# Patient Record
Sex: FEMALE | Race: BLACK OR AFRICAN AMERICAN | NOT HISPANIC OR LATINO | URBAN - METROPOLITAN AREA
[De-identification: names, ages, dates, MRNs, and addresses within clinical notes are randomized per-mention and may not be internally consistent; named-entity substitution may affect disease eponyms.]

---

## 2021-03-22 ENCOUNTER — INPATIENT (INPATIENT)
Facility: HOSPITAL | Age: 26
LOS: 3 days | Discharge: HOME | End: 2021-03-26
Attending: STUDENT IN AN ORGANIZED HEALTH CARE EDUCATION/TRAINING PROGRAM | Admitting: STUDENT IN AN ORGANIZED HEALTH CARE EDUCATION/TRAINING PROGRAM
Payer: SELF-PAY

## 2021-03-22 VITALS
HEART RATE: 130 BPM | OXYGEN SATURATION: 100 % | TEMPERATURE: 98 F | DIASTOLIC BLOOD PRESSURE: 95 MMHG | RESPIRATION RATE: 20 BRPM | SYSTOLIC BLOOD PRESSURE: 136 MMHG

## 2021-03-22 DIAGNOSIS — K85.90 ACUTE PANCREATITIS WITHOUT NECROSIS OR INFECTION, UNSPECIFIED: ICD-10-CM

## 2021-03-22 DIAGNOSIS — Z94.5 SKIN TRANSPLANT STATUS: Chronic | ICD-10-CM

## 2021-03-22 LAB
ALBUMIN SERPL ELPH-MCNC: 4.1 G/DL — SIGNIFICANT CHANGE UP (ref 3.3–5)
ALP SERPL-CCNC: 292 U/L — HIGH (ref 40–120)
ALT FLD-CCNC: 50 U/L — HIGH (ref 4–33)
ANION GAP SERPL CALC-SCNC: 28 MMOL/L — HIGH (ref 7–14)
APAP SERPL-MCNC: <15 UG/ML — SIGNIFICANT CHANGE UP (ref 15–25)
APPEARANCE UR: ABNORMAL
APTT BLD: 33.7 SEC — SIGNIFICANT CHANGE UP (ref 27–36.3)
AST SERPL-CCNC: 209 U/L — HIGH (ref 4–32)
BILIRUB SERPL-MCNC: 1.2 MG/DL — SIGNIFICANT CHANGE UP (ref 0.2–1.2)
BILIRUB UR-MCNC: NEGATIVE — SIGNIFICANT CHANGE UP
BLOOD GAS VENOUS COMPREHENSIVE RESULT: SIGNIFICANT CHANGE UP
BUN SERPL-MCNC: 19 MG/DL — SIGNIFICANT CHANGE UP (ref 7–23)
CALCIUM SERPL-MCNC: 10.4 MG/DL — SIGNIFICANT CHANGE UP (ref 8.4–10.5)
CHLORIDE SERPL-SCNC: 88 MMOL/L — LOW (ref 98–107)
CO2 SERPL-SCNC: 16 MMOL/L — LOW (ref 22–31)
COLOR SPEC: YELLOW — SIGNIFICANT CHANGE UP
CREAT SERPL-MCNC: 1.01 MG/DL — SIGNIFICANT CHANGE UP (ref 0.5–1.3)
DIFF PNL FLD: ABNORMAL
ETHANOL SERPL-MCNC: <10 MG/DL — SIGNIFICANT CHANGE UP
GLUCOSE SERPL-MCNC: 69 MG/DL — LOW (ref 70–99)
GLUCOSE UR QL: NEGATIVE — SIGNIFICANT CHANGE UP
HCG SERPL-ACNC: <5 MIU/ML — SIGNIFICANT CHANGE UP
HCT VFR BLD CALC: 35.1 % — SIGNIFICANT CHANGE UP (ref 34.5–45)
HGB BLD-MCNC: 11.8 G/DL — SIGNIFICANT CHANGE UP (ref 11.5–15.5)
IANC: 10.47 K/UL — HIGH (ref 1.5–8.5)
INR BLD: 1.33 RATIO — HIGH (ref 0.88–1.16)
KETONES UR-MCNC: ABNORMAL
LACTATE BLDV-MCNC: 3.5 MMOL/L — HIGH (ref 0.5–2)
LEUKOCYTE ESTERASE UR-ACNC: NEGATIVE — SIGNIFICANT CHANGE UP
MCHC RBC-ENTMCNC: 27.7 PG — SIGNIFICANT CHANGE UP (ref 27–34)
MCHC RBC-ENTMCNC: 33.6 GM/DL — SIGNIFICANT CHANGE UP (ref 32–36)
MCV RBC AUTO: 82.4 FL — SIGNIFICANT CHANGE UP (ref 80–100)
NITRITE UR-MCNC: NEGATIVE — SIGNIFICANT CHANGE UP
PH UR: 6.5 — SIGNIFICANT CHANGE UP (ref 5–8)
PLATELET # BLD AUTO: 372 K/UL — SIGNIFICANT CHANGE UP (ref 150–400)
POTASSIUM SERPL-MCNC: 3.4 MMOL/L — LOW (ref 3.5–5.3)
POTASSIUM SERPL-SCNC: 3.4 MMOL/L — LOW (ref 3.5–5.3)
PROT SERPL-MCNC: 9.3 G/DL — HIGH (ref 6–8.3)
PROT UR-MCNC: ABNORMAL
PROTHROM AB SERPL-ACNC: 15.1 SEC — HIGH (ref 10.6–13.6)
RBC # BLD: 4.26 M/UL — SIGNIFICANT CHANGE UP (ref 3.8–5.2)
RBC # FLD: 23.3 % — HIGH (ref 10.3–14.5)
SALICYLATES SERPL-MCNC: <5 MG/DL — LOW (ref 15–30)
SARS-COV-2 RNA SPEC QL NAA+PROBE: SIGNIFICANT CHANGE UP
SODIUM SERPL-SCNC: 132 MMOL/L — LOW (ref 135–145)
SP GR SPEC: 1.03 — HIGH (ref 1.01–1.02)
TOXICOLOGY SCREEN, DRUGS OF ABUSE, SERUM RESULT: SIGNIFICANT CHANGE UP
UROBILINOGEN FLD QL: ABNORMAL
WBC # BLD: 12.54 K/UL — HIGH (ref 3.8–10.5)
WBC # FLD AUTO: 12.54 K/UL — HIGH (ref 3.8–10.5)

## 2021-03-22 PROCEDURE — 76705 ECHO EXAM OF ABDOMEN: CPT | Mod: 26

## 2021-03-22 PROCEDURE — 99285 EMERGENCY DEPT VISIT HI MDM: CPT

## 2021-03-22 PROCEDURE — 71046 X-RAY EXAM CHEST 2 VIEWS: CPT | Mod: 26

## 2021-03-22 PROCEDURE — 99223 1ST HOSP IP/OBS HIGH 75: CPT | Mod: GC

## 2021-03-22 PROCEDURE — 74177 CT ABD & PELVIS W/CONTRAST: CPT | Mod: 26

## 2021-03-22 RX ORDER — FAMOTIDINE 10 MG/ML
20 INJECTION INTRAVENOUS ONCE
Refills: 0 | Status: COMPLETED | OUTPATIENT
Start: 2021-03-22 | End: 2021-03-22

## 2021-03-22 RX ORDER — MORPHINE SULFATE 50 MG/1
4 CAPSULE, EXTENDED RELEASE ORAL ONCE
Refills: 0 | Status: DISCONTINUED | OUTPATIENT
Start: 2021-03-22 | End: 2021-03-22

## 2021-03-22 RX ORDER — SODIUM CHLORIDE 9 MG/ML
1000 INJECTION INTRAMUSCULAR; INTRAVENOUS; SUBCUTANEOUS ONCE
Refills: 0 | Status: COMPLETED | OUTPATIENT
Start: 2021-03-22 | End: 2021-03-22

## 2021-03-22 RX ORDER — ACETAMINOPHEN 500 MG
975 TABLET ORAL ONCE
Refills: 0 | Status: COMPLETED | OUTPATIENT
Start: 2021-03-22 | End: 2021-03-22

## 2021-03-22 RX ORDER — HYDROMORPHONE HYDROCHLORIDE 2 MG/ML
0.5 INJECTION INTRAMUSCULAR; INTRAVENOUS; SUBCUTANEOUS ONCE
Refills: 0 | Status: DISCONTINUED | OUTPATIENT
Start: 2021-03-22 | End: 2021-03-22

## 2021-03-22 RX ORDER — ONDANSETRON 8 MG/1
4 TABLET, FILM COATED ORAL ONCE
Refills: 0 | Status: COMPLETED | OUTPATIENT
Start: 2021-03-22 | End: 2021-03-22

## 2021-03-22 RX ORDER — DEXTROSE MONOHYDRATE, SODIUM CHLORIDE, AND POTASSIUM CHLORIDE 50; .745; 4.5 G/1000ML; G/1000ML; G/1000ML
1000 INJECTION, SOLUTION INTRAVENOUS
Refills: 0 | Status: DISCONTINUED | OUTPATIENT
Start: 2021-03-22 | End: 2021-03-23

## 2021-03-22 RX ADMIN — SODIUM CHLORIDE 1000 MILLILITER(S): 9 INJECTION INTRAMUSCULAR; INTRAVENOUS; SUBCUTANEOUS at 14:28

## 2021-03-22 RX ADMIN — ONDANSETRON 4 MILLIGRAM(S): 8 TABLET, FILM COATED ORAL at 14:04

## 2021-03-22 RX ADMIN — MORPHINE SULFATE 4 MILLIGRAM(S): 50 CAPSULE, EXTENDED RELEASE ORAL at 20:22

## 2021-03-22 RX ADMIN — MORPHINE SULFATE 4 MILLIGRAM(S): 50 CAPSULE, EXTENDED RELEASE ORAL at 14:04

## 2021-03-22 RX ADMIN — HYDROMORPHONE HYDROCHLORIDE 0.5 MILLIGRAM(S): 2 INJECTION INTRAMUSCULAR; INTRAVENOUS; SUBCUTANEOUS at 22:31

## 2021-03-22 RX ADMIN — Medication 975 MILLIGRAM(S): at 13:56

## 2021-03-22 RX ADMIN — MORPHINE SULFATE 4 MILLIGRAM(S): 50 CAPSULE, EXTENDED RELEASE ORAL at 14:28

## 2021-03-22 RX ADMIN — DEXTROSE MONOHYDRATE, SODIUM CHLORIDE, AND POTASSIUM CHLORIDE 150 MILLILITER(S): 50; .745; 4.5 INJECTION, SOLUTION INTRAVENOUS at 22:44

## 2021-03-22 RX ADMIN — SODIUM CHLORIDE 1000 MILLILITER(S): 9 INJECTION INTRAMUSCULAR; INTRAVENOUS; SUBCUTANEOUS at 13:55

## 2021-03-22 RX ADMIN — MORPHINE SULFATE 4 MILLIGRAM(S): 50 CAPSULE, EXTENDED RELEASE ORAL at 17:08

## 2021-03-22 RX ADMIN — Medication 975 MILLIGRAM(S): at 14:28

## 2021-03-22 RX ADMIN — Medication 30 MILLILITER(S): at 13:56

## 2021-03-22 RX ADMIN — SODIUM CHLORIDE 1000 MILLILITER(S): 9 INJECTION INTRAMUSCULAR; INTRAVENOUS; SUBCUTANEOUS at 19:01

## 2021-03-22 RX ADMIN — FAMOTIDINE 20 MILLIGRAM(S): 10 INJECTION INTRAVENOUS at 13:56

## 2021-03-22 NOTE — H&P ADULT - PROBLEM SELECTOR PLAN 2
N/V due to pancreatitis  - Prolonged QTc to 557.  Daily EKG   - Ativan 0.25mg IVP q6h PRN for n/v  - Start on CLD given patient amenable and slowly advance as tolerated  - Famotidine 20mg IV q6h PRN for reflux N/V due to pancreatitis  - Prolonged QTc to 557.  Daily EKG. Check Magnesium level.   - Ativan 0.25mg IVP q6h PRN for n/v  - Start on CLD given patient amenable and slowly advance as tolerated  - Famotidine 20mg IV q6h PRN for reflux  - Hypokalemia likely secondary to emesis, likely causing biphasic T waves (down then up, inconsistent with ischemic biphasic T waves).  Replete with K 20mEq with IVF and PO 40mEq. Monitor BMP. N/V due to pancreatitis  - Prolonged QTc to 557.  Daily EKG. Check Magnesium level.   - Ativan 0.25mg IVP q6h PRN for n/v  - Start on CLD given patient amenable and slowly advance as tolerated  - Hypokalemia likely secondary to emesis, likely causing biphasic T waves (down then up, inconsistent with ischemic biphasic T waves).  Replete with K 20mEq with IVF and PO 40mEq. Monitor BMP. N/V due to pancreatitis  - Prolonged QTc to 595 ms. Daily EKG. Check Magnesium level.   - Ativan 0.25mg IVP q6h PRN for N/V  - Start on clear liquid diet given patient amenable and slowly advance as tolerated  - Hypokalemia likely secondary to emesis, likely causing biphasic T waves (down then up, inconsistent with ischemic biphasic T waves).  Replete with K 20mEq with IVF and PO 40mEq. Monitor BMP.

## 2021-03-22 NOTE — ED ADULT NURSE NOTE - NSIMPLEMENTINTERV_GEN_ALL_ED
Implemented All Universal Safety Interventions:  [place] to call system. Call bell, personal items and telephone within reach. Instruct patient to call for assistance. Room bathroom lighting operational. Non-slip footwear when patient is off stretcher. Physically safe environment: no spills, clutter or unnecessary equipment. Stretcher in lowest position, wheels locked, appropriate side rails in place.

## 2021-03-22 NOTE — ED PROVIDER NOTE - OBJECTIVE STATEMENT
26 yo female with unremarkable pmhx presenting with 2 weeks of abdominal pain, vomiting, and decreased PO. Per patient, she has had intermittent now persistent episodes of generalized mid abdominal pain, worse with laying down, with associated several vomiting episodes, blood tinged over the past 4 days. Came to ED for further evaluation. Has IUD for 6 years, LMP 1 mo ago.    Denies surgical hx, denies CP, SOB, LOC, dysuria, hematuria, vaginal bleeding.

## 2021-03-22 NOTE — H&P ADULT - ASSESSMENT
25F w/ no significant PMH presents with worsening epigastric and lower abdominal pain, n/v x 3 weeks, found to have elevated lipase and CT findings c/w acute pancreatitis.

## 2021-03-22 NOTE — ED PROVIDER NOTE - CLINICAL SUMMARY MEDICAL DECISION MAKING FREE TEXT BOX
24 yo female with unremarkable pmhx presenting with 2 weeks of abdominal pain, vomiting, and decreased PO. suggestive of gastritis vs intrabdominal pathology, will evaluate with labs, ct imaging, cxr, urine studies, will give ivf, pain control, zofran, and will reassess

## 2021-03-22 NOTE — ED ADULT NURSE REASSESSMENT NOTE - NS ED NURSE REASSESS COMMENT FT1
MD made aware of pt.'s BP. Pt. NSR on cardiac monitor. Respirations appear unlabored. MD at bedside for evaluation. Awaiting further orders. Will continue ot monitor.

## 2021-03-22 NOTE — ED ADULT NURSE NOTE - OBJECTIVE STATEMENT
pt coming from home c/o abd pain x 1 week. pain was initially in rlq and is now all over. c/o multiple episodes of n/v.  Patient brought to [facility]. Patient evaluated by MD. IV lock placed to left antecubital 20 gauge and labs drawn and sent. Pt waiting for results, fruther orders and disposition.   ALKA Bowers

## 2021-03-22 NOTE — H&P ADULT - PROBLEM SELECTOR PLAN 6
Severe hepatosteatosis possibly secondary to PACHECO vs. heavy alcohol use. Lipid profile with elevated TG to 209.  - Check viral hepatitis (B, C) panel to r/o etiologies Mild descending colonic wall thickening, likely inflammation secondary due to adjacent pancreatitis.  - Treat pancreatitis as above

## 2021-03-22 NOTE — ED PROVIDER NOTE - SHIFT CHANGE DETAILS
HARJINDER:  Patient signed out to Dr. Abraham pending ct abdomen, ruq sono.  HD stable at time of signout.

## 2021-03-22 NOTE — ED ADULT TRIAGE NOTE - CHIEF COMPLAINT QUOTE
pt coming from home c/o abd pain x 1 week. pain was initially in rlq and is now all over. c/o multiple episodes of n/v.

## 2021-03-22 NOTE — H&P ADULT - PROBLEM SELECTOR PLAN 7
DVT ppx: SC Lovenox 40mg daily  Diet: CLD, advance as tolerated Likely due to pain, possibly with undiagnosed underlying essential hypertension.  - BP down to 150s/110s s/p Dilaudid 0.5mg IV  - Treat pain as above  - Monitor BP. Would not be inclined to start antihypertensives inpatient. F/u outpatient with PCP.

## 2021-03-22 NOTE — H&P ADULT - NSHPSOCIALHISTORY_GEN_ALL_CORE
Lives with father at home.  Works as  at [employer].    Tobacco: 1 cigarette/day. Stopped 1 year ago for SOB.  ETOH: rare use. Half a cup 2 months ago.  Recreational drugs: no hx of marijuana, cocaine, heroin use Lives with father at home.  Works as  at [employer].    Tobacco: Former smoker, 1 cigarette/day. Stopped 1 year ago for SOB.  ETOH: Rare use. Half a cup 2 months ago.  Recreational drugs: No hx of marijuana, cocaine, or heroin use

## 2021-03-22 NOTE — H&P ADULT - PROBLEM SELECTOR PLAN 3
HAGMA likely d/t lactic acidosis with respiratory compensation. Likely secondary to pancreatitis and hypovolemia.  - IVF as above  - Trend lactate with VBG

## 2021-03-22 NOTE — ED PROVIDER NOTE - ATTENDING CONTRIBUTION TO CARE
Jesse I have personally performed a face to face bedside history and physical examination of this patient. I have discussed the history, examination, review of systems, assessment and plan of management with the resident. I have reviewed the electronic medical record and amended it to reflect my history, review of systems, physical exam, assessment and plan.    Brief HPI:  26 yo female with unremarkable pmhx presenting with 3 weeks of abdominal pain, vomiting, and decreased PO.  Denies fever, chills, diarrhea, bloody or dark stool.  Abdominal pain is diffuse, non-radiating, sharp.  Had 2-3 episodes of blood tinged emesis today.     Vitals:   Reviewed    Exam:    GEN:  Non-toxic appearing, non-distressed, speaking full sentences, non-diaphoretic, AAOx3  HEENT:  NCAT, neck supple, EOMI, PERRLA, sclera anicteric, no conjunctival pallor or injection, no stridor, normal voice, no tonsillar exudate, uvula midline, tympanic membranes and external auditory canals normal appearing bilaterally   CV:  regular rhythm and rate, s1/s2 audible, no murmurs, rubs or gallops, peripheral pulses 2+ and symmetric  PULM:  non-labored respirations, lungs clear to auscultation bilaterally, no wheezes, crackles or rales  ABD:  non distended, ttp in all quadrants, no rebound, no guarding, negative Burkett's sign, bowel sounds normal, no cvat  MSK:  no gross deformity, non-tender extremities and joints, range of motion grossly normal appearing, no extremity edema, extremities warm and well perfused   NEURO:  AAOx3, CN II-XII intact, motor 5/5 in upper and lower extremities bilaterally, sensation grossly intact in extremities and trunk, finger to nose testing wnl, no nystagmus, negative Romberg, no pronator drift, no gait deficit  SKIN:  warm, dry, no rash or vesicles     A/P:  26 yo female with unremarkable pmhx presenting with 3 weeks of abdominal pain, vomiting, and decreased PO.  Reports 2-3 episodes of blood tinged emesis.  VSS.  No active hematemesis in dept.  Abdomen diffusely tender but non-peritoneal.  Suspect lisa wilson tear given extensive vomiting.  Possible inflammatory or infectious etiology of abdominal pain such as pancreatitis, acute elinor, or appendicitis.  Will send labs, ua, ct, supportive care.  Dispo pending.

## 2021-03-22 NOTE — H&P ADULT - NSHPREVIEWOFSYSTEMS_GEN_ALL_CORE
General: + subjective fever, diaphoresis, generalized weakness. No weight loss  HEENT: No headaches, acute visual changes. + sore throat  CVS: No palpitations  Resp: + mild cough. No dyspnea, wheezing  GI: + abdominal pain, nausea, vomiting. No constipation, diarrhea, hematochezia, melena  : No dysuria, hematuria, or discharge  MSK: No joint pain or swelling  Ext: No edema, no claudication  Skin: No rashes or itching  Heme: No easy bruising or petechiae  Neuro: No confusion, focal weakness  Endocrine: + polyuria, polydipsia Constitutional: +Generalized weakness, fevers, and diaphoresis. No weight loss.  Eyes: No visual changes, double vision, or eye pain  Ears, Nose, Mouth, Throat: +Sore throat. No runny nose, sinus pain, ear pain, tinnitus, dysphagia, or odynophagia.  Cardiovascular: No chest pain, palpitations, or LE edema  Respiratory: +Mild cough. No wheezing, hemoptysis, or SOB.  Gastrointestinal: +Abdominal pain, nausea, and vomiting. No diarrhea, constipation, hematemesis, melena, or BRBPR.  Genitourinary: +Frequent urination. +Polyuria. No dysuria or hematuria.  Musculoskeletal: No joint pain, joint swelling, or decreased ROM  Skin: No pruritus or rashes  Neurologic: No seizures, headache, paresthesias, numbness, or limb weakness  Psychiatric: No depression, anxiety, or agitation  Endocrine: +Polydipsia. No heat/cold intolerance, mood swings, or sweats.  Hematologic/lymphatic: No purpura, petechia, or prolonged or excessive bleeding after dental extraction / injury  Allergic/Immunologic: No anaphylaxis or allergic response to materials, foods, animals    Positives and pertinent negatives noted and all other systems negative.

## 2021-03-22 NOTE — H&P ADULT - PROBLEM SELECTOR PLAN 4
Mild descending colonic wall thickening, likely inflammation secondary due to adjacent pancreatitis.  - Treat pancreatitis as above Reported dark brown emesis in last 3 weeks and 1 episode of bright red blood-tinged emesis yesterday, may be secondary to upper GI bleed.  Possibly secondary to gastropathy vs. esophagitis.  - H/H stable, vitals stable  - GI consult  - Monitor H/H, maintain active T&S Reported dark brown emesis in last 3 weeks and 1 episode of bright red blood-tinged emesis yesterday, may be secondary to upper GI bleed.  Possibly secondary to gastropathy vs. esophagitis.  - H/H stable, vitals stable  - Monitor H/H, maintain active T&S  - GI consult  - Start pantoprazole 40 IV BID Reported dark brown emesis in last 3 weeks and 1 episode of bright red blood-tinged emesis yesterday, may be secondary to upper GI bleed.  Possibly secondary to gastropathy vs. esophagitis vs. Lili Dsouza tear.  - H/H stable, vitals stable  - Monitor H/H, maintain active T&S  - GI consult emailed  - Start pantoprazole 40 IV BID

## 2021-03-22 NOTE — H&P ADULT - PROBLEM SELECTOR PLAN 5
Likely due to pain, possibly with undiagnosed underlying essential hypertension.  - BP down to 150s/110s s/p Dilaudid 0.5mg IV  - Treat pain as above  - Monitor BP. Would not be inclined to start antihypertensives inpatient. F/u outpatient with PCP. Severe hepatosteatosis possibly secondary to PACHECO vs. heavy alcohol use. Lipid profile with elevated TG to 209.  - Check viral hepatitis (B, C) panel to r/o etiologies

## 2021-03-22 NOTE — H&P ADULT - NSHPPHYSICALEXAM_GEN_ALL_CORE
Vital Signs Last 24 Hrs  T(C): 36.8 (22 Mar 2021 22:16), Max: 36.8 (22 Mar 2021 22:16)  T(F): 98.2 (22 Mar 2021 22:16), Max: 98.2 (22 Mar 2021 22:16)  HR: 92 (22 Mar 2021 22:16) (92 - 130)  BP: 151/111 (22 Mar 2021 22:16) (136/95 - 172/121)  BP(mean): --  RR: 18 (22 Mar 2021 22:16) (18 - 20)  SpO2: 100% (22 Mar 2021 22:16) (100% - 100%)    Physical Exam:  Gen: Alert, NAD, drowsy  HEENT: NCAT, EOMI, clear conjunctiva, no scleral icterus, no erythema or exudates in the oropharynx  Neck: Supple, no LAD  CV: Tachycardic, Regular rhythm, S1S2, no m/r/g  Resp: CTAB, normal respiratory effort  Abd: Soft, mildly distended, TTP in epigastric and diffuse lower abdomen  Ext: no edema, no clubbing or cyanosis  Neuro: AOx3, CN2-12 grossly intact, TURNER  Skin: warm, perfused Vital Signs Last 24 Hrs  T(C): 36.8 (22 Mar 2021 22:16), Max: 36.8 (22 Mar 2021 22:16)  T(F): 98.2 (22 Mar 2021 22:16), Max: 98.2 (22 Mar 2021 22:16)  HR: 92 (22 Mar 2021 22:16) (92 - 130)  BP: 151/111 (22 Mar 2021 22:16) (136/95 - 172/121)  BP(mean): --  RR: 18 (22 Mar 2021 22:16) (18 - 20)  SpO2: 100% (22 Mar 2021 22:16) (100% - 100%)    PHYSICAL EXAM:  General: Drowsy, in no acute distress.  Head: Normocephalic, atraumatic.    Eyes: PERRL.  EOMI.  No scleral icterus.  No conjunctival pallor.  Mouth: Moist MM.  No oropharyngeal exudates.    Neck: Supple.  Full range of motion.  No JVD.  No LAD.  No thyromegaly.  Trachea midline.    Heart: Tachycardic.  Normal S1 and S2.  No murmurs, rubs, or gallops.  No LE edema b/l.   Lungs: Nonlabored breathing.  Good inspiratory effort.  CTAB.  No wheezes, crackles, or rhonchi.    Abdomen: BS+, soft, mildly distended, TTP in epigastric and diffuse lower abdomen with no rebound or guarding. No hepatomegaly.   Skin: Warm and dry.  No rashes.  Extremities: No cyanosis.  2+ peripheral pulses b/l.  Musculoskeletal: No joint deformities.  No spinal or paraspinal tenderness.  Neuro: A&Ox3.  CN II-XII intact.  5/5 motor strength in UE and LE b/l.  Tactile sensation intact in UE and LE b/l.  No focal deficits.

## 2021-03-22 NOTE — H&P ADULT - PROBLEM SELECTOR PLAN 1
Meets criteria for acute pancreatitis: worsening epigastric pain with n/v x 3 weeks, elevated lipase of 388, and peripancreatic fat stranding on CT. No gallstone or gallbladder abnormalities on RUQ u/s. TG elevated to 209 but not >1000. Not on any medications that can induce pancreatitis. No hypercalcemia. Possibly secondary alcohol use (although patient denies hx) vs. biliary obstruction vs. infections (HIV, Mumps, coxsackievirus, hepatitis B, CMV, VZV, HSV, Mycoplasma, Legionella, Leptospira, Salmonella, Toxoplasma, Cryptosporidium, Ascaris) vs. idiopathic  - Drain's criteria likely 0 points on assumption that LDH is <350. Severe pancreatitis unlikely. Will check LDH with AM labs.  Recheck Dinesh's criteria at 48 hour mt into admission.  - Treat supportively with IVF. S/p 2L NS. Now on maintenance NS @150cc/hr with K repletion.  - Pain control with IV dilaudid 0.5mg IV q6h PRN for severe pain, Tylenol 650 PO q8h for mild-moderate pain  - Start on CLD given patient amenable and slowly advance as tolerated  - Check HIV, Hepatitis B  - GI consult Meets criteria for acute pancreatitis: worsening epigastric pain with n/v x 3 weeks, elevated lipase of 388, and peripancreatic fat stranding on CT. No gallstone or gallbladder abnormalities on RUQ u/s. TG elevated to 209 but not >1000. Not on any medications that can induce pancreatitis. No hypercalcemia. Possibly secondary alcohol use (although patient denies hx) vs. biliary obstruction vs. infections (HIV, hepatitis B, CMV, HSV, Legionella, Leptospira, Salmonella, Toxoplasma) vs. idiopathic  - Dinesh's criteria likely 0 points on assumption that LDH is <350. Severe pancreatitis unlikely. Will check LDH with AM labs.  Recheck [place]'s criteria at 48 hour mt into admission.  - Treat supportively with IVF. S/p 2L NS. Now on maintenance NS @150cc/hr with K repletion.  - Pain control with IV dilaudid 0.5mg IV q6h PRN for severe pain, Tylenol 650 PO q8h for mild-moderate pain  - Start on CLD given patient amenable and slowly advance as tolerated  - Check HIV, Hepatitis B  - GI consult Meets criteria for acute pancreatitis: worsening epigastric pain with n/v x 3 weeks, elevated lipase of 388, and peripancreatic fat stranding on CT. No gallstone or gallbladder abnormalities on RUQ u/s. TG elevated to 209 but not >1000. Not on any medications that can induce pancreatitis. No hypercalcemia. Biliary duct caliber normal. Possibly secondary alcohol use (although patient denies hx) vs. infections (HIV, hepatitis B, CMV, HSV, Legionella, Leptospira, Salmonella, Toxoplasma) vs. idiopathic  - Dinesh's criteria likely 0 points on assumption that LDH is <350. Severe pancreatitis unlikely. Will check LDH with AM labs.  Recheck [place]'s criteria at 48 hour mt into admission.  - Treat supportively with IVF. S/p 2L NS. Now on maintenance NS @150cc/hr with K repletion.  - Pain control with IV dilaudid 0.5mg IV q6h PRN for severe pain, Tylenol 650 PO q8h for mild-moderate pain  - Start on CLD given patient amenable and slowly advance as tolerated  - Check HIV, Hepatitis B  - GI consult Meets criteria for acute pancreatitis: worsening epigastric pain with n/v x 3 weeks, elevated lipase of 388, and peripancreatic fat stranding on CT. No gallstone or gallbladder abnormalities on RUQ u/s. TG elevated to 209 but not >1000. Not on any medications that can induce pancreatitis. No hypercalcemia. Biliary duct caliber normal. Possibly secondary hypertriglyceridemia (moderate) vs. alcohol use (although patient denies hx, AST:ALT ratio > 2:1) vs. infections (HIV, hepatitis B, CMV, HSV, Legionella, Leptospira, Salmonella, Toxoplasma) vs. idiopathic  - [place]'s criteria likely 0 points on assumption that LDH is <350. Severe pancreatitis unlikely. Will check LDH with AM labs.  Recheck Dinesh's criteria at 48 hour mt into admission.  - Treat supportively with IVF. S/p 2L NS. Now on maintenance NS @150cc/hr with K repletion.  - Pain control with IV dilaudid 0.5mg IV q6h PRN for severe pain, Tylenol 650 PO q8h for mild-moderate pain  - Start on CLD given patient amenable and slowly advance as tolerated  - Check HIV, Hepatitis B  - GI consult Meets criteria for acute pancreatitis: worsening epigastric pain with n/v x 3 weeks, elevated lipase of 388, and peripancreatic fat stranding on CT. No gallstone or gallbladder abnormalities on RUQ u/s. TG elevated to 209 but not >1000. Not on any medications that can induce pancreatitis. No hypercalcemia. Biliary duct caliber normal. Possibly secondary hypertriglyceridemia (moderate) vs. alcohol use (although patient denies hx, AST:ALT ratio > 2:1) vs. infections (HIV, hepatitis B, CMV, HSV, Legionella, Leptospira, Salmonella, Toxoplasma) vs. idiopathic  - [place]'s criteria likely 0 points on assumption that LDH is <350. Severe pancreatitis unlikely. Will check LDH with AM labs.  Recheck Dinesh's criteria at 48 hour mt into admission.  - Treat supportively with IVF. S/p 2L NS. Start maintenance LR @150cc/hr with K repletion.  - Pain control with Oxycodone PO 5mg q4h PRN for severe pain, Tylenol 650 PO q6h for mild-moderate pain  - Start on CLD given patient amenable and slowly advance as tolerated  - Check HIV, Hepatitis B  - GI consult Meets criteria for acute pancreatitis: worsening epigastric pain with N/V x 3 weeks, elevated lipase of 388, and peripancreatic fat stranding on CT. No gallstone or gallbladder abnormalities on RUQ U/S. TG elevated to 209 but not >1000. Not on any medications that can induce pancreatitis. No hypercalcemia. Biliary duct caliber normal. Possibly secondary to hypertriglyceridemia (moderate) vs. alcohol use (although patient denies hx, AST:ALT ratio > 2:1) vs. infections (HIV, hepatitis B, CMV, HSV, Legionella, Leptospira, Salmonella, Toxoplasma) vs. idiopathic.  - [place]'s criteria likely 0 points on assumption that LDH is <350. Severe pancreatitis unlikely. Will check LDH with AM labs.  Recheck Dinesh's criteria at 48 hour mt into admission.  - Treat supportively with IVF. S/p 2L NS. Start maintenance LR @150cc/hr with K repletion.  - Pain control with Oxycodone PO 5mg q4h PRN for severe pain, Tylenol 650 PO q6h for mild-moderate pain  - Start on CLD given patient amenable and slowly advance as tolerated  - Check HIV, Hepatitis B  - GI consult

## 2021-03-22 NOTE — H&P ADULT - NSHPLABSRESULTS_GEN_ALL_CORE
Vital Signs Last 24 Hrs  T(C): 36.8 (22 Mar 2021 22:16), Max: 36.8 (22 Mar 2021 22:16)  T(F): 98.2 (22 Mar 2021 22:16), Max: 98.2 (22 Mar 2021 22:16)  HR: 92 (22 Mar 2021 22:16) (92 - 130)  BP: 151/111 (22 Mar 2021 22:16) (136/95 - 172/121)  BP(mean): --  RR: 18 (22 Mar 2021 22:16) (18 - 20)  SpO2: 100% (22 Mar 2021 22:16) (100% - 100%)    LABS: Personally reviewed labs, imaging, and ECG               11.8   12.54 )-----------( 372      ( 22 Mar 2021 13:33 )             35.1       -    132<L>  |  88<L>  |  19  ----------------------------<  69<L>  3.4<L>   |  16<L>  |  1.01    Ca    10.4      22 Mar 2021 13:33    TPro  9.3<H>  /  Alb  4.1  /  TBili  1.2  /  DBili  x   /  AST  209<H>  /  ALT  50<H>  /  AlkPhos  292<H>         LIVER FUNCTIONS - ( 22 Mar 2021 13:33 )  Alb: 4.1 g/dL / Pro: 9.3 g/dL / ALK PHOS: 292 U/L / ALT: 50 U/L / AST: 209 U/L / GGT: x             Urinalysis Basic - ( 22 Mar 2021 16:24 )    Color: Yellow / Appearance: Slightly Turbid / S.031 / pH: x  Gluc: x / Ketone: Large  / Bili: Negative / Urobili: 3 mg/dL   Blood: x / Protein: 300 mg/dL / Nitrite: Negative   Leuk Esterase: Negative / RBC: 5 /HPF / WBC 4 /HPF   Sq Epi: x / Non Sq Epi: 10 /HPF / Bacteria: Few      PT/INR - ( 22 Mar 2021 17:32 )   PT: 15.1 sec;   INR: 1.33 ratio    PTT - ( 22 Mar 2021 17:32 )  PTT:33.7 sec    CAPILLARY BLOOD GLUCOSE  POCT Blood Glucose.: 72 mg/dL (22 Mar 2021 20:36)    Salicylate Level, Serum (21 @ 17:32)   Salicylate Level, Serum: <5.0  Alcohol, Blood (21 @ 17:32)   Alcohol, Blood: <10: <10 mg/dL = Negative mg/dL  Acetaminophen Level, Serum (21 @ 17:32)   Acetaminophen Level, Serum: <15: Acetaminophen values are related to time of ingestion.       RADIOLOGY & ADDITIONAL TESTS:    CT Abdomen and Pelvis w/ IV Cont (21 @ 17:37)  FINDINGS:  LOWER CHEST: Within normal limits.  LIVER: Severe steatosis. Hepatomegaly.  BILE DUCTS: Normal caliber.  GALLBLADDER: Within normal limits.  SPLEEN: Within normal limits.  PANCREAS: Peripancreatic fat stranding predominantly involving the head and body. Normal pancreatic enhancement. The pancreatic duct is normal in diameter. No peripancreatic fluid collection.  ADRENALS: Within normal limits.  KIDNEYS/URETERS: Within normal limits.  BLADDER: Within normal limits.  REPRODUCTIVE ORGANS: Intrauterine device in place.  BOWEL: Mild descending colonic wall thickening, likely inflammation secondary due to adjacent pancreatitis. No bowel obstruction. Appendix is normal.  PERITONEUM: No ascites.  VESSELS: Within normal limits.  RETROPERITONEUM/LYMPH NODES: No lymphadenopathy.  ABDOMINAL WALL: Within normal limits.  BONES: Within normal limits.  IMPRESSION:  Acute pancreatitis. No peripancreatic fluid collection. Normal parenchymal enhancement.  Hepatomegaly and severe hepatic steatosis      US Abdomen Upper Quadrant Right (21 @ 16:48)  IMPRESSION:  Hepatomegaly. Increased echogenicity of the hepatic parenchyma suggests hepatic parenchymal fatty infiltration. No gallstones, gallbladder wall thickening or pericholecystic fluid.    Xray Chest 2 Views PA/Lat (21 @ 14:53)  IMPRESSION: Normal chest Vital Signs Last 24 Hrs  T(C): 36.8 (22 Mar 2021 22:16), Max: 36.8 (22 Mar 2021 22:16)  T(F): 98.2 (22 Mar 2021 22:16), Max: 98.2 (22 Mar 2021 22:16)  HR: 92 (22 Mar 2021 22:16) (92 - 130)  BP: 151/111 (22 Mar 2021 22:16) (136/95 - 172/121)  BP(mean): --  RR: 18 (22 Mar 2021 22:16) (18 - 20)  SpO2: 100% (22 Mar 2021 22:16) (100% - 100%)    LABS: Personally reviewed labs, imaging, and ECG               11.8   12.54 )-----------( 372      ( 22 Mar 2021 13:33 )             35.1       -    132<L>  |  88<L>  |  19  ----------------------------<  69<L>  3.4<L>   |  16<L>  |  1.01    Ca    10.4      22 Mar 2021 13:33    TPro  9.3<H>  /  Alb  4.1  /  TBili  1.2  /  DBili  x   /  AST  209<H>  /  ALT  50<H>  /  AlkPhos  292<H>         LIVER FUNCTIONS - ( 22 Mar 2021 13:33 )  Alb: 4.1 g/dL / Pro: 9.3 g/dL / ALK PHOS: 292 U/L / ALT: 50 U/L / AST: 209 U/L / GGT: x             Urinalysis Basic - ( 22 Mar 2021 16:24 )    Color: Yellow / Appearance: Slightly Turbid / S.031 / pH: x  Gluc: x / Ketone: Large  / Bili: Negative / Urobili: 3 mg/dL   Blood: x / Protein: 300 mg/dL / Nitrite: Negative   Leuk Esterase: Negative / RBC: 5 /HPF / WBC 4 /HPF   Sq Epi: x / Non Sq Epi: 10 /HPF / Bacteria: Few      PT/INR - ( 22 Mar 2021 17:32 )   PT: 15.1 sec;   INR: 1.33 ratio    PTT - ( 22 Mar 2021 17:32 )  PTT:33.7 sec    CAPILLARY BLOOD GLUCOSE  POCT Blood Glucose.: 72 mg/dL (22 Mar 2021 20:36)    Salicylate Level, Serum (21 @ 17:32)   Salicylate Level, Serum: <5.0  Alcohol, Blood (21 @ 17:32)   Alcohol, Blood: <10: <10 mg/dL = Negative mg/dL  Acetaminophen Level, Serum (21 @ 17:32)   Acetaminophen Level, Serum: <15: Acetaminophen values are related to time of ingestion.       EKG:  Sinus tachycardia  biphasic T wave (down then up) in V1-V6  QTc 557      RADIOLOGY & ADDITIONAL TESTS:    CT Abdomen and Pelvis w/ IV Cont (21 @ 17:37)  FINDINGS:  LOWER CHEST: Within normal limits.  LIVER: Severe steatosis. Hepatomegaly.  BILE DUCTS: Normal caliber.  GALLBLADDER: Within normal limits.  SPLEEN: Within normal limits.  PANCREAS: Peripancreatic fat stranding predominantly involving the head and body. Normal pancreatic enhancement. The pancreatic duct is normal in diameter. No peripancreatic fluid collection.  ADRENALS: Within normal limits.  KIDNEYS/URETERS: Within normal limits.  BLADDER: Within normal limits.  REPRODUCTIVE ORGANS: Intrauterine device in place.  BOWEL: Mild descending colonic wall thickening, likely inflammation secondary due to adjacent pancreatitis. No bowel obstruction. Appendix is normal.  PERITONEUM: No ascites.  VESSELS: Within normal limits.  RETROPERITONEUM/LYMPH NODES: No lymphadenopathy.  ABDOMINAL WALL: Within normal limits.  BONES: Within normal limits.  IMPRESSION:  Acute pancreatitis. No peripancreatic fluid collection. Normal parenchymal enhancement.  Hepatomegaly and severe hepatic steatosis      US Abdomen Upper Quadrant Right (21 @ 16:48)  IMPRESSION:  Hepatomegaly. Increased echogenicity of the hepatic parenchyma suggests hepatic parenchymal fatty infiltration. No gallstones, gallbladder wall thickening or pericholecystic fluid.    Xray Chest 2 Views PA/Lat (21 @ 14:53)  IMPRESSION: Normal chest LABS: Personally reviewed labs, imaging, and ECG               11.8   12.54 )-----------( 372      ( 22 Mar 2021 13:33 )             35.1           132<L>  |  88<L>  |  19  ----------------------------<  69<L>  3.4<L>   |  16<L>  |  1.01    Ca    10.4      22 Mar 2021 13:33    TPro  9.3<H>  /  Alb  4.1  /  TBili  1.2  /  DBili  x   /  AST  209<H>  /  ALT  50<H>  /  AlkPhos  292<H>         LIVER FUNCTIONS - ( 22 Mar 2021 13:33 )  Alb: 4.1 g/dL / Pro: 9.3 g/dL / ALK PHOS: 292 U/L / ALT: 50 U/L / AST: 209 U/L / GGT: x             Urinalysis Basic - ( 22 Mar 2021 16:24 )    Color: Yellow / Appearance: Slightly Turbid / S.031 / pH: x  Gluc: x / Ketone: Large  / Bili: Negative / Urobili: 3 mg/dL   Blood: x / Protein: 300 mg/dL / Nitrite: Negative   Leuk Esterase: Negative / RBC: 5 /HPF / WBC 4 /HPF   Sq Epi: x / Non Sq Epi: 10 /HPF / Bacteria: Few      PT/INR - ( 22 Mar 2021 17:32 )   PT: 15.1 sec;   INR: 1.33 ratio    PTT - ( 22 Mar 2021 17:32 )  PTT:33.7 sec    CAPILLARY BLOOD GLUCOSE  POCT Blood Glucose.: 72 mg/dL (22 Mar 2021 20:36)    Salicylate Level, Serum (21 @ 17:32)   Salicylate Level, Serum: <5.0  Alcohol, Blood (21 @ 17:32)   Alcohol, Blood: <10: <10 mg/dL = Negative mg/dL  Acetaminophen Level, Serum (21 @ 17:32)   Acetaminophen Level, Serum: <15: Acetaminophen values are related to time of ingestion.       EKG:  Sinus tachycardia  biphasic T wave (down then up) in V1-V6  QTc 557      RADIOLOGY & ADDITIONAL TESTS:    CT Abdomen and Pelvis w/ IV Cont (21 @ 17:37)  FINDINGS:  LOWER CHEST: Within normal limits.  LIVER: Severe steatosis. Hepatomegaly.  BILE DUCTS: Normal caliber.  GALLBLADDER: Within normal limits.  SPLEEN: Within normal limits.  PANCREAS: Peripancreatic fat stranding predominantly involving the head and body. Normal pancreatic enhancement. The pancreatic duct is normal in diameter. No peripancreatic fluid collection.  ADRENALS: Within normal limits.  KIDNEYS/URETERS: Within normal limits.  BLADDER: Within normal limits.  REPRODUCTIVE ORGANS: Intrauterine device in place.  BOWEL: Mild descending colonic wall thickening, likely inflammation secondary due to adjacent pancreatitis. No bowel obstruction. Appendix is normal.  PERITONEUM: No ascites.  VESSELS: Within normal limits.  RETROPERITONEUM/LYMPH NODES: No lymphadenopathy.  ABDOMINAL WALL: Within normal limits.  BONES: Within normal limits.  IMPRESSION:  Acute pancreatitis. No peripancreatic fluid collection. Normal parenchymal enhancement.  Hepatomegaly and severe hepatic steatosis      US Abdomen Upper Quadrant Right (21 @ 16:48)  IMPRESSION:  Hepatomegaly. Increased echogenicity of the hepatic parenchyma suggests hepatic parenchymal fatty infiltration. No gallstones, gallbladder wall thickening or pericholecystic fluid.    Xray Chest 2 Views PA/Lat (21 @ 14:53)  IMPRESSION: Normal chest LABS: Personally reviewed labs, imaging, and ECG               11.8   12.54 )-----------( 372      ( 22 Mar 2021 13:33 )             35.1           132<L>  |  88<L>  |  19  ----------------------------<  69<L>  3.4<L>   |  16<L>  |  1.01    Ca    10.4      22 Mar 2021 13:33    TPro  9.3<H>  /  Alb  4.1  /  TBili  1.2  /  DBili  x   /  AST  209<H>  /  ALT  50<H>  /  AlkPhos  292<H>       LIVER FUNCTIONS - ( 22 Mar 2021 13:33 )  Alb: 4.1 g/dL / Pro: 9.3 g/dL / ALK PHOS: 292 U/L / ALT: 50 U/L / AST: 209 U/L / GGT: x           Urinalysis Basic - ( 22 Mar 2021 16:24 )    Color: Yellow / Appearance: Slightly Turbid / S.031 / pH: x  Gluc: x / Ketone: Large  / Bili: Negative / Urobili: 3 mg/dL   Blood: x / Protein: 300 mg/dL / Nitrite: Negative   Leuk Esterase: Negative / RBC: 5 /HPF / WBC 4 /HPF   Sq Epi: x / Non Sq Epi: 10 /HPF / Bacteria: Few    PT/INR - ( 22 Mar 2021 17:32 )   PT: 15.1 sec;   INR: 1.33 ratio    PTT - ( 22 Mar 2021 17:32 )  PTT:33.7 sec    CAPILLARY BLOOD GLUCOSE  POCT Blood Glucose.: 72 mg/dL (22 Mar 2021 20:36)    Salicylate Level, Serum (21 @ 17:32)   Salicylate Level, Serum: <5.0  Alcohol, Blood (21 @ 17:32)   Alcohol, Blood: <10: <10 mg/dL = Negative mg/dL  Acetaminophen Level, Serum (21 @ 17:32)   Acetaminophen Level, Serum: <15: Acetaminophen values are related to time of ingestion.       EKG PERSONALLY REVIEWED:  Sinus tachycardia at 127 bpm  Biphasic T wave (down then up) in V1-V6  QTc 595 ms    IMAGING PERSONALLY REVIEWED.  CT Abdomen and Pelvis w/ IV Cont (21 @ 17:37)  FINDINGS:  LOWER CHEST: Within normal limits.  LIVER: Severe steatosis. Hepatomegaly.  BILE DUCTS: Normal caliber.  GALLBLADDER: Within normal limits.  SPLEEN: Within normal limits.  PANCREAS: Peripancreatic fat stranding predominantly involving the head and body. Normal pancreatic enhancement. The pancreatic duct is normal in diameter. No peripancreatic fluid collection.  ADRENALS: Within normal limits.  KIDNEYS/URETERS: Within normal limits.  BLADDER: Within normal limits.  REPRODUCTIVE ORGANS: Intrauterine device in place.  BOWEL: Mild descending colonic wall thickening, likely inflammation secondary due to adjacent pancreatitis. No bowel obstruction. Appendix is normal.  PERITONEUM: No ascites.  VESSELS: Within normal limits.  RETROPERITONEUM/LYMPH NODES: No lymphadenopathy.  ABDOMINAL WALL: Within normal limits.  BONES: Within normal limits.  IMPRESSION:  Acute pancreatitis. No peripancreatic fluid collection. Normal parenchymal enhancement.  Hepatomegaly and severe hepatic steatosis      US Abdomen Upper Quadrant Right (21 @ 16:48)  IMPRESSION:  Hepatomegaly. Increased echogenicity of the hepatic parenchyma suggests hepatic parenchymal fatty infiltration. No gallstones, gallbladder wall thickening or pericholecystic fluid.    Xray Chest 2 Views PA/Lat (21 @ 14:53)  IMPRESSION: Normal chest

## 2021-03-22 NOTE — H&P ADULT - HISTORY OF PRESENT ILLNESS
25F w/ no PMH who presents with 3 weeks of abdominal pain and nausea/vomiting.  Patient reports pain is in epigastric region to lower substernal chest region and in the lower abdominal region as well.  Pain is burning/sharp and worsened by eating liquids and solids.  Pain is not exacerbated by lying down or bending over.  Patient has been taking ibuprofen at home with only mild relief.  Pain was intermittent and has progressed to persistent.  Reports persistent nausea and emesis with food intake.  Emesis has been brown (chocolate colored) prior to yesterday.  Yesterday, she reports one of her emesis was blood-tinged and green.  Currently she reports mild nausea and abdominal pain is 8/10.  Denies previous hx of these symptoms.  Denies known history of cholesterol issues, gallbladder/gallstone issues, alcohol or opioid use.  Admits to subjective fever at home with diaphoresis, generalized weakness, excessive thirst, frequent urination.  C/o drowsiness post morphine.  Denies weight change, headaches, vision changes, SOB, leg pain, rash, blood in urine. Denies family hx of GI/pancreatic/biliary disease.  Has copper IUD, menstruates, and started her menses today.    In ED: Received 2L NS, tylenol 975mg PO, Maalox 30mL, Famotidine 20mg IVP, Morphine 4mg IVP x2, and Zofran 4mg IVP.  Vitals: afebrile, HR 130s -> 100-110s, BP 130s/90s -> 170s/110-120s, RR 20, SpO2 100% on RA.

## 2021-03-22 NOTE — H&P ADULT - PROBLEM SELECTOR PLAN 9
Transitions of Care Status:  1.  Name of PCP: Dr. Andrei Serrato ([phone])  2.  PCP Contacted on Admission: [ ] Y    [x ] N    3.  PCP contacted at Discharge: [ ] Y    [ ] N    [ ] N/A  4.  Post-Discharge Appointment Date and Location:  5.  Summary of Handoff given to PCP: DVT ppx: hold for possible GI bleed  Diet: CLD, advance as tolerated

## 2021-03-22 NOTE — ED PROVIDER NOTE - NS ED ATTENDING STATEMENT MOD
I have personally seen and examined this patient.  I have fully participated in the care of this patient. I have reviewed all pertinent clinical information, including history, physical exam, plan and the Resident’s note and agree except as noted. Attending with

## 2021-03-22 NOTE — H&P ADULT - PROBLEM SELECTOR PLAN 8
Transitions of Care Status:  1.  Name of PCP: Dr. Andrei Serrato ([phone])  2.  PCP Contacted on Admission: [ ] Y    [x ] N    3.  PCP contacted at Discharge: [ ] Y    [ ] N    [ ] N/A  4.  Post-Discharge Appointment Date and Location:  5.  Summary of Handoff given to PCP: DVT ppx: SC Lovenox 40mg daily  Diet: CLD, advance as tolerated DVT ppx: hold for possible GI bleed  Diet: CLD, advance as tolerated QTc on  ms.   - Serial EKGs to monitor QTc  - Avoid QT prolonging agents  - Monitor electrolytes and replete PRN

## 2021-03-22 NOTE — H&P ADULT - PROBLEM SELECTOR PLAN 10
Transitions of Care Status:  1.  Name of PCP: Dr. Andrei Serrato ([phone])  2.  PCP Contacted on Admission: [ ] Y    [x ] N    3.  PCP contacted at Discharge: [ ] Y    [ ] N    [ ] N/A  4.  Post-Discharge Appointment Date and Location:  5.  Summary of Handoff given to PCP:

## 2021-03-23 DIAGNOSIS — R11.2 NAUSEA WITH VOMITING, UNSPECIFIED: ICD-10-CM

## 2021-03-23 DIAGNOSIS — K52.9 NONINFECTIVE GASTROENTERITIS AND COLITIS, UNSPECIFIED: ICD-10-CM

## 2021-03-23 DIAGNOSIS — R94.31 ABNORMAL ELECTROCARDIOGRAM [ECG] [EKG]: ICD-10-CM

## 2021-03-23 DIAGNOSIS — K92.2 GASTROINTESTINAL HEMORRHAGE, UNSPECIFIED: ICD-10-CM

## 2021-03-23 DIAGNOSIS — K85.90 ACUTE PANCREATITIS WITHOUT NECROSIS OR INFECTION, UNSPECIFIED: ICD-10-CM

## 2021-03-23 DIAGNOSIS — I10 ESSENTIAL (PRIMARY) HYPERTENSION: ICD-10-CM

## 2021-03-23 DIAGNOSIS — E87.2 ACIDOSIS: ICD-10-CM

## 2021-03-23 DIAGNOSIS — K76.0 FATTY (CHANGE OF) LIVER, NOT ELSEWHERE CLASSIFIED: ICD-10-CM

## 2021-03-23 DIAGNOSIS — Z02.9 ENCOUNTER FOR ADMINISTRATIVE EXAMINATIONS, UNSPECIFIED: ICD-10-CM

## 2021-03-23 DIAGNOSIS — Z29.9 ENCOUNTER FOR PROPHYLACTIC MEASURES, UNSPECIFIED: ICD-10-CM

## 2021-03-23 LAB
ALBUMIN SERPL ELPH-MCNC: 2.9 G/DL — LOW (ref 3.3–5)
ALBUMIN SERPL ELPH-MCNC: 3.3 G/DL — SIGNIFICANT CHANGE UP (ref 3.3–5)
ALP SERPL-CCNC: 184 U/L — HIGH (ref 40–120)
ALP SERPL-CCNC: 197 U/L — HIGH (ref 40–120)
ALT FLD-CCNC: 38 U/L — HIGH (ref 4–33)
ALT FLD-CCNC: 43 U/L — HIGH (ref 4–33)
ANION GAP SERPL CALC-SCNC: 16 MMOL/L — HIGH (ref 7–14)
ANION GAP SERPL CALC-SCNC: 18 MMOL/L — HIGH (ref 7–14)
AST SERPL-CCNC: 127 U/L — HIGH (ref 4–32)
AST SERPL-CCNC: 131 U/L — HIGH (ref 4–32)
BASE EXCESS BLDV CALC-SCNC: -6.5 MMOL/L — LOW (ref -3–2)
BILIRUB SERPL-MCNC: 1.1 MG/DL — SIGNIFICANT CHANGE UP (ref 0.2–1.2)
BILIRUB SERPL-MCNC: 1.3 MG/DL — HIGH (ref 0.2–1.2)
BUN SERPL-MCNC: 12 MG/DL — SIGNIFICANT CHANGE UP (ref 7–23)
BUN SERPL-MCNC: 14 MG/DL — SIGNIFICANT CHANGE UP (ref 7–23)
CALCIUM SERPL-MCNC: 9 MG/DL — SIGNIFICANT CHANGE UP (ref 8.4–10.5)
CALCIUM SERPL-MCNC: 9.2 MG/DL — SIGNIFICANT CHANGE UP (ref 8.4–10.5)
CHLORIDE SERPL-SCNC: 100 MMOL/L — SIGNIFICANT CHANGE UP (ref 98–107)
CHLORIDE SERPL-SCNC: 99 MMOL/L — SIGNIFICANT CHANGE UP (ref 98–107)
CO2 SERPL-SCNC: 16 MMOL/L — LOW (ref 22–31)
CO2 SERPL-SCNC: 16 MMOL/L — LOW (ref 22–31)
COVID-19 SPIKE DOMAIN AB INTERP: POSITIVE
COVID-19 SPIKE DOMAIN ANTIBODY RESULT: >250 U/ML — HIGH
CREAT SERPL-MCNC: 0.64 MG/DL — SIGNIFICANT CHANGE UP (ref 0.5–1.3)
CREAT SERPL-MCNC: 0.66 MG/DL — SIGNIFICANT CHANGE UP (ref 0.5–1.3)
GLUCOSE SERPL-MCNC: 108 MG/DL — HIGH (ref 70–99)
GLUCOSE SERPL-MCNC: 80 MG/DL — SIGNIFICANT CHANGE UP (ref 70–99)
HAV IGM SER-ACNC: SIGNIFICANT CHANGE UP
HBV CORE IGM SER-ACNC: SIGNIFICANT CHANGE UP
HBV SURFACE AG SER-ACNC: SIGNIFICANT CHANGE UP
HCO3 BLDV-SCNC: 19 MMOL/L — LOW (ref 20–27)
HCT VFR BLD CALC: 29.4 % — LOW (ref 34.5–45)
HCV AB S/CO SERPL IA: 0.11 S/CO — SIGNIFICANT CHANGE UP (ref 0–0.99)
HCV AB SERPL-IMP: SIGNIFICANT CHANGE UP
HGB BLD-MCNC: 9.7 G/DL — LOW (ref 11.5–15.5)
HIV 1+2 AB+HIV1 P24 AG SERPL QL IA: SIGNIFICANT CHANGE UP
LACTATE BLDV-MCNC: 3.4 MMOL/L — HIGH (ref 0.5–2)
LACTATE SERPL-SCNC: 4 MMOL/L — CRITICAL HIGH (ref 0.5–2)
LDH SERPL L TO P-CCNC: 411 U/L — HIGH (ref 135–225)
MAGNESIUM SERPL-MCNC: 1.5 MG/DL — LOW (ref 1.6–2.6)
MAGNESIUM SERPL-MCNC: 1.8 MG/DL — SIGNIFICANT CHANGE UP (ref 1.6–2.6)
MCHC RBC-ENTMCNC: 27.7 PG — SIGNIFICANT CHANGE UP (ref 27–34)
MCHC RBC-ENTMCNC: 33 GM/DL — SIGNIFICANT CHANGE UP (ref 32–36)
MCV RBC AUTO: 84 FL — SIGNIFICANT CHANGE UP (ref 80–100)
NRBC # BLD: 0 /100 WBCS — SIGNIFICANT CHANGE UP
NRBC # FLD: 0.02 K/UL — HIGH
PCO2 BLDV: 30 MMHG — LOW (ref 41–51)
PH BLDV: 7.39 — SIGNIFICANT CHANGE UP (ref 7.32–7.43)
PHOSPHATE SERPL-MCNC: 1.3 MG/DL — LOW (ref 2.5–4.5)
PHOSPHATE SERPL-MCNC: <0.5 MG/DL — CRITICAL LOW (ref 2.5–4.5)
PLATELET # BLD AUTO: 261 K/UL — SIGNIFICANT CHANGE UP (ref 150–400)
PO2 BLDV: 85 MMHG — HIGH (ref 35–40)
POTASSIUM SERPL-MCNC: 3.5 MMOL/L — SIGNIFICANT CHANGE UP (ref 3.5–5.3)
POTASSIUM SERPL-MCNC: 3.8 MMOL/L — SIGNIFICANT CHANGE UP (ref 3.5–5.3)
POTASSIUM SERPL-SCNC: 3.5 MMOL/L — SIGNIFICANT CHANGE UP (ref 3.5–5.3)
POTASSIUM SERPL-SCNC: 3.8 MMOL/L — SIGNIFICANT CHANGE UP (ref 3.5–5.3)
PROT SERPL-MCNC: 7.3 G/DL — SIGNIFICANT CHANGE UP (ref 6–8.3)
PROT SERPL-MCNC: 7.3 G/DL — SIGNIFICANT CHANGE UP (ref 6–8.3)
RBC # BLD: 3.5 M/UL — LOW (ref 3.8–5.2)
RBC # FLD: 23.6 % — HIGH (ref 10.3–14.5)
SAO2 % BLDV: 96.5 % — HIGH (ref 60–85)
SARS-COV-2 IGG+IGM SERPL QL IA: >250 U/ML — HIGH
SARS-COV-2 IGG+IGM SERPL QL IA: POSITIVE
SODIUM SERPL-SCNC: 132 MMOL/L — LOW (ref 135–145)
SODIUM SERPL-SCNC: 133 MMOL/L — LOW (ref 135–145)
WBC # BLD: 7.46 K/UL — SIGNIFICANT CHANGE UP (ref 3.8–10.5)
WBC # FLD AUTO: 7.46 K/UL — SIGNIFICANT CHANGE UP (ref 3.8–10.5)

## 2021-03-23 PROCEDURE — 99233 SBSQ HOSP IP/OBS HIGH 50: CPT | Mod: GC

## 2021-03-23 PROCEDURE — 99222 1ST HOSP IP/OBS MODERATE 55: CPT | Mod: GC

## 2021-03-23 PROCEDURE — 93010 ELECTROCARDIOGRAM REPORT: CPT

## 2021-03-23 RX ORDER — POTASSIUM PHOSPHATE, MONOBASIC POTASSIUM PHOSPHATE, DIBASIC 236; 224 MG/ML; MG/ML
30 INJECTION, SOLUTION INTRAVENOUS ONCE
Refills: 0 | Status: COMPLETED | OUTPATIENT
Start: 2021-03-23 | End: 2021-03-23

## 2021-03-23 RX ORDER — POTASSIUM CHLORIDE 20 MEQ
40 PACKET (EA) ORAL ONCE
Refills: 0 | Status: COMPLETED | OUTPATIENT
Start: 2021-03-23 | End: 2021-03-23

## 2021-03-23 RX ORDER — SODIUM CHLORIDE 9 MG/ML
1000 INJECTION, SOLUTION INTRAVENOUS
Refills: 0 | Status: DISCONTINUED | OUTPATIENT
Start: 2021-03-23 | End: 2021-03-25

## 2021-03-23 RX ORDER — FAMOTIDINE 10 MG/ML
20 INJECTION INTRAVENOUS EVERY 12 HOURS
Refills: 0 | Status: DISCONTINUED | OUTPATIENT
Start: 2021-03-23 | End: 2021-03-23

## 2021-03-23 RX ORDER — OXYCODONE HYDROCHLORIDE 5 MG/1
5 TABLET ORAL EVERY 4 HOURS
Refills: 0 | Status: DISCONTINUED | OUTPATIENT
Start: 2021-03-23 | End: 2021-03-23

## 2021-03-23 RX ORDER — MAGNESIUM SULFATE 500 MG/ML
2 VIAL (ML) INJECTION ONCE
Refills: 0 | Status: COMPLETED | OUTPATIENT
Start: 2021-03-23 | End: 2021-03-23

## 2021-03-23 RX ORDER — OXYCODONE HYDROCHLORIDE 5 MG/1
10 TABLET ORAL EVERY 4 HOURS
Refills: 0 | Status: DISCONTINUED | OUTPATIENT
Start: 2021-03-23 | End: 2021-03-25

## 2021-03-23 RX ORDER — INFLUENZA VIRUS VACCINE 15; 15; 15; 15 UG/.5ML; UG/.5ML; UG/.5ML; UG/.5ML
0.5 SUSPENSION INTRAMUSCULAR ONCE
Refills: 0 | Status: DISCONTINUED | OUTPATIENT
Start: 2021-03-23 | End: 2021-03-26

## 2021-03-23 RX ORDER — PANTOPRAZOLE SODIUM 20 MG/1
40 TABLET, DELAYED RELEASE ORAL EVERY 12 HOURS
Refills: 0 | Status: DISCONTINUED | OUTPATIENT
Start: 2021-03-23 | End: 2021-03-26

## 2021-03-23 RX ORDER — ACETAMINOPHEN 500 MG
650 TABLET ORAL EVERY 6 HOURS
Refills: 0 | Status: DISCONTINUED | OUTPATIENT
Start: 2021-03-23 | End: 2021-03-26

## 2021-03-23 RX ADMIN — OXYCODONE HYDROCHLORIDE 5 MILLIGRAM(S): 5 TABLET ORAL at 10:07

## 2021-03-23 RX ADMIN — Medication 50 GRAM(S): at 19:04

## 2021-03-23 RX ADMIN — POTASSIUM PHOSPHATE, MONOBASIC POTASSIUM PHOSPHATE, DIBASIC 83.33 MILLIMOLE(S): 236; 224 INJECTION, SOLUTION INTRAVENOUS at 19:13

## 2021-03-23 RX ADMIN — Medication 85 MILLIMOLE(S): at 12:03

## 2021-03-23 RX ADMIN — FAMOTIDINE 20 MILLIGRAM(S): 10 INJECTION INTRAVENOUS at 01:46

## 2021-03-23 RX ADMIN — OXYCODONE HYDROCHLORIDE 5 MILLIGRAM(S): 5 TABLET ORAL at 03:14

## 2021-03-23 RX ADMIN — PANTOPRAZOLE SODIUM 40 MILLIGRAM(S): 20 TABLET, DELAYED RELEASE ORAL at 07:29

## 2021-03-23 RX ADMIN — Medication 30 MILLILITER(S): at 13:52

## 2021-03-23 RX ADMIN — OXYCODONE HYDROCHLORIDE 10 MILLIGRAM(S): 5 TABLET ORAL at 14:53

## 2021-03-23 RX ADMIN — OXYCODONE HYDROCHLORIDE 10 MILLIGRAM(S): 5 TABLET ORAL at 15:53

## 2021-03-23 RX ADMIN — SODIUM CHLORIDE 150 MILLILITER(S): 9 INJECTION, SOLUTION INTRAVENOUS at 03:15

## 2021-03-23 RX ADMIN — PANTOPRAZOLE SODIUM 40 MILLIGRAM(S): 20 TABLET, DELAYED RELEASE ORAL at 17:52

## 2021-03-23 RX ADMIN — SODIUM CHLORIDE 150 MILLILITER(S): 9 INJECTION, SOLUTION INTRAVENOUS at 12:03

## 2021-03-23 RX ADMIN — Medication 40 MILLIEQUIVALENT(S): at 01:46

## 2021-03-23 RX ADMIN — Medication 30 MILLILITER(S): at 23:14

## 2021-03-23 RX ADMIN — OXYCODONE HYDROCHLORIDE 5 MILLIGRAM(S): 5 TABLET ORAL at 03:44

## 2021-03-23 RX ADMIN — OXYCODONE HYDROCHLORIDE 5 MILLIGRAM(S): 5 TABLET ORAL at 09:08

## 2021-03-23 NOTE — CONSULT NOTE ADULT - SUBJECTIVE AND OBJECTIVE BOX
Chief Complaint:  Patient is a 25y old  Female who presents with a chief complaint of Abd pain, N/V (23 Mar 2021 07:20)      HPI: 25F w/ no PMH who presents with 3 weeks of abdominal pain and nausea/vomiting.  Patient reports pain is in epigastric region to lower substernal chest region and in the lower abdominal region as well.  Pain is burning/sharp and worsened by eating liquids and solids.  Pain is not exacerbated by lying down or bending over.  Patient has been taking ibuprofen at home with only mild relief.  +Nausea and vomiting with eating. Yesterday, she reports one episode of vomiting was blood-tinged and green. Currently she reports mild nausea and abdominal pain is 8/10.  Denies previous hx of these symptoms.  Denies known history of cholesterol issues, gallbladder/gallstone issues, alcohol or opioid use.            Allergies:  No Known Allergies      Home Medications:    Hospital Medications:  acetaminophen   Tablet .. 650 milliGRAM(s) Oral every 6 hours PRN  influenza   Vaccine 0.5 milliLiter(s) IntraMuscular once  lactated ringers 1000 milliLiter(s) IV Continuous <Continuous>  LORazepam   Injectable 0.25 milliGRAM(s) IV Push every 6 hours PRN  oxyCODONE    IR 5 milliGRAM(s) Oral every 4 hours PRN  pantoprazole  Injectable 40 milliGRAM(s) IV Push every 12 hours      PMHX/PSHX:  No pertinent past medical history    Status post skin graft        Family history:  FH: type 2 diabetes        Social History:     ROS:     General:  No weight loss, fevers, chills, night sweats, fatigue  Eyes:  No vision changes, no yellowing of eyes   ENT:  No throat pain, runny nose  CV:  No chest pain, palpitations  Resp:  No SOB, cough, wheezing  GI:  See HPI  :  No burning with urination, no hematuria   Muscle:  No muscle pain, weakness  Neuro:  No numbness/tingling, memory problems  Psych:  No fatigue, insomnia, mood problems  Heme:  No easy bruisability  Skin:  No rash, itching       PHYSICAL EXAM:     GENERAL:  Appears stated age, well-groomed, well-nourished, no distress  HEENT:  NC/AT,  conjunctivae clear and pink,  no JVD  CHEST:  Full & symmetric excursion, no increased effort, breath sounds clear  HEART:  Regular rhythm, S1, S2, no murmur/rub/S3/S4, no abdominal bruit, no edema  ABDOMEN:  Soft, non-tender, non-distended, normoactive bowel sounds,  no masses ,  EXTREMITIES:  no cyanosis,clubbing or edema  SKIN:  No rash/erythema/ecchymoses/petechiae/wounds/abscess/warm/dry  NEURO:  Alert, oriented    Vital Signs:  Vital Signs Last 24 Hrs  T(C): 37 (23 Mar 2021 09:07), Max: 37 (23 Mar 2021 09:07)  T(F): 98.6 (23 Mar 2021 09:07), Max: 98.6 (23 Mar 2021 09:07)  HR: 101 (23 Mar 2021 09:07) (92 - 130)  BP: 138/98 (23 Mar 2021 09:07) (136/95 - 172/121)  BP(mean): --  RR: 18 (23 Mar 2021 09:07) (18 - 20)  SpO2: 100% (23 Mar 2021 09:07) (100% - 100%)  Daily Height in cm: 157.48 (23 Mar 2021 01:08)    Daily     LABS:                        11.8   12.54 )-----------( 372      ( 22 Mar 2021 13:33 )             35.1     03-22    132<L>  |  88<L>  |  19  ----------------------------<  69<L>  3.4<L>   |  16<L>  |  1.01    Ca    10.4      22 Mar 2021 13:33    TPro  9.3<H>  /  Alb  4.1  /  TBili  1.2  /  DBili  x   /  AST  209<H>  /  ALT  50<H>  /  AlkPhos  292<H>  03-    LIVER FUNCTIONS - ( 22 Mar 2021 13:33 )  Alb: 4.1 g/dL / Pro: 9.3 g/dL / ALK PHOS: 292 U/L / ALT: 50 U/L / AST: 209 U/L / GGT: x           PT/INR - ( 22 Mar 2021 17:32 )   PT: 15.1 sec;   INR: 1.33 ratio         PTT - ( 22 Mar 2021 17:32 )  PTT:33.7 sec  Urinalysis Basic - ( 22 Mar 2021 16:24 )    Color: Yellow / Appearance: Slightly Turbid / S.031 / pH: x  Gluc: x / Ketone: Large  / Bili: Negative / Urobili: 3 mg/dL   Blood: x / Protein: 300 mg/dL / Nitrite: Negative   Leuk Esterase: Negative / RBC: 5 /HPF / WBC 4 /HPF   Sq Epi: x / Non Sq Epi: 10 /HPF / Bacteria: Few      Amylase Serum--      Lipase [id number]       Ammonia--      Imaging:  < from: CT Abdomen and Pelvis w/ IV Cont (21 @ 17:37) >  FINDINGS:  LOWER CHEST: Within normal limits.    LIVER: Severe steatosis. Hepatomegaly.  BILE DUCTS: Normal caliber.  GALLBLADDER: Within normal limits.  SPLEEN: Within normal limits.  PANCREAS: Peripancreatic fat stranding predominantly involving the head and body. Normal pancreatic enhancement. The pancreatic duct is normal in diameter. No peripancreatic fluid collection.  ADRENALS: Within normal limits.  KIDNEYS/URETERS: Within normal limits.    BLADDER: Within normal limits.  REPRODUCTIVE ORGANS: Intrauterine device in place.    BOWEL: Mild descending colonic wall thickening, likely inflammation secondary due to adjacent pancreatitis. No bowel obstruction. Appendix is normal.  PERITONEUM: No ascites.  VESSELS: Within normal limits.  RETROPERITONEUM/LYMPH NODES: No lymphadenopathy.  ABDOMINAL WALL: Within normal limits.  BONES: Within normal limits.    IMPRESSION:    Acute pancreatitis. No peripancreatic fluid collection. Normal parenchymal enhancement.    Hepatomegaly and severe hepatic steatosis    < end of copied text >             Chief Complaint:  Patient is a 25y old  Female who presents with a chief complaint of Abd pain, N/V (23 Mar 2021 07:20)      HPI: 25F w/ no PMH who presents with 2-3 weeks of abdominal pain and nausea/vomiting.  Patient reports started with vomiting and lower chest pain that then moved to her pelvis and epigastric area.  Pain is burning/sharp and worsened by eating liquids and solids.  Pain is not exacerbated by lying down or bending over.  Patient has been taking ibuprofen at home with only mild relief. Pain is much worse after vomiting. Yesterday, she reports one episode of vomiting was blood-tinged and green. She states yesterday she ate a piece of bread that went down fine without vomiting hwoever today tried apple juice that she then vomited.   Denies previous hx of these symptoms.  Denies known history of cholesterol issues, gallbladder/gallstone issues, alcohol or opioid use.            Allergies:  No Known Allergies      Home Medications:    Hospital Medications:  acetaminophen   Tablet .. 650 milliGRAM(s) Oral every 6 hours PRN  influenza   Vaccine 0.5 milliLiter(s) IntraMuscular once  lactated ringers 1000 milliLiter(s) IV Continuous <Continuous>  LORazepam   Injectable 0.25 milliGRAM(s) IV Push every 6 hours PRN  oxyCODONE    IR 5 milliGRAM(s) Oral every 4 hours PRN  pantoprazole  Injectable 40 milliGRAM(s) IV Push every 12 hours      PMHX/PSHX:  No pertinent past medical history    Status post skin graft        Family history:  FH: type 2 diabetes        Social History:     ROS:     General:  No weight loss, fevers, chills, night sweats, fatigue  Eyes:  No vision changes, no yellowing of eyes   ENT:  No throat pain, runny nose  CV:  No chest pain, palpitations  Resp:  No SOB, cough, wheezing  GI:  See HPI  :  No burning with urination, no hematuria   Muscle:  No muscle pain, weakness  Neuro:  No numbness/tingling, memory problems  Psych:  No fatigue, insomnia, mood problems  Heme:  No easy bruisability  Skin:  No rash, itching       PHYSICAL EXAM:     GENERAL:  Appears stated age, well-groomed, well-nourished, no distress  HEENT:  NC/AT,  conjunctivae clear and pink,  no JVD  CHEST:  Full & symmetric excursion, no increased effort, breath sounds clear  HEART:  Regular rhythm, S1, S2, no murmur/rub/S3/S4, no abdominal bruit, no edema  ABDOMEN:  Soft, non-tender, non-distended, normoactive bowel sounds,  no masses ,  EXTREMITIES:  no cyanosis,clubbing or edema  SKIN:  No rash/erythema/ecchymoses/petechiae/wounds/abscess/warm/dry  NEURO:  Alert, oriented    Vital Signs:  Vital Signs Last 24 Hrs  T(C): 37 (23 Mar 2021 09:07), Max: 37 (23 Mar 2021 09:07)  T(F): 98.6 (23 Mar 2021 09:07), Max: 98.6 (23 Mar 2021 09:07)  HR: 101 (23 Mar 2021 09:07) (92 - 130)  BP: 138/98 (23 Mar 2021 09:07) (136/95 - 172/121)  BP(mean): --  RR: 18 (23 Mar 2021 09:07) (18 - 20)  SpO2: 100% (23 Mar 2021 09:07) (100% - 100%)  Daily Height in cm: 157.48 (23 Mar 2021 01:08)    Daily     LABS:                        11.8   12.54 )-----------( 372      ( 22 Mar 2021 13:33 )             35.1     03-22    132<L>  |  88<L>  |  19  ----------------------------<  69<L>  3.4<L>   |  16<L>  |  1.01    Ca    10.4      22 Mar 2021 13:33    TPro  9.3<H>  /  Alb  4.1  /  TBili  1.2  /  DBili  x   /  AST  209<H>  /  ALT  50<H>  /  AlkPhos  292<H>      LIVER FUNCTIONS - ( 22 Mar 2021 13:33 )  Alb: 4.1 g/dL / Pro: 9.3 g/dL / ALK PHOS: 292 U/L / ALT: 50 U/L / AST: 209 U/L / GGT: x           PT/INR - ( 22 Mar 2021 17:32 )   PT: 15.1 sec;   INR: 1.33 ratio         PTT - ( 22 Mar 2021 17:32 )  PTT:33.7 sec  Urinalysis Basic - ( 22 Mar 2021 16:24 )    Color: Yellow / Appearance: Slightly Turbid / S.031 / pH: x  Gluc: x / Ketone: Large  / Bili: Negative / Urobili: 3 mg/dL   Blood: x / Protein: 300 mg/dL / Nitrite: Negative   Leuk Esterase: Negative / RBC: 5 /HPF / WBC 4 /HPF   Sq Epi: x / Non Sq Epi: 10 /HPF / Bacteria: Few      Amylase Serum--      Lipase [id number]       Ammonia--      Imaging:  < from: CT Abdomen and Pelvis w/ IV Cont (21 @ 17:37) >  FINDINGS:  LOWER CHEST: Within normal limits.    LIVER: Severe steatosis. Hepatomegaly.  BILE DUCTS: Normal caliber.  GALLBLADDER: Within normal limits.  SPLEEN: Within normal limits.  PANCREAS: Peripancreatic fat stranding predominantly involving the head and body. Normal pancreatic enhancement. The pancreatic duct is normal in diameter. No peripancreatic fluid collection.  ADRENALS: Within normal limits.  KIDNEYS/URETERS: Within normal limits.    BLADDER: Within normal limits.  REPRODUCTIVE ORGANS: Intrauterine device in place.    BOWEL: Mild descending colonic wall thickening, likely inflammation secondary due to adjacent pancreatitis. No bowel obstruction. Appendix is normal.  PERITONEUM: No ascites.  VESSELS: Within normal limits.  RETROPERITONEUM/LYMPH NODES: No lymphadenopathy.  ABDOMINAL WALL: Within normal limits.  BONES: Within normal limits.    IMPRESSION:    Acute pancreatitis. No peripancreatic fluid collection. Normal parenchymal enhancement.    Hepatomegaly and severe hepatic steatosis    < end of copied text >             Chief Complaint:  Patient is a 25y old  Female who presents with a chief complaint of Abd pain, N/V (23 Mar 2021 07:20)      HPI: 25F w/ no PMH who presents with 2-3 weeks of abdominal pain and nausea/vomiting.  Patient reports started with vomiting and lower chest pain that then moved to her pelvis and epigastric area.  Pain is burning/sharp and worsened by eating liquids and solids.  Pain is not exacerbated by lying down or bending over.  Patient has been taking ibuprofen at home with only mild relief. Pain is much worse after vomiting. Yesterday, she reports one episode of vomiting was blood-tinged and green. She states yesterday she ate a piece of bread that went down fine without vomiting hwoever today tried apple juice that she then vomited.   Denies previous hx of these symptoms.  Denies known history of cholesterol issues, gallbladder/gallstone issues, alcohol or illicit drug use.           Allergies:  No Known Allergies      Home Medications:    Hospital Medications:  acetaminophen   Tablet .. 650 milliGRAM(s) Oral every 6 hours PRN  influenza   Vaccine 0.5 milliLiter(s) IntraMuscular once  lactated ringers 1000 milliLiter(s) IV Continuous <Continuous>  LORazepam   Injectable 0.25 milliGRAM(s) IV Push every 6 hours PRN  oxyCODONE    IR 5 milliGRAM(s) Oral every 4 hours PRN  pantoprazole  Injectable 40 milliGRAM(s) IV Push every 12 hours      PMHX/PSHX:  No pertinent past medical history    Status post skin graft        Family history:  FH: type 2 diabetes, no FH of liver disease, pancreatic disease or malignancy        Social History:     ROS:     General:  No weight loss, fevers, chills, night sweats, fatigue  Eyes:  No vision changes, no yellowing of eyes   ENT:  No throat pain, runny nose  CV:  No chest pain, palpitations  Resp:  No SOB, cough, wheezing  GI:  See HPI  :  No burning with urination, no hematuria   Muscle:  No muscle pain, weakness  Neuro:  No numbness/tingling, memory problems  Psych:  No fatigue, insomnia, mood problems  Heme:  No easy bruisability  Skin:  No rash, itching       PHYSICAL EXAM:     GENERAL:  no distress  HEENT:  NC/AT,  conjunctivae clear and pink,  no JVD  CHEST:  Full & symmetric excursion, no increased effort, breath sounds clear  HEART:  Regular rhythm, S1, S2, no murmur/rub/S3/S4, no abdominal bruit, no edema  ABDOMEN:  Soft, non-tender, non-distended, normoactive bowel sounds,  no masses ,  EXTREMITIES:  no cyanosis,clubbing or edema  SKIN:  No rash/erythema/ecchymoses/petechiae/wounds/abscess/warm/dry  NEURO:  Alert, oriented    Vital Signs:  Vital Signs Last 24 Hrs  T(C): 37 (23 Mar 2021 09:07), Max: 37 (23 Mar 2021 09:07)  T(F): 98.6 (23 Mar 2021 09:07), Max: 98.6 (23 Mar 2021 09:07)  HR: 101 (23 Mar 2021 09:07) (92 - 130)  BP: 138/98 (23 Mar 2021 09:07) (136/95 - 172/121)  BP(mean): --  RR: 18 (23 Mar 2021 09:07) (18 - 20)  SpO2: 100% (23 Mar 2021 09:07) (100% - 100%)  Daily Height in cm: 157.48 (23 Mar 2021 01:08)    Daily     LABS:                        11.8   12.54 )-----------( 372      ( 22 Mar 2021 13:33 )             35.1     03-22    132<L>  |  88<L>  |  19  ----------------------------<  69<L>  3.4<L>   |  16<L>  |  1.01    Ca    10.4      22 Mar 2021 13:33    TPro  9.3<H>  /  Alb  4.1  /  TBili  1.2  /  DBili  x   /  AST  209<H>  /  ALT  50<H>  /  AlkPhos  292<H>      LIVER FUNCTIONS - ( 22 Mar 2021 13:33 )  Alb: 4.1 g/dL / Pro: 9.3 g/dL / ALK PHOS: 292 U/L / ALT: 50 U/L / AST: 209 U/L / GGT: x           PT/INR - ( 22 Mar 2021 17:32 )   PT: 15.1 sec;   INR: 1.33 ratio         PTT - ( 22 Mar 2021 17:32 )  PTT:33.7 sec  Urinalysis Basic - ( 22 Mar 2021 16:24 )    Color: Yellow / Appearance: Slightly Turbid / S.031 / pH: x  Gluc: x / Ketone: Large  / Bili: Negative / Urobili: 3 mg/dL   Blood: x / Protein: 300 mg/dL / Nitrite: Negative   Leuk Esterase: Negative / RBC: 5 /HPF / WBC 4 /HPF   Sq Epi: x / Non Sq Epi: 10 /HPF / Bacteria: Few      Amylase Serum--      Lipase [id number]       Ammonia--      Imaging:  < from: CT Abdomen and Pelvis w/ IV Cont (21 @ 17:37) >  FINDINGS:  LOWER CHEST: Within normal limits.    LIVER: Severe steatosis. Hepatomegaly.  BILE DUCTS: Normal caliber.  GALLBLADDER: Within normal limits.  SPLEEN: Within normal limits.  PANCREAS: Peripancreatic fat stranding predominantly involving the head and body. Normal pancreatic enhancement. The pancreatic duct is normal in diameter. No peripancreatic fluid collection.  ADRENALS: Within normal limits.  KIDNEYS/URETERS: Within normal limits.    BLADDER: Within normal limits.  REPRODUCTIVE ORGANS: Intrauterine device in place.    BOWEL: Mild descending colonic wall thickening, likely inflammation secondary due to adjacent pancreatitis. No bowel obstruction. Appendix is normal.  PERITONEUM: No ascites.  VESSELS: Within normal limits.  RETROPERITONEUM/LYMPH NODES: No lymphadenopathy.  ABDOMINAL WALL: Within normal limits.  BONES: Within normal limits.    IMPRESSION:    Acute pancreatitis. No peripancreatic fluid collection. Normal parenchymal enhancement.    Hepatomegaly and severe hepatic steatosis    < end of copied text >

## 2021-03-23 NOTE — PROGRESS NOTE ADULT - PROBLEM SELECTOR PLAN 8
DVT ppx: hold for possible GI bleed  Diet: CLD, advance as tolerated DVT ppx: lovenox 40 qd if evidence of bleeding can hold.   Diet: CLD, advance as tolerated  Electrolytes: repleted this am. Will monitor for possible re-feeding syndrome. etiology of poor electrolytes most likely for poor nutritional state. Nutritional consult f/u. Patient on OTC supplement Apetamin will look into it further to see if can cause metabolic disturbances including hepatic injury.

## 2021-03-23 NOTE — PROVIDER CONTACT NOTE (CRITICAL VALUE NOTIFICATION) - ACTION/TREATMENT ORDERED:
MD made aware and will put in sodium phosphate iv
MD made aware, monitor lactate level, will put in for magnesium and sodium phosphate

## 2021-03-23 NOTE — CONSULT NOTE ADULT - ATTENDING COMMENTS
History/PE/recommendations as above-admitted after several week illness characterized as nausea, vomiting and emesis with scant blood. Experiencing upper abdominal pain as well as sensation of bloating and distention. Currently comfortable/NAD/nontoxic-appearing. Abdomen-protuberant but otherwise soft and minimally tender throughout. CT noted for severe hepatic steatosis. Gallbladder and bile duct normal. Evidence of pancreatitis noted. Etiology for her acute pancreatitis unclear-no evident detection of gallstones, alcohol history or other evident etiology. As noted, assessment for autoimmune etiology advised. In view of elevated liver enzymes although possibly related to hepatic steatosis may warrant MRI/MRCP. Continue IV hydration, liquids p.o. for now. Monitor serial liver enzymes. Submit hepatitis viral serology (HAV/HBV/HCV), BRIAN, smooth muscle antibody as well as IgG4 level. Pantoprazole 40 mg q.d. for likely associated GERD symptoms.

## 2021-03-23 NOTE — PROGRESS NOTE ADULT - PROBLEM SELECTOR PLAN 4
Reported dark brown emesis in last 3 weeks and 1 episode of bright red blood-tinged emesis yesterday, may be secondary to upper GI bleed.  Possibly secondary to gastropathy vs. esophagitis.  - H/H stable, vitals stable  - Monitor H/H, maintain active T&S  - GI consult  - Start pantoprazole 40 IV BID Reported dark brown emesis in last 3 weeks and 1 episode of bright red blood-tinged emesis yesterday, may be secondary to upper GI bleed.  Possibly secondary to gastropathy vs. esophagitis.  - H/H stable, vitals stable  - Monitor H/H, maintain active T&S  - GI consult with no role for EGD and will continue to monitoring.   - Start pantoprazole 40 IV BID

## 2021-03-23 NOTE — CONSULT NOTE ADULT - ASSESSMENT
25F w/ no PMH who presents with 2-3 weeks of abdominal pain and nausea/vomiting. 25F w/ no PMH who presents with 2-3 weeks of abdominal pain and nausea/vomiting with concern for pancreatitis.       Impression:  #Pancreatitis - peripancreatic stranding on imaging - no stones or biliary dilatation however ddx includes passed stone given elevated of LFTs. Calcium normal, TG mildly elevated. Also noted is protein gap on CMP - other ddx includes autoimmune disease or IgG4-related disease.   #Blood-tinged vomit - likely 2/2 esophagitis from repeated vomiting episodes vs MW tear. Other ddx includes PUD vs gastritis vs angioectasia vs Dieulafoy vs malignancy.      Recommendation:  - patient had multiple episodes of vomiting today on clears - would keep NPO for now as she is not tolerating  - send IgG4 level  - f/u HIV and hepatitis panel  - given acute pancreatitis episode with continued vomiting - no indication for EGD at this time  - IVF and pain control  - rest of care per primary team      Kacy Iverson PGY-4  Gastroenterology Fellow  Pager #[phone] (DEMIAN) or [phone] (NS)  Available on [employer] Teams.  Please contact on-call GI fellow via answering service ([phone]) after 5pm and before 8am, and on weekends.

## 2021-03-23 NOTE — PROGRESS NOTE ADULT - PROBLEM SELECTOR PLAN 3
HAGMA likely d/t lactic acidosis with respiratory compensation. Likely secondary to pancreatitis and hypovolemia.  - IVF as above  - Trend lactate with VBG HAGMA likely d/t lactic acidosis with respiratory compensation. Likely secondary to pancreatitis and hypovolemia.  - IVF as above  - Trend lactate with VBG daily.

## 2021-03-23 NOTE — PROGRESS NOTE ADULT - PROBLEM SELECTOR PLAN 1
Meets criteria for acute pancreatitis: worsening epigastric pain with n/v x 3 weeks, elevated lipase of 388, and peripancreatic fat stranding on CT. No gallstone or gallbladder abnormalities on RUQ u/s. TG elevated to 209 but not >1000. Not on any medications that can induce pancreatitis. No hypercalcemia. Biliary duct caliber normal. Possibly secondary hypertriglyceridemia (moderate) vs. alcohol use (although patient denies hx, AST:ALT ratio > 2:1) vs. infections (HIV, hepatitis B, CMV, HSV, Legionella, Leptospira, Salmonella, Toxoplasma) vs. idiopathic  - [place]'s criteria likely 0 points on assumption that LDH is <350. Severe pancreatitis unlikely. Will check LDH with AM labs.  Recheck Dinesh's criteria at 48 hour mt into admission.  - Treat supportively with IVF. S/p 2L NS. Start maintenance LR @150cc/hr with K repletion.  - Pain control with Oxycodone PO 5mg q4h PRN for severe pain, Tylenol 650 PO q6h for mild-moderate pain  - Start on CLD given patient amenable and slowly advance as tolerated  - Check HIV, Hepatitis B  - GI consult Meets criteria for acute pancreatitis: worsening epigastric pain with n/v x 3 weeks, elevated lipase of 388, and peripancreatic fat stranding on CT. No gallstone or gallbladder abnormalities on RUQ u/s. TG elevated to 209 but not >1000. Not on any medications that can induce pancreatitis. No hypercalcemia. Biliary duct caliber normal. Possibly secondary hypertriglyceridemia (moderate) vs. alcohol use (although patient denies hx, AST:ALT ratio > 2:1) vs. infections (HIV, hepatitis B, CMV, HSV, Legionella, Leptospira, Salmonella, Toxoplasma) vs. idiopathic  - Treat supportively with IVF. S/p 2L NS. Start maintenance LR @150cc/hr with K repletion.  - Pain control with Oxycodone PO 5mg q4h PRN for severe pain, Tylenol 650 PO q6h for mild-moderate pain  - Start on CLD given patient amenable and slowly advance as tolerated  - Check HIV, Hepatitis B  - GI consult  - Checking IgG4 with IgG subsets.  - Maalox for symptomatic control.

## 2021-03-23 NOTE — PROGRESS NOTE ADULT - SUBJECTIVE AND OBJECTIVE BOX
Jarrod Bowers M.D.  Internal Medicine PGY-1  [phone] / [id number]     Patient is a 25y old  Female who presents with a chief complaint of Abd pain, N/V (22 Mar 2021 22:27)      SUBJECTIVE / OVERNIGHT EVENTS:          MEDICATIONS  (STANDING):  influenza   Vaccine 0.5 milliLiter(s) IntraMuscular once  lactated ringers 1000 milliLiter(s) (150 mL/Hr) IV Continuous <Continuous>  pantoprazole  Injectable 40 milliGRAM(s) IV Push every 12 hours    MEDICATIONS  (PRN):  acetaminophen   Tablet .. 650 milliGRAM(s) Oral every 6 hours PRN Temp greater or equal to 38C (100.4F), Mild Pain (1 - 3), Moderate Pain (4 - 6)  LORazepam   Injectable 0.25 milliGRAM(s) IV Push every 6 hours PRN Nausea and/or Vomiting  oxyCODONE    IR 5 milliGRAM(s) Oral every 4 hours PRN Severe Pain (7 - 10)      Vital Signs Last 24 Hrs  T(C): 36.6 (23 Mar 2021 01:08), Max: 36.8 (22 Mar 2021 22:16)  T(F): 97.8 (23 Mar 2021 01:08), Max: 98.2 (22 Mar 2021 22:16)  HR: 102 (23 Mar 2021 01:08) (92 - 130)  BP: 146/107 (23 Mar 2021 01:08) (136/95 - 172/121)  BP(mean): --  RR: 20 (23 Mar 2021 01:08) (18 - 20)  SpO2: 100% (23 Mar 2021 01:08) (100% - 100%)      PHYSICAL EXAM  GENERAL: NAD, well-developed  HEAD:  Atraumatic, Normocephalic  EYES: EOMI, PERRLA, conjunctiva and sclera clear  NECK: Supple, No JVD  CHEST/LUNG: Clear to auscultation bilaterally; No wheeze  HEART: Regular rate and rhythm; No murmurs, rubs, or gallops  ABDOMEN: Soft, Nontender, Nondistended; Bowel sounds present  EXTREMITIES:  2+ Peripheral Pulses, No clubbing, cyanosis, or edema  PSYCH: AAOx3  SKIN: No rashes or lesions    CAPILLARY BLOOD GLUCOSE      POCT Blood Glucose.: 72 mg/dL (22 Mar 2021 20:36)    I&O's Summary      LABS:                        11.8   12.54 )-----------( 372      ( 22 Mar 2021 13:33 )             35.1         132<L>  |  88<L>  |  19  ----------------------------<  69<L>  3.4<L>   |  16<L>  |  1.01    Ca    10.4      22 Mar 2021 13:33    TPro  9.3<H>  /  Alb  4.1  /  TBili  1.2  /  DBili  x   /  AST  209<H>  /  ALT  50<H>  /  AlkPhos  292<H>      PT/INR - ( 22 Mar 2021 17:32 )   PT: 15.1 sec;   INR: 1.33 ratio         PTT - ( 22 Mar 2021 17:32 )  PTT:33.7 sec      Urinalysis Basic - ( 22 Mar 2021 16:24 )    Color: Yellow / Appearance: Slightly Turbid / S.031 / pH: x  Gluc: x / Ketone: Large  / Bili: Negative / Urobili: 3 mg/dL   Blood: x / Protein: 300 mg/dL / Nitrite: Negative   Leuk Esterase: Negative / RBC: 5 /HPF / WBC 4 /HPF   Sq Epi: x / Non Sq Epi: 10 /HPF / Bacteria: Few        RADIOLOGY & ADDITIONAL TESTS:     MICROBIOLOGY:    ANTIMICROBIALS:    CONSULTS: Jarrod Bowers M.D.  Internal Medicine PGY-1  102- [phone] / [id number]     Patient is a 25y old  Female who presents with a chief complaint of Abd pain, N/V (22 Mar 2021 22:27)      SUBJECTIVE / OVERNIGHT EVENTS: still with pain 8/10          MEDICATIONS  (STANDING):  influenza   Vaccine 0.5 milliLiter(s) IntraMuscular once  lactated ringers 1000 milliLiter(s) (150 mL/Hr) IV Continuous <Continuous>  pantoprazole  Injectable 40 milliGRAM(s) IV Push every 12 hours    MEDICATIONS  (PRN):  acetaminophen   Tablet .. 650 milliGRAM(s) Oral every 6 hours PRN Temp greater or equal to 38C (100.4F), Mild Pain (1 - 3), Moderate Pain (4 - 6)  LORazepam   Injectable 0.25 milliGRAM(s) IV Push every 6 hours PRN Nausea and/or Vomiting  oxyCODONE    IR 5 milliGRAM(s) Oral every 4 hours PRN Severe Pain (7 - 10)      Vital Signs Last 24 Hrs  T(C): 36.6 (23 Mar 2021 01:08), Max: 36.8 (22 Mar 2021 22:16)  T(F): 97.8 (23 Mar 2021 01:08), Max: 98.2 (22 Mar 2021 22:16)  HR: 102 (23 Mar 2021 01:08) (92 - 130)  BP: 146/107 (23 Mar 2021 01:08) (136/95 - 172/121)  BP(mean): --  RR: 20 (23 Mar 2021 01:08) (18 - 20)  SpO2: 100% (23 Mar 2021 01:08) (100% - 100%)      PHYSICAL EXAM  GENERAL: NAD, well-developed  HEAD:  Atraumatic, Normocephalic  EYES: EOMI, PERRLA, conjunctiva and sclera clear  NECK: Supple, No JVD  CHEST/LUNG: Clear to auscultation bilaterally; No wheeze  HEART: Regular rate and rhythm; No murmurs, rubs, or gallops  ABDOMEN: Soft, Nontender, Nondistended; Bowel sounds present  EXTREMITIES:  2+ Peripheral Pulses, No clubbing, cyanosis, or edema  PSYCH: AAOx3  SKIN: No rashes or lesions    CAPILLARY BLOOD GLUCOSE      POCT Blood Glucose.: 72 mg/dL (22 Mar 2021 20:36)    I&O's Summary      LABS:                        11.8   12.54 )-----------( 372      ( 22 Mar 2021 13:33 )             35.1         132<L>  |  88<L>  |  19  ----------------------------<  69<L>  3.4<L>   |  16<L>  |  1.01    Ca    10.4      22 Mar 2021 13:33    TPro  9.3<H>  /  Alb  4.1  /  TBili  1.2  /  DBili  x   /  AST  209<H>  /  ALT  50<H>  /  AlkPhos  292<H>      PT/INR - ( 22 Mar 2021 17:32 )   PT: 15.1 sec;   INR: 1.33 ratio         PTT - ( 22 Mar 2021 17:32 )  PTT:33.7 sec      Urinalysis Basic - ( 22 Mar 2021 16:24 )    Color: Yellow / Appearance: Slightly Turbid / S.031 / pH: x  Gluc: x / Ketone: Large  / Bili: Negative / Urobili: 3 mg/dL   Blood: x / Protein: 300 mg/dL / Nitrite: Negative   Leuk Esterase: Negative / RBC: 5 /HPF / WBC 4 /HPF   Sq Epi: x / Non Sq Epi: 10 /HPF / Bacteria: Few        RADIOLOGY & ADDITIONAL TESTS:     MICROBIOLOGY:    ANTIMICROBIALS:    CONSULTS: Jarrod Bowers M.D.  Internal Medicine PGY-1  [phone] / [id number]     Patient is a 25y old  Female who presents with a chief complaint of Abd pain, N/V (22 Mar 2021 22:27)      SUBJECTIVE / OVERNIGHT EVENTS: Patient seen and examined at the bedside this am. Per nursing patient admitted overnight for acute pancreatitis. Patient with minimal alcohol use hx, no gallbladder hx, denies autoimmune hx, any OTC or prescription medication use, hx of hypertriglyceridemia, or previous instrumentation (ERCP)/trauma to the area. The patient also had evidence of prolonged QT on EKG but denies any SCD hx in family endorse younger brother who is 18 has passed out a few times while playing sports but denies any further cardiac workup. Patient currently endorsing epigastric and lower abdominal pain and hiccups with PO intake, but has denied further episodes of hematemsis.           MEDICATIONS  (STANDING):  influenza   Vaccine 0.5 milliLiter(s) IntraMuscular once  lactated ringers 1000 milliLiter(s) (150 mL/Hr) IV Continuous <Continuous>  pantoprazole  Injectable 40 milliGRAM(s) IV Push every 12 hours    MEDICATIONS  (PRN):  acetaminophen   Tablet .. 650 milliGRAM(s) Oral every 6 hours PRN Temp greater or equal to 38C (100.4F), Mild Pain (1 - 3), Moderate Pain (4 - 6)  LORazepam   Injectable 0.25 milliGRAM(s) IV Push every 6 hours PRN Nausea and/or Vomiting  oxyCODONE    IR 5 milliGRAM(s) Oral every 4 hours PRN Severe Pain (7 - 10)      Vital Signs Last 24 Hrs  T(C): 36.6 (23 Mar 2021 01:08), Max: 36.8 (22 Mar 2021 22:16)  T(F): 97.8 (23 Mar 2021 01:08), Max: 98.2 (22 Mar 2021 22:16)  HR: 102 (23 Mar 2021 01:08) (92 - 130)  BP: 146/107 (23 Mar 2021 01:08) (136/95 - 172/121)  BP(mean): --  RR: 20 (23 Mar 2021 01:08) (18 - 20)  SpO2: 100% (23 Mar 2021 01:08) (100% - 100%)    General: in no acute distress.   Head: Normocephalic, atraumatic.    Eyes: PERRL.  EOMI.  No scleral icterus.  No conjunctival pallor.  Mouth: Moist MM.  No oropharyngeal exudates.    Neck: Supple.  Full range of motion.  No JVD.  No LAD.  No thyromegaly.  Trachea midline.    Heart: Tachycardic.  Normal S1 and S2.  No murmurs, rubs, or gallops.  No LE edema b/l.   Lungs: Nonlabored breathing.  Good inspiratory effort.  CTAB.  No wheezes, crackles, or rhonchi.  +3L NC.   Abdomen: BS+, soft, mildly distended, TTP in epigastric and diffuse lower abdomen with no rebound or guarding. No hepatomegaly. +Primafit.   Skin: Warm and dry.  No rashes.  Extremities: + RLE ecchymosis.   Musculoskeletal: No joint deformities.  No spinal or paraspinal tenderness.  Neuro: A&Ox3.  CN II-XII intact.      CAPILLARY BLOOD GLUCOSE      POCT Blood Glucose.: 72 mg/dL (22 Mar 2021 20:36)    I&O's Summary      LABS:                        11.8   12.54 )-----------( 372      ( 22 Mar 2021 13:33 )             35.1     03-    132<L>  |  88<L>  |  19  ----------------------------<  69<L>  3.4<L>   |  16<L>  |  1.01    Ca    10.4      22 Mar 2021 13:33    TPro  9.3<H>  /  Alb  4.1  /  TBili  1.2  /  DBili  x   /  AST  209<H>  /  ALT  50<H>  /  AlkPhos  292<H>  03-22    PT/INR - ( 22 Mar 2021 17:32 )   PT: 15.1 sec;   INR: 1.33 ratio         PTT - ( 22 Mar 2021 17:32 )  PTT:33.7 sec      Urinalysis Basic - ( 22 Mar 2021 16:24 )    Color: Yellow / Appearance: Slightly Turbid / S.031 / pH: x  Gluc: x / Ketone: Large  / Bili: Negative / Urobili: 3 mg/dL   Blood: x / Protein: 300 mg/dL / Nitrite: Negative   Leuk Esterase: Negative / RBC: 5 /HPF / WBC 4 /HPF   Sq Epi: x / Non Sq Epi: 10 /HPF / Bacteria: Few        RADIOLOGY & ADDITIONAL TESTS:     MICROBIOLOGY:    ANTIMICROBIALS:    CONSULTS:

## 2021-03-23 NOTE — PROVIDER CONTACT NOTE (CRITICAL VALUE NOTIFICATION) - ASSESSMENT
VSS, afebrile. pt is tachy at times. pt denies chest pain and sob.
VSS, afebrile, pt denies sob and chest pain. ambulates with standby assist

## 2021-03-23 NOTE — PROGRESS NOTE ADULT - PROBLEM SELECTOR PLAN 6
Mild descending colonic wall thickening, likely inflammation secondary due to adjacent pancreatitis.  - Treat pancreatitis as above

## 2021-03-23 NOTE — PROGRESS NOTE ADULT - PROBLEM SELECTOR PLAN 7
Likely due to pain, possibly with undiagnosed underlying essential hypertension.  - BP down to 150s/110s s/p Dilaudid 0.5mg IV  - Treat pain as above  - Monitor BP. Would not be inclined to start antihypertensives inpatient. F/u outpatient with PCP. Likely due to pain, possibly with undiagnosed underlying essential hypertension.  - BP down to 150s/110s s/p Dilaudid 0.5mg IV  - Treat pain as above  - Monitor BP. Would not be inclined to start antihypertensives inpatient. F/u outpatient with PCP.  -patient within timeframe of withdrawal however CIWA <5 on exam this am and patient not endorsing significant EtOH use hx.

## 2021-03-23 NOTE — PROGRESS NOTE ADULT - PROBLEM SELECTOR PLAN 2
N/V due to pancreatitis  - Prolonged QTc to 557.  Daily EKG. Check Magnesium level.   - Ativan 0.25mg IVP q6h PRN for n/v  - Start on CLD given patient amenable and slowly advance as tolerated  - Hypokalemia likely secondary to emesis, likely causing biphasic T waves (down then up, inconsistent with ischemic biphasic T waves).  Replete with K 20mEq with IVF and PO 40mEq. Monitor BMP. N/V due to pancreatitis  - Prolonged QTc to 557. Phosphorus repleted will recheck labs. Other electrolytes normal. EKG rechecked with normal QTc.  - Ativan 0.25mg IVP q6h PRN for n/v  - Start on CLD given patient amenable and slowly advance as tolerated

## 2021-03-23 NOTE — PROGRESS NOTE ADULT - PROBLEM SELECTOR PLAN 5
Severe hepatosteatosis possibly secondary to PACHECO vs. heavy alcohol use. Lipid profile with elevated TG to 209.  - Check viral hepatitis (B, C) panel to r/o etiologies

## 2021-03-24 LAB
ALBUMIN SERPL ELPH-MCNC: 2.8 G/DL — LOW (ref 3.3–5)
ALP SERPL-CCNC: 160 U/L — HIGH (ref 40–120)
ALT FLD-CCNC: 38 U/L — HIGH (ref 4–33)
ANION GAP SERPL CALC-SCNC: 12 MMOL/L — SIGNIFICANT CHANGE UP (ref 7–14)
AST SERPL-CCNC: 87 U/L — HIGH (ref 4–32)
BILIRUB SERPL-MCNC: 1.2 MG/DL — SIGNIFICANT CHANGE UP (ref 0.2–1.2)
BUN SERPL-MCNC: 7 MG/DL — SIGNIFICANT CHANGE UP (ref 7–23)
CALCIUM SERPL-MCNC: 8.1 MG/DL — LOW (ref 8.4–10.5)
CHLORIDE SERPL-SCNC: 99 MMOL/L — SIGNIFICANT CHANGE UP (ref 98–107)
CO2 SERPL-SCNC: 21 MMOL/L — LOW (ref 22–31)
CREAT SERPL-MCNC: 0.53 MG/DL — SIGNIFICANT CHANGE UP (ref 0.5–1.3)
GLUCOSE SERPL-MCNC: 81 MG/DL — SIGNIFICANT CHANGE UP (ref 70–99)
HCT VFR BLD CALC: 26.8 % — LOW (ref 34.5–45)
HGB BLD-MCNC: 9 G/DL — LOW (ref 11.5–15.5)
LACTATE SERPL-SCNC: 2.1 MMOL/L — HIGH (ref 0.5–2)
MAGNESIUM SERPL-MCNC: 1.1 MG/DL — LOW (ref 1.6–2.6)
MCHC RBC-ENTMCNC: 27.5 PG — SIGNIFICANT CHANGE UP (ref 27–34)
MCHC RBC-ENTMCNC: 33.6 GM/DL — SIGNIFICANT CHANGE UP (ref 32–36)
MCV RBC AUTO: 82 FL — SIGNIFICANT CHANGE UP (ref 80–100)
NRBC # BLD: 0 /100 WBCS — SIGNIFICANT CHANGE UP
NRBC # FLD: 0 K/UL — SIGNIFICANT CHANGE UP
PHOSPHATE SERPL-MCNC: 1.6 MG/DL — LOW (ref 2.5–4.5)
PLATELET # BLD AUTO: 209 K/UL — SIGNIFICANT CHANGE UP (ref 150–400)
POTASSIUM SERPL-MCNC: 3.3 MMOL/L — LOW (ref 3.5–5.3)
POTASSIUM SERPL-SCNC: 3.3 MMOL/L — LOW (ref 3.5–5.3)
PROT SERPL-MCNC: 6.3 G/DL — SIGNIFICANT CHANGE UP (ref 6–8.3)
RBC # BLD: 3.27 M/UL — LOW (ref 3.8–5.2)
RBC # FLD: 22.5 % — HIGH (ref 10.3–14.5)
SODIUM SERPL-SCNC: 132 MMOL/L — LOW (ref 135–145)
WBC # BLD: 6.84 K/UL — SIGNIFICANT CHANGE UP (ref 3.8–10.5)
WBC # FLD AUTO: 6.84 K/UL — SIGNIFICANT CHANGE UP (ref 3.8–10.5)

## 2021-03-24 PROCEDURE — 99232 SBSQ HOSP IP/OBS MODERATE 35: CPT | Mod: GC

## 2021-03-24 PROCEDURE — 99232 SBSQ HOSP IP/OBS MODERATE 35: CPT

## 2021-03-24 PROCEDURE — 93010 ELECTROCARDIOGRAM REPORT: CPT

## 2021-03-24 RX ORDER — THIAMINE MONONITRATE (VIT B1) 100 MG
500 TABLET ORAL THREE TIMES A DAY
Refills: 0 | Status: DISCONTINUED | OUTPATIENT
Start: 2021-03-24 | End: 2021-03-26

## 2021-03-24 RX ORDER — FOLIC ACID 0.8 MG
1 TABLET ORAL DAILY
Refills: 0 | Status: DISCONTINUED | OUTPATIENT
Start: 2021-03-24 | End: 2021-03-26

## 2021-03-24 RX ORDER — THIAMINE MONONITRATE (VIT B1) 100 MG
500 TABLET ORAL DAILY
Refills: 0 | Status: DISCONTINUED | OUTPATIENT
Start: 2021-03-24 | End: 2021-03-24

## 2021-03-24 RX ORDER — ENOXAPARIN SODIUM 100 MG/ML
40 INJECTION SUBCUTANEOUS DAILY
Refills: 0 | Status: DISCONTINUED | OUTPATIENT
Start: 2021-03-24 | End: 2021-03-25

## 2021-03-24 RX ORDER — POTASSIUM PHOSPHATE, MONOBASIC POTASSIUM PHOSPHATE, DIBASIC 236; 224 MG/ML; MG/ML
30 INJECTION, SOLUTION INTRAVENOUS ONCE
Refills: 0 | Status: COMPLETED | OUTPATIENT
Start: 2021-03-24 | End: 2021-03-24

## 2021-03-24 RX ORDER — MAGNESIUM SULFATE 500 MG/ML
2 VIAL (ML) INJECTION ONCE
Refills: 0 | Status: COMPLETED | OUTPATIENT
Start: 2021-03-24 | End: 2021-03-24

## 2021-03-24 RX ORDER — FAMOTIDINE 10 MG/ML
20 INJECTION INTRAVENOUS ONCE
Refills: 0 | Status: COMPLETED | OUTPATIENT
Start: 2021-03-24 | End: 2021-03-24

## 2021-03-24 RX ADMIN — Medication 105 MILLIGRAM(S): at 22:00

## 2021-03-24 RX ADMIN — SODIUM CHLORIDE 150 MILLILITER(S): 9 INJECTION, SOLUTION INTRAVENOUS at 02:07

## 2021-03-24 RX ADMIN — Medication 1 MILLIGRAM(S): at 18:02

## 2021-03-24 RX ADMIN — OXYCODONE HYDROCHLORIDE 10 MILLIGRAM(S): 5 TABLET ORAL at 05:40

## 2021-03-24 RX ADMIN — POTASSIUM PHOSPHATE, MONOBASIC POTASSIUM PHOSPHATE, DIBASIC 83.33 MILLIMOLE(S): 236; 224 INJECTION, SOLUTION INTRAVENOUS at 11:54

## 2021-03-24 RX ADMIN — PANTOPRAZOLE SODIUM 40 MILLIGRAM(S): 20 TABLET, DELAYED RELEASE ORAL at 18:03

## 2021-03-24 RX ADMIN — ENOXAPARIN SODIUM 40 MILLIGRAM(S): 100 INJECTION SUBCUTANEOUS at 21:59

## 2021-03-24 RX ADMIN — OXYCODONE HYDROCHLORIDE 10 MILLIGRAM(S): 5 TABLET ORAL at 11:32

## 2021-03-24 RX ADMIN — PANTOPRAZOLE SODIUM 40 MILLIGRAM(S): 20 TABLET, DELAYED RELEASE ORAL at 05:10

## 2021-03-24 RX ADMIN — SODIUM CHLORIDE 150 MILLILITER(S): 9 INJECTION, SOLUTION INTRAVENOUS at 10:32

## 2021-03-24 RX ADMIN — OXYCODONE HYDROCHLORIDE 10 MILLIGRAM(S): 5 TABLET ORAL at 10:32

## 2021-03-24 RX ADMIN — Medication 1 TABLET(S): at 18:02

## 2021-03-24 RX ADMIN — Medication 0.25 MILLIGRAM(S): at 04:32

## 2021-03-24 RX ADMIN — Medication 50 GRAM(S): at 10:32

## 2021-03-24 RX ADMIN — OXYCODONE HYDROCHLORIDE 10 MILLIGRAM(S): 5 TABLET ORAL at 05:10

## 2021-03-24 RX ADMIN — FAMOTIDINE 20 MILLIGRAM(S): 10 INJECTION INTRAVENOUS at 02:16

## 2021-03-24 NOTE — PROGRESS NOTE ADULT - PROBLEM SELECTOR PLAN 2
N/V due to pancreatitis  - Prolonged QTc to 557. Phosphorus repleted will recheck labs. Other electrolytes normal. EKG rechecked with normal QTc.  - Ativan 0.25mg IVP q6h PRN for n/v  - Start on CLD given patient amenable and slowly advance as tolerated N/V due to pancreatitis  - Ativan 0.25mg IVP q6h PRN for n/v given prolonged QTc. Will transition to zofran given normal QTc on 3/23.   - Start on CLD given patient amenable and slowly advance as tolerated

## 2021-03-24 NOTE — PROGRESS NOTE ADULT - PROBLEM SELECTOR PLAN 5
Severe hepatosteatosis possibly secondary to PACHECO vs. heavy alcohol use. Lipid profile with elevated TG to 209.  - Check viral hepatitis (B, C) panel to r/o etiologies Severe hepatosteatosis possibly secondary to PACHECO vs. heavy alcohol use. Lipid profile with elevated TG to 209.  - Hepatitis negative.   - Check autoimmune markers.

## 2021-03-24 NOTE — PROGRESS NOTE ADULT - ASSESSMENT
25F w/ no significant PMH presents with worsening epigastric and lower abdominal pain, n/v x 3 weeks, found to have elevated lipase and CT findings c/w acute pancreatitis.  25F w/ no significant PMH presents with worsening epigastric and lower abdominal pain, n/v x 3 weeks, found to have elevated lipase and CT findings c/w acute pancreatitis of unknown etiology who is now presenting with worsening transaminitis and severe hepatic steatosis on imaging.

## 2021-03-24 NOTE — PROGRESS NOTE ADULT - ASSESSMENT
25F w/ no PMH who presents with 2-3 weeks of abdominal pain and nausea/vomiting with concern for pancreatitis.       Impression:  #Pancreatitis - peripancreatic stranding on imaging - no stones or biliary dilatation however ddx includes passed stone given elevated of LFTs. Calcium normal, TG mildly elevated. Also noted is protein gap on CMP - other ddx includes autoimmune disease or IgG4-related disease. HIV and hep panel negative.  #Hepatomegaly with steatosis - ddx is wide and includes NAFLD vs alcoholic steatosis vs drug-induced (denies any home meds) vs pregnancy-related (neg preg test)  #Blood-tinged vomit - likely 2/2 esophagitis from repeated vomiting episodes vs MW tear. Other ddx includes PUD vs gastritis vs angioectasia vs Dieulafoy vs malignancy.      Recommendation:  - advance to clears as tolerated  - f/u IgG4 level  - f/u HIV and hepatitis panel  - given acute pancreatitis episode with continued vomiting - no indication for EGD at this time  - IVF and pain control  - rest of care per primary team      Kacy Iverson PGY-4  Gastroenterology Fellow  Pager #[phone] (DEMIAN) or [phone] (NS)  Available on [employer] Teams.  Please contact on-call GI fellow via answering service ([phone]) after 5pm and before 8am, and on weekends.  25F w/ no PMH who presents with 2-3 weeks of abdominal pain and nausea/vomiting with concern for pancreatitis.       Impression:  #Pancreatitis - peripancreatic stranding on imaging - no stones or biliary dilatation however ddx includes passed stone given elevated of LFTs. Calcium normal, TG mildly elevated. Also noted is protein gap on CMP - other ddx includes autoimmune disease or IgG4-related disease. HIV and hep panel negative.  #Hepatomegaly with steatosis - ddx is wide and includes NAFLD vs alcoholic steatosis vs drug-induced (denies any home meds) vs hepatitis C genotype 3 (neg ab) vs pregnancy-related (neg preg test) vs Jeff's disease vs starvation   #Blood-tinged vomit - likely 2/2 esophagitis from repeated vomiting episodes vs MW tear. Other ddx includes PUD vs gastritis vs angioectasia vs Dieulafoy vs malignancy.      Recommendation:  - advance to clears as tolerated  - f/u IgG4 level  - f/u HIV and hepatitis panel  - consider other autoimmune work up given unclear etiology of pancreatitis and severe fatty liver: BRIAN, AMA, ASMA and cerruloplasmin  - given acute pancreatitis episode with continued vomiting - no indication for EGD at this time  - IVF and pain control  - rest of care per primary team      Kacy Iverson PGY-4  Gastroenterology Fellow  Pager #[phone] (DEMIAN) or [phone] (NS)  Available on [employer] Teams.  Please contact on-call GI fellow via answering service ([phone]) after 5pm and before 8am, and on weekends.

## 2021-03-24 NOTE — PROGRESS NOTE ADULT - PROBLEM SELECTOR PLAN 4
Reported dark brown emesis in last 3 weeks and 1 episode of bright red blood-tinged emesis yesterday, may be secondary to upper GI bleed.  Possibly secondary to gastropathy vs. esophagitis.  - H/H stable, vitals stable  - Monitor H/H, maintain active T&S  - GI consult with no role for EGD and will continue to monitoring.   - Start pantoprazole 40 IV BID Reported dark brown emesis in last 3 weeks and 1 episode of bright red blood-tinged emesis yesterday, may be secondary to upper GI bleed.  Possibly secondary to gastropathy vs. esophagitis.  - H/H stable, vitals stable  - Monitor H/H, maintain active T&S  - GI consult with no role for EGD and will continue to monitoring.   - C/w pantoprazole BID.

## 2021-03-24 NOTE — PROGRESS NOTE ADULT - SUBJECTIVE AND OBJECTIVE BOX
Jarrod Bowers M.D.  Internal Medicine PGY-1  [id number] / [id number]     Patient is a 25y old  Female who presents with a chief complaint of Abd pain, N/V (23 Mar 2021 09:51)      SUBJECTIVE / OVERNIGHT EVENTS:          MEDICATIONS  (STANDING):  influenza   Vaccine 0.5 milliLiter(s) IntraMuscular once  lactated ringers 1000 milliLiter(s) (150 mL/Hr) IV Continuous <Continuous>  pantoprazole  Injectable 40 milliGRAM(s) IV Push every 12 hours    MEDICATIONS  (PRN):  acetaminophen   Tablet .. 650 milliGRAM(s) Oral every 6 hours PRN Temp greater or equal to 38C (100.4F), Mild Pain (1 - 3), Moderate Pain (4 - 6)  aluminum hydroxide/magnesium hydroxide/simethicone Suspension 30 milliLiter(s) Oral every 6 hours PRN Dyspepsia  LORazepam   Injectable 0.25 milliGRAM(s) IV Push every 6 hours PRN Nausea and/or Vomiting  oxyCODONE    IR 10 milliGRAM(s) Oral every 4 hours PRN Moderate Pain (4 - 6)      Vital Signs Last 24 Hrs  T(C): 37.2 (24 Mar 2021 05:08), Max: 37.2 (24 Mar 2021 05:08)  T(F): 99 (24 Mar 2021 05:08), Max: 99 (24 Mar 2021 05:08)  HR: 107 (24 Mar 2021 05:08) (101 - 107)  BP: 140/103 (24 Mar 2021 05:08) (138/98 - 152/99)  BP(mean): --  RR: 16 (24 Mar 2021 05:08) (16 - 18)  SpO2: 100% (24 Mar 2021 05:08) (97% - 100%)      PHYSICAL EXAM  GENERAL: NAD, well-developed  HEAD:  Atraumatic, Normocephalic  EYES: EOMI, PERRLA, conjunctiva and sclera clear  NECK: Supple, No JVD  CHEST/LUNG: Clear to auscultation bilaterally; No wheeze  HEART: Regular rate and rhythm; No murmurs, rubs, or gallops  ABDOMEN: Soft, Nontender, Nondistended; Bowel sounds present  EXTREMITIES:  2+ Peripheral Pulses, No clubbing, cyanosis, or edema  PSYCH: AAOx3  SKIN: No rashes or lesions    CAPILLARY BLOOD GLUCOSE      POCT Blood Glucose.: 110 mg/dL (23 Mar 2021 12:30)  POCT Blood Glucose.: 81 mg/dL (23 Mar 2021 08:50)    I&O's Summary    23 Mar 2021 07:01  -  24 Mar 2021 07:00  --------------------------------------------------------  IN: 750 mL / OUT: 0 mL / NET: 750 mL        LABS:                        9.7    7.46  )-----------( 261      ( 23 Mar 2021 10:39 )             29.4         133<L>  |  99  |  12  ----------------------------<  108<H>  3.5   |  16<L>  |  0.64    Ca    9.0      23 Mar 2021 17:25  Phos  1.3       Mg     1.5         TPro  7.3  /  Alb  3.3  /  TBili  1.3<H>  /  DBili  x   /  AST  131<H>  /  ALT  43<H>  /  AlkPhos  197<H>      PT/INR - ( 22 Mar 2021 17:32 )   PT: 15.1 sec;   INR: 1.33 ratio         PTT - ( 22 Mar 2021 17:32 )  PTT:33.7 sec      Urinalysis Basic - ( 22 Mar 2021 16:24 )    Color: Yellow / Appearance: Slightly Turbid / S.031 / pH: x  Gluc: x / Ketone: Large  / Bili: Negative / Urobili: 3 mg/dL   Blood: x / Protein: 300 mg/dL / Nitrite: Negative   Leuk Esterase: Negative / RBC: 5 /HPF / WBC 4 /HPF   Sq Epi: x / Non Sq Epi: 10 /HPF / Bacteria: Few        RADIOLOGY & ADDITIONAL TESTS:     MICROBIOLOGY:    ANTIMICROBIALS:    CONSULTS: Jarrod Bowers M.D.  Internal Medicine PGY-1  [id number] / [id number]     Patient is a 25y old  Female who presents with a chief complaint of Abd pain, N/V (23 Mar 2021 09:51)      SUBJECTIVE / OVERNIGHT EVENTS:    Patient seen and examined at the bedside this am. Per nursing no acute events overnight. Patient still endorsing epigastric pain with sips of juice and endorsed one bout of pseudo-emesis last night but stated that drinking sips of water helps calm her stomach down and help her. She states that her pain control has been adequate since going up on the oxycodone 10 IR and does not endorse any night sweats, tremors, hallucinations, hx of previous alcohol withdrawal type symptoms.       MEDICATIONS  (STANDING):  influenza   Vaccine 0.5 milliLiter(s) IntraMuscular once  lactated ringers 1000 milliLiter(s) (150 mL/Hr) IV Continuous <Continuous>  pantoprazole  Injectable 40 milliGRAM(s) IV Push every 12 hours    MEDICATIONS  (PRN):  acetaminophen   Tablet .. 650 milliGRAM(s) Oral every 6 hours PRN Temp greater or equal to 38C (100.4F), Mild Pain (1 - 3), Moderate Pain (4 - 6)  aluminum hydroxide/magnesium hydroxide/simethicone Suspension 30 milliLiter(s) Oral every 6 hours PRN Dyspepsia  LORazepam   Injectable 0.25 milliGRAM(s) IV Push every 6 hours PRN Nausea and/or Vomiting  oxyCODONE    IR 10 milliGRAM(s) Oral every 4 hours PRN Moderate Pain (4 - 6)      Vital Signs Last 24 Hrs  T(C): 37.2 (24 Mar 2021 05:08), Max: 37.2 (24 Mar 2021 05:08)  T(F): 99 (24 Mar 2021 05:08), Max: 99 (24 Mar 2021 05:08)  HR: 107 (24 Mar 2021 05:08) (101 - 107)  BP: 140/103 (24 Mar 2021 05:08) (138/98 - 152/99)  BP(mean): --  RR: 16 (24 Mar 2021 05:08) (16 - 18)  SpO2: 100% (24 Mar 2021 05:08) (97% - 100%)      PHYSICAL EXAM  GENERAL: NAD, well-developed  HEAD:  Atraumatic, Normocephalic  EYES: EOMI, PERRLA, conjunctiva and sclera clear  NECK: Supple, No JVD  CHEST/LUNG: Clear to auscultation bilaterally; No wheeze  HEART: Regular rate and rhythm; No murmurs, rubs, or gallops  ABDOMEN: Soft, Nontender, Nondistended; Bowel sounds present  EXTREMITIES:  2+ Peripheral Pulses, No clubbing, cyanosis, or edema  PSYCH: AAOx3  SKIN: No rashes or lesions    CAPILLARY BLOOD GLUCOSE      POCT Blood Glucose.: 110 mg/dL (23 Mar 2021 12:30)  POCT Blood Glucose.: 81 mg/dL (23 Mar 2021 08:50)    I&O's Summary    23 Mar 2021 07:01  -  24 Mar 2021 07:00  --------------------------------------------------------  IN: 750 mL / OUT: 0 mL / NET: 750 mL        LABS:                        9.7    7.46  )-----------( 261      ( 23 Mar 2021 10:39 )             29.4     03-23    133<L>  |  99  |  12  ----------------------------<  108<H>  3.5   |  16<L>  |  0.64    Ca    9.0      23 Mar 2021 17:25  Phos  1.3     03-  Mg     1.5     -    TPro  7.3  /  Alb  3.3  /  TBili  1.3<H>  /  DBili  x   /  AST  131<H>  /  ALT  43<H>  /  AlkPhos  197<H>  03-23    PT/INR - ( 22 Mar 2021 17:32 )   PT: 15.1 sec;   INR: 1.33 ratio         PTT - ( 22 Mar 2021 17:32 )  PTT:33.7 sec      Urinalysis Basic - ( 22 Mar 2021 16:24 )    Color: Yellow / Appearance: Slightly Turbid / S.031 / pH: x  Gluc: x / Ketone: Large  / Bili: Negative / Urobili: 3 mg/dL   Blood: x / Protein: 300 mg/dL / Nitrite: Negative   Leuk Esterase: Negative / RBC: 5 /HPF / WBC 4 /HPF   Sq Epi: x / Non Sq Epi: 10 /HPF / Bacteria: Few        RADIOLOGY & ADDITIONAL TESTS:     MICROBIOLOGY:    ANTIMICROBIALS:    CONSULTS: Jarrod Bowers M.D.  Internal Medicine PGY-1  201- [id number] / [id number]     Patient is a 25y old  Female who presents with a chief complaint of Abd pain, N/V (23 Mar 2021 09:51)      SUBJECTIVE / OVERNIGHT EVENTS:    Patient seen and examined at the bedside this am. Per nursing no acute events overnight. Patient still endorsing epigastric pain with sips of juice and endorsed one bout of pseudo-emesis last night but stated that drinking sips of water helps calm her stomach down and help her. She states that her pain control has been adequate since going up on the oxycodone 10 IR and does not endorse any night sweats, tremors, hallucinations, hx of previous alcohol withdrawal type symptoms.       MEDICATIONS  (STANDING):  influenza   Vaccine 0.5 milliLiter(s) IntraMuscular once  lactated ringers 1000 milliLiter(s) (150 mL/Hr) IV Continuous <Continuous>  pantoprazole  Injectable 40 milliGRAM(s) IV Push every 12 hours    MEDICATIONS  (PRN):  acetaminophen   Tablet .. 650 milliGRAM(s) Oral every 6 hours PRN Temp greater or equal to 38C (100.4F), Mild Pain (1 - 3), Moderate Pain (4 - 6)  aluminum hydroxide/magnesium hydroxide/simethicone Suspension 30 milliLiter(s) Oral every 6 hours PRN Dyspepsia  LORazepam   Injectable 0.25 milliGRAM(s) IV Push every 6 hours PRN Nausea and/or Vomiting  oxyCODONE    IR 10 milliGRAM(s) Oral every 4 hours PRN Moderate Pain (4 - 6)      Vital Signs Last 24 Hrs  T(C): 37.2 (24 Mar 2021 05:08), Max: 37.2 (24 Mar 2021 05:08)  T(F): 99 (24 Mar 2021 05:08), Max: 99 (24 Mar 2021 05:08)  HR: 107 (24 Mar 2021 05:08) (101 - 107)  BP: 140/103 (24 Mar 2021 05:08) (138/98 - 152/99)  BP(mean): --  RR: 16 (24 Mar 2021 05:08) (16 - 18)  SpO2: 100% (24 Mar 2021 05:08) (97% - 100%)      General: in no acute distress.   Head: Normocephalic, atraumatic. No tongue fasiculations. Good dental hygiene.     Eyes: PERRL.  EOMI.  No scleral icterus.  No conjunctival pallor.  Mouth: Moist MM.  No oropharyngeal exudates.    Heart: Tachycardic.  Normal S1 and S2.  No murmurs, rubs, or gallops.  No LE edema b/l.   Lungs: Nonlabored breathing.  Good inspiratory effort.  CTAB.  No wheezes, crackles, or rhonchi.  Abdomen: BS+, soft, mildly distended, TTP in epigastric and diffuse lower abdomen with no rebound or guarding. No hepatomegaly.   Skin: Warm and dry.  No rashes.  Musculoskeletal: No joint deformities.  No spinal or paraspinal tenderness.  Neuro: A&Ox3.  CN II-XII intact.    Extremities: No tremors.   Psych: AxOx3 not responding to external stimuli denies hallucinations, si, hi.     CAPILLARY BLOOD GLUCOSE      POCT Blood Glucose.: 110 mg/dL (23 Mar 2021 12:30)  POCT Blood Glucose.: 81 mg/dL (23 Mar 2021 08:50)    I&O's Summary    23 Mar 2021 07:01  -  24 Mar 2021 07:00  --------------------------------------------------------  IN: 750 mL / OUT: 0 mL / NET: 750 mL        LABS:                        9.7    7.46  )-----------( 261      ( 23 Mar 2021 10:39 )             29.4         133<L>  |  99  |  12  ----------------------------<  108<H>  3.5   |  16<L>  |  0.64    Ca    9.0      23 Mar 2021 17:25  Phos  1.3       Mg     1.5         TPro  7.3  /  Alb  3.3  /  TBili  1.3<H>  /  DBili  x   /  AST  131<H>  /  ALT  43<H>  /  AlkPhos  197<H>      PT/INR - ( 22 Mar 2021 17:32 )   PT: 15.1 sec;   INR: 1.33 ratio         PTT - ( 22 Mar 2021 17:32 )  PTT:33.7 sec      Urinalysis Basic - ( 22 Mar 2021 16:24 )    Color: Yellow / Appearance: Slightly Turbid / S.031 / pH: x  Gluc: x / Ketone: Large  / Bili: Negative / Urobili: 3 mg/dL   Blood: x / Protein: 300 mg/dL / Nitrite: Negative   Leuk Esterase: Negative / RBC: 5 /HPF / WBC 4 /HPF   Sq Epi: x / Non Sq Epi: 10 /HPF / Bacteria: Few        RADIOLOGY & ADDITIONAL TESTS:     MICROBIOLOGY:    ANTIMICROBIALS:    CONSULTS:

## 2021-03-24 NOTE — PROGRESS NOTE ADULT - SUBJECTIVE AND OBJECTIVE BOX
Chief Complaint:  Patient is a 25y old  Female who presents with a chief complaint of Abd pain, N/V (24 Mar 2021 07:14)      Interval Events: no vomiting since trying apple juice yesterday  - improved chest and abdominal pain      Hospital Medications:  acetaminophen   Tablet .. 650 milliGRAM(s) Oral every 6 hours PRN  aluminum hydroxide/magnesium hydroxide/simethicone Suspension 30 milliLiter(s) Oral every 6 hours PRN  influenza   Vaccine 0.5 milliLiter(s) IntraMuscular once  lactated ringers 1000 milliLiter(s) IV Continuous <Continuous>  LORazepam   Injectable 0.25 milliGRAM(s) IV Push every 6 hours PRN  magnesium sulfate  IVPB 2 Gram(s) IV Intermittent once  oxyCODONE    IR 10 milliGRAM(s) Oral every 4 hours PRN  pantoprazole  Injectable 40 milliGRAM(s) IV Push every 12 hours  potassium phosphate IVPB 30 milliMole(s) IV Intermittent once      PMHX/PSHX:  No pertinent past medical history    Status post skin graft            ROS:     General:  No weight loss, fevers, chills, night sweats, fatigue   Eyes:  No vision changes  ENT:  No sore throat, pain, runny nose  CV:  No chest pain, palpitations, dizziness   Resp:  No SOB, cough, wheezing  GI:  See HPI  :  No burning with urination, hematuria  Muscle:  No pain, weakness  Neuro:  No weakness/tingling, memory problems  Psych:  No fatigue, insomnia, mood problems, depression  Heme:  No easy bruisability  Skin:  No rash, edema      PHYSICAL EXAM:     GENERAL:  Well developed, no distress  HEENT:  NC/AT,  conjunctivae clear, sclera anicteric  CHEST:  Full & symmetric excursion, no increased effort w/ respirations  HEART:  Regular rhythm & rate  ABDOMEN:  Soft, non-tender, distended  EXTREMITIES:  no LE  edema  SKIN:  No rash/erythema/ecchymoses/petechiae/wounds/jaundice  NEURO:  Alert, oriented    Vital Signs:  Vital Signs Last 24 Hrs  T(C): 37.2 (24 Mar 2021 05:08), Max: 37.2 (24 Mar 2021 05:08)  T(F): 99 (24 Mar 2021 05:08), Max: 99 (24 Mar 2021 05:08)  HR: 107 (24 Mar 2021 05:08) (106 - 107)  BP: 140/103 (24 Mar 2021 05:08) (140/88 - 152/99)  BP(mean): --  RR: 16 (24 Mar 2021 05:08) (16 - 18)  SpO2: 100% (24 Mar 2021 05:08) (97% - 100%)  Daily     Daily Weight in k (24 Mar 2021 02:32)    LABS:                        9.0    6.84  )-----------( 209      ( 24 Mar 2021 08:52 )             26.8     03-24    132<L>  |  99  |  7   ----------------------------<  81  3.3<L>   |  21<L>  |  0.53    Ca    8.1<L>      24 Mar 2021 08:52  Phos  1.6     03-24  Mg     1.1     03-24    TPro  6.3  /  Alb  2.8<L>  /  TBili  1.2  /  DBili  x   /  AST  87<H>  /  ALT  38<H>  /  AlkPhos  160<H>  03-24    LIVER FUNCTIONS - ( 24 Mar 2021 08:52 )  Alb: 2.8 g/dL / Pro: 6.3 g/dL / ALK PHOS: 160 U/L / ALT: 38 U/L / AST: 87 U/L / GGT: x           PT/INR - ( 22 Mar 2021 17:32 )   PT: 15.1 sec;   INR: 1.33 ratio         PTT - ( 22 Mar 2021 17:32 )  PTT:33.7 sec  Urinalysis Basic - ( 22 Mar 2021 16:24 )    Color: Yellow / Appearance: Slightly Turbid / S.031 / pH: x  Gluc: x / Ketone: Large  / Bili: Negative / Urobili: 3 mg/dL   Blood: x / Protein: 300 mg/dL / Nitrite: Negative   Leuk Esterase: Negative / RBC: 5 /HPF / WBC 4 /HPF   Sq Epi: x / Non Sq Epi: 10 /HPF / Bacteria: Few          Imaging:

## 2021-03-24 NOTE — PROGRESS NOTE ADULT - PROBLEM SELECTOR PLAN 1
Meets criteria for acute pancreatitis: worsening epigastric pain with n/v x 3 weeks, elevated lipase of 388, and peripancreatic fat stranding on CT. No gallstone or gallbladder abnormalities on RUQ u/s. TG elevated to 209 but not >1000. Not on any medications that can induce pancreatitis. No hypercalcemia. Biliary duct caliber normal. Possibly secondary hypertriglyceridemia (moderate) vs. alcohol use (although patient denies hx, AST:ALT ratio > 2:1) vs. infections (HIV, hepatitis B, CMV, HSV, Legionella, Leptospira, Salmonella, Toxoplasma) vs. idiopathic  - Treat supportively with IVF. S/p 2L NS. Start maintenance LR @150cc/hr with K repletion.  - Pain control with Oxycodone PO 5mg q4h PRN for severe pain, Tylenol 650 PO q6h for mild-moderate pain  - Start on CLD given patient amenable and slowly advance as tolerated  - Check HIV, Hepatitis B  - GI consult  - Checking IgG4 with IgG subsets.  - Maalox for symptomatic control. Meets criteria for acute pancreatitis: worsening epigastric pain with n/v x 3 weeks, elevated lipase of 388, and peripancreatic fat stranding on CT. No gallstone or gallbladder abnormalities on RUQ u/s. TG elevated to 209 but not >1000. Not on any medications that can induce pancreatitis. No hypercalcemia. Biliary duct caliber normal. Possibly secondary hypertriglyceridemia (moderate) vs. alcohol use (although patient denies hx, AST:ALT ratio > 2:1) vs. infections (HIV, hepatitis B, CMV, HSV, Legionella, Leptospira, Salmonella, Toxoplasma) vs. idiopathic  - Treat supportively with IVF. S/p 2L NS. Start maintenance LR @150cc/hr with K repletion.  - Pain control with Oxycodone PO 10mg q4h PRN for severe pain, Tylenol 650 PO q6h for mild-moderate pain  - Start on CLD given patient amenable and slowly advance as tolerated  - HIV and hep panel negative.   - GI consult  - Checking IgG4 with IgG subsets.  - f/u autoimmune labs AMA, BRIAN, ASMA, ceruloplasmin for possible autoimmune component of severe pancreatic dysfunction w/ hepatic steatosis.   - Maalox for symptomatic control.

## 2021-03-24 NOTE — PROGRESS NOTE ADULT - PROBLEM SELECTOR PLAN 3
HAGMA likely d/t lactic acidosis with respiratory compensation. Likely secondary to pancreatitis and hypovolemia.  - IVF as above  - Trend lactate with VBG daily. HAGMA likely d/t lactic acidosis with respiratory compensation. Likely secondary to pancreatitis and hypovolemia.  - IVF as above  - Lactate downtrending this am. Will trend until resolution.   - Trend lactate with VBG daily.

## 2021-03-24 NOTE — PROGRESS NOTE ADULT - PROBLEM SELECTOR PLAN 7
Likely due to pain, possibly with undiagnosed underlying essential hypertension.  - BP down to 150s/110s s/p Dilaudid 0.5mg IV  - Treat pain as above  - Monitor BP. Would not be inclined to start antihypertensives inpatient. F/u outpatient with PCP. Likely due to pain, possibly with undiagnosed underlying essential hypertension.  - BP down to 150s/110s s/p Dilaudid 0.5mg IV  - Treat pain as above.   - Monitor BP. Would not be inclined to start antihypertensives inpatient. F/u outpatient with PCP.

## 2021-03-25 LAB
24R-OH-CALCIDIOL SERPL-MCNC: <5 NG/ML — LOW (ref 30–80)
ALBUMIN SERPL ELPH-MCNC: 2.8 G/DL — LOW (ref 3.3–5)
ALP SERPL-CCNC: 189 U/L — HIGH (ref 40–120)
ALT FLD-CCNC: 39 U/L — HIGH (ref 4–33)
ANION GAP SERPL CALC-SCNC: 10 MMOL/L — SIGNIFICANT CHANGE UP (ref 7–14)
APPEARANCE UR: CLEAR — SIGNIFICANT CHANGE UP
APTT BLD: 34.1 SEC — SIGNIFICANT CHANGE UP (ref 27–36.3)
AST SERPL-CCNC: 134 U/L — HIGH (ref 4–32)
BILIRUB SERPL-MCNC: 1.3 MG/DL — HIGH (ref 0.2–1.2)
BILIRUB UR-MCNC: NEGATIVE — SIGNIFICANT CHANGE UP
BUN SERPL-MCNC: 6 MG/DL — LOW (ref 7–23)
CALCIUM SERPL-MCNC: 8.7 MG/DL — SIGNIFICANT CHANGE UP (ref 8.4–10.5)
CERULOPLASMIN SERPL-MCNC: 28 MG/DL — SIGNIFICANT CHANGE UP (ref 16–45)
CHLORIDE SERPL-SCNC: 100 MMOL/L — SIGNIFICANT CHANGE UP (ref 98–107)
CO2 SERPL-SCNC: 26 MMOL/L — SIGNIFICANT CHANGE UP (ref 22–31)
COLOR SPEC: SIGNIFICANT CHANGE UP
CREAT SERPL-MCNC: 0.49 MG/DL — LOW (ref 0.5–1.3)
DIFF PNL FLD: ABNORMAL
GLUCOSE SERPL-MCNC: 73 MG/DL — SIGNIFICANT CHANGE UP (ref 70–99)
GLUCOSE UR QL: NEGATIVE — SIGNIFICANT CHANGE UP
HCT VFR BLD CALC: 26.4 % — LOW (ref 34.5–45)
HGB BLD-MCNC: 8.8 G/DL — LOW (ref 11.5–15.5)
INR BLD: 1.29 RATIO — HIGH (ref 0.88–1.16)
KETONES UR-MCNC: ABNORMAL
LACTATE SERPL-SCNC: 1 MMOL/L — SIGNIFICANT CHANGE UP (ref 0.5–2)
LEUKOCYTE ESTERASE UR-ACNC: NEGATIVE — SIGNIFICANT CHANGE UP
MAGNESIUM SERPL-MCNC: 1.5 MG/DL — LOW (ref 1.6–2.6)
MCHC RBC-ENTMCNC: 27.2 PG — SIGNIFICANT CHANGE UP (ref 27–34)
MCHC RBC-ENTMCNC: 33.3 GM/DL — SIGNIFICANT CHANGE UP (ref 32–36)
MCV RBC AUTO: 81.7 FL — SIGNIFICANT CHANGE UP (ref 80–100)
NITRITE UR-MCNC: NEGATIVE — SIGNIFICANT CHANGE UP
NRBC # BLD: 0 /100 WBCS — SIGNIFICANT CHANGE UP
NRBC # FLD: 0 K/UL — SIGNIFICANT CHANGE UP
PH UR: 8 — SIGNIFICANT CHANGE UP (ref 5–8)
PHOSPHATE SERPL-MCNC: 1.2 MG/DL — LOW (ref 2.5–4.5)
PLATELET # BLD AUTO: 263 K/UL — SIGNIFICANT CHANGE UP (ref 150–400)
POTASSIUM SERPL-MCNC: 3.8 MMOL/L — SIGNIFICANT CHANGE UP (ref 3.5–5.3)
POTASSIUM SERPL-SCNC: 3.8 MMOL/L — SIGNIFICANT CHANGE UP (ref 3.5–5.3)
PROT SERPL-MCNC: 6.6 G/DL — SIGNIFICANT CHANGE UP (ref 6–8.3)
PROT UR-MCNC: NEGATIVE — SIGNIFICANT CHANGE UP
PROTHROM AB SERPL-ACNC: 14.7 SEC — HIGH (ref 10.6–13.6)
RBC # BLD: 3.23 M/UL — LOW (ref 3.8–5.2)
RBC # FLD: 22.4 % — HIGH (ref 10.3–14.5)
SODIUM SERPL-SCNC: 136 MMOL/L — SIGNIFICANT CHANGE UP (ref 135–145)
SP GR SPEC: 1.01 — LOW (ref 1.01–1.02)
UROBILINOGEN FLD QL: SIGNIFICANT CHANGE UP
WBC # BLD: 7 K/UL — SIGNIFICANT CHANGE UP (ref 3.8–10.5)
WBC # FLD AUTO: 7 K/UL — SIGNIFICANT CHANGE UP (ref 3.8–10.5)

## 2021-03-25 PROCEDURE — 99233 SBSQ HOSP IP/OBS HIGH 50: CPT | Mod: GC

## 2021-03-25 PROCEDURE — 99232 SBSQ HOSP IP/OBS MODERATE 35: CPT | Mod: GC

## 2021-03-25 PROCEDURE — 93306 TTE W/DOPPLER COMPLETE: CPT | Mod: 26

## 2021-03-25 RX ORDER — AMLODIPINE BESYLATE 2.5 MG/1
5 TABLET ORAL DAILY
Refills: 0 | Status: DISCONTINUED | OUTPATIENT
Start: 2021-03-25 | End: 2021-03-26

## 2021-03-25 RX ORDER — POTASSIUM PHOSPHATE, MONOBASIC POTASSIUM PHOSPHATE, DIBASIC 236; 224 MG/ML; MG/ML
30 INJECTION, SOLUTION INTRAVENOUS ONCE
Refills: 0 | Status: COMPLETED | OUTPATIENT
Start: 2021-03-25 | End: 2021-03-25

## 2021-03-25 RX ORDER — OXYCODONE HYDROCHLORIDE 5 MG/1
5 TABLET ORAL EVERY 8 HOURS
Refills: 0 | Status: DISCONTINUED | OUTPATIENT
Start: 2021-03-25 | End: 2021-03-26

## 2021-03-25 RX ORDER — MAGNESIUM SULFATE 500 MG/ML
2 VIAL (ML) INJECTION DAILY
Refills: 0 | Status: DISCONTINUED | OUTPATIENT
Start: 2021-03-25 | End: 2021-03-26

## 2021-03-25 RX ADMIN — Medication 105 MILLIGRAM(S): at 14:53

## 2021-03-25 RX ADMIN — Medication 50 GRAM(S): at 13:50

## 2021-03-25 RX ADMIN — POTASSIUM PHOSPHATE, MONOBASIC POTASSIUM PHOSPHATE, DIBASIC 83.33 MILLIMOLE(S): 236; 224 INJECTION, SOLUTION INTRAVENOUS at 11:27

## 2021-03-25 RX ADMIN — SODIUM CHLORIDE 150 MILLILITER(S): 9 INJECTION, SOLUTION INTRAVENOUS at 05:02

## 2021-03-25 RX ADMIN — Medication 105 MILLIGRAM(S): at 05:03

## 2021-03-25 RX ADMIN — Medication 650 MILLIGRAM(S): at 21:41

## 2021-03-25 RX ADMIN — AMLODIPINE BESYLATE 5 MILLIGRAM(S): 2.5 TABLET ORAL at 11:27

## 2021-03-25 RX ADMIN — PANTOPRAZOLE SODIUM 40 MILLIGRAM(S): 20 TABLET, DELAYED RELEASE ORAL at 05:04

## 2021-03-25 RX ADMIN — SODIUM CHLORIDE 150 MILLILITER(S): 9 INJECTION, SOLUTION INTRAVENOUS at 11:27

## 2021-03-25 RX ADMIN — Medication 105 MILLIGRAM(S): at 21:41

## 2021-03-25 RX ADMIN — Medication 1 MILLIGRAM(S): at 11:27

## 2021-03-25 RX ADMIN — Medication 650 MILLIGRAM(S): at 22:11

## 2021-03-25 RX ADMIN — ENOXAPARIN SODIUM 40 MILLIGRAM(S): 100 INJECTION SUBCUTANEOUS at 11:28

## 2021-03-25 RX ADMIN — Medication 1 TABLET(S): at 11:27

## 2021-03-25 NOTE — PROGRESS NOTE ADULT - ASSESSMENT
25F w/ no PMH who presents with 2-3 weeks of abdominal pain and nausea/vomiting with concern for pancreatitis.       Impression:  #Pancreatitis - peripancreatic stranding on imaging - no stones or biliary dilatation however ddx includes passed stone given elevated of LFTs. Calcium normal, TG mildly elevated. Also noted is protein gap on CMP - other ddx includes autoimmune disease or IgG4-related disease. HIV and hep panel negative.  #Hepatomegaly with steatosis - ddx is wide and includes NAFLD vs alcoholic steatosis vs drug-induced (denies any home meds) vs hepatitis C genotype 3 (neg ab) vs pregnancy-related (neg preg test) vs Jeff's disease vs starvation   #Blood-tinged vomit - likely 2/2 esophagitis from repeated vomiting episodes vs MW tear. Other ddx includes PUD vs gastritis vs angioectasia vs Dieulafoy vs malignancy.      Recommendation:  - if no more vomiting or abd pain with eating would trial soft diet to avoid odynophagia that is likely from esophagitis from prolonged continued vomiting  - f/u IgG4 level and autoimmune work up  - c/w PPI  - rest of care per primary team      Kacy Iverson PGY-4  Gastroenterology Fellow  Pager #[phone] (DEMIAN) or [phone] (NS)  Available on [employer] Teams.  Please contact on-call GI fellow via answering service ([phone]) after 5pm and before 8am, and on weekends. 25F w/ no PMH who presents with 2-3 weeks of abdominal pain and nausea/vomiting with concern for pancreatitis.       Impression:  #Pancreatitis - peripancreatic stranding on imaging - no stones or biliary dilatation however ddx includes passed stone given elevated of LFTs. Calcium normal, TG mildly elevated. Also noted is protein gap on CMP - other ddx includes autoimmune disease or IgG4-related disease. HIV and hep panel negative. patient now reports alcohol use as possible etiology.  #Hepatomegaly with steatosis - ddx is wide and includes NAFLD vs alcoholic steatosis vs drug-induced (denies any home meds) vs hepatitis C genotype 3 (neg ab) vs pregnancy-related (neg preg test) vs Jeff's disease vs starvation   #Blood-tinged vomit - likely 2/2 esophagitis from repeated vomiting episodes vs MW tear. Other ddx includes PUD vs gastritis vs angioectasia vs Dieulafoy vs malignancy.      Recommendation:  - if no more vomiting or abd pain with eating would trial soft diet to avoid odynophagia that is likely from esophagitis from prolonged continued vomiting  - f/u IgG4 level and autoimmune work up  - trend LFTs  - c/w PPI  - rest of care per primary team      Kacy Iverson PGY-4  Gastroenterology Fellow  Pager #[phone] (DEMIAN) or [phone] (NS)  Available on [employer] Teams.  Please contact on-call GI fellow via answering service ([phone]) after 5pm and before 8am, and on weekends.

## 2021-03-25 NOTE — PROGRESS NOTE ADULT - PROBLEM SELECTOR PLAN 5
Severe hepatosteatosis possibly secondary to PACHECO vs. heavy alcohol use. Lipid profile with elevated TG to 209.  - Hepatitis negative.   - Check autoimmune markers. Reported dark brown emesis in last 3 weeks and 1 episode of bright red blood-tinged emesis yesterday, may be secondary to upper GI bleed.  Possibly secondary to gastropathy vs. esophagitis.  - H/H stable, vitals stable  - Monitor H/H, maintain active T&S  - GI consult with no role for EGD and will continue to monitoring.   - C/w pantoprazole BID.

## 2021-03-25 NOTE — PROGRESS NOTE ADULT - SUBJECTIVE AND OBJECTIVE BOX
Jarrod Bowers M.D.  Internal Medicine PGY-1  [phone] [phone] / [id number]     Patient is a 25y old  Female who presents with a chief complaint of Abd pain, N/V (24 Mar 2021 09:51)      SUBJECTIVE / OVERNIGHT EVENTS:          MEDICATIONS  (STANDING):  enoxaparin Injectable 40 milliGRAM(s) SubCutaneous daily  folic acid 1 milliGRAM(s) Oral daily  influenza   Vaccine 0.5 milliLiter(s) IntraMuscular once  lactated ringers 1000 milliLiter(s) (150 mL/Hr) IV Continuous <Continuous>  multivitamin 1 Tablet(s) Oral daily  pantoprazole  Injectable 40 milliGRAM(s) IV Push every 12 hours  thiamine IVPB 500 milliGRAM(s) IV Intermittent three times a day    MEDICATIONS  (PRN):  acetaminophen   Tablet .. 650 milliGRAM(s) Oral every 6 hours PRN Temp greater or equal to 38C (100.4F), Mild Pain (1 - 3), Moderate Pain (4 - 6)  aluminum hydroxide/magnesium hydroxide/simethicone Suspension 30 milliLiter(s) Oral every 6 hours PRN Dyspepsia  LORazepam   Injectable 0.25 milliGRAM(s) IV Push every 6 hours PRN Nausea and/or Vomiting  oxyCODONE    IR 10 milliGRAM(s) Oral every 4 hours PRN Moderate Pain (4 - 6)      Vital Signs Last 24 Hrs  T(C): 37 (25 Mar 2021 05:00), Max: 37 (25 Mar 2021 05:00)  T(F): 98.6 (25 Mar 2021 05:00), Max: 98.6 (25 Mar 2021 05:00)  HR: 98 (25 Mar 2021 05:00) (98 - 106)  BP: 139/100 (25 Mar 2021 05:00) (137/104 - 139/100)  BP(mean): --  RR: 17 (25 Mar 2021 05:00) (17 - 18)  SpO2: 100% (25 Mar 2021 05:00) (96% - 100%)      PHYSICAL EXAM  GENERAL: NAD, well-developed  HEAD:  Atraumatic, Normocephalic  EYES: EOMI, PERRLA, conjunctiva and sclera clear  NECK: Supple, No JVD  CHEST/LUNG: Clear to auscultation bilaterally; No wheeze  HEART: Regular rate and rhythm; No murmurs, rubs, or gallops  ABDOMEN: Soft, Nontender, Nondistended; Bowel sounds present  EXTREMITIES:  2+ Peripheral Pulses, No clubbing, cyanosis, or edema  PSYCH: AAOx3  SKIN: No rashes or lesions    CAPILLARY BLOOD GLUCOSE        I&O's Summary    24 Mar 2021 07:01  -  25 Mar 2021 07:00  --------------------------------------------------------  IN: 4170 mL / OUT: 0 mL / NET: 4170 mL        LABS:                        9.0    6.84  )-----------( 209      ( 24 Mar 2021 08:52 )             26.8     03-24    132<L>  |  99  |  7   ----------------------------<  81  3.3<L>   |  21<L>  |  0.53    Ca    8.1<L>      24 Mar 2021 08:52  Phos  1.6     03-24  Mg     1.1     03-24    TPro  6.3  /  Alb  2.8<L>  /  TBili  1.2  /  DBili  x   /  AST  87<H>  /  ALT  38<H>  /  AlkPhos  160<H>  03-24              RADIOLOGY & ADDITIONAL TESTS:     MICROBIOLOGY:    ANTIMICROBIALS:    CONSULTS: Jarrod Bowers M.D.  Internal Medicine PGY-1  [phone] [id number] / [id number]     Patient is a 25y old  Female who presents with a chief complaint of Abd pain, N/V (24 Mar 2021 09:51)      SUBJECTIVE / OVERNIGHT EVENTS:  still with abdominal pain          MEDICATIONS  (STANDING):  enoxaparin Injectable 40 milliGRAM(s) SubCutaneous daily  folic acid 1 milliGRAM(s) Oral daily  influenza   Vaccine 0.5 milliLiter(s) IntraMuscular once  lactated ringers 1000 milliLiter(s) (150 mL/Hr) IV Continuous <Continuous>  multivitamin 1 Tablet(s) Oral daily  pantoprazole  Injectable 40 milliGRAM(s) IV Push every 12 hours  thiamine IVPB 500 milliGRAM(s) IV Intermittent three times a day    MEDICATIONS  (PRN):  acetaminophen   Tablet .. 650 milliGRAM(s) Oral every 6 hours PRN Temp greater or equal to 38C (100.4F), Mild Pain (1 - 3), Moderate Pain (4 - 6)  aluminum hydroxide/magnesium hydroxide/simethicone Suspension 30 milliLiter(s) Oral every 6 hours PRN Dyspepsia  LORazepam   Injectable 0.25 milliGRAM(s) IV Push every 6 hours PRN Nausea and/or Vomiting  oxyCODONE    IR 10 milliGRAM(s) Oral every 4 hours PRN Moderate Pain (4 - 6)      Vital Signs Last 24 Hrs  T(C): 37 (25 Mar 2021 05:00), Max: 37 (25 Mar 2021 05:00)  T(F): 98.6 (25 Mar 2021 05:00), Max: 98.6 (25 Mar 2021 05:00)  HR: 98 (25 Mar 2021 05:00) (98 - 106)  BP: 139/100 (25 Mar 2021 05:00) (137/104 - 139/100)  BP(mean): --  RR: 17 (25 Mar 2021 05:00) (17 - 18)  SpO2: 100% (25 Mar 2021 05:00) (96% - 100%)      PHYSICAL EXAM  GENERAL: NAD, well-developed  HEAD:  Atraumatic, Normocephalic  EYES: EOMI, PERRLA, conjunctiva and sclera clear  NECK: Supple, No JVD  CHEST/LUNG: Clear to auscultation bilaterally; No wheeze  HEART: Regular rate and rhythm; No murmurs, rubs, or gallops  ABDOMEN: Soft, Nontender, Nondistended; Bowel sounds present  EXTREMITIES:  2+ Peripheral Pulses, No clubbing, cyanosis, or edema  PSYCH: AAOx3  SKIN: No rashes or lesions    CAPILLARY BLOOD GLUCOSE        I&O's Summary    24 Mar 2021 07:01  -  25 Mar 2021 07:00  --------------------------------------------------------  IN: 4170 mL / OUT: 0 mL / NET: 4170 mL        LABS:                          8.8    7.00  )-----------( 263      ( 25 Mar 2021 08:28 )             26.4                           9.0    6.84  )-----------( 209      ( 24 Mar 2021 08:52 )             26.8     03-24    132<L>  |  99  |  7   ----------------------------<  81  3.3<L>   |  21<L>  |  0.53    Ca    8.1<L>      24 Mar 2021 08:52  Phos  1.6     03-24  Mg     1.1     03-24    TPro  6.3  /  Alb  2.8<L>  /  TBili  1.2  /  DBili  x   /  AST  87<H>  /  ALT  38<H>  /  AlkPhos  160<H>  03-24              RADIOLOGY & ADDITIONAL TESTS:     MICROBIOLOGY:    ANTIMICROBIALS:    CONSULTS: Jarrod Bowers M.D.  Internal Medicine PGY-1  [phone] / [id number]     Patient is a 25y old  Female who presents with a chief complaint of Abd pain, N/V (24 Mar 2021 09:51)      SUBJECTIVE / OVERNIGHT EVENTS:  Patient seen and examined at the bedside this am. Endorsing abdominal pain and vomiting last night. Patient counseled on apetamin and its adverse effects. The patient endorses that she will d/c the supplement at home. She also does not endorse any previous knowledge of knowing if she had hypertension from previous doctors visits.            MEDICATIONS  (STANDING):  enoxaparin Injectable 40 milliGRAM(s) SubCutaneous daily  folic acid 1 milliGRAM(s) Oral daily  influenza   Vaccine 0.5 milliLiter(s) IntraMuscular once  lactated ringers 1000 milliLiter(s) (150 mL/Hr) IV Continuous <Continuous>  multivitamin 1 Tablet(s) Oral daily  pantoprazole  Injectable 40 milliGRAM(s) IV Push every 12 hours  thiamine IVPB 500 milliGRAM(s) IV Intermittent three times a day    MEDICATIONS  (PRN):  acetaminophen   Tablet .. 650 milliGRAM(s) Oral every 6 hours PRN Temp greater or equal to 38C (100.4F), Mild Pain (1 - 3), Moderate Pain (4 - 6)  aluminum hydroxide/magnesium hydroxide/simethicone Suspension 30 milliLiter(s) Oral every 6 hours PRN Dyspepsia  LORazepam   Injectable 0.25 milliGRAM(s) IV Push every 6 hours PRN Nausea and/or Vomiting  oxyCODONE    IR 10 milliGRAM(s) Oral every 4 hours PRN Moderate Pain (4 - 6)      Vital Signs Last 24 Hrs  T(C): 37 (25 Mar 2021 05:00), Max: 37 (25 Mar 2021 05:00)  T(F): 98.6 (25 Mar 2021 05:00), Max: 98.6 (25 Mar 2021 05:00)  HR: 98 (25 Mar 2021 05:00) (98 - 106)  BP: 139/100 (25 Mar 2021 05:00) (137/104 - 139/100)  BP(mean): --  RR: 17 (25 Mar 2021 05:00) (17 - 18)  SpO2: 100% (25 Mar 2021 05:00) (96% - 100%)      PHYSICAL EXAM  GENERAL: NAD, well-developed  HEAD:  Atraumatic, Normocephalic  EYES: EOMI, PERRLA, conjunctiva and sclera clear  NECK: Supple, No JVD  CHEST/LUNG: Clear to auscultation bilaterally; No wheeze  HEART: Regular rate and rhythm; No murmurs, rubs, or gallops  ABDOMEN: Soft, Nontender, Nondistended; Bowel sounds present  EXTREMITIES:  2+ Peripheral Pulses, No clubbing, cyanosis, or edema  PSYCH: AAOx3  SKIN: No rashes or lesions    CAPILLARY BLOOD GLUCOSE        I&O's Summary    24 Mar 2021 07:01  -  25 Mar 2021 07:00  --------------------------------------------------------  IN: 4170 mL / OUT: 0 mL / NET: 4170 mL        LABS:                          8.8    7.00  )-----------( 263      ( 25 Mar 2021 08:28 )             26.4                           9.0    6.84  )-----------( 209      ( 24 Mar 2021 08:52 )             26.8     03-24    132<L>  |  99  |  7   ----------------------------<  81  3.3<L>   |  21<L>  |  0.53    Ca    8.1<L>      24 Mar 2021 08:52  Phos  1.6     03-24  Mg     1.1     03-24    TPro  6.3  /  Alb  2.8<L>  /  TBili  1.2  /  DBili  x   /  AST  87<H>  /  ALT  38<H>  /  AlkPhos  160<H>  03-24              RADIOLOGY & ADDITIONAL TESTS:     MICROBIOLOGY:    ANTIMICROBIALS:    CONSULTS:

## 2021-03-25 NOTE — PROVIDER CONTACT NOTE (OTHER) - RECOMMENDATIONS
EKG, MD evaluation
Md made aware and ok for pt to eat yogurt
For provider to speak with patient and reiterate need for test.

## 2021-03-25 NOTE — PROGRESS NOTE ADULT - PROBLEM SELECTOR PLAN 7
Likely due to pain, possibly with undiagnosed underlying essential hypertension.  - BP down to 150s/110s s/p Dilaudid 0.5mg IV  - Treat pain as above.   - Monitor BP. Would not be inclined to start antihypertensives inpatient. F/u outpatient with PCP. Mild descending colonic wall thickening, likely inflammation secondary due to adjacent pancreatitis.  - Treat pancreatitis as above

## 2021-03-25 NOTE — PROVIDER CONTACT NOTE (OTHER) - REASON
verbal consent for pt to eat yogurt
Patient refusing echocardiogram
Pt c/o mid sternal chest pain 7/10 sharp

## 2021-03-25 NOTE — PROVIDER CONTACT NOTE (OTHER) - SITUATION
Pt c/o mid sternal chest pain 7/10 sharp
Patient is refusing echocardiogram when transporter was on unit to bring her. Patient educated on reason for test.
Pt has an order for clear liquid diet, pt requested yogurt. ok per md for pt to have yogurt

## 2021-03-25 NOTE — PROGRESS NOTE ADULT - PROBLEM SELECTOR PLAN 6
Mild descending colonic wall thickening, likely inflammation secondary due to adjacent pancreatitis.  - Treat pancreatitis as above Severe hepatosteatosis possibly secondary to PACHECO vs. heavy alcohol use vs OTC apetamin use which is known to cause hepatosteatosis. Lipid profile with elevated TG to 209.  - Hepatitis negative.   - Check autoimmune markers.

## 2021-03-25 NOTE — PROGRESS NOTE ADULT - PROBLEM SELECTOR PROBLEM 2
Nausea & vomiting Acute pancreatitis, unspecified complication status, unspecified pancreatitis type

## 2021-03-25 NOTE — PROGRESS NOTE ADULT - ASSESSMENT
25F w/ no significant PMH presents with worsening epigastric and lower abdominal pain, n/v x 3 weeks, found to have elevated lipase and CT findings c/w acute pancreatitis of unknown etiology who is now presenting with worsening transaminitis and severe hepatic steatosis on imaging.  25F w/ no significant PMH presents with worsening epigastric and lower abdominal pain, n/v x 3 weeks, found to have elevated lipase and CT findings c/w acute pancreatitis of unknown etiology who is now presenting with worsening transaminitis and severe hepatic steatosis on imaging. Worsening trasaminitis and hepatic steatosis most likely i/s/o possible apetamin (OTC supplement) use however other etiologies being worked us as well. Her course has been complicated by hypertension (both systolic and diastolic hypertension).

## 2021-03-25 NOTE — PROGRESS NOTE ADULT - PROBLEM SELECTOR PLAN 4
Reported dark brown emesis in last 3 weeks and 1 episode of bright red blood-tinged emesis yesterday, may be secondary to upper GI bleed.  Possibly secondary to gastropathy vs. esophagitis.  - H/H stable, vitals stable  - Monitor H/H, maintain active T&S  - GI consult with no role for EGD and will continue to monitoring.   - C/w pantoprazole BID. HAGMA likely d/t lactic acidosis with respiratory compensation. Likely secondary to pancreatitis and hypovolemia.  -s/p IVF  -Lactate normal   - Lactate downtrending this am. Will trend until resolution.   - Trend lactate with VBG daily.

## 2021-03-25 NOTE — DIETITIAN INITIAL EVALUATION ADULT. - OTHER INFO
26 y/o female admitted with dx of pancreatitis. Visited with pt to obtain nutrition hx. Pt said that she began having abdominal pain approx 3 weeks ago with frequent vomiting episodes occurring shortly after. The pain and vomiting was directly related to food and liquids she drank and not specific to one particular food item or beverage. She currently has pain when she swallows; GI believes it to be related to prolonged vomiting pt has experienced. She had tolerated CLD; diet now has advanced. Pt is trying to consume small amounts of food but is still "afraid" that her abdominal pain/vomiting will reoccur. Suggested to consume PO as tolerated. Discussed oral supplementation with Ensure Clear 3x daily (540 mariama and 24 gm protein) to optimize her nutrition. Pt is amenable. She denies food allergies, diarrhea/constipation, or issues with chewing/swallowing with last BM 3/24. She said that her wt has been stable PTA with  lbs. Food preferences deferred until she is feeling better. HTN noted possibly due to prolonged vomiting; TG noted to be elevated. Discussed heart healthy diet principles and reinforced low sodium restriction as well as limiting high sodium choices when eating out. Pt receptive towards education. RDN services to remain available as needed.

## 2021-03-25 NOTE — PROGRESS NOTE ADULT - PROBLEM SELECTOR PROBLEM 1
Acute pancreatitis, unspecified complication status, unspecified pancreatitis type Hypertension, unspecified type

## 2021-03-25 NOTE — PROGRESS NOTE ADULT - SUBJECTIVE AND OBJECTIVE BOX
Chief Complaint:  Patient is a 25y old  Female who presents with a chief complaint of Abd pain, N/V (25 Mar 2021 07:16)      Interval Events: reports ate crackers and drank water without vomiting  - continues to have odynophagia      Hospital Medications:  acetaminophen   Tablet .. 650 milliGRAM(s) Oral every 6 hours PRN  aluminum hydroxide/magnesium hydroxide/simethicone Suspension 30 milliLiter(s) Oral every 6 hours PRN  enoxaparin Injectable 40 milliGRAM(s) SubCutaneous daily  folic acid 1 milliGRAM(s) Oral daily  influenza   Vaccine 0.5 milliLiter(s) IntraMuscular once  lactated ringers 1000 milliLiter(s) IV Continuous <Continuous>  magnesium sulfate  IVPB 2 Gram(s) IV Intermittent daily  multivitamin 1 Tablet(s) Oral daily  pantoprazole  Injectable 40 milliGRAM(s) IV Push every 12 hours  potassium phosphate IVPB 30 milliMole(s) IV Intermittent once  thiamine IVPB 500 milliGRAM(s) IV Intermittent three times a day      PMHX/PSHX:  No pertinent past medical history    Status post skin graft            ROS:     General:  No weight loss, fevers, chills, night sweats, fatigue   Eyes:  No vision changes  ENT:  No sore throat, pain, runny nose  CV:  No chest pain, palpitations, dizziness   Resp:  No SOB, cough, wheezing  GI:  See HPI  :  No burning with urination, hematuria  Muscle:  No pain, weakness  Neuro:  No weakness/tingling, memory problems  Psych:  No fatigue, insomnia, mood problems, depression  Heme:  No easy bruisability  Skin:  No rash, edema      PHYSICAL EXAM:     GENERAL:  Well developed, no distress  HEENT:  NC/AT,  conjunctivae clear, sclera anicteric  CHEST:  Full & symmetric excursion, no increased effort w/ respirations  HEART:  Regular rhythm & rate  ABDOMEN:  Soft, non-tender, non-distended  EXTREMITIES:  no LE  edema  SKIN:  No rash/erythema/ecchymoses/petechiae/wounds/jaundice  NEURO:  Alert, oriented    Vital Signs:  Vital Signs Last 24 Hrs  T(C): 37 (25 Mar 2021 05:00), Max: 37 (25 Mar 2021 05:00)  T(F): 98.6 (25 Mar 2021 05:00), Max: 98.6 (25 Mar 2021 05:00)  HR: 98 (25 Mar 2021 05:00) (98 - 106)  BP: 139/100 (25 Mar 2021 05:00) (137/104 - 139/100)  BP(mean): --  RR: 17 (25 Mar 2021 05:00) (17 - 18)  SpO2: 100% (25 Mar 2021 05:00) (96% - 100%)  Daily     Daily     LABS:                        8.8    7.00  )-----------( 263      ( 25 Mar 2021 08:28 )             26.4     03-25    136  |  100  |  6<L>  ----------------------------<  73  3.8   |  26  |  0.49<L>    Ca    8.7      25 Mar 2021 08:28  Phos  1.2     03-25  Mg     1.5     03-25    TPro  6.6  /  Alb  2.8<L>  /  TBili  1.3<H>  /  DBili  x   /  AST  134<H>  /  ALT  39<H>  /  AlkPhos  189<H>  03-25    LIVER FUNCTIONS - ( 25 Mar 2021 08:28 )  Alb: 2.8 g/dL / Pro: 6.6 g/dL / ALK PHOS: 189 U/L / ALT: 39 U/L / AST: 134 U/L / GGT: x           PT/INR - ( 25 Mar 2021 08:28 )   PT: 14.7 sec;   INR: 1.29 ratio         PTT - ( 25 Mar 2021 08:28 )  PTT:34.1 sec        Imaging:             Chief Complaint:  Patient is a 25y old  Female who presents with a chief complaint of Abd pain, N/V (25 Mar 2021 07:16)      Interval Events: reports ate crackers and drank water without vomiting  - continues to have odynophagia  - after discussion with primary team, patient does have daily alcohol use and takes a supplement with hepatotoxicity      Hospital Medications:  acetaminophen   Tablet .. 650 milliGRAM(s) Oral every 6 hours PRN  aluminum hydroxide/magnesium hydroxide/simethicone Suspension 30 milliLiter(s) Oral every 6 hours PRN  enoxaparin Injectable 40 milliGRAM(s) SubCutaneous daily  folic acid 1 milliGRAM(s) Oral daily  influenza   Vaccine 0.5 milliLiter(s) IntraMuscular once  lactated ringers 1000 milliLiter(s) IV Continuous <Continuous>  magnesium sulfate  IVPB 2 Gram(s) IV Intermittent daily  multivitamin 1 Tablet(s) Oral daily  pantoprazole  Injectable 40 milliGRAM(s) IV Push every 12 hours  potassium phosphate IVPB 30 milliMole(s) IV Intermittent once  thiamine IVPB 500 milliGRAM(s) IV Intermittent three times a day      PMHX/PSHX:  No pertinent past medical history    Status post skin graft            ROS:     General:  No weight loss, fevers, chills, night sweats, fatigue   Eyes:  No vision changes  ENT:  No sore throat, pain, runny nose  CV:  No chest pain, palpitations, dizziness   Resp:  No SOB, cough, wheezing  GI:  See HPI  :  No burning with urination, hematuria  Muscle:  No pain, weakness  Neuro:  No weakness/tingling, memory problems  Psych:  No fatigue, insomnia, mood problems, depression  Heme:  No easy bruisability  Skin:  No rash, edema      PHYSICAL EXAM:     GENERAL:  Well developed, no distress  HEENT:  NC/AT,  conjunctivae clear, sclera anicteric  CHEST:  Full & symmetric excursion, no increased effort w/ respirations  HEART:  Regular rhythm & rate  ABDOMEN:  Soft, non-tender, non-distended  EXTREMITIES:  no LE  edema  SKIN:  No rash/erythema/ecchymoses/petechiae/wounds/jaundice  NEURO:  Alert, oriented    Vital Signs:  Vital Signs Last 24 Hrs  T(C): 37 (25 Mar 2021 05:00), Max: 37 (25 Mar 2021 05:00)  T(F): 98.6 (25 Mar 2021 05:00), Max: 98.6 (25 Mar 2021 05:00)  HR: 98 (25 Mar 2021 05:00) (98 - 106)  BP: 139/100 (25 Mar 2021 05:00) (137/104 - 139/100)  BP(mean): --  RR: 17 (25 Mar 2021 05:00) (17 - 18)  SpO2: 100% (25 Mar 2021 05:00) (96% - 100%)  Daily     Daily     LABS:                        8.8    7.00  )-----------( 263      ( 25 Mar 2021 08:28 )             26.4     03-25    136  |  100  |  6<L>  ----------------------------<  73  3.8   |  26  |  0.49<L>    Ca    8.7      25 Mar 2021 08:28  Phos  1.2     03-25  Mg     1.5     03-25    TPro  6.6  /  Alb  2.8<L>  /  TBili  1.3<H>  /  DBili  x   /  AST  134<H>  /  ALT  39<H>  /  AlkPhos  189<H>  03-25    LIVER FUNCTIONS - ( 25 Mar 2021 08:28 )  Alb: 2.8 g/dL / Pro: 6.6 g/dL / ALK PHOS: 189 U/L / ALT: 39 U/L / AST: 134 U/L / GGT: x           PT/INR - ( 25 Mar 2021 08:28 )   PT: 14.7 sec;   INR: 1.29 ratio         PTT - ( 25 Mar 2021 08:28 )  PTT:34.1 sec        Imaging:

## 2021-03-25 NOTE — PROGRESS NOTE ADULT - PROBLEM SELECTOR PLAN 2
N/V due to pancreatitis  - Ativan 0.25mg IVP q6h PRN for n/v given prolonged QTc. Will transition to zofran given normal QTc on 3/23.   - Start on CLD given patient amenable and slowly advance as tolerated Meets criteria for acute pancreatitis: worsening epigastric pain with n/v x 3 weeks, elevated lipase of 388, and peripancreatic fat stranding on CT. No gallstone or gallbladder abnormalities on RUQ u/s. TG elevated to 209 but not >1000. Not on any medications that can induce pancreatitis. No hypercalcemia. Biliary duct caliber normal. Possibly secondary hypertriglyceridemia (moderate) vs. alcohol use (although patient denies hx, AST:ALT ratio > 2:1) vs. infections (HIV, hepatitis B, CMV, HSV, Legionella, Leptospira, Salmonella, Toxoplasma) vs. idiopathic  - Treat supportively with IVF. S/p 2L NS. Start maintenance LR @150cc/hr with K repletion (3/23-).  - Pain control with Oxycodone PO 5mg q4h PRN for severe pain, Tylenol 650 PO q6h for mild-moderate pain  - Diet advanced to solids  - HIV and hep panel negative.   - GI consult  - Checking IgG4 with IgG subsets.  - f/u autoimmune labs AMA, BRIAN, ASMA, ceruloplasmin for possible autoimmune component of severe pancreatic dysfunction w/ hepatic steatosis.   - Maalox for symptomatic control.

## 2021-03-25 NOTE — DIETITIAN INITIAL EVALUATION ADULT. - ADD RECOMMEND
1). Monitor weights, labs, BM's, skin integrity, p.o. intake and tolerance. 2). Follow pt as per protocol.

## 2021-03-25 NOTE — PROVIDER CONTACT NOTE (OTHER) - ACTION/TREATMENT ORDERED:
Provider spoke to patient and patient is now agreeable to echocardiogram.
MD said she will order famotadine and see if that works. She said the pt had the same complaint in ED and EKG was already done
Md made aware and ok for pt to eat yogurt

## 2021-03-25 NOTE — PROGRESS NOTE ADULT - PROBLEM SELECTOR PLAN 1
Meets criteria for acute pancreatitis: worsening epigastric pain with n/v x 3 weeks, elevated lipase of 388, and peripancreatic fat stranding on CT. No gallstone or gallbladder abnormalities on RUQ u/s. TG elevated to 209 but not >1000. Not on any medications that can induce pancreatitis. No hypercalcemia. Biliary duct caliber normal. Possibly secondary hypertriglyceridemia (moderate) vs. alcohol use (although patient denies hx, AST:ALT ratio > 2:1) vs. infections (HIV, hepatitis B, CMV, HSV, Legionella, Leptospira, Salmonella, Toxoplasma) vs. idiopathic  - Treat supportively with IVF. S/p 2L NS. Start maintenance LR @150cc/hr with K repletion.  - Pain control with Oxycodone PO 10mg q4h PRN for severe pain, Tylenol 650 PO q6h for mild-moderate pain  - Start on CLD given patient amenable and slowly advance as tolerated  - HIV and hep panel negative.   - GI consult  - Checking IgG4 with IgG subsets.  - f/u autoimmune labs AMA, BRIAN, ASMA, ceruloplasmin for possible autoimmune component of severe pancreatic dysfunction w/ hepatic steatosis.   - Maalox for symptomatic control. Initially on presentation appeared to be due to pain. However patient has persistently been hypertensive to 140s/100s with significant diastolic hypertension. NO previous recollection of hypertension during otpt visits to doctors. Pain much improved from pancreatitis as well.   -F/u echo, renal dopplers, and secondary HTN w/u as outpatient  -Start amlodipine 5mg qd, will need to educate patient on medication compliance and need for good outpatient follow up.

## 2021-03-25 NOTE — DIETITIAN INITIAL EVALUATION ADULT. - PERTINENT MEDS FT
MEDICATIONS  (STANDING):  amLODIPine   Tablet 5 milliGRAM(s) Oral daily  enoxaparin Injectable 40 milliGRAM(s) SubCutaneous daily  folic acid 1 milliGRAM(s) Oral daily  influenza   Vaccine 0.5 milliLiter(s) IntraMuscular once  lactated ringers 1000 milliLiter(s) (150 mL/Hr) IV Continuous <Continuous>  magnesium sulfate  IVPB 2 Gram(s) IV Intermittent daily  multivitamin 1 Tablet(s) Oral daily  pantoprazole  Injectable 40 milliGRAM(s) IV Push every 12 hours  thiamine IVPB 500 milliGRAM(s) IV Intermittent three times a day

## 2021-03-25 NOTE — PROVIDER CONTACT NOTE (OTHER) - BACKGROUND
Pt admitted with pancreaitis.
Pt was admitted for acute pancreatitis
Patient admitted for acute pancreatitis, tachycardia.

## 2021-03-25 NOTE — PROGRESS NOTE ADULT - PROBLEM SELECTOR PLAN 3
HAGMA likely d/t lactic acidosis with respiratory compensation. Likely secondary to pancreatitis and hypovolemia.  - IVF as above  - Lactate downtrending this am. Will trend until resolution.   - Trend lactate with VBG daily. N/V due to pancreatitis  - Will transition to zofran given normal QTc on 3/23.   - advancing diet as tolerated.

## 2021-03-26 VITALS
RESPIRATION RATE: 18 BRPM | SYSTOLIC BLOOD PRESSURE: 123 MMHG | HEART RATE: 86 BPM | TEMPERATURE: 98 F | DIASTOLIC BLOOD PRESSURE: 93 MMHG | OXYGEN SATURATION: 100 %

## 2021-03-26 LAB
ALBUMIN SERPL ELPH-MCNC: 2.8 G/DL — LOW (ref 3.3–5)
ALP SERPL-CCNC: 200 U/L — HIGH (ref 40–120)
ALT FLD-CCNC: 54 U/L — HIGH (ref 4–33)
ANA TITR SER: NEGATIVE — SIGNIFICANT CHANGE UP
ANION GAP SERPL CALC-SCNC: 11 MMOL/L — SIGNIFICANT CHANGE UP (ref 7–14)
AST SERPL-CCNC: 181 U/L — HIGH (ref 4–32)
BASOPHILS # BLD AUTO: 0.02 K/UL — SIGNIFICANT CHANGE UP (ref 0–0.2)
BASOPHILS NFR BLD AUTO: 0.3 % — SIGNIFICANT CHANGE UP (ref 0–2)
BILIRUB SERPL-MCNC: 0.9 MG/DL — SIGNIFICANT CHANGE UP (ref 0.2–1.2)
BUN SERPL-MCNC: 5 MG/DL — LOW (ref 7–23)
CALCIUM SERPL-MCNC: 8.6 MG/DL — SIGNIFICANT CHANGE UP (ref 8.4–10.5)
CHLORIDE SERPL-SCNC: 99 MMOL/L — SIGNIFICANT CHANGE UP (ref 98–107)
CO2 SERPL-SCNC: 25 MMOL/L — SIGNIFICANT CHANGE UP (ref 22–31)
CREAT SERPL-MCNC: 0.46 MG/DL — LOW (ref 0.5–1.3)
EOSINOPHIL # BLD AUTO: 0.07 K/UL — SIGNIFICANT CHANGE UP (ref 0–0.5)
EOSINOPHIL NFR BLD AUTO: 1.2 % — SIGNIFICANT CHANGE UP (ref 0–6)
GLUCOSE SERPL-MCNC: 70 MG/DL — SIGNIFICANT CHANGE UP (ref 70–99)
HCT VFR BLD CALC: 26.2 % — LOW (ref 34.5–45)
HGB BLD-MCNC: 8.6 G/DL — LOW (ref 11.5–15.5)
IANC: 3.15 K/UL — SIGNIFICANT CHANGE UP (ref 1.5–8.5)
IGG SERPL-MCNC: 1367 MG/DL — SIGNIFICANT CHANGE UP (ref 586–1602)
IGG1 SER-MCNC: 869 MG/DL — HIGH (ref 248–810)
IGG2 SER-MCNC: 348 MG/DL — SIGNIFICANT CHANGE UP (ref 130–555)
IGG3 SER-MCNC: >228 MG/DL — HIGH (ref 15–102)
IGG4 SER-MCNC: 56 MG/DL — SIGNIFICANT CHANGE UP (ref 2–96)
IMM GRANULOCYTES NFR BLD AUTO: 2.4 % — HIGH (ref 0–1.5)
LYMPHOCYTES # BLD AUTO: 1.72 K/UL — SIGNIFICANT CHANGE UP (ref 1–3.3)
LYMPHOCYTES # BLD AUTO: 30 % — SIGNIFICANT CHANGE UP (ref 13–44)
MAGNESIUM SERPL-MCNC: 1.2 MG/DL — LOW (ref 1.6–2.6)
MCHC RBC-ENTMCNC: 27 PG — SIGNIFICANT CHANGE UP (ref 27–34)
MCHC RBC-ENTMCNC: 32.8 GM/DL — SIGNIFICANT CHANGE UP (ref 32–36)
MCV RBC AUTO: 82.1 FL — SIGNIFICANT CHANGE UP (ref 80–100)
MONOCYTES # BLD AUTO: 0.63 K/UL — SIGNIFICANT CHANGE UP (ref 0–0.9)
MONOCYTES NFR BLD AUTO: 11 % — SIGNIFICANT CHANGE UP (ref 2–14)
NEUTROPHILS # BLD AUTO: 3.15 K/UL — SIGNIFICANT CHANGE UP (ref 1.8–7.4)
NEUTROPHILS NFR BLD AUTO: 55.1 % — SIGNIFICANT CHANGE UP (ref 43–77)
NRBC # BLD: 0 /100 WBCS — SIGNIFICANT CHANGE UP
NRBC # FLD: 0 K/UL — SIGNIFICANT CHANGE UP
PHOSPHATE SERPL-MCNC: 2.3 MG/DL — LOW (ref 2.5–4.5)
PLATELET # BLD AUTO: 251 K/UL — SIGNIFICANT CHANGE UP (ref 150–400)
POTASSIUM SERPL-MCNC: 3.5 MMOL/L — SIGNIFICANT CHANGE UP (ref 3.5–5.3)
POTASSIUM SERPL-SCNC: 3.5 MMOL/L — SIGNIFICANT CHANGE UP (ref 3.5–5.3)
PROT SERPL-MCNC: 6.5 G/DL — SIGNIFICANT CHANGE UP (ref 6–8.3)
RBC # BLD: 3.19 M/UL — LOW (ref 3.8–5.2)
RBC # FLD: 22.5 % — HIGH (ref 10.3–14.5)
SODIUM SERPL-SCNC: 135 MMOL/L — SIGNIFICANT CHANGE UP (ref 135–145)
TSH SERPL-MCNC: 9.23 UIU/ML — HIGH (ref 0.27–4.2)
WBC # BLD: 5.73 K/UL — SIGNIFICANT CHANGE UP (ref 3.8–10.5)
WBC # FLD AUTO: 5.73 K/UL — SIGNIFICANT CHANGE UP (ref 3.8–10.5)

## 2021-03-26 PROCEDURE — 99239 HOSP IP/OBS DSCHRG MGMT >30: CPT | Mod: GC

## 2021-03-26 PROCEDURE — 99232 SBSQ HOSP IP/OBS MODERATE 35: CPT | Mod: GC

## 2021-03-26 RX ORDER — POTASSIUM PHOSPHATE, MONOBASIC POTASSIUM PHOSPHATE, DIBASIC 236; 224 MG/ML; MG/ML
30 INJECTION, SOLUTION INTRAVENOUS ONCE
Refills: 0 | Status: COMPLETED | OUTPATIENT
Start: 2021-03-26 | End: 2021-03-26

## 2021-03-26 RX ORDER — ERGOCALCIFEROL 1.25 MG/1
50000 CAPSULE ORAL ONCE
Refills: 0 | Status: COMPLETED | OUTPATIENT
Start: 2021-03-26 | End: 2021-03-26

## 2021-03-26 RX ORDER — CHOLECALCIFEROL (VITAMIN D3) 125 MCG
1 CAPSULE ORAL
Qty: 7 | Refills: 0
Start: 2021-03-26 | End: 2021-04-01

## 2021-03-26 RX ORDER — CHOLECALCIFEROL (VITAMIN D3) 125 MCG
1 CAPSULE ORAL
Qty: 1 | Refills: 0
Start: 2021-03-26 | End: 2021-04-01

## 2021-03-26 RX ORDER — MAGNESIUM SULFATE 500 MG/ML
2 VIAL (ML) INJECTION ONCE
Refills: 0 | Status: COMPLETED | OUTPATIENT
Start: 2021-03-26 | End: 2021-03-26

## 2021-03-26 RX ORDER — FOLIC ACID 0.8 MG
1 TABLET ORAL
Qty: 30 | Refills: 0
Start: 2021-03-26 | End: 2021-04-24

## 2021-03-26 RX ORDER — THIAMINE MONONITRATE (VIT B1) 100 MG
1 TABLET ORAL
Qty: 30 | Refills: 0
Start: 2021-03-26 | End: 2021-04-24

## 2021-03-26 RX ORDER — PANTOPRAZOLE SODIUM 20 MG/1
1 TABLET, DELAYED RELEASE ORAL
Qty: 30 | Refills: 0
Start: 2021-03-26 | End: 2021-04-24

## 2021-03-26 RX ADMIN — ERGOCALCIFEROL 50000 UNIT(S): 1.25 CAPSULE ORAL at 10:23

## 2021-03-26 RX ADMIN — Medication 105 MILLIGRAM(S): at 15:41

## 2021-03-26 RX ADMIN — Medication 1 MILLIGRAM(S): at 11:55

## 2021-03-26 RX ADMIN — Medication 1 TABLET(S): at 11:55

## 2021-03-26 RX ADMIN — PANTOPRAZOLE SODIUM 40 MILLIGRAM(S): 20 TABLET, DELAYED RELEASE ORAL at 06:50

## 2021-03-26 RX ADMIN — AMLODIPINE BESYLATE 5 MILLIGRAM(S): 2.5 TABLET ORAL at 06:50

## 2021-03-26 RX ADMIN — Medication 50 GRAM(S): at 10:24

## 2021-03-26 RX ADMIN — POTASSIUM PHOSPHATE, MONOBASIC POTASSIUM PHOSPHATE, DIBASIC 83.33 MILLIMOLE(S): 236; 224 INJECTION, SOLUTION INTRAVENOUS at 11:55

## 2021-03-26 RX ADMIN — Medication 105 MILLIGRAM(S): at 06:50

## 2021-03-26 NOTE — PROGRESS NOTE ADULT - PROBLEM SELECTOR PLAN 8
DVT ppx: hold for possible GI bleed  Diet: CLD, advance as tolerated DVT ppx: hold for possible GI bleed  Diet: CLD, advance as tolerated    Hypothyroidism: Patient most likely has component of subclincal hypothyroidism vs. ESS. Will ask patient to follow up with PCP. Will set patient up with [facility] follow up number.     Hypovitamin D: Will send patient out on vitamin D supplementation 50,000 IU for 8 weeks total.

## 2021-03-26 NOTE — DISCHARGE NOTE PROVIDER - NSFOLLOWUPCLINICS_GEN_ALL_ED_FT
[facility]  Internal Medicine  [address] [place] [address]  Phone: [phone]  Fax: [phone]  Follow Up Time: 2 weeks

## 2021-03-26 NOTE — PROGRESS NOTE ADULT - PROBLEM SELECTOR PLAN 2
Meets criteria for acute pancreatitis: worsening epigastric pain with n/v x 3 weeks, elevated lipase of 388, and peripancreatic fat stranding on CT. No gallstone or gallbladder abnormalities on RUQ u/s. TG elevated to 209 but not >1000. Not on any medications that can induce pancreatitis. No hypercalcemia. Biliary duct caliber normal. Possibly secondary hypertriglyceridemia (moderate) vs. alcohol use (although patient denies hx, AST:ALT ratio > 2:1) vs. infections (HIV, hepatitis B, CMV, HSV, Legionella, Leptospira, Salmonella, Toxoplasma) vs. idiopathic  - Treat supportively with IVF. S/p 2L NS. Start maintenance LR @150cc/hr with K repletion (3/23-).  - Pain control with Oxycodone PO 5mg q4h PRN for severe pain, Tylenol 650 PO q6h for mild-moderate pain  - Diet advanced to solids  - HIV and hep panel negative.   - GI consult  - Checking IgG4 with IgG subsets.  - f/u autoimmune labs AMA, BRIAN, ASMA, ceruloplasmin for possible autoimmune component of severe pancreatic dysfunction w/ hepatic steatosis.   - Maalox for symptomatic control. Meets criteria for acute pancreatitis: worsening epigastric pain with n/v x 3 weeks, elevated lipase of 388, and peripancreatic fat stranding on CT. No gallstone or gallbladder abnormalities on RUQ u/s. TG elevated to 209 but not >1000. Not on any medications that can induce pancreatitis. No hypercalcemia. Biliary duct caliber normal. Possibly secondary hypertriglyceridemia (moderate) vs. alcohol use (although patient denies hx, AST:ALT ratio > 2:1) vs. infections (HIV, hepatitis B, CMV, HSV, Legionella, Leptospira, Salmonella, Toxoplasma) vs. idiopathic  - Treat supportively with IVF. S/p 2L NS. Start maintenance LR @150cc/hr with K repletion (3/23-).  - Pain control with Oxycodone PO 5mg q4h PRN for severe pain, Tylenol 650 PO q6h for mild-moderate pain  - Diet advanced to solids  - HIV and hep panel negative.   - GI consult  - IgG4 wnl.  - f/u autoimmune labs AMA, BRIAN, ASMA, ceruloplasmin for possible autoimmune component of severe pancreatic dysfunction w/ hepatic steatosis.   - Maalox for symptomatic control.

## 2021-03-26 NOTE — PROGRESS NOTE ADULT - PROBLEM SELECTOR PLAN 3
N/V due to pancreatitis  - Will transition to zofran given normal QTc on 3/23.   - advancing diet as tolerated.

## 2021-03-26 NOTE — DISCHARGE NOTE PROVIDER - NSDCMRMEDTOKEN_GEN_ALL_CORE_FT
ibuprofen:    folic acid 1 mg oral tablet: 1 tab(s) orally once a day  Multiple Vitamins oral tablet: 1 tab(s) orally once a day  Protonix 20 mg oral delayed release tablet: 1 tab(s) orally once a day   thiamine 50 mg oral tablet: 1 tab(s) orally once a day   Vitamin D3 50,000 intl units (1250 mcg) oral capsule: 1 cap(s) orally once a week starting 4/2

## 2021-03-26 NOTE — PROGRESS NOTE ADULT - PROBLEM SELECTOR PLAN 1
Initially on presentation appeared to be due to pain. However patient has persistently been hypertensive to 140s/100s with significant diastolic hypertension. NO previous recollection of hypertension during otpt visits to doctors. Pain much improved from pancreatitis as well.   -F/u echo, renal dopplers, and secondary HTN w/u as outpatient  -Start amlodipine 5mg qd, will need to educate patient on medication compliance and need for good outpatient follow up. Initially on presentation appeared to be due to pain. However patient has persistently been hypertensive to 140s/100s with significant diastolic hypertension. NO previous recollection of hypertension during otpt visits to doctors. Pain much improved from pancreatitis as well.   -Echo wnl, can f/u renal dopplers, and secondary HTN w/u as outpatient  -D/c amlodipine 5mg qd.   -HTN improved today. Most likely i/s/o fluids given during admission.

## 2021-03-26 NOTE — PROGRESS NOTE ADULT - PROBLEM SELECTOR PLAN 4
HAGMA likely d/t lactic acidosis with respiratory compensation. Likely secondary to pancreatitis and hypovolemia.  -s/p IVF  -Lactate normal   - Lactate downtrending this am. Will trend until resolution.   - Trend lactate with VBG daily.

## 2021-03-26 NOTE — DISCHARGE NOTE PROVIDER - NSDCCPCAREPLAN_GEN_ALL_CORE_FT
PRINCIPAL DISCHARGE DIAGNOSIS  Diagnosis: Pancreatitis  Assessment and Plan of Treatment: You were diagnosed with pancreatitis. We treated you with IV fluids. If you start to have worsening abdominal pain, nausea/vomiting, or cannot tolerate food, please return to the hospital.      SECONDARY DISCHARGE DIAGNOSES  Diagnosis: Elevated TSH  Assessment and Plan of Treatment: One of your thyroid tests (thyroid stimulating hormone) was elevated. This test should be repeated in the future. Please follow-up with your primary care physician.    Diagnosis: GERD (gastroesophageal reflux disease)  Assessment and Plan of Treatment: You have acid reflux. We started you on pantoprazole. Continue to take pantoprazole at home.    Diagnosis: Anemia  Assessment and Plan of Treatment: Your hemoglobin level was low. Follow-up with your primary care physician. If you begin to feel short of breath, heart palpitations, or lightheaded, these may be signs that your anemia is worsening and you should come to the hospital.    Diagnosis: Vitamin D deficiency  Assessment and Plan of Treatment: Your vitamin D level was extremely low. Continue to take the prescribed vitamin D supplement. Follow-up with your primary care physician.    Diagnosis: Steatosis of liver  Assessment and Plan of Treatment: Your liver was enlarged with signs of liver injury on imaging and lab work. We believe this is from the aphetine supplement. Do not continue to take this supplement. It is important for you to follow-up with your primary care physician.    Diagnosis: Hypertension, unspecified type  Assessment and Plan of Treatment: Your blood pressure was high during your hospital stay. We did imaging of your heart (echocardiogram) which was normal. We did imaging of your kidneys (renal ultrasound) which was normal. We ordered lab studies to evaluate potential reasons for your high blood pressure. These results have not come back yet. Please follow-up with your primary care physician for continued blood pressure monitoring and your laboratory results.     PRINCIPAL DISCHARGE DIAGNOSIS  Diagnosis: Pancreatitis  Assessment and Plan of Treatment: You were diagnosed with pancreatitis. We treated you with IV fluids and pain medications. If you start to have worsening abdominal pain, nausea/vomiting, or cannot tolerate food, please return to the hospital. Please follow up with a primary care doctor within a week of discharge.      SECONDARY DISCHARGE DIAGNOSES  Diagnosis: Electrolyte abnormality  Assessment and Plan of Treatment: You were found to have persistent electrolyte abnormalities that we think may be due to poor diet. Please follow up with a primary care doctor after discharge.    Diagnosis: Elevated TSH  Assessment and Plan of Treatment: One of your thyroid tests (thyroid stimulating hormone) was elevated. This test should be repeated as you may need to be started on medications for low thyroid function. Please follow-up with your primary care physician within a week for repeat testing.    Diagnosis: Hypertension, unspecified type  Assessment and Plan of Treatment: Your blood pressure was high during your hospital stay. We did imaging of your heart (echocardiogram) which was normal. We did imaging of your kidneys (renal ultrasound) which was normal. We ordered lab studies to evaluate potential reasons for your high blood pressure. These results have not come back yet. Please follow-up with your primary care physician for continued blood pressure monitoring and your laboratory results.    Diagnosis: GERD (gastroesophageal reflux disease)  Assessment and Plan of Treatment: You have acid reflux. We started you on pantoprazole. Continue to take pantoprazole at home.    Diagnosis: Anemia  Assessment and Plan of Treatment: Your hemoglobin level was low. Follow-up with your primary care physician. If you begin to feel short of breath, heart palpitations, or lightheaded, these may be signs that your anemia is worsening and you should come to the hospital.    Diagnosis: Vitamin D deficiency  Assessment and Plan of Treatment: Your vitamin D level was extremely low. Continue to take the prescribed vitamin D supplement once a week. Follow-up with your primary care physician after discharge.    Diagnosis: Steatosis of liver  Assessment and Plan of Treatment: Your liver was enlarged with signs of liver injury on imaging and lab work. We believe this is from the aphetine supplement. Do not continue to take this supplement. It is important for you to follow-up with your primary care physician and gastroenterology after discharge.

## 2021-03-26 NOTE — PROGRESS NOTE ADULT - PROBLEM SELECTOR PLAN 6
Severe hepatosteatosis possibly secondary to PACHECO vs. heavy alcohol use vs OTC apetamin use which is known to cause hepatosteatosis. Lipid profile with elevated TG to 209.  - Hepatitis negative.   - Check autoimmune markers.

## 2021-03-26 NOTE — DISCHARGE NOTE PROVIDER - NSDCFUADDAPPT_GEN_ALL_CORE_FT
Please call [phone] to make appointments to see the primary care doctors and the gastroenterologist.

## 2021-03-26 NOTE — PROGRESS NOTE ADULT - ASSESSMENT
25F w/ no PMH who presents with 2-3 weeks of abdominal pain and nausea/vomiting with concern for pancreatitis.       Impression:  #Pancreatitis - peripancreatic stranding on imaging - no stones or biliary dilatation however ddx includes passed stone given elevated of LFTs. Calcium normal, TG mildly elevated. Also noted is protein gap on CMP - other ddx includes autoimmune disease or IgG4-related disease. HIV and hep panel negative. patient now reports alcohol use as possible etiology.  #Hepatomegaly with steatosis - ddx is wide and includes NAFLD vs alcoholic steatosis vs drug-induced (now reports use of supplement "apetamin" which has been associated with case reports of drug-induced autoimmune hepatitis) vs hepatitis C genotype 3 (neg ab) vs pregnancy-related (neg preg test) vs Jeff's disease vs starvation   #Blood-tinged vomit - likely 2/2 esophagitis from repeated vomiting episodes vs MW tear. Other ddx includes PUD vs gastritis vs angioectasia vs Dieulafoy vs malignancy.      Recommendation:  - diet as tolerated  - f/u autoimmune work up  - trend LFTs  - c/w PPI daily on d/c  - stop home supplement for weight gain  - patient should follow up in clinic after d/c - [address] [place], [place] ([phone])  - rest of care per primary team    GI will sign off, please reconsult as needed.     Kacy Iverson PGY-4  Gastroenterology Fellow  Pager #[phone] (DEMIAN) or [phone] (NS)  Available on [employer] Teams.  Please contact on-call GI fellow via answering service ([phone]) after 5pm and before 8am, and on weekends.

## 2021-03-26 NOTE — PROGRESS NOTE ADULT - SUBJECTIVE AND OBJECTIVE BOX
Jarrod Bowers M.D.  Internal Medicine PGY-1  [phone] [phone] / [id number]       Patient is a 25y old  Female who presents with a chief complaint of Abd pain, N/V (25 Mar 2021 14:50)      SUBJECTIVE / OVERNIGHT EVENTS:          MEDICATIONS  (STANDING):  amLODIPine   Tablet 5 milliGRAM(s) Oral daily  folic acid 1 milliGRAM(s) Oral daily  influenza   Vaccine 0.5 milliLiter(s) IntraMuscular once  magnesium sulfate  IVPB 2 Gram(s) IV Intermittent daily  multivitamin 1 Tablet(s) Oral daily  pantoprazole  Injectable 40 milliGRAM(s) IV Push every 12 hours  thiamine IVPB 500 milliGRAM(s) IV Intermittent three times a day    MEDICATIONS  (PRN):  acetaminophen   Tablet .. 650 milliGRAM(s) Oral every 6 hours PRN Temp greater or equal to 38C (100.4F), Mild Pain (1 - 3), Moderate Pain (4 - 6)  aluminum hydroxide/magnesium hydroxide/simethicone Suspension 30 milliLiter(s) Oral every 6 hours PRN Dyspepsia  oxyCODONE    IR 5 milliGRAM(s) Oral every 8 hours PRN Severe Pain (7 - 10)      Vital Signs Last 24 Hrs  T(C): 36.8 (26 Mar 2021 06:18), Max: 37.1 (25 Mar 2021 21:26)  T(F): 98.3 (26 Mar 2021 06:18), Max: 98.8 (25 Mar 2021 21:26)  HR: 86 (26 Mar 2021 06:18) (86 - 111)  BP: 123/93 (26 Mar 2021 06:18) (123/93 - 132/87)  BP(mean): --  RR: 18 (26 Mar 2021 06:18) (17 - 18)  SpO2: 100% (26 Mar 2021 06:18) (99% - 100%)      PHYSICAL EXAM  GENERAL: NAD, well-developed  HEAD:  Atraumatic, Normocephalic  EYES: EOMI, PERRLA, conjunctiva and sclera clear  NECK: Supple, No JVD  CHEST/LUNG: Clear to auscultation bilaterally; No wheeze  HEART: Regular rate and rhythm; No murmurs, rubs, or gallops  ABDOMEN: Soft, Nontender, Nondistended; Bowel sounds present  EXTREMITIES:  2+ Peripheral Pulses, No clubbing, cyanosis, or edema  PSYCH: AAOx3  SKIN: No rashes or lesions    CAPILLARY BLOOD GLUCOSE        I&O's Summary      LABS:                        8.8    7.00  )-----------( 263      ( 25 Mar 2021 08:28 )             26.4     03-    136  |  100  |  6<L>  ----------------------------<  73  3.8   |  26  |  0.49<L>    Ca    8.7      25 Mar 2021 08:28  Phos  1.2     03-  Mg     1.5         TPro  6.6  /  Alb  2.8<L>  /  TBili  1.3<H>  /  DBili  x   /  AST  134<H>  /  ALT  39<H>  /  AlkPhos  189<H>  03-25    PT/INR - ( 25 Mar 2021 08:28 )   PT: 14.7 sec;   INR: 1.29 ratio         PTT - ( 25 Mar 2021 08:28 )  PTT:34.1 sec      Urinalysis Basic - ( 25 Mar 2021 12:24 )    Color: Light Yellow / Appearance: Clear / S.009 / pH: x  Gluc: x / Ketone: Small  / Bili: Negative / Urobili: <2 mg/dL   Blood: x / Protein: Negative / Nitrite: Negative   Leuk Esterase: Negative / RBC: 12 /HPF / WBC 1 /HPF   Sq Epi: x / Non Sq Epi: 1 /HPF / Bacteria: Negative        RADIOLOGY & ADDITIONAL TESTS:     MICROBIOLOGY:    ANTIMICROBIALS:    CONSULTS: Jarrod Bowers M.D.  Internal Medicine PGY-1  [phone] / [id number]       Patient is a 25y old  Female who presents with a chief complaint of Abd pain, N/V (25 Mar 2021 14:50)      SUBJECTIVE / OVERNIGHT EVENTS:    Patient seen and examined at the bedside this am. Per nursing no acute events overnight. Patient looking forward to being discharged home today. Patient denies any n/v/d, sob, she is able to tolerate her PO intake.       MEDICATIONS  (STANDING):  amLODIPine   Tablet 5 milliGRAM(s) Oral daily  folic acid 1 milliGRAM(s) Oral daily  influenza   Vaccine 0.5 milliLiter(s) IntraMuscular once  magnesium sulfate  IVPB 2 Gram(s) IV Intermittent daily  multivitamin 1 Tablet(s) Oral daily  pantoprazole  Injectable 40 milliGRAM(s) IV Push every 12 hours  thiamine IVPB 500 milliGRAM(s) IV Intermittent three times a day    MEDICATIONS  (PRN):  acetaminophen   Tablet .. 650 milliGRAM(s) Oral every 6 hours PRN Temp greater or equal to 38C (100.4F), Mild Pain (1 - 3), Moderate Pain (4 - 6)  aluminum hydroxide/magnesium hydroxide/simethicone Suspension 30 milliLiter(s) Oral every 6 hours PRN Dyspepsia  oxyCODONE    IR 5 milliGRAM(s) Oral every 8 hours PRN Severe Pain (7 - 10)      Vital Signs Last 24 Hrs  T(C): 36.8 (26 Mar 2021 06:18), Max: 37.1 (25 Mar 2021 21:26)  T(F): 98.3 (26 Mar 2021 06:18), Max: 98.8 (25 Mar 2021 21:26)  HR: 86 (26 Mar 2021 06:18) (86 - 111)  BP: 123/93 (26 Mar 2021 06:18) (123/93 - 132/87)  BP(mean): --  RR: 18 (26 Mar 2021 06:18) (17 - 18)  SpO2: 100% (26 Mar 2021 06:18) (99% - 100%)      PHYSICAL EXAM  GENERAL: NAD, well-developed  HEAD:  Atraumatic, Normocephalic  EYES: EOMI, PERRLA, conjunctiva and sclera clear  NECK: Supple, No JVD  CHEST/LUNG: Clear to auscultation bilaterally; No wheeze  HEART: Regular rate and rhythm; No murmurs, rubs, or gallops  ABDOMEN: Soft, Nontender, Nondistended; Bowel sounds present  EXTREMITIES:  2+ Peripheral Pulses, No clubbing, cyanosis, or edema  PSYCH: AAOx3  SKIN: No rashes or lesions    CAPILLARY BLOOD GLUCOSE        I&O's Summary      LABS:                        8.8    7.00  )-----------( 263      ( 25 Mar 2021 08:28 )             26.4         136  |  100  |  6<L>  ----------------------------<  73  3.8   |  26  |  0.49<L>    Ca    8.7      25 Mar 2021 08:28  Phos  1.2       Mg     1.5         TPro  6.6  /  Alb  2.8<L>  /  TBili  1.3<H>  /  DBili  x   /  AST  134<H>  /  ALT  39<H>  /  AlkPhos  189<H>  25    PT/INR - ( 25 Mar 2021 08:28 )   PT: 14.7 sec;   INR: 1.29 ratio         PTT - ( 25 Mar 2021 08:28 )  PTT:34.1 sec      Urinalysis Basic - ( 25 Mar 2021 12:24 )    Color: Light Yellow / Appearance: Clear / S.009 / pH: x  Gluc: x / Ketone: Small  / Bili: Negative / Urobili: <2 mg/dL   Blood: x / Protein: Negative / Nitrite: Negative   Leuk Esterase: Negative / RBC: 12 /HPF / WBC 1 /HPF   Sq Epi: x / Non Sq Epi: 1 /HPF / Bacteria: Negative        RADIOLOGY & ADDITIONAL TESTS:     MICROBIOLOGY:    ANTIMICROBIALS:    CONSULTS:

## 2021-03-26 NOTE — PROGRESS NOTE ADULT - ATTENDING COMMENTS
Patient seen/examined. Recommendations as noted.  some l experiencing lower abdominal pain today but different from the upper abdominal pain prompting admission at time of diagnosis of pancreatitis. Trial of bland soft diet. Continue pantoprazole in conjunction with antireflux precautions. Monitor serial liver enzymes and await completion of the serologic workup.
Feels somewhat better today. No further vomiting. Tolerating liquids. Recommendations as noted-IV hydration and continue clear liquids as tolerated. Advise IGg4 level and additional autoimmune/metabolic evaluation as noted above. Monitor serial liver enzymes.
The patient now admits to alcohol intake (denied up until now) and also indicates that she has been taking an herbal supplement that is an appetite stimulant in an effort to gain weight. Reports 30 pound weight gain!  Alcohol/herbal supplement possible etiology regarding pancreatitis as well as severe steatosis noted on abdominal imaging. Somewhat improved from admission-can trial bland diet. Monitor serial liver enzymes with completion of the serologic workup as outlined. Patient aware of necessity of alcohol avoidance and cessation of herbal supplements.
25 F with limited PMH, (some ETOH use) a/w abdominal pain, found to have pancreatitis (CT showing pancreatitis, Lipas elevated).  Pain control, IVF, diet as tolerated.  Pt also with c/o pain after eating, ? GERD, on PPI, will add Maalox. check lactate this afternoon    Discussed with HS
25 F with limited PMH, (some ETOH use) a/w abdominal pain, found to have pancreatitis (CT showing pancreatitis, Lipase elevated) and hepatic steatosis.  Pain control, IVF, diet as tolerated.  advanced today.    ? GERD, on PPI/Maalox.  Pt admits to ETOH use, also with regular use of Apetamin, a dietary supplement used to gain weight, known adverse effect of liver toxicity.  Discussed with Dr. Jenkins GI at bedside, advance diet as tolerated.  Noted to have HTN, started norvasc, given young age will check secondary causes of htn.  Can complete further workup as o/p is tolerating po.      Discussed with HS
25 F with limited PMH, (some ETOH use) a/w abdominal pain, found to have pancreatitis (CT showing pancreatitis, Lipase elevated) and hepatic steatosis.  Pain control, IVF, diet as tolerated.  ? GERD, on PPI/Maalox. Lactate improved.  Pt admits to ETOH use, last time in February for several days.  Also with regular use of Apetamin, a dietary supplement used to gain weight.  Pt with multiple electrolyte deficiencies, nutrition to see.    Discussed with HS
25F with no PMH presented with pancreatitis and elevated LFT. noted to have significant hepatosteatosis, potentially due to her OTC supplement toxicity. no signs of autoimmune process at this time. Her pancreatitis is resolving, tolerating full diet. recommended patient to hold further OTC supplements. follow up with PCP and hepatology for further monitoring of her liver function. Hypertensive, in setting aggressive fluid hydration. no h/o of HTN. would hold BP agents, follow up in clinic prior to starting BP agents. stable for d/c home today.     45 minutes spent coordinating discharge

## 2021-03-26 NOTE — PROGRESS NOTE ADULT - PROBLEM SELECTOR PLAN 5
Reported dark brown emesis in last 3 weeks and 1 episode of bright red blood-tinged emesis yesterday, may be secondary to upper GI bleed.  Possibly secondary to gastropathy vs. esophagitis.  - H/H stable, vitals stable  - Monitor H/H, maintain active T&S  - GI consult with no role for EGD and will continue to monitoring.   - C/w pantoprazole BID.

## 2021-03-26 NOTE — DISCHARGE NOTE NURSING/CASE MANAGEMENT/SOCIAL WORK - PATIENT PORTAL LINK FT
You can access the FollowMyHealth Patient Portal offered by [facility] by registering at the following website: [email or web address]. By joining [employer]’s FollowMyHealth portal, you will also be able to view your health information using other applications (apps) compatible with our system.

## 2021-03-26 NOTE — PROGRESS NOTE ADULT - PROBLEM SELECTOR PLAN 9
Transitions of Care Status:  1.  Name of PCP: Dr. Andrei Serrato ([phone])  2.  PCP Contacted on Admission: [ ] Y    [x ] N    3.  PCP contacted at Discharge: [ ] Y    [ ] N    [ ] N/A  4.  Post-Discharge Appointment Date and Location:  5.  Summary of Handoff given to PCP:
Transitions of Care Status:  1.  Name of PCP: Dr. Andrei Serrato ([phone])  2.  PCP Contacted on Admission: [ ] Y    [x ] N    3.  PCP contacted at Discharge: [ ] Y    [ ] N    [ ] N/A  4.  Post-Discharge Appointment Date and Location:  5.  Summary of Handoff given to PCP:
Transitions of Care Status:  1.  Name of PCP: Dr. Andrei Serrato ([phone])  2.  PCP Contacted on Admission: [ ] Y    [x ] N    3.  PCP contacted at Discharge: [ ] Y    [ ] N    [ ] N/A  4.  Post-Discharge Appointment Date and Location:  5.  Summary of Handoff given to PCP:
Transitions of Care Status:  1.  Name of PCP: Dr. Andrei Serrato ([phone])  2.  PCP Contacted on Admission: [ ] Y    [x ] N    3.  PCP contacted at Discharge: [ ] Y    [ ] N    [ ] N/A  4.  Post-Discharge Appointment Date and Location:  5.  Summary of Handoff given to PCP:

## 2021-03-26 NOTE — PROGRESS NOTE ADULT - ASSESSMENT
25F w/ no significant PMH presents with worsening epigastric and lower abdominal pain, n/v x 3 weeks, found to have elevated lipase and CT findings c/w acute pancreatitis of unknown etiology who is now presenting with worsening transaminitis and severe hepatic steatosis on imaging. Worsening trasaminitis and hepatic steatosis most likely i/s/o possible apetamin (OTC supplement) use however other etiologies being worked us as well. Her course has been complicated by hypertension (both systolic and diastolic hypertension).  25F w/ no significant PMH presents with worsening epigastric and lower abdominal pain, n/v x 3 weeks, found to have elevated lipase and CT findings c/w acute pancreatitis of unknown etiology who is now presenting with worsening transaminitis and severe hepatic steatosis on imaging. Worsening trasaminitis and hepatic steatosis most likely i/s/o possible apetamin (OTC supplement) use however other etiologies being worked us as well. Her course has been complicated by hypertension (both systolic and diastolic hypertension) most likely in the setting of increased fluids for pancreatitis now stable for discharge home with follow up with PCP for possible subclinical hypothyroidism and regular care along with stopping supplementation with apetamin.

## 2021-03-26 NOTE — PROGRESS NOTE ADULT - SUBJECTIVE AND OBJECTIVE BOX
Chief Complaint:  Patient is a 25y old  Female who presents with a chief complaint of Abd pain, N/V (26 Mar 2021 07:21)      Interval Events: reports lower abdominal/pelvic pain after eating breakfast  - denies epigastric or chest pain   - reports she is peeing a lot  - no n/v, no BM      Hospital Medications:  acetaminophen   Tablet .. 650 milliGRAM(s) Oral every 6 hours PRN  aluminum hydroxide/magnesium hydroxide/simethicone Suspension 30 milliLiter(s) Oral every 6 hours PRN  amLODIPine   Tablet 5 milliGRAM(s) Oral daily  folic acid 1 milliGRAM(s) Oral daily  influenza   Vaccine 0.5 milliLiter(s) IntraMuscular once  magnesium sulfate  IVPB 2 Gram(s) IV Intermittent daily  multivitamin 1 Tablet(s) Oral daily  oxyCODONE    IR 5 milliGRAM(s) Oral every 8 hours PRN  pantoprazole  Injectable 40 milliGRAM(s) IV Push every 12 hours  potassium phosphate IVPB 30 milliMole(s) IV Intermittent once  thiamine IVPB 500 milliGRAM(s) IV Intermittent three times a day      PMHX/PSHX:  No pertinent past medical history    Status post skin graft            ROS:     General:  No weight loss, fevers, chills, night sweats, fatigue   Eyes:  No vision changes  ENT:  No sore throat, pain, runny nose  CV:  No chest pain, palpitations, dizziness   Resp:  No SOB, cough, wheezing  GI:  See HPI  :  No burning with urination, hematuria  Muscle:  No pain, weakness  Neuro:  No weakness/tingling, memory problems  Psych:  No fatigue, insomnia, mood problems, depression  Heme:  No easy bruisability  Skin:  No rash, edema      PHYSICAL EXAM:     GENERAL:  Well developed, no distress  HEENT:  NC/AT,  conjunctivae clear, sclera anicteric  CHEST:  Full & symmetric excursion, no increased effort w/ respirations  HEART:  Regular rhythm & rate  ABDOMEN:  Soft, tender to palpation in lower abdomen/suprapubic area, distended  EXTREMITIES:  no LE  edema  SKIN:  No rash/erythema/ecchymoses/petechiae/wounds/jaundice  NEURO:  Alert, oriented    Vital Signs:  Vital Signs Last 24 Hrs  T(C): 36.8 (26 Mar 2021 06:18), Max: 37.1 (25 Mar 2021 21:26)  T(F): 98.3 (26 Mar 2021 06:18), Max: 98.8 (25 Mar 2021 21:26)  HR: 86 (26 Mar 2021 06:18) (86 - 111)  BP: 123/93 (26 Mar 2021 06:18) (123/93 - 132/87)  BP(mean): --  RR: 18 (26 Mar 2021 06:18) (17 - 18)  SpO2: 100% (26 Mar 2021 06:18) (99% - 100%)  Daily     Daily     LABS:                        8.6    5.73  )-----------( 251      ( 26 Mar 2021 08:40 )             26.2     03-26    135  |  99  |  5<L>  ----------------------------<  70  3.5   |  25  |  0.46<L>    Ca    8.6      26 Mar 2021 08:40  Phos  2.3     03-  Mg     1.2     -    TPro  6.5  /  Alb  2.8<L>  /  TBili  0.9  /  DBili  x   /  AST  181<H>  /  ALT  54<H>  /  AlkPhos  200<H>  03-26    LIVER FUNCTIONS - ( 26 Mar 2021 08:40 )  Alb: 2.8 g/dL / Pro: 6.5 g/dL / ALK PHOS: 200 U/L / ALT: 54 U/L / AST: 181 U/L / GGT: x           PT/INR - ( 25 Mar 2021 08:28 )   PT: 14.7 sec;   INR: 1.29 ratio         PTT - ( 25 Mar 2021 08:28 )  PTT:34.1 sec  Urinalysis Basic - ( 25 Mar 2021 12:24 )    Color: Light Yellow / Appearance: Clear / S.009 / pH: x  Gluc: x / Ketone: Small  / Bili: Negative / Urobili: <2 mg/dL   Blood: x / Protein: Negative / Nitrite: Negative   Leuk Esterase: Negative / RBC: 12 /HPF / WBC 1 /HPF   Sq Epi: x / Non Sq Epi: 1 /HPF / Bacteria: Negative          Imaging:

## 2021-03-29 LAB
ALDOST SERPL-MCNC: <3 NG/DL — SIGNIFICANT CHANGE UP
MITOCHONDRIA AB SER-ACNC: SIGNIFICANT CHANGE UP
SMOOTH MUSCLE AB SER-ACNC: ABNORMAL

## 2021-04-05 LAB
METANEPHRINE, PL: 57 PG/ML — SIGNIFICANT CHANGE UP (ref 0–88)
NORMETANEPHRINE, PL: 78.8 PG/ML — SIGNIFICANT CHANGE UP (ref 0–107.7)

## 2021-04-20 NOTE — DIETITIAN INITIAL EVALUATION ADULT. - CALCULATED FROM (G/KG)
1.  Start anoro one puff daily  2.  Change your albuterol to only as needed for severe breakthrough shortness of breath  
70.92

## 2022-03-01 NOTE — PROGRESS NOTE ADULT - PROBLEM/PLAN-9
Patient was seen for 30 minute sessin through a synchronous (real-time) audio-video encounter. The patient (or guardian if applicable) is aware that this is a billable service, which includes applicable co-pays. This Virtual Visit was conducted with patient's (and/or legal guardian's) consent. The visit was conducted pursuant to the emergency declaration under the [address] [place] [address] and the [employer] General Act. Patient identification was verified, and a caregiver was present when appropriate. The patient was located in a state where the provider was licensed to provide care. Patient was initially non-cooperative for the first few minutes but quickly warmed up and readily participated. We continued working on /sh/ and /ch/ in initial and final positions of words. He completed the task with 70% avg acc with mod cues needed. When we tried to progress to phrases, his accuracy dropped to 60% with max cues. Homework practice strongly encouraged. Will continue. Federico Romero.  Marcelino Riddle MA/CCC-SLP  [id number]    [facility] [id number]
DISPLAY PLAN FREE TEXT

## 2023-09-25 ENCOUNTER — INPATIENT (INPATIENT)
Facility: HOSPITAL | Age: 28
LOS: 10 days | Discharge: LEFT BEFORE DISCHARGE | End: 2023-10-06
Attending: STUDENT IN AN ORGANIZED HEALTH CARE EDUCATION/TRAINING PROGRAM | Admitting: STUDENT IN AN ORGANIZED HEALTH CARE EDUCATION/TRAINING PROGRAM
Payer: MEDICAID

## 2023-09-25 VITALS
SYSTOLIC BLOOD PRESSURE: 155 MMHG | RESPIRATION RATE: 24 BRPM | HEART RATE: 162 BPM | HEIGHT: 63 IN | OXYGEN SATURATION: 96 % | DIASTOLIC BLOOD PRESSURE: 107 MMHG | WEIGHT: 160.06 LBS

## 2023-09-25 DIAGNOSIS — Z94.5 SKIN TRANSPLANT STATUS: Chronic | ICD-10-CM

## 2023-09-25 LAB
ALBUMIN SERPL ELPH-MCNC: 3.1 G/DL — LOW (ref 3.3–5)
ALBUMIN SERPL ELPH-MCNC: 3.2 G/DL — LOW (ref 3.3–5)
ALBUMIN SERPL ELPH-MCNC: 3.7 G/DL — SIGNIFICANT CHANGE UP (ref 3.3–5)
ALP SERPL-CCNC: 101 U/L — SIGNIFICANT CHANGE UP (ref 40–120)
ALP SERPL-CCNC: 75 U/L — SIGNIFICANT CHANGE UP (ref 40–120)
ALP SERPL-CCNC: 76 U/L — SIGNIFICANT CHANGE UP (ref 40–120)
ALT FLD-CCNC: 21 U/L — SIGNIFICANT CHANGE UP (ref 12–78)
ALT FLD-CCNC: 24 U/L — SIGNIFICANT CHANGE UP (ref 12–78)
ALT FLD-CCNC: 28 U/L — SIGNIFICANT CHANGE UP (ref 12–78)
AMPHET UR-MCNC: NEGATIVE — SIGNIFICANT CHANGE UP
ANION GAP SERPL CALC-SCNC: 10 MMOL/L — SIGNIFICANT CHANGE UP (ref 5–17)
ANION GAP SERPL CALC-SCNC: 12 MMOL/L — SIGNIFICANT CHANGE UP (ref 5–17)
ANION GAP SERPL CALC-SCNC: 27 MMOL/L — HIGH (ref 5–17)
ANION GAP SERPL CALC-SCNC: 9 MMOL/L — SIGNIFICANT CHANGE UP (ref 5–17)
APPEARANCE CSF: CLEAR — SIGNIFICANT CHANGE UP
APPEARANCE CSF: CLEAR — SIGNIFICANT CHANGE UP
APPEARANCE UR: CLEAR — SIGNIFICANT CHANGE UP
APTT BLD: 27 SEC — SIGNIFICANT CHANGE UP (ref 24.5–35.6)
AST SERPL-CCNC: 70 U/L — HIGH (ref 15–37)
AST SERPL-CCNC: 72 U/L — HIGH (ref 15–37)
AST SERPL-CCNC: 75 U/L — HIGH (ref 15–37)
BACTERIA # UR AUTO: ABNORMAL
BARBITURATES UR SCN-MCNC: NEGATIVE — SIGNIFICANT CHANGE UP
BASE EXCESS BLDA CALC-SCNC: -5.3 MMOL/L — LOW (ref -2–3)
BASE EXCESS BLDV CALC-SCNC: -17.3 MMOL/L — LOW (ref -2–3)
BASOPHILS # BLD AUTO: 0.02 K/UL — SIGNIFICANT CHANGE UP (ref 0–0.2)
BASOPHILS # BLD AUTO: 0.07 K/UL — SIGNIFICANT CHANGE UP (ref 0–0.2)
BASOPHILS NFR BLD AUTO: 0.2 % — SIGNIFICANT CHANGE UP (ref 0–2)
BASOPHILS NFR BLD AUTO: 0.7 % — SIGNIFICANT CHANGE UP (ref 0–2)
BENZODIAZ UR-MCNC: POSITIVE — SIGNIFICANT CHANGE UP
BILIRUB SERPL-MCNC: 1.2 MG/DL — SIGNIFICANT CHANGE UP (ref 0.2–1.2)
BILIRUB SERPL-MCNC: 1.3 MG/DL — HIGH (ref 0.2–1.2)
BILIRUB SERPL-MCNC: 1.8 MG/DL — HIGH (ref 0.2–1.2)
BILIRUB UR-MCNC: NEGATIVE — SIGNIFICANT CHANGE UP
BLD GP AB SCN SERPL QL: SIGNIFICANT CHANGE UP
BLOOD GAS COMMENTS ARTERIAL: SIGNIFICANT CHANGE UP
BLOOD GAS COMMENTS, VENOUS: SIGNIFICANT CHANGE UP
BUN SERPL-MCNC: 10 MG/DL — SIGNIFICANT CHANGE UP (ref 7–23)
BUN SERPL-MCNC: 4 MG/DL — LOW (ref 7–23)
BUN SERPL-MCNC: 5 MG/DL — LOW (ref 7–23)
BUN SERPL-MCNC: 6 MG/DL — LOW (ref 7–23)
CALCIUM SERPL-MCNC: 8.2 MG/DL — LOW (ref 8.5–10.1)
CALCIUM SERPL-MCNC: 8.4 MG/DL — LOW (ref 8.5–10.1)
CALCIUM SERPL-MCNC: 8.6 MG/DL — SIGNIFICANT CHANGE UP (ref 8.5–10.1)
CALCIUM SERPL-MCNC: 9.2 MG/DL — SIGNIFICANT CHANGE UP (ref 8.5–10.1)
CHLORIDE BLDV-SCNC: 94 MMOL/L — LOW (ref 98–107)
CHLORIDE SERPL-SCNC: 100 MMOL/L — SIGNIFICANT CHANGE UP (ref 96–108)
CHLORIDE SERPL-SCNC: 100 MMOL/L — SIGNIFICANT CHANGE UP (ref 96–108)
CHLORIDE SERPL-SCNC: 95 MMOL/L — LOW (ref 96–108)
CHLORIDE SERPL-SCNC: 98 MMOL/L — SIGNIFICANT CHANGE UP (ref 96–108)
CO2 BLDA-SCNC: 20 MMOL/L — SIGNIFICANT CHANGE UP (ref 19–24)
CO2 BLDV-SCNC: 14 MMOL/L — LOW (ref 22–26)
CO2 SERPL-SCNC: 13 MMOL/L — LOW (ref 22–31)
CO2 SERPL-SCNC: 24 MMOL/L — SIGNIFICANT CHANGE UP (ref 22–31)
CO2 SERPL-SCNC: 25 MMOL/L — SIGNIFICANT CHANGE UP (ref 22–31)
CO2 SERPL-SCNC: 25 MMOL/L — SIGNIFICANT CHANGE UP (ref 22–31)
COCAINE METAB.OTHER UR-MCNC: NEGATIVE — SIGNIFICANT CHANGE UP
COLOR CSF: ABNORMAL
COLOR CSF: ABNORMAL
COLOR SPEC: YELLOW — SIGNIFICANT CHANGE UP
CREAT SERPL-MCNC: 0.72 MG/DL — SIGNIFICANT CHANGE UP (ref 0.5–1.3)
CREAT SERPL-MCNC: 0.72 MG/DL — SIGNIFICANT CHANGE UP (ref 0.5–1.3)
CREAT SERPL-MCNC: 0.74 MG/DL — SIGNIFICANT CHANGE UP (ref 0.5–1.3)
CREAT SERPL-MCNC: 1.32 MG/DL — HIGH (ref 0.5–1.3)
CSF PCR RESULT: SIGNIFICANT CHANGE UP
DIFF PNL FLD: ABNORMAL
EGFR: 112 ML/MIN/1.73M2 — SIGNIFICANT CHANGE UP
EGFR: 115 ML/MIN/1.73M2 — SIGNIFICANT CHANGE UP
EGFR: 115 ML/MIN/1.73M2 — SIGNIFICANT CHANGE UP
EGFR: 56 ML/MIN/1.73M2 — LOW
EOSINOPHIL # BLD AUTO: 0 K/UL — SIGNIFICANT CHANGE UP (ref 0–0.5)
EOSINOPHIL # BLD AUTO: 0.01 K/UL — SIGNIFICANT CHANGE UP (ref 0–0.5)
EOSINOPHIL NFR BLD AUTO: 0 % — SIGNIFICANT CHANGE UP (ref 0–6)
EOSINOPHIL NFR BLD AUTO: 0.1 % — SIGNIFICANT CHANGE UP (ref 0–6)
EPI CELLS # UR: SIGNIFICANT CHANGE UP
ETHANOL SERPL-MCNC: <10 MG/DL — SIGNIFICANT CHANGE UP (ref 0–10)
GAS PNL BLDA: SIGNIFICANT CHANGE UP
GAS PNL BLDA: SIGNIFICANT CHANGE UP
GAS PNL BLDV: 133 MMOL/L — LOW (ref 136–145)
GAS PNL BLDV: SIGNIFICANT CHANGE UP
GAS PNL BLDV: SIGNIFICANT CHANGE UP
GLUCOSE BLDC GLUCOMTR-MCNC: 85 MG/DL — SIGNIFICANT CHANGE UP (ref 70–99)
GLUCOSE BLDC GLUCOMTR-MCNC: 98 MG/DL — SIGNIFICANT CHANGE UP (ref 70–99)
GLUCOSE BLDV-MCNC: 244 MG/DL — HIGH (ref 65–95)
GLUCOSE CSF-MCNC: 93 MG/DL — HIGH (ref 40–70)
GLUCOSE SERPL-MCNC: 227 MG/DL — HIGH (ref 70–99)
GLUCOSE SERPL-MCNC: 75 MG/DL — SIGNIFICANT CHANGE UP (ref 70–99)
GLUCOSE SERPL-MCNC: 82 MG/DL — SIGNIFICANT CHANGE UP (ref 70–99)
GLUCOSE SERPL-MCNC: 86 MG/DL — SIGNIFICANT CHANGE UP (ref 70–99)
GLUCOSE UR QL: NEGATIVE MG/DL — SIGNIFICANT CHANGE UP
GRAM STN FLD: SIGNIFICANT CHANGE UP
GRAM STN FLD: SIGNIFICANT CHANGE UP
HCO3 BLDA-SCNC: 19 MMOL/L — LOW (ref 21–28)
HCO3 BLDV-SCNC: 13 MMOL/L — LOW (ref 22–28)
HCT VFR BLD CALC: 26.2 % — LOW (ref 34.5–45)
HCT VFR BLD CALC: 26.8 % — LOW (ref 34.5–45)
HCT VFR BLD CALC: 31.8 % — LOW (ref 34.5–45)
HCT VFR BLDA CALC: 37 % — SIGNIFICANT CHANGE UP (ref 37–47)
HGB BLD CALC-MCNC: 12.3 G/DL — SIGNIFICANT CHANGE UP (ref 11.7–16.1)
HGB BLD-MCNC: 8.3 G/DL — LOW (ref 11.5–15.5)
HGB BLD-MCNC: 8.6 G/DL — LOW (ref 11.5–15.5)
HGB BLD-MCNC: 9.5 G/DL — LOW (ref 11.5–15.5)
HIV 1 & 2 AB SERPL IA.RAPID: SIGNIFICANT CHANGE UP
HOROWITZ INDEX BLDA+IHG-RTO: 40 — SIGNIFICANT CHANGE UP
HOROWITZ INDEX BLDV+IHG-RTO: 21 — SIGNIFICANT CHANGE UP
HYPOCHROMIA BLD QL: SLIGHT — SIGNIFICANT CHANGE UP
IMM GRANULOCYTES NFR BLD AUTO: 0.7 % — SIGNIFICANT CHANGE UP (ref 0–0.9)
IMM GRANULOCYTES NFR BLD AUTO: 1.9 % — HIGH (ref 0–0.9)
INR BLD: 0.97 RATIO — SIGNIFICANT CHANGE UP (ref 0.85–1.18)
KETONES UR-MCNC: ABNORMAL
LACTATE BLDV-MCNC: >15 MMOL/L — CRITICAL HIGH (ref 0.56–1.39)
LACTATE SERPL-SCNC: 17.3 MMOL/L — CRITICAL HIGH (ref 0.7–2)
LACTATE SERPL-SCNC: 4.7 MMOL/L — CRITICAL HIGH (ref 0.7–2)
LEUKOCYTE ESTERASE UR-ACNC: NEGATIVE — SIGNIFICANT CHANGE UP
LYMPHOCYTES # BLD AUTO: 1.5 K/UL — SIGNIFICANT CHANGE UP (ref 1–3.3)
LYMPHOCYTES # BLD AUTO: 14.8 % — SIGNIFICANT CHANGE UP (ref 13–44)
LYMPHOCYTES # BLD AUTO: 2.99 K/UL — SIGNIFICANT CHANGE UP (ref 1–3.3)
LYMPHOCYTES # BLD AUTO: 28.1 % — SIGNIFICANT CHANGE UP (ref 13–44)
MACROCYTES BLD QL: SLIGHT — SIGNIFICANT CHANGE UP
MAGNESIUM SERPL-MCNC: 1.3 MG/DL — LOW (ref 1.6–2.6)
MAGNESIUM SERPL-MCNC: 1.9 MG/DL — SIGNIFICANT CHANGE UP (ref 1.6–2.6)
MAGNESIUM SERPL-MCNC: 2.2 MG/DL — SIGNIFICANT CHANGE UP (ref 1.6–2.6)
MANUAL SMEAR VERIFICATION: SIGNIFICANT CHANGE UP
MCHC RBC-ENTMCNC: 24.4 PG — LOW (ref 27–34)
MCHC RBC-ENTMCNC: 24.5 PG — LOW (ref 27–34)
MCHC RBC-ENTMCNC: 24.6 PG — LOW (ref 27–34)
MCHC RBC-ENTMCNC: 29.9 G/DL — LOW (ref 32–36)
MCHC RBC-ENTMCNC: 31.7 G/DL — LOW (ref 32–36)
MCHC RBC-ENTMCNC: 32.1 G/DL — SIGNIFICANT CHANGE UP (ref 32–36)
MCV RBC AUTO: 76.4 FL — LOW (ref 80–100)
MCV RBC AUTO: 77.1 FL — LOW (ref 80–100)
MCV RBC AUTO: 82.4 FL — SIGNIFICANT CHANGE UP (ref 80–100)
METHADONE UR-MCNC: NEGATIVE — SIGNIFICANT CHANGE UP
MONOCYTES # BLD AUTO: 0.52 K/UL — SIGNIFICANT CHANGE UP (ref 0–0.9)
MONOCYTES # BLD AUTO: 0.84 K/UL — SIGNIFICANT CHANGE UP (ref 0–0.9)
MONOCYTES NFR BLD AUTO: 4.9 % — SIGNIFICANT CHANGE UP (ref 2–14)
MONOCYTES NFR BLD AUTO: 8.3 % — SIGNIFICANT CHANGE UP (ref 2–14)
NEUTROPHILS # BLD AUTO: 6.86 K/UL — SIGNIFICANT CHANGE UP (ref 1.8–7.4)
NEUTROPHILS # BLD AUTO: 7.72 K/UL — HIGH (ref 1.8–7.4)
NEUTROPHILS # CSF: SIGNIFICANT CHANGE UP (ref 0–6)
NEUTROPHILS # CSF: SIGNIFICANT CHANGE UP (ref 0–6)
NEUTROPHILS NFR BLD AUTO: 64.3 % — SIGNIFICANT CHANGE UP (ref 43–77)
NEUTROPHILS NFR BLD AUTO: 76 % — SIGNIFICANT CHANGE UP (ref 43–77)
NITRITE UR-MCNC: NEGATIVE — SIGNIFICANT CHANGE UP
NRBC # BLD: 0 /100 WBCS — SIGNIFICANT CHANGE UP (ref 0–0)
NRBC NFR CSF: 0 /UL — SIGNIFICANT CHANGE UP (ref 0–5)
NRBC NFR CSF: 0 /UL — SIGNIFICANT CHANGE UP (ref 0–5)
OPIATES UR-MCNC: NEGATIVE — SIGNIFICANT CHANGE UP
PCO2 BLDA: 33 MMHG — SIGNIFICANT CHANGE UP (ref 32–46)
PCO2 BLDV: 46 MMHG — SIGNIFICANT CHANGE UP (ref 42–55)
PCP SPEC-MCNC: SIGNIFICANT CHANGE UP
PCP UR-MCNC: NEGATIVE — SIGNIFICANT CHANGE UP
PH BLDA: 7.37 — SIGNIFICANT CHANGE UP (ref 7.35–7.45)
PH BLDV: 7.05 — CRITICAL LOW (ref 7.32–7.43)
PH UR: 6.5 — SIGNIFICANT CHANGE UP (ref 5–8)
PHOSPHATE SERPL-MCNC: 1.1 MG/DL — LOW (ref 2.5–4.5)
PHOSPHATE SERPL-MCNC: 1.2 MG/DL — LOW (ref 2.5–4.5)
PHOSPHATE SERPL-MCNC: 1.8 MG/DL — LOW (ref 2.5–4.5)
PLAT MORPH BLD: NORMAL — SIGNIFICANT CHANGE UP
PLATELET # BLD AUTO: 101 K/UL — LOW (ref 150–400)
PLATELET # BLD AUTO: 112 K/UL — LOW (ref 150–400)
PLATELET # BLD AUTO: 162 K/UL — SIGNIFICANT CHANGE UP (ref 150–400)
PO2 BLDA: 84 MMHG — SIGNIFICANT CHANGE UP (ref 83–108)
PO2 BLDV: 87 MMHG — HIGH (ref 25–45)
POTASSIUM BLDV-SCNC: 3.7 MMOL/L — SIGNIFICANT CHANGE UP (ref 3.5–5.1)
POTASSIUM SERPL-MCNC: 2.7 MMOL/L — CRITICAL LOW (ref 3.5–5.3)
POTASSIUM SERPL-MCNC: 2.8 MMOL/L — CRITICAL LOW (ref 3.5–5.3)
POTASSIUM SERPL-MCNC: 3 MMOL/L — LOW (ref 3.5–5.3)
POTASSIUM SERPL-MCNC: 3.5 MMOL/L — SIGNIFICANT CHANGE UP (ref 3.5–5.3)
POTASSIUM SERPL-SCNC: 2.7 MMOL/L — CRITICAL LOW (ref 3.5–5.3)
POTASSIUM SERPL-SCNC: 2.8 MMOL/L — CRITICAL LOW (ref 3.5–5.3)
POTASSIUM SERPL-SCNC: 3 MMOL/L — LOW (ref 3.5–5.3)
POTASSIUM SERPL-SCNC: 3.5 MMOL/L — SIGNIFICANT CHANGE UP (ref 3.5–5.3)
PROT CSF-MCNC: 39 MG/DL — SIGNIFICANT CHANGE UP (ref 15–45)
PROT SERPL-MCNC: 8.3 GM/DL — SIGNIFICANT CHANGE UP (ref 6–8.3)
PROT SERPL-MCNC: 8.5 GM/DL — HIGH (ref 6–8.3)
PROT SERPL-MCNC: 9.7 GM/DL — HIGH (ref 6–8.3)
PROT UR-MCNC: 30 MG/DL
PROTHROM AB SERPL-ACNC: 11.6 SEC — SIGNIFICANT CHANGE UP (ref 9.5–13)
RAPID RVP RESULT: SIGNIFICANT CHANGE UP
RBC # BLD: 3.4 M/UL — LOW (ref 3.8–5.2)
RBC # BLD: 3.51 M/UL — LOW (ref 3.8–5.2)
RBC # BLD: 3.86 M/UL — SIGNIFICANT CHANGE UP (ref 3.8–5.2)
RBC # CSF: 2 /UL — HIGH (ref 0–0)
RBC # CSF: 2 /UL — HIGH (ref 0–0)
RBC # FLD: 22.2 % — HIGH (ref 10.3–14.5)
RBC # FLD: 22.2 % — HIGH (ref 10.3–14.5)
RBC # FLD: 22.4 % — HIGH (ref 10.3–14.5)
RBC BLD AUTO: SIGNIFICANT CHANGE UP
RBC CASTS # UR COMP ASSIST: ABNORMAL /HPF (ref 0–4)
SAO2 % BLDA: 97.2 % — SIGNIFICANT CHANGE UP (ref 94–98)
SAO2 % BLDV: 92.7 % — LOW (ref 94–98)
SARS-COV-2 RNA SPEC QL NAA+PROBE: SIGNIFICANT CHANGE UP
SODIUM SERPL-SCNC: 134 MMOL/L — LOW (ref 135–145)
SODIUM SERPL-SCNC: 134 MMOL/L — LOW (ref 135–145)
SODIUM SERPL-SCNC: 135 MMOL/L — SIGNIFICANT CHANGE UP (ref 135–145)
SODIUM SERPL-SCNC: 135 MMOL/L — SIGNIFICANT CHANGE UP (ref 135–145)
SP GR SPEC: 1.01 — SIGNIFICANT CHANGE UP (ref 1.01–1.02)
SPECIMEN SOURCE: SIGNIFICANT CHANGE UP
SPECIMEN SOURCE: SIGNIFICANT CHANGE UP
THC UR QL: NEGATIVE — SIGNIFICANT CHANGE UP
TROPONIN I, HIGH SENSITIVITY RESULT: 11 NG/L — SIGNIFICANT CHANGE UP
TUBE TYPE: SIGNIFICANT CHANGE UP
TUBE TYPE: SIGNIFICANT CHANGE UP
UROBILINOGEN FLD QL: NEGATIVE MG/DL — SIGNIFICANT CHANGE UP
WBC # BLD: 10.15 K/UL — SIGNIFICANT CHANGE UP (ref 3.8–10.5)
WBC # BLD: 10.65 K/UL — HIGH (ref 3.8–10.5)
WBC # BLD: 11.57 K/UL — HIGH (ref 3.8–10.5)
WBC # FLD AUTO: 10.15 K/UL — SIGNIFICANT CHANGE UP (ref 3.8–10.5)
WBC # FLD AUTO: 10.65 K/UL — HIGH (ref 3.8–10.5)
WBC # FLD AUTO: 11.57 K/UL — HIGH (ref 3.8–10.5)
WBC UR QL: SIGNIFICANT CHANGE UP

## 2023-09-25 PROCEDURE — 99254 IP/OBS CNSLTJ NEW/EST MOD 60: CPT

## 2023-09-25 PROCEDURE — 71045 X-RAY EXAM CHEST 1 VIEW: CPT | Mod: 26

## 2023-09-25 PROCEDURE — 31500 INSERT EMERGENCY AIRWAY: CPT

## 2023-09-25 PROCEDURE — 99291 CRITICAL CARE FIRST HOUR: CPT

## 2023-09-25 PROCEDURE — 71260 CT THORAX DX C+: CPT | Mod: 26,MA

## 2023-09-25 PROCEDURE — 99291 CRITICAL CARE FIRST HOUR: CPT | Mod: 25

## 2023-09-25 PROCEDURE — 70450 CT HEAD/BRAIN W/O DYE: CPT | Mod: 26,MA

## 2023-09-25 PROCEDURE — 93010 ELECTROCARDIOGRAM REPORT: CPT

## 2023-09-25 PROCEDURE — 62270 DX LMBR SPI PNXR: CPT

## 2023-09-25 RX ORDER — SODIUM,POTASSIUM PHOSPHATES 278-250MG
1 POWDER IN PACKET (EA) ORAL ONCE
Refills: 0 | Status: COMPLETED | OUTPATIENT
Start: 2023-09-25 | End: 2023-09-25

## 2023-09-25 RX ORDER — ETOMIDATE 2 MG/ML
40 INJECTION INTRAVENOUS ONCE
Refills: 0 | Status: COMPLETED | OUTPATIENT
Start: 2023-09-25 | End: 2023-09-25

## 2023-09-25 RX ORDER — ENOXAPARIN SODIUM 100 MG/ML
40 INJECTION SUBCUTANEOUS
Refills: 0 | Status: DISCONTINUED | OUTPATIENT
Start: 2023-09-25 | End: 2023-09-27

## 2023-09-25 RX ORDER — ACETAMINOPHEN 500 MG
975 TABLET ORAL ONCE
Refills: 0 | Status: COMPLETED | OUTPATIENT
Start: 2023-09-25 | End: 2023-09-25

## 2023-09-25 RX ORDER — ACYCLOVIR SODIUM 500 MG
600 VIAL (EA) INTRAVENOUS EVERY 8 HOURS
Refills: 0 | Status: DISCONTINUED | OUTPATIENT
Start: 2023-09-25 | End: 2023-09-25

## 2023-09-25 RX ORDER — DEXMEDETOMIDINE HYDROCHLORIDE IN 0.9% SODIUM CHLORIDE 4 UG/ML
0.5 INJECTION INTRAVENOUS
Qty: 200 | Refills: 0 | Status: DISCONTINUED | OUTPATIENT
Start: 2023-09-25 | End: 2023-10-03

## 2023-09-25 RX ORDER — VANCOMYCIN HCL 1 G
1000 VIAL (EA) INTRAVENOUS EVERY 12 HOURS
Refills: 0 | Status: DISCONTINUED | OUTPATIENT
Start: 2023-09-25 | End: 2023-09-30

## 2023-09-25 RX ORDER — MIDAZOLAM HYDROCHLORIDE 1 MG/ML
10 INJECTION, SOLUTION INTRAMUSCULAR; INTRAVENOUS ONCE
Refills: 0 | Status: DISCONTINUED | OUTPATIENT
Start: 2023-09-25 | End: 2023-09-25

## 2023-09-25 RX ORDER — ACETAMINOPHEN 500 MG
1000 TABLET ORAL ONCE
Refills: 0 | Status: COMPLETED | OUTPATIENT
Start: 2023-09-25 | End: 2023-09-27

## 2023-09-25 RX ORDER — VANCOMYCIN HCL 1 G
1500 VIAL (EA) INTRAVENOUS EVERY 12 HOURS
Refills: 0 | Status: DISCONTINUED | OUTPATIENT
Start: 2023-09-25 | End: 2023-09-25

## 2023-09-25 RX ORDER — CEFTRIAXONE 500 MG/1
1000 INJECTION, POWDER, FOR SOLUTION INTRAMUSCULAR; INTRAVENOUS EVERY 24 HOURS
Refills: 0 | Status: DISCONTINUED | OUTPATIENT
Start: 2023-09-26 | End: 2023-09-25

## 2023-09-25 RX ORDER — MIDAZOLAM HYDROCHLORIDE 1 MG/ML
4 INJECTION, SOLUTION INTRAMUSCULAR; INTRAVENOUS
Refills: 0 | Status: DISCONTINUED | OUTPATIENT
Start: 2023-09-25 | End: 2023-09-26

## 2023-09-25 RX ORDER — PHENOBARBITAL 60 MG
260 TABLET ORAL ONCE
Refills: 0 | Status: DISCONTINUED | OUTPATIENT
Start: 2023-09-25 | End: 2023-09-25

## 2023-09-25 RX ORDER — SODIUM CHLORIDE 9 MG/ML
1350 INJECTION, SOLUTION INTRAVENOUS ONCE
Refills: 0 | Status: DISCONTINUED | OUTPATIENT
Start: 2023-09-25 | End: 2023-09-25

## 2023-09-25 RX ORDER — SODIUM CHLORIDE 9 MG/ML
1000 INJECTION, SOLUTION INTRAVENOUS ONCE
Refills: 0 | Status: COMPLETED | OUTPATIENT
Start: 2023-09-25 | End: 2023-09-25

## 2023-09-25 RX ORDER — CHLORHEXIDINE GLUCONATE 213 G/1000ML
15 SOLUTION TOPICAL EVERY 12 HOURS
Refills: 0 | Status: DISCONTINUED | OUTPATIENT
Start: 2023-09-25 | End: 2023-09-26

## 2023-09-25 RX ORDER — CEFTRIAXONE 500 MG/1
1000 INJECTION, POWDER, FOR SOLUTION INTRAMUSCULAR; INTRAVENOUS ONCE
Refills: 0 | Status: COMPLETED | OUTPATIENT
Start: 2023-09-25 | End: 2023-09-25

## 2023-09-25 RX ORDER — ENOXAPARIN SODIUM 100 MG/ML
40 INJECTION SUBCUTANEOUS EVERY 24 HOURS
Refills: 0 | Status: DISCONTINUED | OUTPATIENT
Start: 2023-09-25 | End: 2023-09-25

## 2023-09-25 RX ORDER — CEFTRIAXONE 500 MG/1
INJECTION, POWDER, FOR SOLUTION INTRAMUSCULAR; INTRAVENOUS
Refills: 0 | Status: DISCONTINUED | OUTPATIENT
Start: 2023-09-25 | End: 2023-09-26

## 2023-09-25 RX ORDER — SODIUM CHLORIDE 9 MG/ML
2000 INJECTION, SOLUTION INTRAVENOUS ONCE
Refills: 0 | Status: COMPLETED | OUTPATIENT
Start: 2023-09-25 | End: 2023-09-25

## 2023-09-25 RX ORDER — PIPERACILLIN AND TAZOBACTAM 4; .5 G/20ML; G/20ML
3.38 INJECTION, POWDER, LYOPHILIZED, FOR SOLUTION INTRAVENOUS EVERY 8 HOURS
Refills: 0 | Status: DISCONTINUED | OUTPATIENT
Start: 2023-09-25 | End: 2023-09-25

## 2023-09-25 RX ORDER — ROCURONIUM BROMIDE 10 MG/ML
60 VIAL (ML) INTRAVENOUS ONCE
Refills: 0 | Status: COMPLETED | OUTPATIENT
Start: 2023-09-25 | End: 2023-09-25

## 2023-09-25 RX ORDER — MAGNESIUM SULFATE 500 MG/ML
2 VIAL (ML) INJECTION ONCE
Refills: 0 | Status: COMPLETED | OUTPATIENT
Start: 2023-09-25 | End: 2023-09-25

## 2023-09-25 RX ORDER — POLYETHYLENE GLYCOL 3350 17 G/17G
17 POWDER, FOR SOLUTION ORAL DAILY
Refills: 0 | Status: DISCONTINUED | OUTPATIENT
Start: 2023-09-25 | End: 2023-10-06

## 2023-09-25 RX ORDER — ENOXAPARIN SODIUM 100 MG/ML
40 INJECTION SUBCUTANEOUS EVERY 12 HOURS
Refills: 0 | Status: DISCONTINUED | OUTPATIENT
Start: 2023-09-25 | End: 2023-09-25

## 2023-09-25 RX ORDER — SODIUM,POTASSIUM PHOSPHATES 278-250MG
1 POWDER IN PACKET (EA) ORAL
Refills: 0 | Status: COMPLETED | OUTPATIENT
Start: 2023-09-25 | End: 2023-09-25

## 2023-09-25 RX ORDER — ACYCLOVIR SODIUM 500 MG
700 VIAL (EA) INTRAVENOUS ONCE
Refills: 0 | Status: DISCONTINUED | OUTPATIENT
Start: 2023-09-25 | End: 2023-09-25

## 2023-09-25 RX ORDER — CHLORHEXIDINE GLUCONATE 213 G/1000ML
1 SOLUTION TOPICAL
Refills: 0 | Status: DISCONTINUED | OUTPATIENT
Start: 2023-09-25 | End: 2023-10-04

## 2023-09-25 RX ORDER — MIDAZOLAM HYDROCHLORIDE 1 MG/ML
0.02 INJECTION, SOLUTION INTRAMUSCULAR; INTRAVENOUS
Qty: 100 | Refills: 0 | Status: DISCONTINUED | OUTPATIENT
Start: 2023-09-25 | End: 2023-09-25

## 2023-09-25 RX ORDER — POTASSIUM CHLORIDE 20 MEQ
10 PACKET (EA) ORAL
Refills: 0 | Status: COMPLETED | OUTPATIENT
Start: 2023-09-25 | End: 2023-09-25

## 2023-09-25 RX ORDER — SENNA PLUS 8.6 MG/1
1 TABLET ORAL AT BEDTIME
Refills: 0 | Status: DISCONTINUED | OUTPATIENT
Start: 2023-09-25 | End: 2023-10-06

## 2023-09-25 RX ORDER — POTASSIUM PHOSPHATE, MONOBASIC POTASSIUM PHOSPHATE, DIBASIC 236; 224 MG/ML; MG/ML
15 INJECTION, SOLUTION INTRAVENOUS ONCE
Refills: 0 | Status: COMPLETED | OUTPATIENT
Start: 2023-09-25 | End: 2023-09-25

## 2023-09-25 RX ORDER — CEFTRIAXONE 500 MG/1
2000 INJECTION, POWDER, FOR SOLUTION INTRAMUSCULAR; INTRAVENOUS EVERY 24 HOURS
Refills: 0 | Status: DISCONTINUED | OUTPATIENT
Start: 2023-09-26 | End: 2023-09-26

## 2023-09-25 RX ORDER — POTASSIUM CHLORIDE 20 MEQ
40 PACKET (EA) ORAL
Refills: 0 | Status: COMPLETED | OUTPATIENT
Start: 2023-09-25 | End: 2023-09-25

## 2023-09-25 RX ORDER — AZITHROMYCIN 500 MG/1
500 TABLET, FILM COATED ORAL ONCE
Refills: 0 | Status: COMPLETED | OUTPATIENT
Start: 2023-09-25 | End: 2023-09-25

## 2023-09-25 RX ORDER — THIAMINE MONONITRATE (VIT B1) 100 MG
500 TABLET ORAL ONCE
Refills: 0 | Status: COMPLETED | OUTPATIENT
Start: 2023-09-25 | End: 2023-09-25

## 2023-09-25 RX ORDER — ENOXAPARIN SODIUM 100 MG/ML
40 INJECTION SUBCUTANEOUS
Refills: 0 | Status: DISCONTINUED | OUTPATIENT
Start: 2023-09-25 | End: 2023-09-25

## 2023-09-25 RX ORDER — PROPOFOL 10 MG/ML
15 INJECTION, EMULSION INTRAVENOUS
Qty: 1000 | Refills: 0 | Status: DISCONTINUED | OUTPATIENT
Start: 2023-09-25 | End: 2023-09-26

## 2023-09-25 RX ORDER — LEVETIRACETAM 250 MG/1
1500 TABLET, FILM COATED ORAL EVERY 12 HOURS
Refills: 0 | Status: DISCONTINUED | OUTPATIENT
Start: 2023-09-25 | End: 2023-10-02

## 2023-09-25 RX ORDER — MIDAZOLAM HYDROCHLORIDE 1 MG/ML
2 INJECTION, SOLUTION INTRAMUSCULAR; INTRAVENOUS EVERY 4 HOURS
Refills: 0 | Status: DISCONTINUED | OUTPATIENT
Start: 2023-09-25 | End: 2023-09-25

## 2023-09-25 RX ORDER — POTASSIUM CHLORIDE 20 MEQ
40 PACKET (EA) ORAL
Refills: 0 | Status: DISCONTINUED | OUTPATIENT
Start: 2023-09-25 | End: 2023-09-25

## 2023-09-25 RX ADMIN — LEVETIRACETAM 400 MILLIGRAM(S): 250 TABLET, FILM COATED ORAL at 17:47

## 2023-09-25 RX ADMIN — MIDAZOLAM HYDROCHLORIDE 1.45 MG/KG/HR: 1 INJECTION, SOLUTION INTRAMUSCULAR; INTRAVENOUS at 05:54

## 2023-09-25 RX ADMIN — Medication 975 MILLIGRAM(S): at 06:23

## 2023-09-25 RX ADMIN — PROPOFOL 6.53 MICROGRAM(S)/KG/MIN: 10 INJECTION, EMULSION INTRAVENOUS at 14:10

## 2023-09-25 RX ADMIN — Medication 262 MILLIGRAM(S): at 15:28

## 2023-09-25 RX ADMIN — AZITHROMYCIN 255 MILLIGRAM(S): 500 TABLET, FILM COATED ORAL at 07:04

## 2023-09-25 RX ADMIN — MIDAZOLAM HYDROCHLORIDE 4 MILLIGRAM(S): 1 INJECTION, SOLUTION INTRAMUSCULAR; INTRAVENOUS at 15:32

## 2023-09-25 RX ADMIN — LEVETIRACETAM 400 MILLIGRAM(S): 250 TABLET, FILM COATED ORAL at 05:43

## 2023-09-25 RX ADMIN — Medication 100 MILLIEQUIVALENT(S): at 16:44

## 2023-09-25 RX ADMIN — SODIUM CHLORIDE 2000 MILLILITER(S): 9 INJECTION, SOLUTION INTRAVENOUS at 05:00

## 2023-09-25 RX ADMIN — PROPOFOL 6.53 MICROGRAM(S)/KG/MIN: 10 INJECTION, EMULSION INTRAVENOUS at 05:17

## 2023-09-25 RX ADMIN — Medication 975 MILLIGRAM(S): at 05:53

## 2023-09-25 RX ADMIN — Medication 1 PACKET(S): at 12:49

## 2023-09-25 RX ADMIN — DEXMEDETOMIDINE HYDROCHLORIDE IN 0.9% SODIUM CHLORIDE 7.41 MICROGRAM(S)/KG/HR: 4 INJECTION INTRAVENOUS at 16:19

## 2023-09-25 RX ADMIN — Medication 1 PACKET(S): at 15:28

## 2023-09-25 RX ADMIN — Medication 250 MILLIGRAM(S): at 10:01

## 2023-09-25 RX ADMIN — Medication 25 GRAM(S): at 21:21

## 2023-09-25 RX ADMIN — SENNA PLUS 1 TABLET(S): 8.6 TABLET ORAL at 21:23

## 2023-09-25 RX ADMIN — Medication 100 MILLIEQUIVALENT(S): at 15:31

## 2023-09-25 RX ADMIN — Medication 100 MILLIEQUIVALENT(S): at 17:46

## 2023-09-25 RX ADMIN — Medication 25 GRAM(S): at 10:50

## 2023-09-25 RX ADMIN — POTASSIUM PHOSPHATE, MONOBASIC POTASSIUM PHOSPHATE, DIBASIC 62.5 MILLIMOLE(S): 236; 224 INJECTION, SOLUTION INTRAVENOUS at 21:21

## 2023-09-25 RX ADMIN — ETOMIDATE 40 MILLIGRAM(S): 2 INJECTION INTRAVENOUS at 05:03

## 2023-09-25 RX ADMIN — MIDAZOLAM HYDROCHLORIDE 2 MILLIGRAM(S): 1 INJECTION, SOLUTION INTRAMUSCULAR; INTRAVENOUS at 12:42

## 2023-09-25 RX ADMIN — MIDAZOLAM HYDROCHLORIDE 1.45 MG/KG/HR: 1 INJECTION, SOLUTION INTRAMUSCULAR; INTRAVENOUS at 09:02

## 2023-09-25 RX ADMIN — MIDAZOLAM HYDROCHLORIDE 10 MILLIGRAM(S): 1 INJECTION, SOLUTION INTRAMUSCULAR; INTRAVENOUS at 06:53

## 2023-09-25 RX ADMIN — Medication 105 MILLIGRAM(S): at 06:26

## 2023-09-25 RX ADMIN — CHLORHEXIDINE GLUCONATE 1 APPLICATION(S): 213 SOLUTION TOPICAL at 09:05

## 2023-09-25 RX ADMIN — PROPOFOL 6.53 MICROGRAM(S)/KG/MIN: 10 INJECTION, EMULSION INTRAVENOUS at 09:03

## 2023-09-25 RX ADMIN — CHLORHEXIDINE GLUCONATE 15 MILLILITER(S): 213 SOLUTION TOPICAL at 09:55

## 2023-09-25 RX ADMIN — Medication 260 MILLIGRAM(S): at 05:17

## 2023-09-25 RX ADMIN — Medication 1 PACKET(S): at 21:21

## 2023-09-25 RX ADMIN — ENOXAPARIN SODIUM 40 MILLIGRAM(S): 100 INJECTION SUBCUTANEOUS at 17:48

## 2023-09-25 RX ADMIN — Medication 1 PACKET(S): at 17:47

## 2023-09-25 RX ADMIN — DEXMEDETOMIDINE HYDROCHLORIDE IN 0.9% SODIUM CHLORIDE 7.41 MICROGRAM(S)/KG/HR: 4 INJECTION INTRAVENOUS at 23:18

## 2023-09-25 RX ADMIN — Medication 40 MILLIEQUIVALENT(S): at 22:03

## 2023-09-25 RX ADMIN — CHLORHEXIDINE GLUCONATE 15 MILLILITER(S): 213 SOLUTION TOPICAL at 17:48

## 2023-09-25 RX ADMIN — Medication 250 MILLIGRAM(S): at 21:21

## 2023-09-25 RX ADMIN — Medication 60 MILLIGRAM(S): at 05:04

## 2023-09-25 RX ADMIN — Medication 40 MILLIEQUIVALENT(S): at 10:01

## 2023-09-25 RX ADMIN — POLYETHYLENE GLYCOL 3350 17 GRAM(S): 17 POWDER, FOR SOLUTION ORAL at 16:04

## 2023-09-25 RX ADMIN — Medication 40 MILLIEQUIVALENT(S): at 21:21

## 2023-09-25 RX ADMIN — CEFTRIAXONE 100 MILLIGRAM(S): 500 INJECTION, POWDER, FOR SOLUTION INTRAMUSCULAR; INTRAVENOUS at 12:41

## 2023-09-25 RX ADMIN — SODIUM CHLORIDE 1000 MILLILITER(S): 9 INJECTION, SOLUTION INTRAVENOUS at 19:30

## 2023-09-25 RX ADMIN — CEFTRIAXONE 100 MILLIGRAM(S): 500 INJECTION, POWDER, FOR SOLUTION INTRAMUSCULAR; INTRAVENOUS at 05:46

## 2023-09-25 NOTE — ED ADULT NURSE REASSESSMENT NOTE - NS ED NURSE REASSESS COMMENT FT1
Patient received  on stretcher, intubated with 7.5' tube, 21cm @ R lip. Patient shivering (stops to noxious), sedation increased to goal RASS 0 - -1.  Patient withdrawing all four extremities to noxious, +DISHA. Antibiotics infusing. Awaiting ICU report.

## 2023-09-25 NOTE — ED PROCEDURE NOTE - NS ED PROC PERFORMED BY1 FT
Community Care Team documentation for patient in [facility]  Initial Follow Up       Patient was discharged to [facility], [facility]. Information included in this progress note has been provided to SNF. Hospital Admission and Diagnosis:  [facility] 5/24-5/30/20 Chest Pain     PCP : None    SNF Attending:  Elke Galeas MD    Spoke with SNF team .Reported: Patient left [facility] AMA on 6/3. Community Care Team will follow up weekly with [facility] until discharge. Medications were not reconciled and general patient assessment was not completed during this [facility] outreach.
dr dawn

## 2023-09-25 NOTE — ED ADULT NURSE NOTE - ED STAT RN HANDOFF DETAILS
Handoff report given to ALKA Duncan. RN made aware of pts current condition/test results/reason for admission to ICU. pt is intubated with 7.5 ETT 21 at the oral commissure. pt is on cardiac monitor. pt is vitally stable at this time. Handoff report given to ALKA Duncan. RN made aware of pts current condition/test results/reason for admission to ICU. pt is intubated with 7.5 ETT 21 at the oral commissure. pt is on cardiac monitor. pt is tachycardic to the 120's at this time, pt is otherwise vitally stable. ALKA Duncan at bedside. Handoff report given to ALKA Duncan. RN made aware of pts current condition/test results/reason for admission to ICU. pt is intubated with 7.5 ETT 21 at the oral commissure. pt is on cardiac monitor. pt is tachycardic to the 120's at this time, pt is otherwise vitally stable. ALKA Duncan at bedside. any issues endorsed to oncoming RN for followup.

## 2023-09-25 NOTE — ED ADULT NURSE NOTE - FINAL NURSING ELECTRONIC SIGNATURE
----- Message from Tayler Hernandez sent at 11/15/2019  9:21 AM CST -----  Contact: Wife   Type: RX Refill Request    Who Called:  Wife - Ellie     Have you contacted your pharmacy: no     Refill or New Rx: Refill     RX Name and Strength:nateglinide (STARLIX) 60 MG tablet    90day supply    Preferred Pharmacy with phone number:[facility] - [place], [place] - [address] AT [address] [facility] [phone] (Phone)  [phone] (Fax)        Local or Mail Order: Mail     Ordering Provider: Dr. Alfredo     Would the patient rather a call back or a response via [facility]?  Call     Best Call Back Number:[phone]         25-Sep-2023 07:15

## 2023-09-25 NOTE — CONSULT NOTE ADULT - SUBJECTIVE AND OBJECTIVE BOX
Patient is a 28y old  Female who presents with a chief complaint of Status Epilepticus (25 Sep 2023 09:20)      CC: status epilepticus    HPI:  27 yo f pmhx polysubstance abuse, ETOH abuse, seizure disorder on Keppra presented with seizures. Limited hx available but per ED staff, father endorsed she usually drinks daily but hasn't drank in 2 days.  Patient with 9 back to back seizures with EMS, given Versed 10mg IM and 5mg IV prior to seizures stopping.  In ED patient intubated for airway protection, patient persistent seizures, lactate >17.  Patient also noted to be febrile, LP performed in ED results pending. Patinet sedated on propofol and Versed gtt.  Patient still appeared to be seizing in ED, given additional Versed 10mg IVP. Admit to ICU.  (25 Sep 2023 07:29)     Patient is currently sedated, on Propofol, but was reportedly following commands earlier today.  On LEV 1500mg IV q112h    CSF:  WBC = 0, Prot = 39, Glu = 93    Head CT: reduced volume in frontal region, ventriculomegaly, absent septum pellucidum    PAST MEDICAL & SURGICAL HISTORY:  Leg pain    No significant past surgical history    FAMILY HISTORY:  unremarkable    Social Hx:  Nonsmoker, no drug or alcohol use    MEDICATIONS  (STANDING):  acetaminophen   IVPB .. 1000 milliGRAM(s) IV Intermittent once  acyclovir IVPB 600 milliGRAM(s) IV Intermittent every 8 hours  cefTRIAXone   IVPB      chlorhexidine 0.12% Liquid 15 milliLiter(s) Oral Mucosa every 12 hours  chlorhexidine 2% Cloths 1 Application(s) Topical <User Schedule>  enoxaparin Injectable 40 milliGRAM(s) SubCutaneous <User Schedule>  levETIRAcetam  IVPB 1500 milliGRAM(s) IV Intermittent every 12 hours  polyethylene glycol 3350 17 Gram(s) Oral daily  potassium chloride  10 mEq/100 mL IVPB 10 milliEquivalent(s) IV Intermittent every 1 hour  potassium phosphate / sodium phosphate Powder (PHOS-NaK) 1 Packet(s) Oral once  propofol Infusion 15 MICROgram(s)/kG/Min (6.53 mL/Hr) IV Continuous <Continuous>  senna 1 Tablet(s) Oral at bedtime  vancomycin  IVPB 1000 milliGRAM(s) IV Intermittent every 12 hours       Allergies  No Known Allergies    ROS: Pertinent positives in HPI, all other ROS were reviewed and are negative.      Vital Signs Last 24 Hrs  T(C): 38 (25 Sep 2023 14:00), Max: 38.7 (25 Sep 2023 04:55)  T(F): 100.4 (25 Sep 2023 14:00), Max: 101.7 (25 Sep 2023 04:55)  HR: 107 (25 Sep 2023 14:00) (95 - 164)  BP: 126/94 (25 Sep 2023 14:00) (113/85 - 176/124)  BP(mean): 105 (25 Sep 2023 14:00) (89 - 155)  RR: 18 (25 Sep 2023 14:00) (17 - 38)  SpO2: 100% (25 Sep 2023 14:00) (95% - 100%)    Parameters below as of 25 Sep 2023 08:36  Patient On (Oxygen Delivery Method): ventilator, 18/450/40%/+5      Neurological exam:  Confounded by sedation, no spontaneous movements, no nystagmus  Will re-examine off sedation    Labs:   09-25    135  |  100  |  5<L>  ----------------------------<  82  2.7<LL>   |  25  |  0.72    Ca    8.6      25 Sep 2023 13:55  Phos  1.2     09-25  Mg     2.2     09-25    TPro  8.5<H>  /  Alb  3.1<L>  /  TBili  1.8<H>  /  DBili  x   /  AST  72<H>  /  ALT  24  /  AlkPhos  75  09-25                              8.6    10.15 )-----------( 112      ( 25 Sep 2023 13:55 )             26.8       A: 27 yo woman admitted in status epilepticus, possibly induced by ETOH withdrawal.  Exam limited by sedation, CSF studies normal.    Recommend:  1. Keppra 1500mg IV q12h  2. EEG  3. Wean to extubate  4. Seizure precautions  5. ETOH withdrawal protocol    Bhanu Nation MD  Neurology Attending Physician

## 2023-09-25 NOTE — ED PROVIDER NOTE - PHYSICAL EXAMINATION
gen - unresponsive  cv - very tachycardic  resp - tachypneic   gi - soft, nd  eyes - pinpoint pupils  skin - intact, no rash  hent - tongue lac  neuro - not responsive, has gag reflex  msk - no signs of fracture

## 2023-09-25 NOTE — ED PROVIDER NOTE - CLINICAL SUMMARY MEDICAL DECISION MAKING FREE TEXT BOX
status epilepticus. 9 seizures pre hospital. started seizing again in the ed, biting tongue and bleeding from mouth. intubated for airway protection. given fever and recurrent seizures, will start prop and versed drip. this may be etoh withdrawal, which that will help with. could be cns infection, will need to lp. empiric ceftriaxone.   I read ekg as sinus tach rate 158, no st elevation or depression, qtc 515, narrow qrs, normal axis. status epilepticus. 9 seizures pre hospital. started seizing again in the ed, biting tongue and bleeding from mouth. intubated for airway protection. given fever and recurrent seizures, will start prop and versed drip. this may be etoh withdrawal, which that will help with. could be cns infection, will need to lp. empiric ceftriaxone.   I read ekg as sinus tach rate 158, no st elevation or depression, qtc 515, narrow qrs, normal axis.  case discussed with father moncho jordankulwinder [phone] or

## 2023-09-25 NOTE — CONSULT NOTE ADULT - SUBJECTIVE AND OBJECTIVE BOX
HPI:  29 yo f pmhx polysubstance abuse, ETOH abuse, seizure disorder on Keppra presented with seizures. Limited hx available but per ED staff, father endorsed she usually drinks daily but hasn't drank in 2 days.  Patient with 9 back to back seizures with EMS, given Versed 10mg IM and 5mg IV prior to seizures stopping.  In ED patient intubated for airway protection, patient persistent seizures, lactate >17.  Patient also noted to be febrile, LP performed in ED results pending. Patinet sedated on propofol and Versed gtt.  Patient still appeared to be seizing in ED, given additional Versed 10mg IVP. Admit to ICU.  (25 Sep 2023 07:29)      PAST MEDICAL & SURGICAL HISTORY:  Leg pain      No significant past surgical history          Allergies    No Known Allergies    Intolerances        ANTIMICROBIALS:  cefTRIAXone   IVPB 1000 every 24 hours  vancomycin  IVPB 1000 every 12 hours      OTHER MEDS:  acetaminophen   IVPB .. 1000 milliGRAM(s) IV Intermittent once  chlorhexidine 0.12% Liquid 15 milliLiter(s) Oral Mucosa every 12 hours  chlorhexidine 2% Cloths 1 Application(s) Topical <User Schedule>  levETIRAcetam  IVPB 1500 milliGRAM(s) IV Intermittent every 12 hours  midazolam Infusion 0.02 mG/kG/Hr IV Continuous <Continuous>  propofol Infusion 15 MICROgram(s)/kG/Min IV Continuous <Continuous>      SOCIAL HISTORY:    Marital Status:    Occupation:   Lives with:     Substance Use (street drugs):   Tobacco Usage:    Alcohol Usage: Social EtOH    FAMILY HISTORY:      ROS:  Unobtainable because:   All other systems negative     Constitutional: no fever, no chills, no weight loss, no night sweats  Eye: no eye pain, no redness, no vision changes  ENT:  no sore throat, no rhinorrhea  Cardiovascular:  no chest pain, no palpitation  Respiratory:  no SOB, no cough  GI:  no abd pain, no vomiting, no diarrhea  urinary: no dysuria, no hematuria, no flank pain  : no  discharge or bleeding  musculoskeletal:  no joint pain, no joint swelling  skin:  no rash  neurology:  no headache, no seizure, no change in mental status  psych: no anxiety, no depression     Physical Exam:    General:    NAD, non toxic  Head: atraumatic, normocephalic  Eyes: normal sclera and conjunctiva  ENT:   no oropharyngeal lesions, no LAD, neck supple  Cardio:    regular S1,S2, no murmur  Respiratory:   clear to auscultation b/l, no wheezing  abd:   soft, BS +, not tender, no hepatosplenomegaly  :     no CVAT, no suprapubic tenderness, no johnson  Musculoskeletal : no joint swelling, no edema  Skin:    no rash  vascular:  normal pulses  Neurologic:     no focal deficits  psych: normal affect, no suicidal ideation      Drug Dosing Weight  Height (cm): 160 (25 Sep 2023 04:43)  Weight (kg): 59.3 (25 Sep 2023 08:36)  BMI (kg/m2): 23.2 (25 Sep 2023 08:36)  BSA (m2): 1.61 (25 Sep 2023 08:36)    Vital Signs Last 24 Hrs  T(F): 100.6 (23 @ 09:00), Max: 101.7 (23 @ 04:55)    Vital Signs Last 24 Hrs  HR: 105 (23 @ 09:00) (105 - 164)  BP: 116/88 (23 @ 09:00) (116/88 - 176/124)  RR: 18 (23 @ 09:00)  SpO2: 100% (23 @ 09:00) (95% - 100%)  Wt(kg): --                          9.5    10.65 )-----------( 162      ( 25 Sep 2023 04:55 )             31.8           135  |  95<L>  |  10  ----------------------------<  227<H>  3.5   |  13<L>  |  1.32<H>    Ca    9.2      25 Sep 2023 04:55  Phos  5.9       Mg     1.4         TPro  9.7<H>  /  Alb  3.7  /  TBili  1.2  /  DBili  x   /  AST  75<H>  /  ALT  28  /  AlkPhos  101        Urinalysis Basic - ( 25 Sep 2023 05:25 )    Color: Yellow / Appearance: Clear / S.010 / pH: x  Gluc: x / Ketone: Trace  / Bili: Negative / Urobili: Negative mg/dL   Blood: x / Protein: 30 mg/dL / Nitrite: Negative   Leuk Esterase: Negative / RBC: 6-10 /HPF / WBC 0-2   Sq Epi: x / Non Sq Epi: x / Bacteria: Few        MICROBIOLOGY:  v      Rapid RVP Result: [employer] ( @ 04:55)          RADIOLOGY:       HPI:  History obtaind from the medical chart and ICU team and NP as she is intubated and  sedated.    Patient is a 28 year old female with  pmh of polysubstance abuse, ETOH abuse, seizure disorder on Keppra who  presented with seizures.  as per med records Patient with 9 back to back seizures with EMS, given Versed 10mg IM and 5mg IV prior to seizures stopping.  In ED patient intubated for airway protection, patient persistent seizures, lactate >17.  Patient also noted to be febrile, LP performed in ED results pending. Patient sedated on propofol and Versed gtt.    patient seen In ICU , ICU team at bedside as well.  she is on the vent, does open her eyes .  as per ICU team she did receive a dose of ceftriaxone and LP was sent for csf cell count, culture, Viral  panel .  csf prelim no wbc, 2 rbc and normal protein and glucose level  so far.  + fever      PAST MEDICAL & SURGICAL HISTORY:    seizure disorder     Allergies    No Known drug  Allergies      ANTIMICROBIALS:    cefTRIAXone   IVPB 1000 every 24 hours  vancomycin  IVPB 1000 every 12 hours      OTHER MEDS:  acetaminophen   IVPB .. 1000 milliGRAM(s) IV Intermittent once  chlorhexidine 0.12% Liquid 15 milliLiter(s) Oral Mucosa every 12 hours  chlorhexidine 2% Cloths 1 Application(s) Topical <User Schedule>  levETIRAcetam  IVPB 1500 milliGRAM(s) IV Intermittent every 12 hours  midazolam Infusion 0.02 mG/kG/Hr IV Continuous <Continuous>  propofol Infusion 15 MICROgram(s)/kG/Min IV Continuous <Continuous>      SOCIAL HISTORY:      Substance Use (street drugs):     Alcohol Usage: daily ETOH use as per med records      ROS:  Unobtainable because: on the vent, sedated     Physical Exam:    General:    on the vent  Head: atraumatic, normocephalic  Eyes: normal sclera   ENT:   ET and OGT present  Cardio:    tachycardia S1,S2, no murmur  Respiratory:   on the vent, decreased breath sounds at bases  abd:   soft, BS +, not tender, no distention  Musculoskeletal : no joint swelling, no edema  Skin:    no rash  vascular:  normal pulses  Neurologic:     sedated and does not follow commands      Drug Dosing Weight  Height (cm): 160 (25 Sep 2023 04:43)  Weight (kg): 59.3 (25 Sep 2023 08:36)  BMI (kg/m2): 23.2 (25 Sep 2023 08:36)  BSA (m2): 1.61 (25 Sep 2023 08:36)    Vital Signs Last 24 Hrs  T(F): 100.6 (23 @ 09:00), Max: 101.7 (23 @ 04:55)    Vital Signs Last 24 Hrs  HR: 105 (23 @ 09:00) (105 - 164)  BP: 116/88 (23 @ 09:00) (116/88 - 176/124)  RR: 18 (23 @ 09:00)  SpO2: 100% (23 @ 09:00) (95% - 100%)  Wt(kg): --                          9.5    10.65 )-----------( 162      ( 25 Sep 2023 04:55 )             31.8           135  |  95<L>  |  10  ----------------------------<  227<H>  3.5   |  13<L>  |  1.32<H>    Ca    9.2      25 Sep 2023 04:55  Phos  5.9       Mg     1.4         TPro  9.7<H>  /  Alb  3.7  /  TBili  1.2  /  DBili  x   /  AST  75<H>  /  ALT  28  /  AlkPhos  101        Urinalysis Basic - ( 25 Sep 2023 05:25 )    Color: Yellow / Appearance: Clear / S.010 / pH: x  Gluc: x / Ketone: Trace  / Bili: Negative / Urobili: Negative mg/dL   Blood: x / Protein: 30 mg/dL / Nitrite: Negative   Leuk Esterase: Negative / RBC: 6-10 /HPF / WBC 0-2   Sq Epi: x / Non Sq Epi: x / Bacteria: Few        MICROBIOLOGY:  v      Rapid RVP Result: NotDetec ( @ 04:55)    RADIOLOGY:    < from: Xray Chest 1 View-PORTABLE IMMEDIATE (23 @ 05:55) >    ACC: [id number] EXAM:  XR CHEST PORTABLE IMMED 1V   ORDERED BY: MAL HENRIQUEZ     PROCEDURE DATE:  2023          INTERPRETATION:  AP supine chest on 2023 at 5:19 AM.   Patient has sepsis and seizures.    COMPARISON: None available.    Mildly elevated left hemidiaphragm noted. Heart magnified by technique.    There is a left upper thoracic curve.    Endotracheal tube and nasogastric tube are in place. No visible fracture.    IMPRESSION: Tubes in place.    --- End of Report ---            SERGIO NICOLE MD; Attending Radiologist  This document has been electronically signed. Sep 25 2023  9:13AM    < end of copied text >    < from: CT Chest w/ IV Cont (23 @ 05:42) >  ACC: [id number] EXAM:  CT CHEST IC   ORDERED BY: MAL HENRIQUEZ     PROCEDURE DATE:  2023          INTERPRETATION:  CLINICAL INFORMATION: Aspiration pneumonia    COMPARISON: There are no prior studies available for comparison.    TECHNIQUE: A volumetric CT acquisition of the chest was obtained from the   thoracic inlet to the upper abdomen, after the administration of   intravenous contrast. MIP images were also obtained. 95 cc Omnipaque 350   administered and 5 cc discarded.    FINDINGS:    CHEST:  LINES AND TUBES: Endotracheal tube with its distal tip above the level of   the vandana. Enteric tube with its distal tip in the stomach.  LUNGS AND LARGE AIRWAYS: Patent central airways.  Patchy airspace   consolidations in the right upper lobe and left lower lobe which may be   infectious in etiology. Subsegmental atelectasis at the right lung base.  PLEURA: No pleural effusion.  VESSELS: Within normal limits.  HEART: Heart size is normal. No pericardial effusion.  MEDIASTINUM AND GEORGIA: No lymphadenopathy.  CHEST WALL AND LOWER NECK: Within normal limits.  VISUALIZED UPPER ABDOMEN: Hepatic steatosis.  BONES: Within normal limits.    IMPRESSION: Patchy airspace consolidations in the right upper lobe and   left lower lobe which may be infectious in etiology.    --- End of Report ---      < end of copied text >     HPI:  History obtained from the medical chart and ICU team and NP as she is intubated and  sedated.    Patient is a 28 year old female with  pmh of polysubstance abuse, ETOH abuse, seizure disorder on Keppra who  presented with seizures.  as per med records Patient with 9 back to back seizures with EMS, given Versed 10mg IM and 5mg IV prior to seizures stopping.  In ED patient intubated for airway protection, patient persistent seizures, lactate >17.  Patient also noted to be febrile, LP performed in ED results pending. Patient sedated on propofol and Versed gtt.    patient seen In ICU , ICU team at bedside as well.  she is on the vent, does open her eyes .  as per ICU team she did receive a dose of ceftriaxone and LP was sent for csf cell count, culture, Viral  panel .  csf prelim no wbc, 2 rbc and normal protein and elevated  glucose level  so far.  + fever      PAST MEDICAL & SURGICAL HISTORY:    seizure disorder     Allergies    No Known drug  Allergies      ANTIMICROBIALS:    cefTRIAXone   IVPB 1000 every 24 hours  vancomycin  IVPB 1000 every 12 hours      OTHER MEDS:  acetaminophen   IVPB .. 1000 milliGRAM(s) IV Intermittent once  chlorhexidine 0.12% Liquid 15 milliLiter(s) Oral Mucosa every 12 hours  chlorhexidine 2% Cloths 1 Application(s) Topical <User Schedule>  levETIRAcetam  IVPB 1500 milliGRAM(s) IV Intermittent every 12 hours  midazolam Infusion 0.02 mG/kG/Hr IV Continuous <Continuous>  propofol Infusion 15 MICROgram(s)/kG/Min IV Continuous <Continuous>      SOCIAL HISTORY:    Alcohol Usage: daily ETOH use as per med records      ROS:  Unobtainable because: on the vent, sedated     Physical Exam:    General:    on the vent  Head: atraumatic, normocephalic  Eyes: normal sclera   ENT:   ET and OGT present  Cardio:    tachycardia S1,S2, no murmur  Respiratory:   on the vent, decreased breath sounds at bases  abd:   soft, BS +, not tender, no distention  Musculoskeletal : no joint swelling, no edema  Skin:    no rash  vascular:  normal pulses  Neurologic:     sedated and does not follow commands      Drug Dosing Weight  Height (cm): 160 (25 Sep 2023 04:43)  Weight (kg): 59.3 (25 Sep 2023 08:36)  BMI (kg/m2): 23.2 (25 Sep 2023 08:36)  BSA (m2): 1.61 (25 Sep 2023 08:36)    Vital Signs Last 24 Hrs  T(F): 100.6 (23 @ 09:00), Max: 101.7 (23 @ 04:55)    Vital Signs Last 24 Hrs  HR: 105 (23 @ 09:00) (105 - 164)  BP: 116/88 (23 @ 09:00) (116/88 - 176/124)  RR: 18 (23 @ 09:00)  SpO2: 100% (23 @ 09:00) (95% - 100%)  Wt(kg): --                          9.5    10.65 )-----------( 162      ( 25 Sep 2023 04:55 )             31.8           135  |  95<L>  |  10  ----------------------------<  227<H>  3.5   |  13<L>  |  1.32<H>    Ca    9.2      25 Sep 2023 04:55  Phos  5.9       Mg     1.4         TPro  9.7<H>  /  Alb  3.7  /  TBili  1.2  /  DBili  x   /  AST  75<H>  /  ALT  28  /  AlkPhos  101        Urinalysis Basic - ( 25 Sep 2023 05:25 )    Color: Yellow / Appearance: Clear / S.010 / pH: x  Gluc: x / Ketone: Trace  / Bili: Negative / Urobili: Negative mg/dL   Blood: x / Protein: 30 mg/dL / Nitrite: Negative   Leuk Esterase: Negative / RBC: 6-10 /HPF / WBC 0-2   Sq Epi: x / Non Sq Epi: x / Bacteria: Few        MICROBIOLOGY:  v      Rapid RVP Result: NotDetec ( @ 04:55)    RADIOLOGY:    < from: Xray Chest 1 View-PORTABLE IMMEDIATE (23 @ 05:55) >    ACC: [id number] EXAM:  XR CHEST PORTABLE IMMED 1V   ORDERED BY: MAL HENRIQUEZ     PROCEDURE DATE:  2023          INTERPRETATION:  AP supine chest on 2023 at 5:19 AM.   Patient has sepsis and seizures.    COMPARISON: None available.    Mildly elevated left hemidiaphragm noted. Heart magnified by technique.    There is a left upper thoracic curve.    Endotracheal tube and nasogastric tube are in place. No visible fracture.    IMPRESSION: Tubes in place.    --- End of Report ---            SERGIO NICOLE MD; Attending Radiologist  This document has been electronically signed. Sep 25 2023  9:13AM    < end of copied text >    < from: CT Chest w/ IV Cont (23 @ 05:42) >  ACC: [id number] EXAM:  CT CHEST IC   ORDERED BY: MAL HENRIQUEZ     PROCEDURE DATE:  2023          INTERPRETATION:  CLINICAL INFORMATION: Aspiration pneumonia    COMPARISON: There are no prior studies available for comparison.    TECHNIQUE: A volumetric CT acquisition of the chest was obtained from the   thoracic inlet to the upper abdomen, after the administration of   intravenous contrast. MIP images were also obtained. 95 cc Omnipaque 350   administered and 5 cc discarded.    FINDINGS:    CHEST:  LINES AND TUBES: Endotracheal tube with its distal tip above the level of   the vandana. Enteric tube with its distal tip in the stomach.  LUNGS AND LARGE AIRWAYS: Patent central airways.  Patchy airspace   consolidations in the right upper lobe and left lower lobe which may be   infectious in etiology. Subsegmental atelectasis at the right lung base.  PLEURA: No pleural effusion.  VESSELS: Within normal limits.  HEART: Heart size is normal. No pericardial effusion.  MEDIASTINUM AND GEORGIA: No lymphadenopathy.  CHEST WALL AND LOWER NECK: Within normal limits.  VISUALIZED UPPER ABDOMEN: Hepatic steatosis.  BONES: Within normal limits.    IMPRESSION: Patchy airspace consolidations in the right upper lobe and   left lower lobe which may be infectious in etiology.    --- End of Report ---      < end of copied text >

## 2023-09-25 NOTE — H&P ADULT - HISTORY OF PRESENT ILLNESS
27 yo f pmhx polysubstance abuse, ETOH abuse, seizure disorder on Keppra presented with seizures. Limited hx available but per ED staff, father endorsed she usually drinks daily but hasn't drank in 2 days.  Patient with 9 back to back seizures with EMS, given Versed 10mg IM and 5mg IV prior to seizures stopping.  In ED patient intubated for airway protection, patient persistent seizures, lactate >17.  Patient also noted to be febrile, LP performed in ED results pending. Patinet sedated on propofol and Versed gtt.  Patient still appeared to be seizing in ED, given additional Versed 10mg IVP. Admit to ICU.

## 2023-09-25 NOTE — ED ADULT NURSE NOTE - NS ED PATIENT SAFETY CONCERN
"SUBJECTIVE:  Constantino Tony is a 5 y.o. male here accompanied by mother for Other (Review [place] Forms/Failed Vision and Color Test/ADHD Eval/), Headache, and Dizziness    Failed vision screen at school. He has confused colors at home in the past. Possible color blindness    Concerns about ADHD for about 2 years  Became more apparent when he went to    Disruptive in class and at home  Talks non-stop  Requires a lot of redirection  There is a lot of defiance too     Grades are very good, doing well in school     Has seen SW/counseling at school. They have been considering testing for [email or web address] program.     Has trouble with social relationships at school, boundaries.     Has a history of very mild pulmonic stenosis, cleared from cardiology perspective at last office visit.       Marios allergies, medications, history, and problem list were updated as appropriate.    Review of Systems   A comprehensive review of symptoms was completed and negative except as noted above.    OBJECTIVE:  Vital signs  Vitals:    05/19/22 1506   BP: (!) 121/66   BP Location: Left arm   Pulse: 87   Weight: 26.4 kg (58 lb 3.2 oz)   Height: 4' 1.33" (1.253 m)        Physical Exam  Vitals and nursing note reviewed. Exam conducted with a chaperone present.   Constitutional:       General: He is active. He is not in acute distress.     Appearance: Normal appearance. He is not toxic-appearing.      Comments: Very active, running around room   HENT:      Head: Normocephalic and atraumatic.      Right Ear: Tympanic membrane, ear canal and external ear normal.      Left Ear: Tympanic membrane, ear canal and external ear normal.      Nose: Nose normal. No congestion or rhinorrhea.      Mouth/Throat:      Mouth: Mucous membranes are moist.      Pharynx: Oropharynx is clear. No oropharyngeal exudate or posterior oropharyngeal erythema.   Eyes:      General:         Right eye: No discharge.         Left eye: No discharge.      " [facility] SURGERY SCHEDULING FORM   [address] [place] [address] [facility]   [phone] (scheduling phone) [phone] (scheduling fax)     PATIENT INFORMATION   Patient Name:    Maribel South   :    1943   G Completed by:    Rozina Snowden      Date:    9/1/2017    [facility] will follow its Pre-Admission Assessment and Screening Policy for pre-admission testing.   Physicians that require   additional testing must order this directly and have results faxed to [facility] at Conjunctiva/sclera: Conjunctivae normal.   Cardiovascular:      Rate and Rhythm: Normal rate and regular rhythm.      Heart sounds: Normal heart sounds. No murmur heard.  Pulmonary:      Effort: Pulmonary effort is normal. No respiratory distress or retractions.      Breath sounds: Normal breath sounds. No decreased air movement. No wheezing.   Abdominal:      Palpations: There is no hepatomegaly or splenomegaly.   Musculoskeletal:         General: No swelling.      Cervical back: Normal range of motion and neck supple. No muscular tenderness.   Skin:     General: Skin is warm and dry.      Capillary Refill: Capillary refill takes less than 2 seconds.      Findings: No rash.   Neurological:      General: No focal deficit present.      Mental Status: He is alert and oriented for age.   Psychiatric:         Behavior: Behavior normal.          ASSESSMENT/PLAN:  Constantino was seen today for other, headache and dizziness.    Diagnoses and all orders for this visit:    Attention deficit hyperactivity disorder (ADHD), combined type  -     dextroamphetamine-amphetamine (ADDERALL XR) 5 MG 24 hr capsule; Take 1 capsule (5 mg total) by mouth once daily.    Failed vision screen  -     Visual acuity screening    Color blindness  -     POC COLOR VISION SCREEN         Recheck in 1 month   Discussed side effects of adderall, ensuring adequate meals, sleep, etc  Will see optometry   Passed vision screen but failed color screen today       No results found for this or any previous visit (from the past 24 hour(s)).    Follow Up:  No follow-ups on file.         Unable to assess due to medical condition

## 2023-09-25 NOTE — PATIENT PROFILE ADULT - FALL HARM RISK - RISK INTERVENTIONS

## 2023-09-25 NOTE — H&P ADULT - NSHPPHYSICALEXAM_GEN_ALL_CORE
GENERAL: adult female, lying in bed, nad  HEENT: nc/at, pupils 3-4mm equal round and reactive, no nystagmus or gaze preference appreciated  CV: sinus tach  RESP: course b/l  ABD: soft, nontender, nondistended, +bs  : elizabeth  MSK: appropriate for age  EXT: no edema  SKIN: warm  NEURO: appears to be seizing, limbs rigid, right arm appears decorticate, left arm decerebate, feet rotated medially

## 2023-09-25 NOTE — ED ADULT NURSE NOTE - OBJECTIVE STATEMENT
28y female BIBEMS for seizures. as per EMS, pt had 9 seizures back to back prior to arrival in the ED. pt received 10mg versed IM and then 5mg IV before the seizures stopped. unclear what preceded these events at this time. EMS states pts family was unable to provide collateral. pt unable to maintain airway, pt intubated by Dr. Dubose with a 7.5 ETT, tube is 21 at the oral commissure.   pt placed on cardiac monitor/EKG completed. pt has pmh of poly substance abuse and seizures on keppra 28y female BIBEMS for seizures. as per EMS, pt had 9 seizures back to back prior to arrival in the ED. pt received 10mg versed IM and then 5mg IV before the seizures stopped. unclear what preceded these events at this time. EMS states pts family was unable to provide collateral, no family at bedside at this time. pt unable to maintain airway, pt intubated by Dr. Dubose with a 7.5 ETT, tube is 21 at the oral commissure.   pt placed on cardiac monitor/EKG completed. pt has pmh of poly substance abuse and seizures on keppra 28y female BIBEMS for seizures. pt arrived into the ED unresponsive. as per EMS, pt had 9 seizures back to back prior to arrival in the ED. pt received 10mg versed IM and then 5mg IV before the seizures stopped. unclear what preceded these events at this time. EMS states pts family was unable to provide collateral, no family at bedside at this time. pt unable to maintain airway, pt intubated by Dr. Dubose with a 7.5 ETT, tube is 21 at the oral commissure.   pt placed on cardiac monitor/EKG completed. pt has pmh of poly substance abuse and seizures on keppra

## 2023-09-25 NOTE — ED ADULT TRIAGE NOTE - CHIEF COMPLAINT QUOTE
had a total 9 sz , hx sz on Keppra  got a total 15mg of versed , 10mg IM and 5 mg iv , was found on bed fs 271 pt has HX ETOH pt unknown if she is complains with meds.

## 2023-09-25 NOTE — ED PROCEDURE NOTE - CPROC ED TIME OUT STATEMENT1
“Patient's name, , procedure and correct site were confirmed during the [place] Timeout.”
“Patient's name, , procedure and correct site were confirmed during the [place] Timeout.”
“Patient's name, , procedure and correct site were confirmed during the [place] Timeout.”

## 2023-09-25 NOTE — ED PROVIDER NOTE - OBJECTIVE STATEMENT
28F who has a reported hx of poly sub abuse and seizures on keppra pw seizures. per ems, pt had 9 seizures back to back. given 10mg versed IM and then 5mg IV before stopped seizing. unclear what preceded this event. per ems, family unable to provide more history. 28F who has a reported hx of poly sub abuse including etoh use disorder and seizures on keppra pw seizures. per ems, pt had 9 seizures back to back. given 10mg versed IM and then 5mg IV before stopped seizing. unclear what preceded this event. per ems, family unable to provide more history.  called father who says pt told him that she hasn't drunk alcohol in 2 days. normally drinks daily.

## 2023-09-25 NOTE — ED PROCEDURE NOTE - NS ED PROCEUDURE1 POST INTUBATION REVIEW
Appropriate capnography/Breath sounds bilateral/Positive end tidal Co2 noted/Chest X-Ray No, the patient is not being discharged from [facility]

## 2023-09-25 NOTE — ED ADULT NURSE REASSESSMENT NOTE - NS ED NURSE REASSESS COMMENT FT1
pt transported to CT on remote telemetry monitor at 05:18am, pt returned to bedside at 05:48am and reconnected to main monitor.

## 2023-09-25 NOTE — CONSULT NOTE ADULT - ASSESSMENT
A/P-  28 year old female s/p multiple seixure epsiodes admitted to ICU and intubated for airway protection.  febrile  minimal leukocytosis  LP - not impressive with no wbc , elevtaed glucose and normal protein  awaist gram stain and cx and viral panel  covid-19- negative  ct chets - possible aspiration pneumonia/pneumonitis  fevers could be secondary to seizures themselves vs aspiration pneumonia    plan-  await csf gram stain and cx and viral panel.  since antibiotics were already started for this no objection to continue ceftriaxone and vanco and acyclovir and if csf cx is negative can d/c antibiotics for this adn if CSF HSV pcr is negative can then d/c acyclovir as well.  keep vanco trough <15.  also check blood and urine cx and sputum cx as part of the fever work up along with urine legionella AG.    vent and seizure management as per ICU team.    All labs and imaging and chart notes reviewed.     All above discussed with patient and patient verbalizes full understanding of all above and agrees with above plan of care.    Thank you for this consultation.    Jayesh Delgado MD  Infectious Disease Attending    for any questions please do not hesitate to contact me either via teams or by calling [phone]    Critical care time spent 50 minutes.

## 2023-09-25 NOTE — ED ADULT NURSE NOTE - NSFALLHARMRISKINTERV_ED_ALL_ED

## 2023-09-25 NOTE — ED ADULT NURSE REASSESSMENT NOTE - NS ED NURSE REASSESS COMMENT FT1
Patient report called to RN Beulah, patient to be transferred to ICU bed 8 with 2 RN and RT on vent and CM.

## 2023-09-25 NOTE — H&P ADULT - ASSESSMENT
29 yo f pmhx polysubstance abuse, ETOH abuse, seizure disorder on Keppra admitted with     1. Status Epilepticus   2. Aspiration pna  3. hypoxic resp fx  4. r/o meningitis    NEURO: sedated on propofol, versed gtt. stat EEG ordered.  keppra and phenobarb given in ED. Remains on Keppra  CV: close hd montioring  RESP: hypoxic respiratory failure, ac/vc 4-6 cc/kg tv lung protective strategies, actively titrating fio2/peep for spo2 >92%  RENAL: BREA, avoid nephrotoxic meds, renally dose meds, trend urine output, bun/cr and electrolytes.  replace as needed. johnson in place  GI: NPO, ogt in place  ENDO: poct q6hr  ID: vanco, ceftriaxone and acyclovir for ~meningitis coverage.    HEME: holding vte ppx   DISPO: Full code    Critical Care time: 45 mins assessing presenting problems of acute illness that poses high probability of life threatening deterioration or end organ damage/dysfunction.  Medical decision making including Initiating plan of care, reviewing data, reviewing radiology, discussing with multidisciplinary team, non inclusive of procedures, discussing goals of care with patient/family

## 2023-09-25 NOTE — ED PROVIDER NOTE - CARE PLAN
1 Principal Discharge DX:	Status epilepticus   Principal Discharge DX:	Status epilepticus  Secondary Diagnosis:	CAP (community acquired pneumonia)

## 2023-09-26 LAB
ALBUMIN SERPL ELPH-MCNC: 2.6 G/DL — LOW (ref 3.3–5)
ALP SERPL-CCNC: 64 U/L — SIGNIFICANT CHANGE UP (ref 40–120)
ALT FLD-CCNC: 19 U/L — SIGNIFICANT CHANGE UP (ref 12–78)
ANION GAP SERPL CALC-SCNC: 5 MMOL/L — SIGNIFICANT CHANGE UP (ref 5–17)
ANION GAP SERPL CALC-SCNC: 6 MMOL/L — SIGNIFICANT CHANGE UP (ref 5–17)
ANION GAP SERPL CALC-SCNC: 8 MMOL/L — SIGNIFICANT CHANGE UP (ref 5–17)
AST SERPL-CCNC: 49 U/L — HIGH (ref 15–37)
BASOPHILS # BLD AUTO: 0.04 K/UL — SIGNIFICANT CHANGE UP (ref 0–0.2)
BASOPHILS NFR BLD AUTO: 0.7 % — SIGNIFICANT CHANGE UP (ref 0–2)
BILIRUB SERPL-MCNC: 2.3 MG/DL — HIGH (ref 0.2–1.2)
BUN SERPL-MCNC: 3 MG/DL — LOW (ref 7–23)
BUN SERPL-MCNC: 4 MG/DL — LOW (ref 7–23)
BUN SERPL-MCNC: 4 MG/DL — LOW (ref 7–23)
CALCIUM SERPL-MCNC: 8.4 MG/DL — LOW (ref 8.5–10.1)
CALCIUM SERPL-MCNC: 8.4 MG/DL — LOW (ref 8.5–10.1)
CALCIUM SERPL-MCNC: 8.5 MG/DL — SIGNIFICANT CHANGE UP (ref 8.5–10.1)
CHLORIDE SERPL-SCNC: 105 MMOL/L — SIGNIFICANT CHANGE UP (ref 96–108)
CHLORIDE SERPL-SCNC: 107 MMOL/L — SIGNIFICANT CHANGE UP (ref 96–108)
CHLORIDE SERPL-SCNC: 110 MMOL/L — HIGH (ref 96–108)
CO2 SERPL-SCNC: 21 MMOL/L — LOW (ref 22–31)
CO2 SERPL-SCNC: 24 MMOL/L — SIGNIFICANT CHANGE UP (ref 22–31)
CO2 SERPL-SCNC: 24 MMOL/L — SIGNIFICANT CHANGE UP (ref 22–31)
CREAT SERPL-MCNC: 0.53 MG/DL — SIGNIFICANT CHANGE UP (ref 0.5–1.3)
CREAT SERPL-MCNC: 0.6 MG/DL — SIGNIFICANT CHANGE UP (ref 0.5–1.3)
CREAT SERPL-MCNC: 0.61 MG/DL — SIGNIFICANT CHANGE UP (ref 0.5–1.3)
CULTURE RESULTS: NO GROWTH — SIGNIFICANT CHANGE UP
EGFR: 123 ML/MIN/1.73M2 — SIGNIFICANT CHANGE UP
EGFR: 124 ML/MIN/1.73M2 — SIGNIFICANT CHANGE UP
EGFR: 128 ML/MIN/1.73M2 — SIGNIFICANT CHANGE UP
EOSINOPHIL # BLD AUTO: 0.05 K/UL — SIGNIFICANT CHANGE UP (ref 0–0.5)
EOSINOPHIL NFR BLD AUTO: 0.8 % — SIGNIFICANT CHANGE UP (ref 0–6)
GLUCOSE BLDC GLUCOMTR-MCNC: 121 MG/DL — HIGH (ref 70–99)
GLUCOSE BLDC GLUCOMTR-MCNC: 129 MG/DL — HIGH (ref 70–99)
GLUCOSE BLDC GLUCOMTR-MCNC: 184 MG/DL — HIGH (ref 70–99)
GLUCOSE BLDC GLUCOMTR-MCNC: 52 MG/DL — CRITICAL LOW (ref 70–99)
GLUCOSE BLDC GLUCOMTR-MCNC: 62 MG/DL — LOW (ref 70–99)
GLUCOSE SERPL-MCNC: 111 MG/DL — HIGH (ref 70–99)
GLUCOSE SERPL-MCNC: 139 MG/DL — HIGH (ref 70–99)
GLUCOSE SERPL-MCNC: 77 MG/DL — SIGNIFICANT CHANGE UP (ref 70–99)
HCT VFR BLD CALC: 27.1 % — LOW (ref 34.5–45)
HGB BLD-MCNC: 8.5 G/DL — LOW (ref 11.5–15.5)
IMM GRANULOCYTES NFR BLD AUTO: 0.7 % — SIGNIFICANT CHANGE UP (ref 0–0.9)
LEGIONELLA AG UR QL: NEGATIVE — SIGNIFICANT CHANGE UP
LYMPHOCYTES # BLD AUTO: 1.25 K/UL — SIGNIFICANT CHANGE UP (ref 1–3.3)
LYMPHOCYTES # BLD AUTO: 21.1 % — SIGNIFICANT CHANGE UP (ref 13–44)
MAGNESIUM SERPL-MCNC: 1.9 MG/DL — SIGNIFICANT CHANGE UP (ref 1.6–2.6)
MAGNESIUM SERPL-MCNC: 2 MG/DL — SIGNIFICANT CHANGE UP (ref 1.6–2.6)
MAGNESIUM SERPL-MCNC: 2.1 MG/DL — SIGNIFICANT CHANGE UP (ref 1.6–2.6)
MCHC RBC-ENTMCNC: 24.6 PG — LOW (ref 27–34)
MCHC RBC-ENTMCNC: 31.4 G/DL — LOW (ref 32–36)
MCV RBC AUTO: 78.3 FL — LOW (ref 80–100)
MONOCYTES # BLD AUTO: 0.5 K/UL — SIGNIFICANT CHANGE UP (ref 0–0.9)
MONOCYTES NFR BLD AUTO: 8.4 % — SIGNIFICANT CHANGE UP (ref 2–14)
MRSA PCR RESULT.: SIGNIFICANT CHANGE UP
NEUTROPHILS # BLD AUTO: 4.05 K/UL — SIGNIFICANT CHANGE UP (ref 1.8–7.4)
NEUTROPHILS NFR BLD AUTO: 68.3 % — SIGNIFICANT CHANGE UP (ref 43–77)
NRBC # BLD: 0 /100 WBCS — SIGNIFICANT CHANGE UP (ref 0–0)
PHOSPHATE SERPL-MCNC: 1.4 MG/DL — LOW (ref 2.5–4.5)
PHOSPHATE SERPL-MCNC: 1.6 MG/DL — LOW (ref 2.5–4.5)
PHOSPHATE SERPL-MCNC: 4.7 MG/DL — HIGH (ref 2.5–4.5)
PLATELET # BLD AUTO: 91 K/UL — LOW (ref 150–400)
POTASSIUM SERPL-MCNC: 2.7 MMOL/L — CRITICAL LOW (ref 3.5–5.3)
POTASSIUM SERPL-MCNC: 4 MMOL/L — SIGNIFICANT CHANGE UP (ref 3.5–5.3)
POTASSIUM SERPL-MCNC: 4.3 MMOL/L — SIGNIFICANT CHANGE UP (ref 3.5–5.3)
POTASSIUM SERPL-SCNC: 2.7 MMOL/L — CRITICAL LOW (ref 3.5–5.3)
POTASSIUM SERPL-SCNC: 4 MMOL/L — SIGNIFICANT CHANGE UP (ref 3.5–5.3)
POTASSIUM SERPL-SCNC: 4.3 MMOL/L — SIGNIFICANT CHANGE UP (ref 3.5–5.3)
PROT SERPL-MCNC: 7.4 GM/DL — SIGNIFICANT CHANGE UP (ref 6–8.3)
RBC # BLD: 3.46 M/UL — LOW (ref 3.8–5.2)
RBC # FLD: 22.8 % — HIGH (ref 10.3–14.5)
S AUREUS DNA NOSE QL NAA+PROBE: DETECTED
SODIUM SERPL-SCNC: 136 MMOL/L — SIGNIFICANT CHANGE UP (ref 135–145)
SODIUM SERPL-SCNC: 137 MMOL/L — SIGNIFICANT CHANGE UP (ref 135–145)
SODIUM SERPL-SCNC: 137 MMOL/L — SIGNIFICANT CHANGE UP (ref 135–145)
SPECIMEN SOURCE: SIGNIFICANT CHANGE UP
T4 AB SER-ACNC: 7.9 UG/DL — SIGNIFICANT CHANGE UP (ref 4.6–12)
TSH SERPL-MCNC: 4.14 UIU/ML — HIGH (ref 0.36–3.74)
VANCOMYCIN TROUGH SERPL-MCNC: 10.3 UG/ML — SIGNIFICANT CHANGE UP (ref 10–20)
WBC # BLD: 5.93 K/UL — SIGNIFICANT CHANGE UP (ref 3.8–10.5)
WBC # FLD AUTO: 5.93 K/UL — SIGNIFICANT CHANGE UP (ref 3.8–10.5)

## 2023-09-26 PROCEDURE — 71045 X-RAY EXAM CHEST 1 VIEW: CPT | Mod: 26

## 2023-09-26 PROCEDURE — 99291 CRITICAL CARE FIRST HOUR: CPT

## 2023-09-26 RX ORDER — SODIUM,POTASSIUM PHOSPHATES 278-250MG
1 POWDER IN PACKET (EA) ORAL
Refills: 0 | Status: COMPLETED | OUTPATIENT
Start: 2023-09-26 | End: 2023-09-26

## 2023-09-26 RX ORDER — DEXTROSE MONOHYDRATE, SODIUM CHLORIDE, AND POTASSIUM CHLORIDE 50; .745; 4.5 G/1000ML; G/1000ML; G/1000ML
1000 INJECTION, SOLUTION INTRAVENOUS
Refills: 0 | Status: DISCONTINUED | OUTPATIENT
Start: 2023-09-26 | End: 2023-09-27

## 2023-09-26 RX ORDER — POTASSIUM CHLORIDE 20 MEQ
40 PACKET (EA) ORAL
Refills: 0 | Status: COMPLETED | OUTPATIENT
Start: 2023-09-26 | End: 2023-09-26

## 2023-09-26 RX ORDER — PANTOPRAZOLE SODIUM 20 MG/1
40 TABLET, DELAYED RELEASE ORAL EVERY 12 HOURS
Refills: 0 | Status: DISCONTINUED | OUTPATIENT
Start: 2023-09-26 | End: 2023-10-04

## 2023-09-26 RX ORDER — SODIUM CHLORIDE 9 MG/ML
1000 INJECTION, SOLUTION INTRAVENOUS
Refills: 0 | Status: DISCONTINUED | OUTPATIENT
Start: 2023-09-26 | End: 2023-09-26

## 2023-09-26 RX ORDER — THIAMINE MONONITRATE (VIT B1) 100 MG
100 TABLET ORAL DAILY
Refills: 0 | Status: DISCONTINUED | OUTPATIENT
Start: 2023-09-26 | End: 2023-10-04

## 2023-09-26 RX ORDER — ONDANSETRON 8 MG/1
4 TABLET, FILM COATED ORAL EVERY 6 HOURS
Refills: 0 | Status: DISCONTINUED | OUTPATIENT
Start: 2023-09-26 | End: 2023-10-06

## 2023-09-26 RX ORDER — MAGNESIUM SULFATE 500 MG/ML
1 VIAL (ML) INJECTION ONCE
Refills: 0 | Status: COMPLETED | OUTPATIENT
Start: 2023-09-26 | End: 2023-09-26

## 2023-09-26 RX ORDER — FOLIC ACID 0.8 MG
1 TABLET ORAL DAILY
Refills: 0 | Status: DISCONTINUED | OUTPATIENT
Start: 2023-09-26 | End: 2023-10-04

## 2023-09-26 RX ORDER — POTASSIUM CHLORIDE 20 MEQ
10 PACKET (EA) ORAL
Refills: 0 | Status: COMPLETED | OUTPATIENT
Start: 2023-09-26 | End: 2023-09-26

## 2023-09-26 RX ORDER — DEXTROSE 50 % IN WATER 50 %
25 SYRINGE (ML) INTRAVENOUS ONCE
Refills: 0 | Status: COMPLETED | OUTPATIENT
Start: 2023-09-26 | End: 2023-09-26

## 2023-09-26 RX ADMIN — Medication 40 MILLIEQUIVALENT(S): at 05:52

## 2023-09-26 RX ADMIN — Medication 100 MILLIEQUIVALENT(S): at 07:00

## 2023-09-26 RX ADMIN — Medication 1 PACKET(S): at 05:52

## 2023-09-26 RX ADMIN — Medication 40 MILLIEQUIVALENT(S): at 04:19

## 2023-09-26 RX ADMIN — Medication 1 PACKET(S): at 08:40

## 2023-09-26 RX ADMIN — ENOXAPARIN SODIUM 40 MILLIGRAM(S): 100 INJECTION SUBCUTANEOUS at 17:37

## 2023-09-26 RX ADMIN — Medication 1 MILLIGRAM(S): at 12:43

## 2023-09-26 RX ADMIN — CHLORHEXIDINE GLUCONATE 1 APPLICATION(S): 213 SOLUTION TOPICAL at 04:30

## 2023-09-26 RX ADMIN — Medication 1 PACKET(S): at 11:25

## 2023-09-26 RX ADMIN — Medication 100 MILLIGRAM(S): at 12:42

## 2023-09-26 RX ADMIN — DEXTROSE MONOHYDRATE, SODIUM CHLORIDE, AND POTASSIUM CHLORIDE 100 MILLILITER(S): 50; .745; 4.5 INJECTION, SOLUTION INTRAVENOUS at 14:57

## 2023-09-26 RX ADMIN — MIDAZOLAM HYDROCHLORIDE 4 MILLIGRAM(S): 1 INJECTION, SOLUTION INTRAMUSCULAR; INTRAVENOUS at 09:30

## 2023-09-26 RX ADMIN — DEXMEDETOMIDINE HYDROCHLORIDE IN 0.9% SODIUM CHLORIDE 7.41 MICROGRAM(S)/KG/HR: 4 INJECTION INTRAVENOUS at 13:06

## 2023-09-26 RX ADMIN — PANTOPRAZOLE SODIUM 40 MILLIGRAM(S): 20 TABLET, DELAYED RELEASE ORAL at 17:37

## 2023-09-26 RX ADMIN — PROPOFOL 6.53 MICROGRAM(S)/KG/MIN: 10 INJECTION, EMULSION INTRAVENOUS at 11:24

## 2023-09-26 RX ADMIN — LEVETIRACETAM 400 MILLIGRAM(S): 250 TABLET, FILM COATED ORAL at 17:36

## 2023-09-26 RX ADMIN — CHLORHEXIDINE GLUCONATE 15 MILLILITER(S): 213 SOLUTION TOPICAL at 05:51

## 2023-09-26 RX ADMIN — MIDAZOLAM HYDROCHLORIDE 4 MILLIGRAM(S): 1 INJECTION, SOLUTION INTRAMUSCULAR; INTRAVENOUS at 02:03

## 2023-09-26 RX ADMIN — LEVETIRACETAM 400 MILLIGRAM(S): 250 TABLET, FILM COATED ORAL at 05:52

## 2023-09-26 RX ADMIN — PANTOPRAZOLE SODIUM 40 MILLIGRAM(S): 20 TABLET, DELAYED RELEASE ORAL at 02:03

## 2023-09-26 RX ADMIN — Medication 1 PACKET(S): at 04:19

## 2023-09-26 RX ADMIN — DEXMEDETOMIDINE HYDROCHLORIDE IN 0.9% SODIUM CHLORIDE 7.41 MICROGRAM(S)/KG/HR: 4 INJECTION INTRAVENOUS at 07:39

## 2023-09-26 RX ADMIN — PROPOFOL 6.53 MICROGRAM(S)/KG/MIN: 10 INJECTION, EMULSION INTRAVENOUS at 04:18

## 2023-09-26 RX ADMIN — CEFTRIAXONE 100 MILLIGRAM(S): 500 INJECTION, POWDER, FOR SOLUTION INTRAMUSCULAR; INTRAVENOUS at 10:37

## 2023-09-26 RX ADMIN — Medication 100 MILLIEQUIVALENT(S): at 05:51

## 2023-09-26 RX ADMIN — Medication 25 MILLILITER(S): at 05:51

## 2023-09-26 RX ADMIN — PROPOFOL 6.53 MICROGRAM(S)/KG/MIN: 10 INJECTION, EMULSION INTRAVENOUS at 18:24

## 2023-09-26 RX ADMIN — POLYETHYLENE GLYCOL 3350 17 GRAM(S): 17 POWDER, FOR SOLUTION ORAL at 11:26

## 2023-09-26 RX ADMIN — DEXMEDETOMIDINE HYDROCHLORIDE IN 0.9% SODIUM CHLORIDE 7.41 MICROGRAM(S)/KG/HR: 4 INJECTION INTRAVENOUS at 16:26

## 2023-09-26 RX ADMIN — Medication 100 MILLIEQUIVALENT(S): at 04:19

## 2023-09-26 RX ADMIN — PROPOFOL 6.53 MICROGRAM(S)/KG/MIN: 10 INJECTION, EMULSION INTRAVENOUS at 07:39

## 2023-09-26 RX ADMIN — CHLORHEXIDINE GLUCONATE 15 MILLILITER(S): 213 SOLUTION TOPICAL at 17:36

## 2023-09-26 RX ADMIN — Medication 62.5 MILLIMOLE(S): at 11:25

## 2023-09-26 RX ADMIN — DEXMEDETOMIDINE HYDROCHLORIDE IN 0.9% SODIUM CHLORIDE 7.41 MICROGRAM(S)/KG/HR: 4 INJECTION INTRAVENOUS at 09:20

## 2023-09-26 RX ADMIN — Medication 250 MILLIGRAM(S): at 18:35

## 2023-09-26 RX ADMIN — Medication 100 GRAM(S): at 16:19

## 2023-09-26 RX ADMIN — Medication 1 PACKET(S): at 13:06

## 2023-09-26 RX ADMIN — DEXMEDETOMIDINE HYDROCHLORIDE IN 0.9% SODIUM CHLORIDE 7.41 MICROGRAM(S)/KG/HR: 4 INJECTION INTRAVENOUS at 19:22

## 2023-09-26 RX ADMIN — PANTOPRAZOLE SODIUM 40 MILLIGRAM(S): 20 TABLET, DELAYED RELEASE ORAL at 05:51

## 2023-09-26 RX ADMIN — Medication 40 MILLIEQUIVALENT(S): at 08:41

## 2023-09-26 RX ADMIN — Medication 250 MILLIGRAM(S): at 05:50

## 2023-09-26 NOTE — PROVIDER CONTACT NOTE (HYPOGLYCEMIA EVENT) - NS PROVIDER CONTACT BACKGROUND-HYPO
Age: 28y    Gender: Female    POCT Blood Glucose:  184 mg/dL (09-26-23 @ 05:59)  62 mg/dL (09-26-23 @ 05:30)  52 mg/dL (09-26-23 @ 05:28)  98 mg/dL (09-25-23 @ 23:14)  85 mg/dL (09-25-23 @ 18:39)      eMAR:dextrose 50% Injectable   25 milliLiter(s) IV Push (09-26-23 @ 05:51)

## 2023-09-26 NOTE — PROGRESS NOTE ADULT - SUBJECTIVE AND OBJECTIVE BOX
CC: Patient is a 28y old  Female who presents with a chief complaint of Status Epilepticus (26 Sep 2023 12:37)      ## HPI:HPI:  27 yo f pmhx polysubstance abuse, ETOH abuse, seizure disorder on Keppra presented with seizures. Limited hx available but per ED staff, father endorsed she usually drinks daily but hasn't drank in 2 days.  Patient with 9 back to back seizures with EMS, given Versed 10mg IM and 5mg IV prior to seizures stopping.  In ED patient intubated for airway protection, patient persistent seizures, lactate >17.  Patient also noted to be febrile, LP performed in ED results pending. Patinet sedated on propofol and Versed gtt.  Patient still appeared to be seizing in ED, given additional Versed 10mg IVP. Admit to ICU.  (25 Sep 2023 07:29)      O/N:no events, patient intermittently agitated    ## ROS:  unable to assess ros due to mental status   ## EXAM  ICU Vital Signs Last 24 Hrs  T(C): 35.3 (26 Sep 2023 15:00), Max: 38 (25 Sep 2023 16:30)  T(F): 95.5 (26 Sep 2023 15:00), Max: 100.4 (25 Sep 2023 16:30)  HR: 69 (26 Sep 2023 15:00) (68 - 160)  BP: 120/105 (26 Sep 2023 15:00) (73/51 - 120/105)  BP(mean): 110 (26 Sep 2023 15:00) (58 - 110)  ABP: --  ABP(mean): --  RR: 18 (26 Sep 2023 15:00) (17 - 18)  SpO2: 99% (26 Sep 2023 15:00) (94% - 100%)    O2 Parameters below as of 26 Sep 2023 08:00  Patient On (Oxygen Delivery Method): ventilator, VAC  RR 18 Peep 5     O2 Concentration (%): 30      CON : NAD  EENT : EOMI, MMM  NECK : Full ROM  RESP : CTAB no increased WOB  CARD : rrr no m/r/g  ABD : S NT ND NABS no rebound  EXT : No edema  NEURO : sedated pupils reactive  ## Labs:  Lab Results:  CBC  CBC Full  -  ( 26 Sep 2023 02:40 )  WBC Count : 5.93 K/uL  RBC Count : 3.46 M/uL  Hemoglobin : 8.5 g/dL  Hematocrit : 27.1 %  Platelet Count - Automated : 91 K/uL  Mean Cell Volume : 78.3 fl  Mean Cell Hemoglobin : 24.6 pg  Mean Cell Hemoglobin Concentration : 31.4 g/dL  Auto Neutrophil # : 4.05 K/uL  Auto Lymphocyte # : 1.25 K/uL  Auto Monocyte # : 0.50 K/uL  Auto Eosinophil # : 0.05 K/uL  Auto Basophil # : 0.04 K/uL  Auto Neutrophil % : 68.3 %  Auto Lymphocyte % : 21.1 %  Auto Monocyte % : 8.4 %  Auto Eosinophil % : 0.8 %  Auto Basophil % : 0.7 %    .		Differential:	[] Automated		[] Manual  Chemistry                        8.5    5.93  )-----------( 91       ( 26 Sep 2023 02:40 )             27.1     09-26    137  |  110<H>  |  3<L>  ----------------------------<  139<H>  4.3   |  21<L>  |  0.53    Ca    8.4<L>      26 Sep 2023 15:15  Phos  4.7     09-26  Mg     1.9     09-26    TPro  7.4  /  Alb  2.6<L>  /  TBili  2.3<H>  /  DBili  x   /  AST  49<H>  /  ALT  19  /  AlkPhos  64  09-26    LIVER FUNCTIONS - ( 26 Sep 2023 02:40 )  Alb: 2.6 g/dL / Pro: 7.4 gm/dL / ALK PHOS: 64 U/L / ALT: 19 U/L / AST: 49 U/L / GGT: x           PT/INR - ( 25 Sep 2023 04:55 )   PT: 11.6 sec;   INR: 0.97 ratio         PTT - ( 25 Sep 2023 04:55 )  PTT:27.0 sec  Urinalysis Basic - ( 26 Sep 2023 15:15 )    Color: x / Appearance: x / SG: x / pH: x  Gluc: 139 mg/dL / Ketone: x  / Bili: x / Urobili: x   Blood: x / Protein: x / Nitrite: x   Leuk Esterase: x / RBC: x / WBC x   Sq Epi: x / Non Sq Epi: x / Bacteria: x        ABG - ( 25 Sep 2023 09:16 )  pH, Arterial: 7.49  pH, Blood: x     /  pCO2: 30    /  pO2: 153   / HCO3: 23    / Base Excess: -0.1  /  SaO2: 98.8                  MICROBIOLOGY/CULTURES:  Culture Results:   No growth to date (09-25 @ 06:11)  Culture Results:   No growth (09-25 @ 05:25)  Culture Results:   No growth at 24 hours (09-25 @ 04:55)  Culture Results:   No growth at 24 hours (09-25 @ 04:55)      ## Medications:  MEDICATIONS  (STANDING):  acetaminophen   IVPB .. 1000 milliGRAM(s) IV Intermittent once  chlorhexidine 0.12% Liquid 15 milliLiter(s) Oral Mucosa every 12 hours  chlorhexidine 2% Cloths 1 Application(s) Topical <User Schedule>  dexMEDEtomidine Infusion 0.5 MICROgram(s)/kG/Hr (7.41 mL/Hr) IV Continuous <Continuous>  dextrose 5% + lactated ringers with potassium chloride 20 mEq/L 1000 milliLiter(s) (100 mL/Hr) IV Continuous <Continuous>  enoxaparin Injectable 40 milliGRAM(s) SubCutaneous <User Schedule>  folic acid Injectable 1 milliGRAM(s) IV Push daily  levETIRAcetam  IVPB 1500 milliGRAM(s) IV Intermittent every 12 hours  magnesium sulfate  IVPB 1 Gram(s) IV Intermittent once  pantoprazole  Injectable 40 milliGRAM(s) IV Push every 12 hours  polyethylene glycol 3350 17 Gram(s) Oral daily  propofol Infusion 15 MICROgram(s)/kG/Min (6.53 mL/Hr) IV Continuous <Continuous>  senna 1 Tablet(s) Oral at bedtime  thiamine Injectable 100 milliGRAM(s) IV Push daily  vancomycin  IVPB 1000 milliGRAM(s) IV Intermittent every 12 hours    ## O/E:I&O's Summary    25 Sep 2023 07:01  -  26 Sep 2023 07:00  --------------------------------------------------------  IN: 4029.9 mL / OUT: 1400 mL / NET: 2629.9 mL    26 Sep 2023 07:01  -  26 Sep 2023 16:01  --------------------------------------------------------  IN: 1369.9 mL / OUT: 350 mL / NET: 1019.9 mL        POCUS :   DVT PPX lovenox  ## Code status:  Goals of care discussion: [] yes [X ] no  [x] full code  [ ] DNR  [ ] DNI  [ ] CARLOSST

## 2023-09-26 NOTE — CHART NOTE - NSCHARTNOTEFT_GEN_A_CORE
Pt noted to self-extubate. She is stable and awake, satting well on 2LNC. Propofol and precedex turned off at this time. Pt able to vocalize and asking for her phone and some ofher belongings. Recommend to continue precedex for now. Appears to be stable for respiratory standpoint but will monitor closely for any signs she may need to be re-intubated

## 2023-09-26 NOTE — PROGRESS NOTE ADULT - ASSESSMENT
28 year old with etoh abuse and seizure dx presents with status epilepticus, now intubated    Plan  -Neuro - keppra for seizures. monitor for etoh withdrawal seziures. eeg.  - Pulm - intubated for airway protection. patient with significant secretion. would defer extubation until secretions improve  - CV - goal map greater than 65  - FEN/GI - Tube feeds. monitor lft  RENAL - monitor uop and cr.  Maintain UOP at 0.5 mL/kg/hr.   -ID - dc CTX as LP non concerning for meningitis. will continue vanc per id until trach aspirate cultures resolve  HEME - trend cbc  DIspo - micu

## 2023-09-26 NOTE — PROGRESS NOTE ADULT - ASSESSMENT
27 yo f pmhx polysubstance abuse, ETOH abuse, seizure disorder on keppra admitted with hypoxic respiratory failure, pna, status epilepticus.    NEURO: sedated on propofol and precedex, versed prn  CV: close hd monitoring as tolerated  RESP: hypoxic respiratory failure, ac/vc 4-6 cc/kg tv lung protective strategies, actively titrating fio2/peep for spo2 >92%  RENAL: electrolyte abnormalities, aggressively replacing K, mg and phos as able.   GI: NPO with tf  ENDO: poct q6hr while npo  ID: Ceftraixone and vancomycin for coverage.  blood and urine cx pending  HEME: lovenox for vte ppx  DISPO: Full code    Critical Care time: 50 mins assessing presenting problems of acute illness that poses high probability of life threatening deterioration or end organ damage/dysfunction.  Medical decision making including Initiating plan of care, reviewing data, reviewing radiology, discussing with multidisciplinary team, non inclusive of procedures, discussing goals of care with patient/family

## 2023-09-26 NOTE — PROGRESS NOTE ADULT - ASSESSMENT
A/P-  28 year old female s/p multiple seixure epsiodes admitted to ICU and intubated for airway protection.    remains on the vent  afebrile today  minimal leukocytosis has resolved  LP - not impressive with no wbc , elevtaed glucose and normal protein  csf gram stain and culture- Negative to date  Blood cx- Neg x 2  trach asp gram stain- gp in pairs, cx not reported yet  covid-19- negative  urine legionella AG - negative      plan-  d/c ceftriaxone as CSF gram stain and cx both negative.  would like to also d/c vanco but will continue pending final cx result of trach asp cx.  keep trough <15.  vent and seizure management as per ICU team.    All labs and imaging and chart notes reviewed.     critical care time 35 minutes.    Jayesh Delgado MD  Infectious Disease Attending    for any questions please do not hesitate to contact me either via teams or by calling [phone]

## 2023-09-26 NOTE — PROGRESS NOTE ADULT - SUBJECTIVE AND OBJECTIVE BOX
Patient is a 28y old  Female who presents with a chief complaint of Status Epilepticus (25 Sep 2023 15:46)      BRIEF HOSPITAL COURSE:   29 yo f pmhx polysubstance abuse, ETOH abuse, seizure disorder on keppra admitted with hypoxic respiratory failure, pna, status epilepticus.      9/25: CSF Gram stain and CSF PCR negative, acyclovir discontinued      Events last 24 hours:   patient remains intubated, sedated, ett with Gm +, MRSA swab ordered.  episode of vomiting this evening, chest xray revealing ngt just above ge junction, advanced.        PAST MEDICAL & SURGICAL HISTORY:  Leg pain  No significant past surgical history      Allergies  No Known Allergies      FAMILY HISTORY:      Social History:   from home, etoh abuse      Review of Systems:  unable to obtain 2/2 intubated/sedated      Physical Examination:    General: Adult female, lying in bed, nad    HEENT: nc/at, ett and ogt in place    PULM: Coarse b/l    CVS: rrr    ABD: Soft, nondistended, nontender, +bs    EXT: No edema, nontender    SKIN: Warm     NEURO: sedated      Medications:  cefTRIAXone   IVPB 2000 milliGRAM(s) IV Intermittent every 24 hours  cefTRIAXone   IVPB      vancomycin  IVPB 1000 milliGRAM(s) IV Intermittent every 12 hours  acetaminophen   IVPB .. 1000 milliGRAM(s) IV Intermittent once  dexMEDEtomidine Infusion 0.5 MICROgram(s)/kG/Hr IV Continuous <Continuous>  levETIRAcetam  IVPB 1500 milliGRAM(s) IV Intermittent every 12 hours  midazolam Injectable 4 milliGRAM(s) IV Push every 2 hours PRN  propofol Infusion 15 MICROgram(s)/kG/Min IV Continuous <Continuous>  enoxaparin Injectable 40 milliGRAM(s) SubCutaneous <User Schedule>  pantoprazole  Injectable 40 milliGRAM(s) IV Push every 12 hours  polyethylene glycol 3350 17 Gram(s) Oral daily  senna 1 Tablet(s) Oral at bedtime  chlorhexidine 0.12% Liquid 15 milliLiter(s) Oral Mucosa every 12 hours  chlorhexidine 2% Cloths 1 Application(s) Topical <User Schedule>      Mode: AC/ CMV (Assist Control/ Continuous Mandatory Ventilation)  RR (machine): 18  TV (machine): 450  FiO2: 30  PEEP: 5  ITime: 1  MAP: 8  PIP: 16      ICU Vital Signs Last 24 Hrs  T(C): 35.7 (26 Sep 2023 03:00), Max: 38.7 (25 Sep 2023 04:55)  T(F): 96.3 (26 Sep 2023 03:00), Max: 101.7 (25 Sep 2023 04:55)  HR: 76 (26 Sep 2023 03:00) (71 - 164)  BP: 91/67 (26 Sep 2023 03:00) (73/51 - 176/124)  BP(mean): 76 (26 Sep 2023 03:00) (58 - 155)  ABP: --  ABP(mean): --  RR: 18 (26 Sep 2023 03:00) (17 - 38)  SpO2: 95% (26 Sep 2023 03:00) (95% - 100%)    O2 Parameters below as of 25 Sep 2023 08:36  Patient On (Oxygen Delivery Method): ventilator, 18/450/40%/+5      Vital Signs Last 24 Hrs  T(C): 35.7 (26 Sep 2023 03:00), Max: 38.7 (25 Sep 2023 04:55)  T(F): 96.3 (26 Sep 2023 03:00), Max: 101.7 (25 Sep 2023 04:55)  HR: 76 (26 Sep 2023 03:00) (71 - 164)  BP: 91/67 (26 Sep 2023 03:00) (73/51 - 176/124)  BP(mean): 76 (26 Sep 2023 03:00) (58 - 155)  RR: 18 (26 Sep 2023 03:00) (17 - 38)  SpO2: 95% (26 Sep 2023 03:00) (95% - 100%)    Parameters below as of 25 Sep 2023 08:36  Patient On (Oxygen Delivery Method): ventilator, 18/450/40%/+5      ABG - ( 25 Sep 2023 09:16 )  pH, Arterial: 7.49  pH, Blood: x     /  pCO2: 30    /  pO2: 153   / HCO3: 23    / Base Excess: -0.1  /  SaO2: 98.8        I&O's Detail    24 Sep 2023 07:01  -  25 Sep 2023 07:00  --------------------------------------------------------  IN:  Total IN: 0 mL    OUT:    Voided (mL): 1100 mL  Total OUT: 1100 mL  Total NET: -1100 mL      25 Sep 2023 07:01  -  26 Sep 2023 03:27  --------------------------------------------------------  IN:    Dexmedetomidine: 124.8 mL    IV PiggyBack: 1300 mL    Jevity 1.2: 100 mL    Lactated Ringers Bolus: 1000 mL    Midazolam: 15.4 mL    Propofol: 295.7 mL  Total IN: 2835.9 mL    OUT:    Incontinent per Collection Bag (mL): 500 mL    Indwelling Catheter - Urethral (mL): 100 mL  Total OUT: 600 mL  Total NET: 2235.9 mL      LABS:                        8.6    10.15 )-----------( 112      ( 25 Sep 2023 13:55 )             26.8     09-25    134<L>  |  100  |  4<L>  ----------------------------<  75  2.8<LL>   |  25  |  0.72    Ca    8.2<L>      25 Sep 2023 20:00  Phos  1.1     09-25  Mg     1.9     09-25    TPro  8.5<H>  /  Alb  3.1<L>  /  TBili  1.8<H>  /  DBili  x   /  AST  72<H>  /  ALT  24  /  AlkPhos  75  09-25      CAPILLARY BLOOD GLUCOSE  POCT Blood Glucose.: 98 mg/dL (25 Sep 2023 23:14)      PT/INR - ( 25 Sep 2023 04:55 )   PT: 11.6 sec;   INR: 0.97 ratio    PTT - ( 25 Sep 2023 04:55 )  PTT:27.0 sec      Urinalysis Basic - ( 25 Sep 2023 20:00 )  Color: x / Appearance: x / SG: x / pH: x  Gluc: 75 mg/dL / Ketone: x  / Bili: x / Urobili: x   Blood: x / Protein: x / Nitrite: x   Leuk Esterase: x / RBC: x / WBC x   Sq Epi: x / Non Sq Epi: x / Bacteria: x      CULTURES:  pending      RADIOLOGY:   < from: Xray Chest 1 View-PORTABLE IMMEDIATE (09.25.23 @ 05:55) >  ACC: [id number] EXAM:  XR CHEST PORTABLE IMMED 1V   ORDERED BY: MAL HENRIQUEZ     PROCEDURE DATE:  09/25/2023      INTERPRETATION:  AP supine chest on September 25, 2023 at 5:19 AM.   Patient has sepsis and seizures.    COMPARISON: None available.    Mildly elevated left hemidiaphragm noted. Heart magnified by technique.    There is a left upper thoracic curve.    Endotracheal tube and nasogastric tube are in place. No visible fracture.    IMPRESSION: Tubes in place.    --- End of Report ---  SERGIO NICOLE MD; Attending Radiologist  This document has been electronically signed. Sep 25 2023  9:13AM    < end of copied text >

## 2023-09-26 NOTE — PROGRESS NOTE ADULT - SUBJECTIVE AND OBJECTIVE BOX
[facility]  INFECTIOUS DISEASES   [address] [place] [address]  Tel: [phone]     Fax: [phone]  ==============================================================================  MD Lucio Rose, DO Karly Meneses, NP   ==============================================================================      KEYA RODRIGUEZ  MRN-[id number]  28y ([birth year])      Interval History:    ROS:    [ ] Unobtainable because:  [ ] All other systems negative except as noted    Constitutional: no fever, no chills  Head: no trauma  Eyes: no vision changes, no eye pain  ENT:  no sore throat, no rhinorrhea  Cardiovascular:  no chest pain, no palpitation  Respiratory:  no SOB, no cough  GI:  no abd pain, no vomiting, no diarrhea  urinary: no dysuria, no hematuria, no flank pain  musculoskeletal:  no joint pain, no joint swelling  skin:  no rash  neurology:  no headache, no seizure, no change in mental status  psych: no anxiety, no depression         Allergies  No Known Allergies        ANTIMICROBIALS:  cefTRIAXone   IVPB 2000 every 24 hours  cefTRIAXone   IVPB    vancomycin  IVPB 1000 every 12 hours        Physical Exam:  Vital Signs Last 24 Hrs  T(C): 36.4 (26 Sep 2023 11:00), Max: 38.2 (25 Sep 2023 14:30)  T(F): 97.5 (26 Sep 2023 11:00), Max: 100.8 (25 Sep 2023 14:30)  HR: 69 (26 Sep 2023 12:06) (69 - 160)  BP: 110/93 (26 Sep 2023 12:00) (73/51 - 126/94)  BP(mean): 100 (26 Sep 2023 12:00) (58 - 108)  RR: 18 (26 Sep 2023 12:00) (17 - 18)  SpO2: 99% (26 Sep 2023 12:06) (94% - 100%)    Parameters below as of 26 Sep 2023 08:00  Patient On (Oxygen Delivery Method): ventilator, VAC  RR 18 Peep 5     O2 Concentration (%): 30    09-25-23 @ 07:01  -  09-26-23 @ 07:00  --------------------------------------------------------  IN: 4029.9 mL / OUT: 1400 mL / NET: 2629.9 mL    09-26-23 @ 07:01  -  09-26-23 @ 12:38  --------------------------------------------------------  IN: 654.6 mL / OUT: 250 mL / NET: 404.6 mL      General:    NAD,  non toxic  Head: atraumatic, normocephalic  Eye: normal sclera and conjunctiva  ENT:    no oral lesions, neck supple  Cardio:     regular S1, S2,  no murmur  Respiratory:    clear b/l,    no wheezing  abd:     soft,   BS +,   no tenderness  :   no CVAT,  no suprapubic tenderness,   no  johnson  Musculoskeletal:   no joint swelling,   no edema  vascular: no central lines, +PIV   Skin:    no rash  Neurologic:     no focal deficit  psych: normal affect    WBC Count: 5.93 K/uL (09-26 @ 02:40)  WBC Count: 10.15 K/uL (09-25 @ 13:55)  WBC Count: 11.57 K/uL (09-25 @ 08:50)  WBC Count: 10.65 K/uL (09-25 @ 04:55)                            8.5    5.93  )-----------( 91       ( 26 Sep 2023 02:40 )             27.1       09-26    136  |  107  |  4<L>  ----------------------------<  111<H>  4.0   |  24  |  0.61    Ca    8.5      26 Sep 2023 08:30  Phos  1.6     09-26  Mg     2.0     09-26    TPro  7.4  /  Alb  2.6<L>  /  TBili  2.3<H>  /  DBili  x   /  AST  49<H>  /  ALT  19  /  AlkPhos  64  09-26      Urinalysis Basic - ( 26 Sep 2023 08:30 )    Color: x / Appearance: x / SG: x / pH: x  Gluc: 111 mg/dL / Ketone: x  / Bili: x / Urobili: x   Blood: x / Protein: x / Nitrite: x   Leuk Esterase: x / RBC: x / WBC x   Sq Epi: x / Non Sq Epi: x / Bacteria: x          Creatinine Trend: 0.61<--, 0.60<--, 0.72<--, 0.72<--, 0.74<--, 1.32<--  Blood Gas Arterial, Lactate: 3.80 mmol/L (09-25-23 @ 09:16)  Lactate, Blood: 4.7 mmol/L (09-25-23 @ 08:50)  Blood Gas Venous - Lactate: >15.00 mmol/L (09-25-23 @ 05:54)  Lactate, Blood: 17.3 mmol/L (09-25-23 @ 04:55)      MICROBIOLOGY:  v  ET Tube ET Tube  09-25-23 --  --    Moderate polymorphonuclear leukocytes seen per low power field  No Squamous epithelial cells seen per low power field  Rare Gram positive cocci in pairs seen per oil power field      .CSF CSF  09-25-23   No growth to date  --    No polymorphonuclear leukocytes seen  No organisms seen  by cytocentrifuge      Clean Catch Clean Catch (Midstream)  09-25-23   No growth  --  --      .Blood Blood-Peripheral  09-25-23   No growth at 24 hours  --  --            Rapid RVP Result: NotDetec (09-25 @ 04:55)                  SARS-CoV-2: NotDetec (09-25-23 @ 04:55)  Rapid RVP Result: NotDetec (09-25-23 @ 04:55)        RADIOLOGY:   [facility]  INFECTIOUS DISEASES   [address] [place] [address]  Tel: [phone]     Fax: [phone]  ==============================================================================  MD Lucio Rose, DO Karly Meneses, NP   ==============================================================================      KEYA RODRIGUEZ  MRN-[id number]  28y ([birth year])      Interval History:  Patient seen and examined In ICU.  remains on the vent and sedated.    afebrile toady.    ROS:    [ ] Unobtainable because: patient is vented and sedated        Allergies  No Known Allergies        ANTIMICROBIALS:  cefTRIAXone   IVPB 2000 every 24 hours  cefTRIAXone   IVPB    vancomycin  IVPB 1000 every 12 hours      Physical Exam:  Vital Signs Last 24 Hrs  T(C): 36.4 (26 Sep 2023 11:00), Max: 38.2 (25 Sep 2023 14:30)  T(F): 97.5 (26 Sep 2023 11:00), Max: 100.8 (25 Sep 2023 14:30)  HR: 69 (26 Sep 2023 12:06) (69 - 160)  BP: 110/93 (26 Sep 2023 12:00) (73/51 - 126/94)  BP(mean): 100 (26 Sep 2023 12:00) (58 - 108)  RR: 18 (26 Sep 2023 12:00) (17 - 18)  SpO2: 99% (26 Sep 2023 12:06) (94% - 100%)    Parameters below as of 26 Sep 2023 08:00  Patient On (Oxygen Delivery Method): ventilator, VAC  RR 18 Peep 5     O2 Concentration (%): 30    09-25-23 @ 07:01  -  09-26-23 @ 07:00  --------------------------------------------------------  IN: 4029.9 mL / OUT: 1400 mL / NET: 2629.9 mL    09-26-23 @ 07:01  -  09-26-23 @ 12:38  --------------------------------------------------------  IN: 654.6 mL / OUT: 250 mL / NET: 404.6 mL      General:    on the vent  Head: atraumatic, normocephalic  Eyes: normal sclera   ENT:   ET and OGT present  Cardio:    tachycardia S1,S2, no murmur  Respiratory:   on the vent, decreased breath sounds at bases  abd:   soft, BS +, not tender, no distention  Musculoskeletal : no joint swelling, no edema  Skin:    no rash  vascular:  normal pulses  Neurologic:     sedated and does not follow commands        WBC Count: 5.93 K/uL (09-26 @ 02:40)  WBC Count: 10.15 K/uL (09-25 @ 13:55)  WBC Count: 11.57 K/uL (09-25 @ 08:50)  WBC Count: 10.65 K/uL (09-25 @ 04:55)                            8.5    5.93  )-----------( 91       ( 26 Sep 2023 02:40 )             27.1       09-26    136  |  107  |  4<L>  ----------------------------<  111<H>  4.0   |  24  |  0.61    Ca    8.5      26 Sep 2023 08:30  Phos  1.6     09-26  Mg     2.0     09-26    TPro  7.4  /  Alb  2.6<L>  /  TBili  2.3<H>  /  DBili  x   /  AST  49<H>  /  ALT  19  /  AlkPhos  64  09-26      Urinalysis Basic - ( 26 Sep 2023 08:30 )    Color: x / Appearance: x / SG: x / pH: x  Gluc: 111 mg/dL / Ketone: x  / Bili: x / Urobili: x   Blood: x / Protein: x / Nitrite: x   Leuk Esterase: x / RBC: x / WBC x   Sq Epi: x / Non Sq Epi: x / Bacteria: x          Creatinine Trend: 0.61<--, 0.60<--, 0.72<--, 0.72<--, 0.74<--, 1.32<--  Blood Gas Arterial, Lactate: 3.80 mmol/L (09-25-23 @ 09:16)  Lactate, Blood: 4.7 mmol/L (09-25-23 @ 08:50)  Blood Gas Venous - Lactate: >15.00 mmol/L (09-25-23 @ 05:54)  Lactate, Blood: 17.3 mmol/L (09-25-23 @ 04:55)      MICROBIOLOGY:  v  ET Tube ET Tube  09-25-23 --  --    Moderate polymorphonuclear leukocytes seen per low power field  No Squamous epithelial cells seen per low power field  Rare Gram positive cocci in pairs seen per oil power field      .CSF CSF  09-25-23   No growth to date  --    No polymorphonuclear leukocytes seen  No organisms seen  by cytocentrifuge      Clean Catch Clean Catch (Midstream)  09-25-23   No growth  --  --      .Blood Blood-Peripheral  09-25-23   No growth at 24 hours  --  --            Rapid RVP Result: NotDetec (09-25 @ 04:55)                  SARS-CoV-2: NotDetec (09-25-23 @ 04:55)  Rapid RVP Result: NotDetec (09-25-23 @ 04:55)        RADIOLOGY:

## 2023-09-27 LAB
ALBUMIN SERPL ELPH-MCNC: 2.4 G/DL — LOW (ref 3.3–5)
ALBUMIN SERPL ELPH-MCNC: 2.5 G/DL — LOW (ref 3.3–5)
ALP SERPL-CCNC: 71 U/L — SIGNIFICANT CHANGE UP (ref 40–120)
ALP SERPL-CCNC: 76 U/L — SIGNIFICANT CHANGE UP (ref 40–120)
ALT FLD-CCNC: 17 U/L — SIGNIFICANT CHANGE UP (ref 12–78)
ALT FLD-CCNC: 19 U/L — SIGNIFICANT CHANGE UP (ref 12–78)
ANION GAP SERPL CALC-SCNC: 6 MMOL/L — SIGNIFICANT CHANGE UP (ref 5–17)
ANION GAP SERPL CALC-SCNC: 9 MMOL/L — SIGNIFICANT CHANGE UP (ref 5–17)
APTT BLD: 36.7 SEC — HIGH (ref 24.5–35.6)
AST SERPL-CCNC: 36 U/L — SIGNIFICANT CHANGE UP (ref 15–37)
AST SERPL-CCNC: 38 U/L — HIGH (ref 15–37)
BASE EXCESS BLDA CALC-SCNC: -3.8 MMOL/L — LOW (ref -2–3)
BILIRUB SERPL-MCNC: 0.9 MG/DL — SIGNIFICANT CHANGE UP (ref 0.2–1.2)
BILIRUB SERPL-MCNC: 0.9 MG/DL — SIGNIFICANT CHANGE UP (ref 0.2–1.2)
BLOOD GAS COMMENTS ARTERIAL: SIGNIFICANT CHANGE UP
BUN SERPL-MCNC: 2 MG/DL — LOW (ref 7–23)
BUN SERPL-MCNC: 3 MG/DL — LOW (ref 7–23)
CALCIUM SERPL-MCNC: 8.5 MG/DL — SIGNIFICANT CHANGE UP (ref 8.5–10.1)
CALCIUM SERPL-MCNC: 8.6 MG/DL — SIGNIFICANT CHANGE UP (ref 8.5–10.1)
CHLORIDE SERPL-SCNC: 107 MMOL/L — SIGNIFICANT CHANGE UP (ref 96–108)
CHLORIDE SERPL-SCNC: 111 MMOL/L — HIGH (ref 96–108)
CK MB BLD-MCNC: 1.2 % — SIGNIFICANT CHANGE UP (ref 0–3.5)
CK MB BLD-MCNC: 1.2 % — SIGNIFICANT CHANGE UP (ref 0–3.5)
CK MB CFR SERPL CALC: 2.8 NG/ML — SIGNIFICANT CHANGE UP (ref 0.5–3.6)
CK MB CFR SERPL CALC: 3.9 NG/ML — HIGH (ref 0.5–3.6)
CK SERPL-CCNC: 229 U/L — HIGH (ref 26–192)
CK SERPL-CCNC: 325 U/L — HIGH (ref 26–192)
CO2 BLDA-SCNC: 19 MMOL/L — SIGNIFICANT CHANGE UP (ref 19–24)
CO2 SERPL-SCNC: 18 MMOL/L — LOW (ref 22–31)
CO2 SERPL-SCNC: 19 MMOL/L — LOW (ref 22–31)
CREAT SERPL-MCNC: 0.52 MG/DL — SIGNIFICANT CHANGE UP (ref 0.5–1.3)
CREAT SERPL-MCNC: 0.55 MG/DL — SIGNIFICANT CHANGE UP (ref 0.5–1.3)
CULTURE RESULTS: SIGNIFICANT CHANGE UP
EGFR: 126 ML/MIN/1.73M2 — SIGNIFICANT CHANGE UP
EGFR: 128 ML/MIN/1.73M2 — SIGNIFICANT CHANGE UP
GAS PNL BLDA: SIGNIFICANT CHANGE UP
GLUCOSE BLDC GLUCOMTR-MCNC: 112 MG/DL — HIGH (ref 70–99)
GLUCOSE BLDC GLUCOMTR-MCNC: 71 MG/DL — SIGNIFICANT CHANGE UP (ref 70–99)
GLUCOSE BLDC GLUCOMTR-MCNC: 88 MG/DL — SIGNIFICANT CHANGE UP (ref 70–99)
GLUCOSE SERPL-MCNC: 102 MG/DL — HIGH (ref 70–99)
GLUCOSE SERPL-MCNC: 96 MG/DL — SIGNIFICANT CHANGE UP (ref 70–99)
GRAM STN FLD: SIGNIFICANT CHANGE UP
HCO3 BLDA-SCNC: 18 MMOL/L — LOW (ref 21–28)
HCT VFR BLD CALC: 26.8 % — LOW (ref 34.5–45)
HCT VFR BLD CALC: 27.9 % — LOW (ref 34.5–45)
HGB BLD-MCNC: 8.3 G/DL — LOW (ref 11.5–15.5)
HGB BLD-MCNC: 8.7 G/DL — LOW (ref 11.5–15.5)
HOROWITZ INDEX BLDA+IHG-RTO: 80 — SIGNIFICANT CHANGE UP
INR BLD: 0.94 RATIO — SIGNIFICANT CHANGE UP (ref 0.85–1.18)
LACTATE SERPL-SCNC: 1.2 MMOL/L — SIGNIFICANT CHANGE UP (ref 0.7–2)
LEVETIRACETAM SERPL-MCNC: <2 UG/ML — LOW (ref 10–40)
LIDOCAIN IGE QN: 31 U/L — SIGNIFICANT CHANGE UP (ref 13–75)
MAGNESIUM SERPL-MCNC: 1.7 MG/DL — SIGNIFICANT CHANGE UP (ref 1.6–2.6)
MAGNESIUM SERPL-MCNC: 2 MG/DL — SIGNIFICANT CHANGE UP (ref 1.6–2.6)
MCHC RBC-ENTMCNC: 24.7 PG — LOW (ref 27–34)
MCHC RBC-ENTMCNC: 24.8 PG — LOW (ref 27–34)
MCHC RBC-ENTMCNC: 31 G/DL — LOW (ref 32–36)
MCHC RBC-ENTMCNC: 31.2 G/DL — LOW (ref 32–36)
MCV RBC AUTO: 79.5 FL — LOW (ref 80–100)
MCV RBC AUTO: 79.8 FL — LOW (ref 80–100)
NRBC # BLD: 0 /100 WBCS — SIGNIFICANT CHANGE UP (ref 0–0)
NRBC # BLD: 0 /100 WBCS — SIGNIFICANT CHANGE UP (ref 0–0)
PCO2 BLDA: 24 MMHG — LOW (ref 32–46)
PH BLDA: 7.48 — HIGH (ref 7.35–7.45)
PHOSPHATE SERPL-MCNC: 3.2 MG/DL — SIGNIFICANT CHANGE UP (ref 2.5–4.5)
PHOSPHATE SERPL-MCNC: 4 MG/DL — SIGNIFICANT CHANGE UP (ref 2.5–4.5)
PLATELET # BLD AUTO: 105 K/UL — LOW (ref 150–400)
PLATELET # BLD AUTO: 94 K/UL — LOW (ref 150–400)
PO2 BLDA: 170 MMHG — HIGH (ref 83–108)
POTASSIUM SERPL-MCNC: 3.8 MMOL/L — SIGNIFICANT CHANGE UP (ref 3.5–5.3)
POTASSIUM SERPL-MCNC: 4.3 MMOL/L — SIGNIFICANT CHANGE UP (ref 3.5–5.3)
POTASSIUM SERPL-SCNC: 3.8 MMOL/L — SIGNIFICANT CHANGE UP (ref 3.5–5.3)
POTASSIUM SERPL-SCNC: 4.3 MMOL/L — SIGNIFICANT CHANGE UP (ref 3.5–5.3)
PROT SERPL-MCNC: 7.3 GM/DL — SIGNIFICANT CHANGE UP (ref 6–8.3)
PROT SERPL-MCNC: 7.5 GM/DL — SIGNIFICANT CHANGE UP (ref 6–8.3)
PROTHROM AB SERPL-ACNC: 11.3 SEC — SIGNIFICANT CHANGE UP (ref 9.5–13)
RBC # BLD: 3.36 M/UL — LOW (ref 3.8–5.2)
RBC # BLD: 3.51 M/UL — LOW (ref 3.8–5.2)
RBC # FLD: 22.9 % — HIGH (ref 10.3–14.5)
RBC # FLD: 22.9 % — HIGH (ref 10.3–14.5)
SAO2 % BLDA: 98.4 % — HIGH (ref 94–98)
SODIUM SERPL-SCNC: 134 MMOL/L — LOW (ref 135–145)
SODIUM SERPL-SCNC: 136 MMOL/L — SIGNIFICANT CHANGE UP (ref 135–145)
SPECIMEN SOURCE: SIGNIFICANT CHANGE UP
TROPONIN I, HIGH SENSITIVITY RESULT: 248.8 NG/L — HIGH
TROPONIN I, HIGH SENSITIVITY RESULT: 274.6 NG/L — HIGH
WBC # BLD: 10.51 K/UL — HIGH (ref 3.8–10.5)
WBC # BLD: 9.85 K/UL — SIGNIFICANT CHANGE UP (ref 3.8–10.5)
WBC # FLD AUTO: 10.51 K/UL — HIGH (ref 3.8–10.5)
WBC # FLD AUTO: 9.85 K/UL — SIGNIFICANT CHANGE UP (ref 3.8–10.5)

## 2023-09-27 PROCEDURE — 71045 X-RAY EXAM CHEST 1 VIEW: CPT | Mod: 26

## 2023-09-27 PROCEDURE — 99291 CRITICAL CARE FIRST HOUR: CPT

## 2023-09-27 PROCEDURE — 74177 CT ABD & PELVIS W/CONTRAST: CPT | Mod: 26

## 2023-09-27 PROCEDURE — 93010 ELECTROCARDIOGRAM REPORT: CPT

## 2023-09-27 PROCEDURE — 71275 CT ANGIOGRAPHY CHEST: CPT | Mod: 26

## 2023-09-27 PROCEDURE — 31500 INSERT EMERGENCY AIRWAY: CPT

## 2023-09-27 PROCEDURE — 99291 CRITICAL CARE FIRST HOUR: CPT | Mod: 25

## 2023-09-27 PROCEDURE — 93306 TTE W/DOPPLER COMPLETE: CPT | Mod: 26

## 2023-09-27 RX ORDER — CHLORHEXIDINE GLUCONATE 213 G/1000ML
15 SOLUTION TOPICAL EVERY 12 HOURS
Refills: 0 | Status: DISCONTINUED | OUTPATIENT
Start: 2023-09-27 | End: 2023-10-01

## 2023-09-27 RX ORDER — ENOXAPARIN SODIUM 100 MG/ML
60 INJECTION SUBCUTANEOUS EVERY 12 HOURS
Refills: 0 | Status: DISCONTINUED | OUTPATIENT
Start: 2023-09-27 | End: 2023-09-27

## 2023-09-27 RX ORDER — PHENOBARBITAL 60 MG
260 TABLET ORAL
Refills: 0 | Status: DISCONTINUED | OUTPATIENT
Start: 2023-09-27 | End: 2023-09-29

## 2023-09-27 RX ORDER — MIDAZOLAM HYDROCHLORIDE 1 MG/ML
4 INJECTION, SOLUTION INTRAMUSCULAR; INTRAVENOUS ONCE
Refills: 0 | Status: DISCONTINUED | OUTPATIENT
Start: 2023-09-27 | End: 2023-09-27

## 2023-09-27 RX ORDER — PIPERACILLIN AND TAZOBACTAM 4; .5 G/20ML; G/20ML
3.38 INJECTION, POWDER, LYOPHILIZED, FOR SOLUTION INTRAVENOUS ONCE
Refills: 0 | Status: DISCONTINUED | OUTPATIENT
Start: 2023-09-27 | End: 2023-09-27

## 2023-09-27 RX ORDER — PROPOFOL 10 MG/ML
40 INJECTION, EMULSION INTRAVENOUS
Qty: 1000 | Refills: 0 | Status: DISCONTINUED | OUTPATIENT
Start: 2023-09-27 | End: 2023-10-01

## 2023-09-27 RX ORDER — ENOXAPARIN SODIUM 100 MG/ML
60 INJECTION SUBCUTANEOUS EVERY 24 HOURS
Refills: 0 | Status: DISCONTINUED | OUTPATIENT
Start: 2023-09-27 | End: 2023-09-27

## 2023-09-27 RX ORDER — FENTANYL CITRATE 50 UG/ML
1 INJECTION INTRAVENOUS
Qty: 2500 | Refills: 0 | Status: DISCONTINUED | OUTPATIENT
Start: 2023-09-27 | End: 2023-09-27

## 2023-09-27 RX ORDER — ENOXAPARIN SODIUM 100 MG/ML
40 INJECTION SUBCUTANEOUS EVERY 24 HOURS
Refills: 0 | Status: DISCONTINUED | OUTPATIENT
Start: 2023-09-28 | End: 2023-10-06

## 2023-09-27 RX ORDER — MIDAZOLAM HYDROCHLORIDE 1 MG/ML
4 INJECTION, SOLUTION INTRAMUSCULAR; INTRAVENOUS EVERY 4 HOURS
Refills: 0 | Status: DISCONTINUED | OUTPATIENT
Start: 2023-09-27 | End: 2023-09-27

## 2023-09-27 RX ORDER — KETAMINE HYDROCHLORIDE 100 MG/ML
100 INJECTION INTRAMUSCULAR; INTRAVENOUS ONCE
Refills: 0 | Status: DISCONTINUED | OUTPATIENT
Start: 2023-09-27 | End: 2023-09-27

## 2023-09-27 RX ORDER — MAGNESIUM SULFATE 500 MG/ML
1 VIAL (ML) INJECTION ONCE
Refills: 0 | Status: COMPLETED | OUTPATIENT
Start: 2023-09-27 | End: 2023-09-27

## 2023-09-27 RX ORDER — ETOMIDATE 2 MG/ML
20 INJECTION INTRAVENOUS ONCE
Refills: 0 | Status: COMPLETED | OUTPATIENT
Start: 2023-09-27 | End: 2023-09-27

## 2023-09-27 RX ORDER — SIMETHICONE 80 MG/1
80 TABLET, CHEWABLE ORAL ONCE
Refills: 0 | Status: DISCONTINUED | OUTPATIENT
Start: 2023-09-27 | End: 2023-09-27

## 2023-09-27 RX ORDER — ROCURONIUM BROMIDE 10 MG/ML
100 VIAL (ML) INTRAVENOUS ONCE
Refills: 0 | Status: COMPLETED | OUTPATIENT
Start: 2023-09-27 | End: 2023-09-27

## 2023-09-27 RX ORDER — PIPERACILLIN AND TAZOBACTAM 4; .5 G/20ML; G/20ML
3.38 INJECTION, POWDER, LYOPHILIZED, FOR SOLUTION INTRAVENOUS EVERY 8 HOURS
Refills: 0 | Status: DISCONTINUED | OUTPATIENT
Start: 2023-09-27 | End: 2023-10-04

## 2023-09-27 RX ORDER — NOREPINEPHRINE BITARTRATE/D5W 8 MG/250ML
0.05 PLASTIC BAG, INJECTION (ML) INTRAVENOUS
Qty: 8 | Refills: 0 | Status: DISCONTINUED | OUTPATIENT
Start: 2023-09-27 | End: 2023-10-01

## 2023-09-27 RX ORDER — KETAMINE HYDROCHLORIDE 100 MG/ML
0.25 INJECTION INTRAMUSCULAR; INTRAVENOUS
Qty: 1000 | Refills: 0 | Status: DISCONTINUED | OUTPATIENT
Start: 2023-09-27 | End: 2023-10-01

## 2023-09-27 RX ORDER — PHENOBARBITAL 60 MG
260 TABLET ORAL ONCE
Refills: 0 | Status: DISCONTINUED | OUTPATIENT
Start: 2023-09-27 | End: 2023-09-27

## 2023-09-27 RX ORDER — PHENOBARBITAL 60 MG
130 TABLET ORAL EVERY 6 HOURS
Refills: 0 | Status: DISCONTINUED | OUTPATIENT
Start: 2023-09-27 | End: 2023-09-29

## 2023-09-27 RX ORDER — PIPERACILLIN AND TAZOBACTAM 4; .5 G/20ML; G/20ML
3.38 INJECTION, POWDER, LYOPHILIZED, FOR SOLUTION INTRAVENOUS ONCE
Refills: 0 | Status: COMPLETED | OUTPATIENT
Start: 2023-09-27 | End: 2023-09-27

## 2023-09-27 RX ORDER — FENTANYL CITRATE 50 UG/ML
100 INJECTION INTRAVENOUS EVERY 4 HOURS
Refills: 0 | Status: DISCONTINUED | OUTPATIENT
Start: 2023-09-27 | End: 2023-09-27

## 2023-09-27 RX ADMIN — PROPOFOL 14.2 MICROGRAM(S)/KG/MIN: 10 INJECTION, EMULSION INTRAVENOUS at 10:23

## 2023-09-27 RX ADMIN — DEXMEDETOMIDINE HYDROCHLORIDE IN 0.9% SODIUM CHLORIDE 7.41 MICROGRAM(S)/KG/HR: 4 INJECTION INTRAVENOUS at 07:48

## 2023-09-27 RX ADMIN — MIDAZOLAM HYDROCHLORIDE 4 MILLIGRAM(S): 1 INJECTION, SOLUTION INTRAMUSCULAR; INTRAVENOUS at 15:04

## 2023-09-27 RX ADMIN — PROPOFOL 14.2 MICROGRAM(S)/KG/MIN: 10 INJECTION, EMULSION INTRAVENOUS at 15:23

## 2023-09-27 RX ADMIN — Medication 5.56 MICROGRAM(S)/KG/MIN: at 21:11

## 2023-09-27 RX ADMIN — LEVETIRACETAM 400 MILLIGRAM(S): 250 TABLET, FILM COATED ORAL at 06:26

## 2023-09-27 RX ADMIN — ETOMIDATE 20 MILLIGRAM(S): 2 INJECTION INTRAVENOUS at 10:23

## 2023-09-27 RX ADMIN — KETAMINE HYDROCHLORIDE 1.48 MG/KG/HR: 100 INJECTION INTRAMUSCULAR; INTRAVENOUS at 17:58

## 2023-09-27 RX ADMIN — MIDAZOLAM HYDROCHLORIDE 4 MILLIGRAM(S): 1 INJECTION, SOLUTION INTRAMUSCULAR; INTRAVENOUS at 10:23

## 2023-09-27 RX ADMIN — CHLORHEXIDINE GLUCONATE 1 APPLICATION(S): 213 SOLUTION TOPICAL at 06:16

## 2023-09-27 RX ADMIN — PANTOPRAZOLE SODIUM 40 MILLIGRAM(S): 20 TABLET, DELAYED RELEASE ORAL at 06:19

## 2023-09-27 RX ADMIN — PIPERACILLIN AND TAZOBACTAM 200 GRAM(S): 4; .5 INJECTION, POWDER, LYOPHILIZED, FOR SOLUTION INTRAVENOUS at 13:26

## 2023-09-27 RX ADMIN — Medication 100 MILLIGRAM(S): at 12:59

## 2023-09-27 RX ADMIN — PIPERACILLIN AND TAZOBACTAM 25 GRAM(S): 4; .5 INJECTION, POWDER, LYOPHILIZED, FOR SOLUTION INTRAVENOUS at 21:33

## 2023-09-27 RX ADMIN — Medication 100 MILLIGRAM(S): at 10:23

## 2023-09-27 RX ADMIN — CHLORHEXIDINE GLUCONATE 15 MILLILITER(S): 213 SOLUTION TOPICAL at 17:33

## 2023-09-27 RX ADMIN — Medication 260 MILLIGRAM(S): at 17:31

## 2023-09-27 RX ADMIN — FENTANYL CITRATE 5.93 MICROGRAM(S)/KG/HR: 50 INJECTION INTRAVENOUS at 14:23

## 2023-09-27 RX ADMIN — MIDAZOLAM HYDROCHLORIDE 4 MILLIGRAM(S): 1 INJECTION, SOLUTION INTRAMUSCULAR; INTRAVENOUS at 21:34

## 2023-09-27 RX ADMIN — Medication 250 MILLIGRAM(S): at 17:33

## 2023-09-27 RX ADMIN — DEXMEDETOMIDINE HYDROCHLORIDE IN 0.9% SODIUM CHLORIDE 7.41 MICROGRAM(S)/KG/HR: 4 INJECTION INTRAVENOUS at 21:15

## 2023-09-27 RX ADMIN — Medication 250 MILLIGRAM(S): at 06:19

## 2023-09-27 RX ADMIN — SENNA PLUS 1 TABLET(S): 8.6 TABLET ORAL at 22:20

## 2023-09-27 RX ADMIN — PANTOPRAZOLE SODIUM 40 MILLIGRAM(S): 20 TABLET, DELAYED RELEASE ORAL at 17:59

## 2023-09-27 RX ADMIN — Medication 100 GRAM(S): at 13:52

## 2023-09-27 RX ADMIN — Medication 1 MILLIGRAM(S): at 12:59

## 2023-09-27 RX ADMIN — Medication 130 MILLIGRAM(S): at 23:32

## 2023-09-27 RX ADMIN — PROPOFOL 14.2 MICROGRAM(S)/KG/MIN: 10 INJECTION, EMULSION INTRAVENOUS at 12:58

## 2023-09-27 RX ADMIN — Medication 400 MILLIGRAM(S): at 22:21

## 2023-09-27 RX ADMIN — KETAMINE HYDROCHLORIDE 100 MILLIGRAM(S): 100 INJECTION INTRAMUSCULAR; INTRAVENOUS at 17:45

## 2023-09-27 RX ADMIN — LEVETIRACETAM 400 MILLIGRAM(S): 250 TABLET, FILM COATED ORAL at 17:32

## 2023-09-27 NOTE — DIETITIAN INITIAL EVALUATION ADULT - NSFNSGIIOFT_GEN_A_CORE
09-26-23 @ 07:01  -  09-27-23 @ 07:00  --------------------------------------------------------  OUT:  Total OUT: 0 mL    Total NET: 120 mL

## 2023-09-27 NOTE — PROGRESS NOTE ADULT - ASSESSMENT
· Assessment	  A/P-  28 year old female s/p multiple seixure epsiodes admitted to ICU and intubated for airway protection.    remains on the vent  afebrile today  minimal leukocytosis has resolved  LP - not impressive with no wbc , elevtaed glucose and normal protein  csf gram stain and culture- Negative to date  Blood cx- Neg x 2  trach asp gram stain- gp in pairs, cx not reported yet  covid-19- negative  urine legionella AG - negative    chest Ct - multilobar opacities      plan-  in light of the worsening pulmonary function and most likely aspiration pneumonia and Ct findings agree with zosyn that was initiated by ICU doc.  Also in light of the new chest ct findings advise to continue with  vanco , day #2  keep trough <15.  vent and seizure management as per ICU team.    All labs and imaging and chart notes reviewed.     critical care time 35 minutes.    Jayesh Delgado MD  Infectious Disease Attending    for any questions please do not hesitate to contact me either via teams or by calling [phone]

## 2023-09-27 NOTE — DIETITIAN INITIAL EVALUATION ADULT - PERTINENT MEDS FT
MEDICATIONS  (STANDING):  acetaminophen   IVPB .. 1000 milliGRAM(s) IV Intermittent once  chlorhexidine 0.12% Liquid 15 milliLiter(s) Oral Mucosa every 12 hours  chlorhexidine 2% Cloths 1 Application(s) Topical <User Schedule>  dexMEDEtomidine Infusion 0.5 MICROgram(s)/kG/Hr (7.41 mL/Hr) IV Continuous <Continuous>  enoxaparin Injectable 60 milliGRAM(s) SubCutaneous every 12 hours  etomidate Injectable 20 milliGRAM(s) IV Push once  folic acid Injectable 1 milliGRAM(s) IV Push daily  levETIRAcetam  IVPB 1500 milliGRAM(s) IV Intermittent every 12 hours  LORazepam   Injectable 2 milliGRAM(s) IV Push once  pantoprazole  Injectable 40 milliGRAM(s) IV Push every 12 hours  polyethylene glycol 3350 17 Gram(s) Oral daily  propofol Infusion 40 MICROgram(s)/kG/Min (14.2 mL/Hr) IV Continuous <Continuous>  rocuronium Injectable 100 milliGRAM(s) IV Push once  senna 1 Tablet(s) Oral at bedtime  thiamine Injectable 100 milliGRAM(s) IV Push daily  vancomycin  IVPB 1000 milliGRAM(s) IV Intermittent every 12 hours    MEDICATIONS  (PRN):  ondansetron Injectable 4 milliGRAM(s) IV Push every 6 hours PRN Nausea and/or Vomiting

## 2023-09-27 NOTE — DIETITIAN INITIAL EVALUATION ADULT - OTHER INFO
Per chart review, pt's father c report of pt to consume alcohol daily, but did not drink 2 days PTA per EMS.  Pt was previously on trickle feeding c Jevity 1.2 10 ml/hr(09/25) & placed on regular diet on 09/26-> NPO at present.  Problem diagnosis include aspiration pneumonia, respiratory failure, r/o meningitis.

## 2023-09-27 NOTE — DIETITIAN INITIAL EVALUATION ADULT - ENTERAL
when appropriate, recommend initiation of NGT feeding c Vital HP 30 ml/hr and advance by 5 ml q 8 hours to goal of 45 ml/db=7589 ml, 1080 calories, 94 grams protein, 903 ml free water, 72% RDIs

## 2023-09-27 NOTE — PROGRESS NOTE ADULT - SUBJECTIVE AND OBJECTIVE BOX
[facility]  INFECTIOUS DISEASES   [address] [place] [address]  Tel: [phone]     Fax: [phone]  ==============================================================================  MD Lucio Rose, DO Karly Meneses, NP   ==============================================================================      KEYA RODRIGUEZ  MRN-[id number]  28y ([birth year])      Interval History:    ROS:    [ ] Unobtainable because:  [ ] All other systems negative except as noted    Constitutional: no fever, no chills  Head: no trauma  Eyes: no vision changes, no eye pain  ENT:  no sore throat, no rhinorrhea  Cardiovascular:  no chest pain, no palpitation  Respiratory:  no SOB, no cough  GI:  no abd pain, no vomiting, no diarrhea  urinary: no dysuria, no hematuria, no flank pain  musculoskeletal:  no joint pain, no joint swelling  skin:  no rash  neurology:  no headache, no seizure, no change in mental status  psych: no anxiety, no depression         Allergies  No Known Allergies        ANTIMICROBIALS:  vancomycin  IVPB 1000 every 12 hours        Physical Exam:  Vital Signs Last 24 Hrs  T(C): 36.1 (27 Sep 2023 05:02), Max: 36.7 (26 Sep 2023 10:00)  T(F): 97 (27 Sep 2023 05:02), Max: 98.1 (26 Sep 2023 10:00)  HR: 79 (27 Sep 2023 08:15) (46 - 96)  BP: 109/68 (27 Sep 2023 08:15) (102/85 - 175/121)  BP(mean): 119 (27 Sep 2023 08:00) (92 - 138)  RR: 21 (27 Sep 2023 08:15) (14 - 29)  SpO2: 100% (27 Sep 2023 08:15) (84% - 100%)    Parameters below as of 27 Sep 2023 08:15  Patient On (Oxygen Delivery Method): nasal cannula, high flow  O2 Flow (L/min): 10      09-26-23 @ 07:01  -  09-27-23 @ 07:00  --------------------------------------------------------  IN: 2711.4 mL / OUT: 600 mL / NET: 2111.4 mL    09-27-23 @ 07:01  -  09-27-23 @ 09:28  --------------------------------------------------------  IN: 22.2 mL / OUT: 150 mL / NET: -127.8 mL      General:    NAD,  non toxic  Head: atraumatic, normocephalic  Eye: normal sclera and conjunctiva  ENT:    no oral lesions, neck supple  Cardio:     regular S1, S2,  no murmur  Respiratory:    clear b/l,    no wheezing  abd:     soft,   BS +,   no tenderness  :   no CVAT,  no suprapubic tenderness,   no  johnson  Musculoskeletal:   no joint swelling,   no edema  vascular: no central lines, +PIV   Skin:    no rash  Neurologic:     no focal deficit  psych: normal affect    WBC Count: 9.85 K/uL (09-27 @ 04:00)  WBC Count: 5.93 K/uL (09-26 @ 02:40)  WBC Count: 10.15 K/uL (09-25 @ 13:55)  WBC Count: 11.57 K/uL (09-25 @ 08:50)  WBC Count: 10.65 K/uL (09-25 @ 04:55)                            8.3    9.85  )-----------( 94       ( 27 Sep 2023 04:00 )             26.8       09-27    136  |  111<H>  |  2<L>  ----------------------------<  102<H>  4.3   |  19<L>  |  0.55    Ca    8.5      27 Sep 2023 04:00  Phos  3.2     09-27  Mg     2.0     09-27    TPro  7.3  /  Alb  2.5<L>  /  TBili  0.9  /  DBili  x   /  AST  38<H>  /  ALT  19  /  AlkPhos  71  09-27      Urinalysis Basic - ( 27 Sep 2023 04:00 )    Color: x / Appearance: x / SG: x / pH: x  Gluc: 102 mg/dL / Ketone: x  / Bili: x / Urobili: x   Blood: x / Protein: x / Nitrite: x   Leuk Esterase: x / RBC: x / WBC x   Sq Epi: x / Non Sq Epi: x / Bacteria: x          Creatinine Trend: 0.55<--, 0.53<--, 0.61<--, 0.60<--, 0.72<--, 0.72<--      MICROBIOLOGY:  Vancomycin Level, Trough: 10.3 ug/mL (09-26-23 @ 17:50)  v  ET Tube ET Tube  09-25-23   Normal Respiratory Ligia present  --    Moderate polymorphonuclear leukocytes seen per low power field  No Squamous epithelial cells seen per low power field  Rare Gram positive cocci in pairs seen per oil power field      .CSF CSF  09-25-23   No growth to date  --    No polymorphonuclear leukocytes seen  No organisms seen  by cytocentrifuge      Clean Catch Clean Catch (Midstream)  09-25-23   No growth  --  --      .Blood Blood-Peripheral  09-25-23   No growth at 48 Hours  --  --            Rapid RVP Result: NotDetec (09-25 @ 04:55)                  SARS-CoV-2: NotDetec (09-25-23 @ 04:55)  Rapid RVP Result: NotDetec (09-25-23 @ 04:55)        RADIOLOGY:   [facility]  INFECTIOUS DISEASES   [address] [place] [address]  Tel: [phone]     Fax: [phone]  ==============================================================================  MD Lucio Rose, DO Karly Meneses, NP   ==============================================================================      KEYA RODRIGUEZ  MRN-[id number]  28y ([birth year])      Interval History:  Patient seen and examined in ICU.  overnight events noted.  as per her nurse she had self extubated overnight and was re-intubated again.  blood pressure elevated.  going for chest CT  this morning.      ROS:    unable to obtain as she is intubated and sedated        Allergies  No Known drug Allergies    ANTIMICROBIALS:  vancomycin  IVPB 1000 every 12 hours        Physical Exam:  Vital Signs Last 24 Hrs  T(C): 36.1 (27 Sep 2023 05:02), Max: 36.7 (26 Sep 2023 10:00)  T(F): 97 (27 Sep 2023 05:02), Max: 98.1 (26 Sep 2023 10:00)  HR: 79 (27 Sep 2023 08:15) (46 - 96)  BP: 109/68 (27 Sep 2023 08:15) (102/85 - 175/121)  BP(mean): 119 (27 Sep 2023 08:00) (92 - 138)  RR: 21 (27 Sep 2023 08:15) (14 - 29)  SpO2: 100% (27 Sep 2023 08:15) (84% - 100%)    Parameters below as of 27 Sep 2023 08:15  Patient On (Oxygen Delivery Method): nasal cannula, high flow  O2 Flow (L/min): 10      09-26-23 @ 07:01  -  09-27-23 @ 07:00  --------------------------------------------------------  IN: 2711.4 mL / OUT: 600 mL / NET: 2111.4 mL    09-27-23 @ 07:01  -  09-27-23 @ 09:28  --------------------------------------------------------  IN: 22.2 mL / OUT: 150 mL / NET: -127.8 mL      General:    NAD,  non toxic  Head: atraumatic, normocephalic  Eye: normal sclera and conjunctiva  ENT:    no oral lesions, neck supple  Cardio:     regular S1, S2,  no murmur  Respiratory:    clear b/l,    no wheezing  abd:     soft,   BS +,   no tenderness  :   no CVAT,  no suprapubic tenderness,   no  johnson  Musculoskeletal:   no joint swelling,   no edema  vascular: no central lines, +PIV   Skin:    no rash  Neurologic:     no focal deficit  psych: normal affect    WBC Count: 9.85 K/uL (09-27 @ 04:00)  WBC Count: 5.93 K/uL (09-26 @ 02:40)  WBC Count: 10.15 K/uL (09-25 @ 13:55)  WBC Count: 11.57 K/uL (09-25 @ 08:50)  WBC Count: 10.65 K/uL (09-25 @ 04:55)                            8.3    9.85  )-----------( 94       ( 27 Sep 2023 04:00 )             26.8       09-27    136  |  111<H>  |  2<L>  ----------------------------<  102<H>  4.3   |  19<L>  |  0.55    Ca    8.5      27 Sep 2023 04:00  Phos  3.2     09-27  Mg     2.0     09-27    TPro  7.3  /  Alb  2.5<L>  /  TBili  0.9  /  DBili  x   /  AST  38<H>  /  ALT  19  /  AlkPhos  71  09-27      Urinalysis Basic - ( 27 Sep 2023 04:00 )    Color: x / Appearance: x / SG: x / pH: x  Gluc: 102 mg/dL / Ketone: x  / Bili: x / Urobili: x   Blood: x / Protein: x / Nitrite: x   Leuk Esterase: x / RBC: x / WBC x   Sq Epi: x / Non Sq Epi: x / Bacteria: x          Creatinine Trend: 0.55<--, 0.53<--, 0.61<--, 0.60<--, 0.72<--, 0.72<--      MICROBIOLOGY:  Vancomycin Level, Trough: 10.3 ug/mL (09-26-23 @ 17:50)  v  ET Tube ET Tube  09-25-23   Normal Respiratory Ligia present  --    Moderate polymorphonuclear leukocytes seen per low power field  No Squamous epithelial cells seen per low power field  Rare Gram positive cocci in pairs seen per oil power field      .CSF CSF  09-25-23   No growth to date  --    No polymorphonuclear leukocytes seen  No organisms seen  by cytocentrifuge      Clean Catch Clean Catch (Midstream)  09-25-23   No growth  --  --      .Blood Blood-Peripheral  09-25-23   No growth at 48 Hours  --  --      Rapid RVP Result: NotDetec (09-25 @ 04:55)    SARS-CoV-2: NotDetec (09-25-23 @ 04:55)  Rapid RVP Result: NotDetec (09-25-23 @ 04:55)        RADIOLOGY:    < from: CT Angio Chest PE Protocol w/ IV Cont (09.27.23 @ 11:19) >  IMPRESSION:    No evidence for pulmonary thromboembolism.    Large, consolidative airspace opacities in the right upper lobe,   bilateral lower lobes and to lesser extent involving the right middle   lobe and posterior left upper lobe. Findings are significantly   progressed/essentially new as compared to the prior study.  Differential   considerations include multifocal pneumonia, acute respiratory distress   syndrome (ARDS) versus pulmonary edema.  Recommend clinical correlation   and consider followup imaging to document resolution.    Fluid-filled colon, correlate for diarrhea. No evidence for bowel   obstruction or inflammation. Normal appendix.  No CT evidence for acute   abdominopelvic pathology.    Endotracheal and nasogastric tubes, as above.    Other findings, as above.    --- End of Report ---            SUZE MUNOZ MD; Attending Radiologist  This document has been electronically signed. Sep 27 2023 12:06PM    < end of copied text >

## 2023-09-27 NOTE — PROGRESS NOTE ADULT - ASSESSMENT
28 year old with etoh abuse and seizure dx presents with status epilepticus, now intubated    Plan  -Neuro - keppra for seizures. monitor for etoh withdrawal seziures. propofol for sedation  - Pulm - self extubated and reintubated for aspiration pna. on peep 12. sats improved s/p recruitment manuever. zosyn added for coverage  - CV - goal map greater than 65  - FEN/GI - Tube feeds. monitor lft  RENAL - monitor uop and cr.  Maintain UOP at 0.5 mL/kg/hr.   -ID - dc CTX as LP non concerning for meningitis. will continue vanc per id until trach aspirate cultures resolve. zosyn added for aspiration pna  HEME - trend cbc  DIspo - micu

## 2023-09-27 NOTE — DIETITIAN INITIAL EVALUATION ADULT - PERTINENT LABORATORY DATA
09-27    136  |  111<H>  |  2<L>  ----------------------------<  102<H>  4.3   |  19<L>  |  0.55    Ca    8.5      27 Sep 2023 04:00  Phos  3.2     09-27  Mg     2.0     09-27    TPro  7.3  /  Alb  2.5<L>  /  TBili  0.9  /  DBili  x   /  AST  38<H>  /  ALT  19  /  AlkPhos  71  09-27  POCT Blood Glucose.: 71 mg/dL (09-27-23 @ 03:32)  09/26, Thyroid stimulating Hormone 4.140 uU/ml <H>  09/26, POCT blood Glucose range 52 mg/dL<L>-184 mg/dL<H>

## 2023-09-27 NOTE — PROGRESS NOTE ADULT - SUBJECTIVE AND OBJECTIVE BOX
CC: Patient is a 28y old  Female who presents with a chief complaint of STATUS EPILEPTICUS COMMUNITY AQUIRED PNEUMONIA     (27 Sep 2023 11:24)      ## HPI:HPI:  29 yo f pmhx polysubstance abuse, ETOH abuse, seizure disorder on Keppra presented with seizures. Limited hx available but per ED staff, father endorsed she usually drinks daily but hasn't drank in 2 days.  Patient with 9 back to back seizures with EMS, given Versed 10mg IM and 5mg IV prior to seizures stopping.  In ED patient intubated for airway protection, patient persistent seizures, lactate >17.  Patient also noted to be febrile, LP performed in ED results pending. Patinet sedated on propofol and Versed gtt.  Patient still appeared to be seizing in ED, given additional Versed 10mg IVP. Admit to ICU.  (25 Sep 2023 07:29)      O/N: patient self extubated approximately 8 Pm on 9/26.  reported to be hypoxic overnight. this morning patient very tachyneic complaining of sob.  ABG confirmed hypoxia. patient was intubated for hypoxic resp failure.  treated for PE as that was the leading diagnosis.  EKG did not show ischemia. bedside echo showed mildly reduced EF globally but no right heart strain.  CT chest and belly showed likely aspiration pna.      ## ROS:  complains of sob and chest pain  ## EXAM  ICU Vital Signs Last 24 Hrs  T(C): 36.1 (27 Sep 2023 05:02), Max: 36.1 (26 Sep 2023 21:00)  T(F): 97 (27 Sep 2023 05:02), Max: 97 (26 Sep 2023 21:00)  HR: 90 (27 Sep 2023 12:14) (46 - 113)  BP: 152/129 (27 Sep 2023 10:05) (102/85 - 175/121)  BP(mean): 138 (27 Sep 2023 10:05) (92 - 144)  ABP: --  ABP(mean): --  RR: 23 (27 Sep 2023 10:15) (14 - 32)  SpO2: 96% (27 Sep 2023 12:14) (70% - 100%)    O2 Parameters below as of 27 Sep 2023 08:15  Patient On (Oxygen Delivery Method): nasal cannula, high flow  O2 Flow (L/min): 10        CON : severe distress  EENT : EOMI, MMM  NECK : Full ROM  RESP : iincreased wob. decreased breath sounds in lung bases  CARD : rrr no m/r/g  ABD : S NT ND NABS no rebound  EXT : No edema  NEURO : AAOX3   ## Labs:  Lab Results:  CBC  CBC Full  -  ( 27 Sep 2023 12:05 )  WBC Count : 10.51 K/uL  RBC Count : 3.51 M/uL  Hemoglobin : 8.7 g/dL  Hematocrit : 27.9 %  Platelet Count - Automated : 105 K/uL  Mean Cell Volume : 79.5 fl  Mean Cell Hemoglobin : 24.8 pg  Mean Cell Hemoglobin Concentration : 31.2 g/dL  Auto Neutrophil # : x  Auto Lymphocyte # : x  Auto Monocyte # : x  Auto Eosinophil # : x  Auto Basophil # : x  Auto Neutrophil % : x  Auto Lymphocyte % : x  Auto Monocyte % : x  Auto Eosinophil % : x  Auto Basophil % : x    .		Differential:	[] Automated		[] Manual  Chemistry                        8.7    10.51 )-----------( 105      ( 27 Sep 2023 12:05 )             27.9     09-27    134<L>  |  107  |  3<L>  ----------------------------<  96  3.8   |  18<L>  |  0.52    Ca    8.6      27 Sep 2023 12:05  Phos  4.0     09-27  Mg     1.7     09-27    TPro  7.5  /  Alb  2.4<L>  /  TBili  0.9  /  DBili  x   /  AST  36  /  ALT  17  /  AlkPhos  76  09-27    LIVER FUNCTIONS - ( 27 Sep 2023 12:05 )  Alb: 2.4 g/dL / Pro: 7.5 gm/dL / ALK PHOS: 76 U/L / ALT: 17 U/L / AST: 36 U/L / GGT: x           PT/INR - ( 27 Sep 2023 12:05 )   PT: 11.3 sec;   INR: 0.94 ratio         PTT - ( 27 Sep 2023 12:05 )  PTT:36.7 sec  Urinalysis Basic - ( 27 Sep 2023 12:05 )    Color: x / Appearance: x / SG: x / pH: x  Gluc: 96 mg/dL / Ketone: x  / Bili: x / Urobili: x   Blood: x / Protein: x / Nitrite: x   Leuk Esterase: x / RBC: x / WBC x   Sq Epi: x / Non Sq Epi: x / Bacteria: x        ABG - ( 27 Sep 2023 10:40 )  pH, Arterial: 7.38  pH, Blood: x     /  pCO2: 29    /  pO2: 57    / HCO3: 17    / Base Excess: -6.9  /  SaO2: 86.2              Lactate, Blood: 1.2 mmol/L (09-27-23 @ 12:05)      Lactate, Blood: 1.2 mmol/L (09-27-23 @ 12:05)      MICROBIOLOGY/CULTURES:  Culture Results:   Normal Respiratory Ligia present (09-25 @ 11:05)  Culture Results:   No growth to date (09-25 @ 06:11)  Culture Results:   No growth (09-25 @ 05:25)  Culture Results:   No growth at 48 Hours (09-25 @ 04:55)  Culture Results:   No growth at 48 Hours (09-25 @ 04:55)      RADIOLOGY RESULTS:  < from: CT Angio Chest PE Protocol w/ IV Cont (09.27.23 @ 11:19) >  IMPRESSION:    No evidence for pulmonary thromboembolism.    Large, consolidative airspace opacities in the right upper lobe,   bilateral lower lobes and to lesser extent involving the right middle   lobe and posterior left upper lobe. Findings are significantly   progressed/essentially new as compared to the prior study.  Differential   considerations include multifocal pneumonia, acute respiratory distress   syndrome (ARDS) versus pulmonary edema.  Recommend clinical correlation   and consider followup imaging to document resolution.    Fluid-filled colon, correlate for diarrhea. No evidence for bowel   obstruction or inflammation. Normal appendix.  No CT evidence for acute   abdominopelvic pathology.    Endotracheal and nasogastric tubes, as above.    < end of copied text >        ## Medications:  MEDICATIONS  (STANDING):  acetaminophen   IVPB .. 1000 milliGRAM(s) IV Intermittent once  chlorhexidine 0.12% Liquid 15 milliLiter(s) Oral Mucosa every 12 hours  chlorhexidine 2% Cloths 1 Application(s) Topical <User Schedule>  dexMEDEtomidine Infusion 0.5 MICROgram(s)/kG/Hr (7.41 mL/Hr) IV Continuous <Continuous>  enoxaparin Injectable 60 milliGRAM(s) SubCutaneous every 12 hours  folic acid Injectable 1 milliGRAM(s) IV Push daily  levETIRAcetam  IVPB 1500 milliGRAM(s) IV Intermittent every 12 hours  magnesium sulfate  IVPB 1 Gram(s) IV Intermittent once  pantoprazole  Injectable 40 milliGRAM(s) IV Push every 12 hours  piperacillin/tazobactam IVPB. 3.375 Gram(s) IV Intermittent once  piperacillin/tazobactam IVPB.- 3.375 Gram(s) IV Intermittent once  polyethylene glycol 3350 17 Gram(s) Oral daily  propofol Infusion 40 MICROgram(s)/kG/Min (14.2 mL/Hr) IV Continuous <Continuous>  senna 1 Tablet(s) Oral at bedtime  thiamine Injectable 100 milliGRAM(s) IV Push daily  vancomycin  IVPB 1000 milliGRAM(s) IV Intermittent every 12 hours    ## O/E:I&O's Summary    26 Sep 2023 07:01  -  27 Sep 2023 07:00  --------------------------------------------------------  IN: 2711.4 mL / OUT: 600 mL / NET: 2111.4 mL    27 Sep 2023 07:01  -  27 Sep 2023 13:05  --------------------------------------------------------  IN: 120 mL / OUT: 400 mL / NET: -280 mL        POCUS : decreased global lv function .no fwma. no effusion no rv strain  DVT PPX lovenox  ## Code status:  Goals of care discussion: [] yes [ ] no  [x] full code  [ ] DNR  [ ] DNI  [ ] CONNER

## 2023-09-27 NOTE — PHARMACOTHERAPY INTERVENTION NOTE - COMMENTS
As per patient weight 59.3 kg and recommended max daily dose to be 15 mg/kg, recommended lowering the dose.  Recommendation not accepted, patient direly needs the dose. Will closely monitor patient.

## 2023-09-28 LAB
ALBUMIN SERPL ELPH-MCNC: 2.2 G/DL — LOW (ref 3.3–5)
ALP SERPL-CCNC: 77 U/L — SIGNIFICANT CHANGE UP (ref 40–120)
ALT FLD-CCNC: 15 U/L — SIGNIFICANT CHANGE UP (ref 12–78)
ANION GAP SERPL CALC-SCNC: 5 MMOL/L — SIGNIFICANT CHANGE UP (ref 5–17)
AST SERPL-CCNC: 21 U/L — SIGNIFICANT CHANGE UP (ref 15–37)
BASOPHILS # BLD AUTO: 0.07 K/UL — SIGNIFICANT CHANGE UP (ref 0–0.2)
BASOPHILS NFR BLD AUTO: 0.7 % — SIGNIFICANT CHANGE UP (ref 0–2)
BILIRUB SERPL-MCNC: 0.7 MG/DL — SIGNIFICANT CHANGE UP (ref 0.2–1.2)
BUN SERPL-MCNC: 1 MG/DL — LOW (ref 7–23)
CALCIUM SERPL-MCNC: 8.2 MG/DL — LOW (ref 8.5–10.1)
CHLORIDE SERPL-SCNC: 112 MMOL/L — HIGH (ref 96–108)
CO2 SERPL-SCNC: 22 MMOL/L — SIGNIFICANT CHANGE UP (ref 22–31)
CREAT SERPL-MCNC: 0.66 MG/DL — SIGNIFICANT CHANGE UP (ref 0.5–1.3)
CULTURE RESULTS: NO GROWTH — SIGNIFICANT CHANGE UP
EGFR: 121 ML/MIN/1.73M2 — SIGNIFICANT CHANGE UP
EOSINOPHIL # BLD AUTO: 0.16 K/UL — SIGNIFICANT CHANGE UP (ref 0–0.5)
EOSINOPHIL NFR BLD AUTO: 1.6 % — SIGNIFICANT CHANGE UP (ref 0–6)
GAS PNL BLDA: SIGNIFICANT CHANGE UP
GLUCOSE BLDC GLUCOMTR-MCNC: 105 MG/DL — HIGH (ref 70–99)
GLUCOSE BLDC GLUCOMTR-MCNC: 111 MG/DL — HIGH (ref 70–99)
GLUCOSE BLDC GLUCOMTR-MCNC: 116 MG/DL — HIGH (ref 70–99)
GLUCOSE BLDC GLUCOMTR-MCNC: 98 MG/DL — SIGNIFICANT CHANGE UP (ref 70–99)
GLUCOSE SERPL-MCNC: 97 MG/DL — SIGNIFICANT CHANGE UP (ref 70–99)
GRAM STN FLD: SIGNIFICANT CHANGE UP
HCT VFR BLD CALC: 26.5 % — LOW (ref 34.5–45)
HGB BLD-MCNC: 8 G/DL — LOW (ref 11.5–15.5)
IMM GRANULOCYTES NFR BLD AUTO: 0.5 % — SIGNIFICANT CHANGE UP (ref 0–0.9)
LYMPHOCYTES # BLD AUTO: 1.78 K/UL — SIGNIFICANT CHANGE UP (ref 1–3.3)
LYMPHOCYTES # BLD AUTO: 18.3 % — SIGNIFICANT CHANGE UP (ref 13–44)
MAGNESIUM SERPL-MCNC: 2.2 MG/DL — SIGNIFICANT CHANGE UP (ref 1.6–2.6)
MCHC RBC-ENTMCNC: 24.2 PG — LOW (ref 27–34)
MCHC RBC-ENTMCNC: 30.2 G/DL — LOW (ref 32–36)
MCV RBC AUTO: 80.3 FL — SIGNIFICANT CHANGE UP (ref 80–100)
MONOCYTES # BLD AUTO: 0.74 K/UL — SIGNIFICANT CHANGE UP (ref 0–0.9)
MONOCYTES NFR BLD AUTO: 7.6 % — SIGNIFICANT CHANGE UP (ref 2–14)
NEUTROPHILS # BLD AUTO: 6.94 K/UL — SIGNIFICANT CHANGE UP (ref 1.8–7.4)
NEUTROPHILS NFR BLD AUTO: 71.3 % — SIGNIFICANT CHANGE UP (ref 43–77)
NRBC # BLD: 0 /100 WBCS — SIGNIFICANT CHANGE UP (ref 0–0)
PHOSPHATE SERPL-MCNC: 4.6 MG/DL — HIGH (ref 2.5–4.5)
PLATELET # BLD AUTO: 129 K/UL — LOW (ref 150–400)
POTASSIUM SERPL-MCNC: 3.4 MMOL/L — LOW (ref 3.5–5.3)
POTASSIUM SERPL-SCNC: 3.4 MMOL/L — LOW (ref 3.5–5.3)
PROT SERPL-MCNC: 6.9 GM/DL — SIGNIFICANT CHANGE UP (ref 6–8.3)
RBC # BLD: 3.3 M/UL — LOW (ref 3.8–5.2)
RBC # FLD: 23.3 % — HIGH (ref 10.3–14.5)
SODIUM SERPL-SCNC: 139 MMOL/L — SIGNIFICANT CHANGE UP (ref 135–145)
SPECIMEN SOURCE: SIGNIFICANT CHANGE UP
VANCOMYCIN FLD-MCNC: 11.8 UG/ML — SIGNIFICANT CHANGE UP
WBC # BLD: 9.74 K/UL — SIGNIFICANT CHANGE UP (ref 3.8–10.5)
WBC # FLD AUTO: 9.74 K/UL — SIGNIFICANT CHANGE UP (ref 3.8–10.5)
WNV IGG CSF IA-ACNC: NEGATIVE — SIGNIFICANT CHANGE UP
WNV IGM CSF IA-ACNC: NEGATIVE — SIGNIFICANT CHANGE UP

## 2023-09-28 PROCEDURE — 93010 ELECTROCARDIOGRAM REPORT: CPT

## 2023-09-28 PROCEDURE — 99291 CRITICAL CARE FIRST HOUR: CPT

## 2023-09-28 PROCEDURE — 93306 TTE W/DOPPLER COMPLETE: CPT | Mod: 26

## 2023-09-28 PROCEDURE — 93308 TTE F-UP OR LMTD: CPT

## 2023-09-28 RX ORDER — ACETAMINOPHEN 500 MG
650 TABLET ORAL EVERY 6 HOURS
Refills: 0 | Status: DISCONTINUED | OUTPATIENT
Start: 2023-09-28 | End: 2023-10-06

## 2023-09-28 RX ORDER — POTASSIUM CHLORIDE 20 MEQ
10 PACKET (EA) ORAL
Refills: 0 | Status: COMPLETED | OUTPATIENT
Start: 2023-09-28 | End: 2023-09-28

## 2023-09-28 RX ADMIN — Medication 130 MILLIGRAM(S): at 11:34

## 2023-09-28 RX ADMIN — Medication 260 MILLIGRAM(S): at 07:30

## 2023-09-28 RX ADMIN — Medication 250 MILLIGRAM(S): at 06:13

## 2023-09-28 RX ADMIN — DEXMEDETOMIDINE HYDROCHLORIDE IN 0.9% SODIUM CHLORIDE 7.41 MICROGRAM(S)/KG/HR: 4 INJECTION INTRAVENOUS at 07:39

## 2023-09-28 RX ADMIN — KETAMINE HYDROCHLORIDE 1.48 MG/KG/HR: 100 INJECTION INTRAMUSCULAR; INTRAVENOUS at 07:40

## 2023-09-28 RX ADMIN — LEVETIRACETAM 400 MILLIGRAM(S): 250 TABLET, FILM COATED ORAL at 05:17

## 2023-09-28 RX ADMIN — LEVETIRACETAM 400 MILLIGRAM(S): 250 TABLET, FILM COATED ORAL at 17:16

## 2023-09-28 RX ADMIN — Medication 5.56 MICROGRAM(S)/KG/MIN: at 07:39

## 2023-09-28 RX ADMIN — Medication 100 MILLIEQUIVALENT(S): at 06:13

## 2023-09-28 RX ADMIN — KETAMINE HYDROCHLORIDE 1.48 MG/KG/HR: 100 INJECTION INTRAMUSCULAR; INTRAVENOUS at 17:43

## 2023-09-28 RX ADMIN — Medication 130 MILLIGRAM(S): at 05:18

## 2023-09-28 RX ADMIN — PROPOFOL 14.2 MICROGRAM(S)/KG/MIN: 10 INJECTION, EMULSION INTRAVENOUS at 21:35

## 2023-09-28 RX ADMIN — PIPERACILLIN AND TAZOBACTAM 25 GRAM(S): 4; .5 INJECTION, POWDER, LYOPHILIZED, FOR SOLUTION INTRAVENOUS at 05:51

## 2023-09-28 RX ADMIN — SENNA PLUS 1 TABLET(S): 8.6 TABLET ORAL at 21:35

## 2023-09-28 RX ADMIN — Medication 1 MILLIGRAM(S): at 11:49

## 2023-09-28 RX ADMIN — Medication 100 MILLIGRAM(S): at 11:50

## 2023-09-28 RX ADMIN — PANTOPRAZOLE SODIUM 40 MILLIGRAM(S): 20 TABLET, DELAYED RELEASE ORAL at 06:13

## 2023-09-28 RX ADMIN — PIPERACILLIN AND TAZOBACTAM 25 GRAM(S): 4; .5 INJECTION, POWDER, LYOPHILIZED, FOR SOLUTION INTRAVENOUS at 21:36

## 2023-09-28 RX ADMIN — Medication 100 MILLIEQUIVALENT(S): at 04:40

## 2023-09-28 RX ADMIN — CHLORHEXIDINE GLUCONATE 1 APPLICATION(S): 213 SOLUTION TOPICAL at 05:52

## 2023-09-28 RX ADMIN — Medication 650 MILLIGRAM(S): at 19:09

## 2023-09-28 RX ADMIN — Medication 1000 MILLIGRAM(S): at 03:17

## 2023-09-28 RX ADMIN — Medication 130 MILLIGRAM(S): at 17:16

## 2023-09-28 RX ADMIN — DEXMEDETOMIDINE HYDROCHLORIDE IN 0.9% SODIUM CHLORIDE 7.41 MICROGRAM(S)/KG/HR: 4 INJECTION INTRAVENOUS at 12:52

## 2023-09-28 RX ADMIN — ENOXAPARIN SODIUM 40 MILLIGRAM(S): 100 INJECTION SUBCUTANEOUS at 05:18

## 2023-09-28 RX ADMIN — CHLORHEXIDINE GLUCONATE 15 MILLILITER(S): 213 SOLUTION TOPICAL at 05:17

## 2023-09-28 RX ADMIN — PIPERACILLIN AND TAZOBACTAM 25 GRAM(S): 4; .5 INJECTION, POWDER, LYOPHILIZED, FOR SOLUTION INTRAVENOUS at 13:01

## 2023-09-28 RX ADMIN — Medication 250 MILLIGRAM(S): at 17:18

## 2023-09-28 RX ADMIN — CHLORHEXIDINE GLUCONATE 15 MILLILITER(S): 213 SOLUTION TOPICAL at 17:15

## 2023-09-28 RX ADMIN — DEXMEDETOMIDINE HYDROCHLORIDE IN 0.9% SODIUM CHLORIDE 7.41 MICROGRAM(S)/KG/HR: 4 INJECTION INTRAVENOUS at 19:54

## 2023-09-28 RX ADMIN — PROPOFOL 14.2 MICROGRAM(S)/KG/MIN: 10 INJECTION, EMULSION INTRAVENOUS at 07:39

## 2023-09-28 RX ADMIN — PANTOPRAZOLE SODIUM 40 MILLIGRAM(S): 20 TABLET, DELAYED RELEASE ORAL at 17:18

## 2023-09-28 RX ADMIN — POLYETHYLENE GLYCOL 3350 17 GRAM(S): 17 POWDER, FOR SOLUTION ORAL at 11:34

## 2023-09-28 RX ADMIN — DEXMEDETOMIDINE HYDROCHLORIDE IN 0.9% SODIUM CHLORIDE 7.41 MICROGRAM(S)/KG/HR: 4 INJECTION INTRAVENOUS at 22:06

## 2023-09-28 RX ADMIN — DEXMEDETOMIDINE HYDROCHLORIDE IN 0.9% SODIUM CHLORIDE 7.41 MICROGRAM(S)/KG/HR: 4 INJECTION INTRAVENOUS at 14:56

## 2023-09-28 RX ADMIN — DEXMEDETOMIDINE HYDROCHLORIDE IN 0.9% SODIUM CHLORIDE 7.41 MICROGRAM(S)/KG/HR: 4 INJECTION INTRAVENOUS at 00:05

## 2023-09-28 RX ADMIN — Medication 100 MILLIEQUIVALENT(S): at 05:18

## 2023-09-28 RX ADMIN — Medication 260 MILLIGRAM(S): at 16:20

## 2023-09-28 NOTE — PROGRESS NOTE ADULT - SUBJECTIVE AND OBJECTIVE BOX
[facility]  INFECTIOUS DISEASES   [address] [place] [address]  Tel: [phone]     Fax: [phone]  ==============================================================================  MD Lucio Rose, DO Karly Meneses, NP   ==============================================================================      KEYA RODRIGUEZ  MRN-[id number]  28y ([birth year])      Interval History:    ROS:    [ ] Unobtainable because:  [ ] All other systems negative except as noted    Constitutional: no fever, no chills  Head: no trauma  Eyes: no vision changes, no eye pain  ENT:  no sore throat, no rhinorrhea  Cardiovascular:  no chest pain, no palpitation  Respiratory:  no SOB, no cough  GI:  no abd pain, no vomiting, no diarrhea  urinary: no dysuria, no hematuria, no flank pain  musculoskeletal:  no joint pain, no joint swelling  skin:  no rash  neurology:  no headache, no seizure, no change in mental status  psych: no anxiety, no depression         Allergies  No Known Allergies        ANTIMICROBIALS:  piperacillin/tazobactam IVPB.. 3.375 every 8 hours  vancomycin  IVPB 1000 every 12 hours        Physical Exam:  Vital Signs Last 24 Hrs  T(C): 37.7 (28 Sep 2023 09:00), Max: 38.5 (27 Sep 2023 21:45)  T(F): 99.9 (28 Sep 2023 09:00), Max: 101.3 (27 Sep 2023 21:45)  HR: 76 (28 Sep 2023 09:00) (74 - 113)  BP: 113/86 (28 Sep 2023 09:00) (74/56 - 170/131)  BP(mean): 95 (28 Sep 2023 09:00) (63 - 144)  RR: 23 (28 Sep 2023 09:00) (15 - 32)  SpO2: 100% (28 Sep 2023 07:00) (70% - 105%)    Parameters below as of 28 Sep 2023 07:00  Patient On (Oxygen Delivery Method): ventilator    O2 Concentration (%): 65    09-27-23 @ 07:01  -  09-28-23 @ 07:00  --------------------------------------------------------  IN: 2457.1 mL / OUT: 1655 mL / NET: 802.1 mL    09-28-23 @ 07:01  -  09-28-23 @ 09:44  --------------------------------------------------------  IN: 71.1 mL / OUT: 0 mL / NET: 71.1 mL      General:    NAD,  non toxic  Head: atraumatic, normocephalic  Eye: normal sclera and conjunctiva  ENT:    no oral lesions, neck supple  Cardio:     regular S1, S2,  no murmur  Respiratory:    clear b/l,    no wheezing  abd:     soft,   BS +,   no tenderness  :   no CVAT,  no suprapubic tenderness,   no  johnson  Musculoskeletal:   no joint swelling,   no edema  vascular: no central lines, +PIV   Skin:    no rash  Neurologic:     no focal deficit  psych: normal affect    WBC Count: 9.74 K/uL (09-28 @ 03:05)  WBC Count: 10.51 K/uL (09-27 @ 12:05)  WBC Count: 9.85 K/uL (09-27 @ 04:00)  WBC Count: 5.93 K/uL (09-26 @ 02:40)  WBC Count: 10.15 K/uL (09-25 @ 13:55)  WBC Count: 11.57 K/uL (09-25 @ 08:50)  WBC Count: 10.65 K/uL (09-25 @ 04:55)                            8.0    9.74  )-----------( 129      ( 28 Sep 2023 03:05 )             26.5       09-28    139  |  112<H>  |  1<L>  ----------------------------<  97  3.4<L>   |  22  |  0.66    Ca    8.2<L>      28 Sep 2023 03:05  Phos  4.6     09-28  Mg     2.2     09-28    TPro  6.9  /  Alb  2.2<L>  /  TBili  0.7  /  DBili  x   /  AST  21  /  ALT  15  /  AlkPhos  77  09-28      Urinalysis Basic - ( 28 Sep 2023 03:05 )    Color: x / Appearance: x / SG: x / pH: x  Gluc: 97 mg/dL / Ketone: x  / Bili: x / Urobili: x   Blood: x / Protein: x / Nitrite: x   Leuk Esterase: x / RBC: x / WBC x   Sq Epi: x / Non Sq Epi: x / Bacteria: x        Lipase: 31 U/L (09-27-23 @ 12:05)    Creatinine Trend: 0.66<--, 0.52<--, 0.55<--, 0.53<--, 0.61<--, 0.60<--  Blood Gas Arterial, Lactate: 1.30 mmol/L (09-28-23 @ 08:57)  Blood Gas Arterial, Lactate: 1.20 mmol/L (09-27-23 @ 17:24)  Lactate, Blood: 1.2 mmol/L (09-27-23 @ 12:05)  Blood Gas Arterial, Lactate: 1.60 mmol/L (09-27-23 @ 10:40)  Blood Gas Arterial, Lactate: 3.00 mmol/L (09-27-23 @ 09:56)      MICROBIOLOGY:  Vancomycin Level, Random: 11.8 ug/mL (09-28-23 @ 03:05)  v  ET Tube ET Tube  09-25-23   Normal Respiratory Ligia present  --    Moderate polymorphonuclear leukocytes seen per low power field  No Squamous epithelial cells seen per low power field  Rare Gram positive cocci in pairs seen per oil power field      .CSF CSF  09-25-23   No growth  --    No polymorphonuclear leukocytes seen  No organisms seen  by cytocentrifuge      Clean Catch Clean Catch (Midstream)  09-25-23   No growth  --  --      .Blood Blood-Peripheral  09-25-23   No growth at 72 Hours  --  --            Rapid RVP Result: NotDetec (09-25 @ 04:55)                  SARS-CoV-2: NotDetec (09-25-23 @ 04:55)  Rapid RVP Result: NotDetec (09-25-23 @ 04:55)        RADIOLOGY:   [facility]  INFECTIOUS DISEASES   [address] [place] [address]  Tel: [phone]     Fax: [phone]  ==============================================================================  MD Lucio Rose, DO Karly Meneses, NP   ==============================================================================      KEYA RODRIGUEZ  MRN-[id number]  28y ([birth year])      Interval History:    Patient seen and examined in ICU.  remains intubated .  afebrile  on low dose pressor for BP support as per ICU team.      ROS:    [ ] Unobtainable because: patient intubated and sedated        Allergies  No Known Allergies        ANTIMICROBIALS:  piperacillin/tazobactam IVPB.. 3.375 every 8 hours  vancomycin  IVPB 1000 every 12 hours        Physical Exam:  Vital Signs Last 24 Hrs  T(C): 37.7 (28 Sep 2023 09:00), Max: 38.5 (27 Sep 2023 21:45)  T(F): 99.9 (28 Sep 2023 09:00), Max: 101.3 (27 Sep 2023 21:45)  HR: 76 (28 Sep 2023 09:00) (74 - 113)  BP: 113/86 (28 Sep 2023 09:00) (74/56 - 170/131)  BP(mean): 95 (28 Sep 2023 09:00) (63 - 144)  RR: 23 (28 Sep 2023 09:00) (15 - 32)  SpO2: 100% (28 Sep 2023 07:00) (70% - 105%)    Parameters below as of 28 Sep 2023 07:00  Patient On (Oxygen Delivery Method): ventilator    O2 Concentration (%): 65    09-27-23 @ 07:01  -  09-28-23 @ 07:00  --------------------------------------------------------  IN: 2457.1 mL / OUT: 1655 mL / NET: 802.1 mL    09-28-23 @ 07:01  -  09-28-23 @ 09:44  --------------------------------------------------------  IN: 71.1 mL / OUT: 0 mL / NET: 71.1 mL      General:   on the vent and sedated  ENT:    ET and OGT in place  Cardio:    s1s2 tachycardia,  no murmur  Respiratory:    coarse breath sounds bilaterally, on the vent  abd:     soft,   BS +,   no tenderness, ND  Musculoskeletal:   no joint swelling,   no edema  Neurologic:     sedated      WBC Count: 9.74 K/uL (09-28 @ 03:05)  WBC Count: 10.51 K/uL (09-27 @ 12:05)  WBC Count: 9.85 K/uL (09-27 @ 04:00)  WBC Count: 5.93 K/uL (09-26 @ 02:40)  WBC Count: 10.15 K/uL (09-25 @ 13:55)  WBC Count: 11.57 K/uL (09-25 @ 08:50)  WBC Count: 10.65 K/uL (09-25 @ 04:55)                            8.0    9.74  )-----------( 129      ( 28 Sep 2023 03:05 )             26.5       09-28    139  |  112<H>  |  1<L>  ----------------------------<  97  3.4<L>   |  22  |  0.66    Ca    8.2<L>      28 Sep 2023 03:05  Phos  4.6     09-28  Mg     2.2     09-28    TPro  6.9  /  Alb  2.2<L>  /  TBili  0.7  /  DBili  x   /  AST  21  /  ALT  15  /  AlkPhos  77  09-28      Urinalysis Basic - ( 28 Sep 2023 03:05 )    Color: x / Appearance: x / SG: x / pH: x  Gluc: 97 mg/dL / Ketone: x  / Bili: x / Urobili: x   Blood: x / Protein: x / Nitrite: x   Leuk Esterase: x / RBC: x / WBC x   Sq Epi: x / Non Sq Epi: x / Bacteria: x        Lipase: 31 U/L (09-27-23 @ 12:05)    Creatinine Trend: 0.66<--, 0.52<--, 0.55<--, 0.53<--, 0.61<--, 0.60<--  Blood Gas Arterial, Lactate: 1.30 mmol/L (09-28-23 @ 08:57)  Blood Gas Arterial, Lactate: 1.20 mmol/L (09-27-23 @ 17:24)  Lactate, Blood: 1.2 mmol/L (09-27-23 @ 12:05)  Blood Gas Arterial, Lactate: 1.60 mmol/L (09-27-23 @ 10:40)  Blood Gas Arterial, Lactate: 3.00 mmol/L (09-27-23 @ 09:56)      MICROBIOLOGY:  Vancomycin Level, Random: 11.8 ug/mL (09-28-23 @ 03:05)  v  ET Tube ET Tube  09-25-23   Normal Respiratory Ligia present  --    Moderate polymorphonuclear leukocytes seen per low power field  No Squamous epithelial cells seen per low power field  Rare Gram positive cocci in pairs seen per oil power field      .CSF CSF  09-25-23   No growth  --    No polymorphonuclear leukocytes seen  No organisms seen  by cytocentrifuge      Clean Catch Clean Catch (Midstream)  09-25-23   No growth  --  --      .Blood Blood-Peripheral  09-25-23   No growth at 72 Hours  --  --        Rapid RVP Result: NotDetec (09-25 @ 04:55)        SARS-CoV-2: NotDetec (09-25-23 @ 04:55)  Rapid RVP Result: NotDetec (09-25-23 @ 04:55)        RADIOLOGY:  < from: CT Abdomen and Pelvis w/ IV Cont (09.27.23 @ 11:19) >  IMPRESSION:    No evidence for pulmonary thromboembolism.    Large, consolidative airspace opacities in the right upper lobe,   bilateral lower lobes and to lesser extent involving the right middle   lobe and posterior left upper lobe. Findings are significantly   progressed/essentially new as compared to the prior study.  Differential   considerations include multifocal pneumonia, acute respiratory distress   syndrome (ARDS) versus pulmonary edema.  Recommend clinical correlation   and consider followup imaging to document resolution.    Fluid-filled colon, correlate for diarrhea. No evidence for bowel   obstruction or inflammation. Normal appendix.  No CT evidence for acute   abdominopelvic pathology.    Endotracheal and nasogastric tubes, as above.    Other findings, as above.    --- End of Report ---            SUZE MUNOZ MD; Attending Radiologist  This document has been electronically signed. Sep 27 2023 12:06PM    < end of copied text >

## 2023-09-28 NOTE — PROGRESS NOTE ADULT - ASSESSMENT
28 year old with etoh abuse and seizure dx presents with status epilepticus, now intubated    Plan  -Neuro - keppra for seizures. monitor for etoh withdrawal seizures. propofol/ketamine for sedation  - Pulm - self extubated and reintubated for aspiration pna. on peep 8. sats improved s/p recruitment manuever. zosyn added for coverage. overall improved oxygenation wean vent as tolerated  - CV - goal map greater than 65  - FEN/GI - Tube feeds. monitor lft  RENAL - monitor uop and cr.  Maintain UOP at 0.5 mL/kg/hr.   -ID - zosyn added (day2) for aspiration pna  HEME - trend cbc  DIspo - micu

## 2023-09-28 NOTE — PROGRESS NOTE ADULT - SUBJECTIVE AND OBJECTIVE BOX
CC: Patient is a 28y old  Female who presents with a chief complaint of Status Epilepticus (28 Sep 2023 09:44)      ## HPI:HPI:  27 yo f pmhx polysubstance abuse, ETOH abuse, seizure disorder on Keppra presented with seizures. Limited hx available but per ED staff, father endorsed she usually drinks daily but hasn't drank in 2 days.  Patient with 9 back to back seizures with EMS, given Versed 10mg IM and 5mg IV prior to seizures stopping.  In ED patient intubated for airway protection, patient persistent seizures, lactate >17.  Patient also noted to be febrile, LP performed in ED results pending. Patinet sedated on propofol and Versed gtt.  Patient still appeared to be seizing in ED, given additional Versed 10mg IVP. Admit to ICU.  (25 Sep 2023 07:29)      O/N: vent settings improved. intermittentl agitated on the vent    ## ROS:  unable to assess ros due to mental status   ## EXAM  ICU Vital Signs Last 24 Hrs  T(C): 38 (28 Sep 2023 12:30), Max: 38.5 (27 Sep 2023 21:45)  T(F): 100.4 (28 Sep 2023 12:30), Max: 101.3 (27 Sep 2023 21:45)  HR: 82 (28 Sep 2023 12:30) (74 - 112)  BP: 96/66 (28 Sep 2023 12:30) (74/56 - 130/93)  BP(mean): 76 (28 Sep 2023 12:30) (63 - 106)  ABP: --  ABP(mean): --  RR: 16 (28 Sep 2023 12:30) (16 - 28)  SpO2: 98% (28 Sep 2023 09:58) (96% - 105%)    O2 Parameters below as of 28 Sep 2023 07:00  Patient On (Oxygen Delivery Method): ventilator    O2 Concentration (%): 65      CON : NAD  EENT : EOMI, MMM  NECK : Full ROM  RESP : CTAB no increased WOB  CARD : rrr no m/r/g  ABD : S NT ND NABS no rebound  EXT : No edema  NEURO : intermittently agitated. + pupils and gag  ## Labs:  Lab Results:  CBC  CBC Full  -  ( 28 Sep 2023 03:05 )  WBC Count : 9.74 K/uL  RBC Count : 3.30 M/uL  Hemoglobin : 8.0 g/dL  Hematocrit : 26.5 %  Platelet Count - Automated : 129 K/uL  Mean Cell Volume : 80.3 fl  Mean Cell Hemoglobin : 24.2 pg  Mean Cell Hemoglobin Concentration : 30.2 g/dL  Auto Neutrophil # : 6.94 K/uL  Auto Lymphocyte # : 1.78 K/uL  Auto Monocyte # : 0.74 K/uL  Auto Eosinophil # : 0.16 K/uL  Auto Basophil # : 0.07 K/uL  Auto Neutrophil % : 71.3 %  Auto Lymphocyte % : 18.3 %  Auto Monocyte % : 7.6 %  Auto Eosinophil % : 1.6 %  Auto Basophil % : 0.7 %    .		Differential:	[] Automated		[] Manual  Chemistry                        8.0    9.74  )-----------( 129      ( 28 Sep 2023 03:05 )             26.5     09-28    139  |  112<H>  |  1<L>  ----------------------------<  97  3.4<L>   |  22  |  0.66    Ca    8.2<L>      28 Sep 2023 03:05  Phos  4.6     09-28  Mg     2.2     09-28    TPro  6.9  /  Alb  2.2<L>  /  TBili  0.7  /  DBili  x   /  AST  21  /  ALT  15  /  AlkPhos  77  09-28    LIVER FUNCTIONS - ( 28 Sep 2023 03:05 )  Alb: 2.2 g/dL / Pro: 6.9 gm/dL / ALK PHOS: 77 U/L / ALT: 15 U/L / AST: 21 U/L / GGT: x           PT/INR - ( 27 Sep 2023 12:05 )   PT: 11.3 sec;   INR: 0.94 ratio         PTT - ( 27 Sep 2023 12:05 )  PTT:36.7 sec  Urinalysis Basic - ( 28 Sep 2023 03:05 )    Color: x / Appearance: x / SG: x / pH: x  Gluc: 97 mg/dL / Ketone: x  / Bili: x / Urobili: x   Blood: x / Protein: x / Nitrite: x   Leuk Esterase: x / RBC: x / WBC x   Sq Epi: x / Non Sq Epi: x / Bacteria: x        ABG - ( 28 Sep 2023 08:57 )  pH, Arterial: 7.44  pH, Blood: x     /  pCO2: 29    /  pO2: 89    / HCO3: 20    / Base Excess: -3.4  /  SaO2: 97.7              MICROBIOLOGY/CULTURES:  Culture Results:   Normal Respiratory Ligia present (09-25 @ 11:05)  Culture Results:   No growth (09-25 @ 06:11)  Culture Results:   No growth (09-25 @ 05:25)  Culture Results:   No growth at 72 Hours (09-25 @ 04:55)  Culture Results:   No growth at 72 Hours (09-25 @ 04:55)      RADIOLOGY RESULTS:    < from: CT Angio Chest PE Protocol w/ IV Cont (09.27.23 @ 11:19) >  No evidence for pulmonary thromboembolism.    Large, consolidative airspace opacities in the right upper lobe,   bilateral lower lobes and to lesser extent involving the right middle   lobe and posterior left upper lobe. Findings are significantly   progressed/essentially new as compared to the prior study.  Differential   considerations include multifocal pneumonia, acute respiratory distress   syndrome (ARDS) versus pulmonary edema.  Recommend clinical correlation   and consider followup imaging to document resolution.    Fluid-filled colon, correlate for diarrhea. No evidence for bowel   obstruction or inflammation. Normal appendix.  No CT evidence for acute   abdominopelvic pathology.    Endotracheal and nasogastric tubes, as above.    Other findings, as above.    \\    ## Medications:  MEDICATIONS  (STANDING):  chlorhexidine 0.12% Liquid 15 milliLiter(s) Oral Mucosa every 12 hours  chlorhexidine 2% Cloths 1 Application(s) Topical <User Schedule>  dexMEDEtomidine Infusion 0.5 MICROgram(s)/kG/Hr (7.41 mL/Hr) IV Continuous <Continuous>  enoxaparin Injectable 40 milliGRAM(s) SubCutaneous every 24 hours  folic acid Injectable 1 milliGRAM(s) IV Push daily  ketamine Infusion. 0.25 mG/kG/Hr (1.48 mL/Hr) IV Continuous <Continuous>  levETIRAcetam  IVPB 1500 milliGRAM(s) IV Intermittent every 12 hours  norepinephrine Infusion 0.05 MICROgram(s)/kG/Min (5.56 mL/Hr) IV Continuous <Continuous>  pantoprazole  Injectable 40 milliGRAM(s) IV Push every 12 hours  PHENobarbital Injectable 130 milliGRAM(s) IV Push every 6 hours  piperacillin/tazobactam IVPB.. 3.375 Gram(s) IV Intermittent every 8 hours  polyethylene glycol 3350 17 Gram(s) Oral daily  propofol Infusion 40 MICROgram(s)/kG/Min (14.2 mL/Hr) IV Continuous <Continuous>  senna 1 Tablet(s) Oral at bedtime  thiamine Injectable 100 milliGRAM(s) IV Push daily  vancomycin  IVPB 1000 milliGRAM(s) IV Intermittent every 12 hours    ## O/E:I&O's Summary    27 Sep 2023 07:01  -  28 Sep 2023 07:00  --------------------------------------------------------  IN: 2457.1 mL / OUT: 1655 mL / NET: 802.1 mL    28 Sep 2023 07:01  -  28 Sep 2023 12:33  --------------------------------------------------------  IN: 479.8 mL / OUT: 0 mL / NET: 479.8 mL        POCUS :   DVT PPX love  ## Code status:  Goals of care discussion: [] yes [ ] no  [x] full code  [ ] DNR  [ ] DNI  [ ] CONNER

## 2023-09-28 NOTE — PROGRESS NOTE ADULT - ASSESSMENT
A/P-  28 year old female s/p multiple seixure epsiodes admitted to ICU and intubated for airway protection.    remains on the vent  afebrile today  minimal leukocytosis has resolved  LP - negative  csf gram stain and culture- Negative to date  Blood cx- Neg x 2  trach asp gram stain-   covid-19- negative  urine legionella AG - negative    chest Ct - multilobar pneumonia and aspiration pneumonia       plan-  continue with zosyn day #2 for multifocal pneumonia .  Also in light of the  chest ct findings advise to continue with  vanco , day #  keep trough <15.  vent and seizure management as per ICU team.    All labs and imaging and chart notes reviewed.     critical care time 35 minutes.      Jayesh Delgado MD  Infectious Disease Attending    for any questions please do not hesitate to contact me either via teams or by calling [phone]

## 2023-09-29 LAB
ALBUMIN SERPL ELPH-MCNC: 2 G/DL — LOW (ref 3.3–5)
ALP SERPL-CCNC: 80 U/L — SIGNIFICANT CHANGE UP (ref 40–120)
ALT FLD-CCNC: 15 U/L — SIGNIFICANT CHANGE UP (ref 12–78)
ANION GAP SERPL CALC-SCNC: 5 MMOL/L — SIGNIFICANT CHANGE UP (ref 5–17)
AST SERPL-CCNC: 31 U/L — SIGNIFICANT CHANGE UP (ref 15–37)
BILIRUB SERPL-MCNC: 0.4 MG/DL — SIGNIFICANT CHANGE UP (ref 0.2–1.2)
BUN SERPL-MCNC: 2 MG/DL — LOW (ref 7–23)
CALCIUM SERPL-MCNC: 8.1 MG/DL — LOW (ref 8.5–10.1)
CHLORIDE SERPL-SCNC: 113 MMOL/L — HIGH (ref 96–108)
CO2 SERPL-SCNC: 22 MMOL/L — SIGNIFICANT CHANGE UP (ref 22–31)
CREAT SERPL-MCNC: 0.71 MG/DL — SIGNIFICANT CHANGE UP (ref 0.5–1.3)
EGFR: 117 ML/MIN/1.73M2 — SIGNIFICANT CHANGE UP
GLUCOSE BLDC GLUCOMTR-MCNC: 100 MG/DL — HIGH (ref 70–99)
GLUCOSE BLDC GLUCOMTR-MCNC: 104 MG/DL — HIGH (ref 70–99)
GLUCOSE BLDC GLUCOMTR-MCNC: 111 MG/DL — HIGH (ref 70–99)
GLUCOSE BLDC GLUCOMTR-MCNC: 98 MG/DL — SIGNIFICANT CHANGE UP (ref 70–99)
GLUCOSE SERPL-MCNC: 101 MG/DL — HIGH (ref 70–99)
HCT VFR BLD CALC: 27.7 % — LOW (ref 34.5–45)
HGB BLD-MCNC: 8.6 G/DL — LOW (ref 11.5–15.5)
MAGNESIUM SERPL-MCNC: 1.9 MG/DL — SIGNIFICANT CHANGE UP (ref 1.6–2.6)
MCHC RBC-ENTMCNC: 25.6 PG — LOW (ref 27–34)
MCHC RBC-ENTMCNC: 31 G/DL — LOW (ref 32–36)
MCV RBC AUTO: 82.4 FL — SIGNIFICANT CHANGE UP (ref 80–100)
NRBC # BLD: 0 /100 WBCS — SIGNIFICANT CHANGE UP (ref 0–0)
PHENOBARB SERPL-MCNC: 37.7 UG/ML — SIGNIFICANT CHANGE UP (ref 15–40)
PHOSPHATE SERPL-MCNC: 3.2 MG/DL — SIGNIFICANT CHANGE UP (ref 2.5–4.5)
PLATELET # BLD AUTO: 138 K/UL — LOW (ref 150–400)
POTASSIUM SERPL-MCNC: 3.7 MMOL/L — SIGNIFICANT CHANGE UP (ref 3.5–5.3)
POTASSIUM SERPL-SCNC: 3.7 MMOL/L — SIGNIFICANT CHANGE UP (ref 3.5–5.3)
PROT SERPL-MCNC: 6.7 GM/DL — SIGNIFICANT CHANGE UP (ref 6–8.3)
RBC # BLD: 3.36 M/UL — LOW (ref 3.8–5.2)
RBC # FLD: 23.9 % — HIGH (ref 10.3–14.5)
SODIUM SERPL-SCNC: 140 MMOL/L — SIGNIFICANT CHANGE UP (ref 135–145)
VANCOMYCIN TROUGH SERPL-MCNC: 11.9 UG/ML — SIGNIFICANT CHANGE UP (ref 10–20)
WBC # BLD: 5.11 K/UL — SIGNIFICANT CHANGE UP (ref 3.8–10.5)
WBC # FLD AUTO: 5.11 K/UL — SIGNIFICANT CHANGE UP (ref 3.8–10.5)

## 2023-09-29 PROCEDURE — 99291 CRITICAL CARE FIRST HOUR: CPT

## 2023-09-29 RX ORDER — PHENOBARBITAL 60 MG
130 TABLET ORAL
Refills: 0 | Status: DISCONTINUED | OUTPATIENT
Start: 2023-09-29 | End: 2023-10-02

## 2023-09-29 RX ORDER — PHENOBARBITAL 60 MG
260 TABLET ORAL ONCE
Refills: 0 | Status: DISCONTINUED | OUTPATIENT
Start: 2023-09-29 | End: 2023-09-29

## 2023-09-29 RX ADMIN — PROPOFOL 14.2 MICROGRAM(S)/KG/MIN: 10 INJECTION, EMULSION INTRAVENOUS at 02:51

## 2023-09-29 RX ADMIN — Medication 250 MILLIGRAM(S): at 06:04

## 2023-09-29 RX ADMIN — CHLORHEXIDINE GLUCONATE 15 MILLILITER(S): 213 SOLUTION TOPICAL at 05:46

## 2023-09-29 RX ADMIN — DEXMEDETOMIDINE HYDROCHLORIDE IN 0.9% SODIUM CHLORIDE 7.41 MICROGRAM(S)/KG/HR: 4 INJECTION INTRAVENOUS at 16:34

## 2023-09-29 RX ADMIN — DEXMEDETOMIDINE HYDROCHLORIDE IN 0.9% SODIUM CHLORIDE 7.41 MICROGRAM(S)/KG/HR: 4 INJECTION INTRAVENOUS at 02:50

## 2023-09-29 RX ADMIN — DEXMEDETOMIDINE HYDROCHLORIDE IN 0.9% SODIUM CHLORIDE 7.41 MICROGRAM(S)/KG/HR: 4 INJECTION INTRAVENOUS at 13:33

## 2023-09-29 RX ADMIN — DEXMEDETOMIDINE HYDROCHLORIDE IN 0.9% SODIUM CHLORIDE 7.41 MICROGRAM(S)/KG/HR: 4 INJECTION INTRAVENOUS at 18:24

## 2023-09-29 RX ADMIN — PROPOFOL 14.2 MICROGRAM(S)/KG/MIN: 10 INJECTION, EMULSION INTRAVENOUS at 21:07

## 2023-09-29 RX ADMIN — CHLORHEXIDINE GLUCONATE 15 MILLILITER(S): 213 SOLUTION TOPICAL at 17:40

## 2023-09-29 RX ADMIN — Medication 130 MILLIGRAM(S): at 05:46

## 2023-09-29 RX ADMIN — Medication 250 MILLIGRAM(S): at 20:22

## 2023-09-29 RX ADMIN — Medication 100 MILLIGRAM(S): at 12:08

## 2023-09-29 RX ADMIN — PIPERACILLIN AND TAZOBACTAM 25 GRAM(S): 4; .5 INJECTION, POWDER, LYOPHILIZED, FOR SOLUTION INTRAVENOUS at 05:46

## 2023-09-29 RX ADMIN — KETAMINE HYDROCHLORIDE 1.48 MG/KG/HR: 100 INJECTION INTRAMUSCULAR; INTRAVENOUS at 16:35

## 2023-09-29 RX ADMIN — DEXMEDETOMIDINE HYDROCHLORIDE IN 0.9% SODIUM CHLORIDE 7.41 MICROGRAM(S)/KG/HR: 4 INJECTION INTRAVENOUS at 22:51

## 2023-09-29 RX ADMIN — PANTOPRAZOLE SODIUM 40 MILLIGRAM(S): 20 TABLET, DELAYED RELEASE ORAL at 05:46

## 2023-09-29 RX ADMIN — POLYETHYLENE GLYCOL 3350 17 GRAM(S): 17 POWDER, FOR SOLUTION ORAL at 12:09

## 2023-09-29 RX ADMIN — KETAMINE HYDROCHLORIDE 1.48 MG/KG/HR: 100 INJECTION INTRAMUSCULAR; INTRAVENOUS at 07:35

## 2023-09-29 RX ADMIN — CHLORHEXIDINE GLUCONATE 1 APPLICATION(S): 213 SOLUTION TOPICAL at 05:00

## 2023-09-29 RX ADMIN — Medication 130 MILLIGRAM(S): at 00:29

## 2023-09-29 RX ADMIN — Medication 1 MILLIGRAM(S): at 12:09

## 2023-09-29 RX ADMIN — PIPERACILLIN AND TAZOBACTAM 25 GRAM(S): 4; .5 INJECTION, POWDER, LYOPHILIZED, FOR SOLUTION INTRAVENOUS at 13:33

## 2023-09-29 RX ADMIN — LEVETIRACETAM 400 MILLIGRAM(S): 250 TABLET, FILM COATED ORAL at 17:40

## 2023-09-29 RX ADMIN — DEXMEDETOMIDINE HYDROCHLORIDE IN 0.9% SODIUM CHLORIDE 7.41 MICROGRAM(S)/KG/HR: 4 INJECTION INTRAVENOUS at 09:29

## 2023-09-29 RX ADMIN — DEXMEDETOMIDINE HYDROCHLORIDE IN 0.9% SODIUM CHLORIDE 7.41 MICROGRAM(S)/KG/HR: 4 INJECTION INTRAVENOUS at 00:27

## 2023-09-29 RX ADMIN — PROPOFOL 14.2 MICROGRAM(S)/KG/MIN: 10 INJECTION, EMULSION INTRAVENOUS at 07:35

## 2023-09-29 RX ADMIN — ENOXAPARIN SODIUM 40 MILLIGRAM(S): 100 INJECTION SUBCUTANEOUS at 05:45

## 2023-09-29 RX ADMIN — PROPOFOL 14.2 MICROGRAM(S)/KG/MIN: 10 INJECTION, EMULSION INTRAVENOUS at 16:34

## 2023-09-29 RX ADMIN — DEXMEDETOMIDINE HYDROCHLORIDE IN 0.9% SODIUM CHLORIDE 7.41 MICROGRAM(S)/KG/HR: 4 INJECTION INTRAVENOUS at 07:35

## 2023-09-29 RX ADMIN — PIPERACILLIN AND TAZOBACTAM 25 GRAM(S): 4; .5 INJECTION, POWDER, LYOPHILIZED, FOR SOLUTION INTRAVENOUS at 21:31

## 2023-09-29 RX ADMIN — LEVETIRACETAM 400 MILLIGRAM(S): 250 TABLET, FILM COATED ORAL at 05:50

## 2023-09-29 RX ADMIN — DEXMEDETOMIDINE HYDROCHLORIDE IN 0.9% SODIUM CHLORIDE 7.41 MICROGRAM(S)/KG/HR: 4 INJECTION INTRAVENOUS at 20:34

## 2023-09-29 RX ADMIN — Medication 260 MILLIGRAM(S): at 18:12

## 2023-09-29 RX ADMIN — PANTOPRAZOLE SODIUM 40 MILLIGRAM(S): 20 TABLET, DELAYED RELEASE ORAL at 17:40

## 2023-09-29 RX ADMIN — DEXMEDETOMIDINE HYDROCHLORIDE IN 0.9% SODIUM CHLORIDE 7.41 MICROGRAM(S)/KG/HR: 4 INJECTION INTRAVENOUS at 05:45

## 2023-09-29 NOTE — PROGRESS NOTE ADULT - SUBJECTIVE AND OBJECTIVE BOX
HPI:  29 yo f pmhx polysubstance abuse, ETOH abuse, seizure disorder on Keppra presented with seizures. Limited hx available but per ED staff, father endorsed she usually drinks daily but hasn't drank in 2 days.  Patient with 9 back to back seizures with EMS, given Versed 10mg IM and 5mg IV prior to seizures stopping.  In ED patient intubated for airway protection, patient persistent seizures, lactate >17.  Patient also noted to be febrile, LP performed in ED results pending. Patinet sedated on propofol and Versed gtt.  Patient still appeared to be seizing in ED, given additional Versed 10mg IVP. Admit to ICU.  (25 Sep 2023 07:29)      24 hr events: No acute events.     ## ROS:  [ x] unable to obtain    ## Vitals  ICU Vital Signs Last 24 Hrs  T(C): 37.2 (29 Sep 2023 07:00), Max: 38.3 (28 Sep 2023 17:00)  T(F): 99 (29 Sep 2023 07:00), Max: 100.9 (28 Sep 2023 17:00)  HR: 76 (29 Sep 2023 08:45) (76 - 85)  BP: 101/71 (29 Sep 2023 07:00) (89/58 - 116/105)  BP(mean): 81 (29 Sep 2023 07:00) (67 - 110)  ABP: --  ABP(mean): --  RR: 20 (29 Sep 2023 07:00) (15 - 28)  SpO2: 100% (29 Sep 2023 08:45) (86% - 100%)    O2 Parameters below as of 29 Sep 2023 04:00  Patient On (Oxygen Delivery Method): ventilator    O2 Concentration (%): 65        ## Physical Exam:  Gen:  HEENT:  Resp:  CV:  Abd:  Ext:  Neuro:    ## Vent Data  Mode: AC/ CMV (Assist Control/ Continuous Mandatory Ventilation)  RR (machine): 16  TV (machine): 380  FiO2: 65  PEEP: 8  ITime: 0.8  MAP: 11  PIP: 20      ## Labs:  Chem:  09-29    140  |  113<H>  |  2<L>  ----------------------------<  101<H>  3.7   |  22  |  0.71    Ca    8.1<L>      29 Sep 2023 05:45  Phos  3.2     09-29  Mg     1.9     09-29    TPro  6.7  /  Alb  2.0<L>  /  TBili  0.4  /  DBili  x   /  AST  31  /  ALT  15  /  AlkPhos  80  09-29    LIVER FUNCTIONS - ( 29 Sep 2023 05:45 )  Alb: 2.0 g/dL / Pro: 6.7 gm/dL / ALK PHOS: 80 U/L / ALT: 15 U/L / AST: 31 U/L / GGT: x           CBC:                        8.6    5.11  )-----------( 138      ( 29 Sep 2023 05:45 )             27.7     Coags:  PT/INR - ( 27 Sep 2023 12:05 )   PT: 11.3 sec;   INR: 0.94 ratio         PTT - ( 27 Sep 2023 12:05 )  PTT:36.7 sec    Urinalysis Basic - ( 29 Sep 2023 05:45 )    Color: x / Appearance: x / SG: x / pH: x  Gluc: 101 mg/dL / Ketone: x  / Bili: x / Urobili: x   Blood: x / Protein: x / Nitrite: x   Leuk Esterase: x / RBC: x / WBC x   Sq Epi: x / Non Sq Epi: x / Bacteria: x        ## Cardiac  CARDIAC MARKERS ( 27 Sep 2023 17:15 )  x     / x     / 229 U/L / x     / 2.8 ng/mL  CARDIAC MARKERS ( 27 Sep 2023 12:05 )  x     / x     / 325 U/L / x     / 3.9 ng/mL        ## Blood Gas  ABG - ( 28 Sep 2023 08:57 )  pH, Arterial: 7.44  pH, Blood: x     /  pCO2: 29    /  pO2: 89    / HCO3: 20    / Base Excess: -3.4  /  SaO2: 97.7                #I/Os  I&O's Detail    28 Sep 2023 07:01  -  29 Sep 2023 07:00  --------------------------------------------------------  IN:    Dexmedetomidine: 417.4 mL    IV PiggyBack: 200 mL    IV PiggyBack: 250 mL    IV PiggyBack: 400 mL    Jevity 1.2: 300 mL    Ketamine: 79.2 mL    Norepinephrine: 4.4 mL    Propofol: 427.2 mL    Vital1.0: 640 mL  Total IN: 2718.2 mL    OUT:    Incontinent per Collection Bag (mL): 1200 mL    Indwelling Catheter - Urethral (mL): 1000 mL  Total OUT: 2200 mL    Total NET: 518.2 mL          ## Imaging:    ## Medications:  MEDICATIONS  (STANDING):  chlorhexidine 0.12% Liquid 15 milliLiter(s) Oral Mucosa every 12 hours  chlorhexidine 2% Cloths 1 Application(s) Topical <User Schedule>  dexMEDEtomidine Infusion 0.5 MICROgram(s)/kG/Hr (7.41 mL/Hr) IV Continuous <Continuous>  enoxaparin Injectable 40 milliGRAM(s) SubCutaneous every 24 hours  folic acid Injectable 1 milliGRAM(s) IV Push daily  ketamine Infusion. 0.25 mG/kG/Hr (1.48 mL/Hr) IV Continuous <Continuous>  levETIRAcetam  IVPB 1500 milliGRAM(s) IV Intermittent every 12 hours  norepinephrine Infusion 0.05 MICROgram(s)/kG/Min (5.56 mL/Hr) IV Continuous <Continuous>  pantoprazole  Injectable 40 milliGRAM(s) IV Push every 12 hours  PHENobarbital Injectable 130 milliGRAM(s) IV Push every 6 hours  piperacillin/tazobactam IVPB.. 3.375 Gram(s) IV Intermittent every 8 hours  polyethylene glycol 3350 17 Gram(s) Oral daily  propofol Infusion 40 MICROgram(s)/kG/Min (14.2 mL/Hr) IV Continuous <Continuous>  senna 1 Tablet(s) Oral at bedtime  thiamine Injectable 100 milliGRAM(s) IV Push daily  vancomycin  IVPB 1000 milliGRAM(s) IV Intermittent every 12 hours    MEDICATIONS  (PRN):  acetaminophen     Tablet .. 650 milliGRAM(s) Oral every 6 hours PRN Temp greater or equal to 38C (100.4F), Mild Pain (1 - 3)  ondansetron Injectable 4 milliGRAM(s) IV Push every 6 hours PRN Nausea and/or Vomiting  PHENobarbital Injectable 260 milliGRAM(s) IV Push every 2 hours PRN agitation       HPI:  27 yo f pmhx polysubstance abuse, ETOH abuse, seizure disorder on Keppra presented with seizures. Limited hx available but per ED staff, father endorsed she usually drinks daily but hasn't drank in 2 days.  Patient with 9 back to back seizures with EMS, given Versed 10mg IM and 5mg IV prior to seizures stopping.  In ED patient intubated for airway protection, patient persistent seizures, lactate >17.  Patient also noted to be febrile, LP performed in ED results pending. Patinet sedated on propofol and Versed gtt.  Patient still appeared to be seizing in ED, given additional Versed 10mg IVP. Admit to ICU.  (25 Sep 2023 07:29)      24 hr events: No acute events.     ## ROS:  [ x] unable to obtain    ## Vitals  ICU Vital Signs Last 24 Hrs  T(C): 37.2 (29 Sep 2023 07:00), Max: 38.3 (28 Sep 2023 17:00)  T(F): 99 (29 Sep 2023 07:00), Max: 100.9 (28 Sep 2023 17:00)  HR: 76 (29 Sep 2023 08:45) (76 - 85)  BP: 101/71 (29 Sep 2023 07:00) (89/58 - 116/105)  BP(mean): 81 (29 Sep 2023 07:00) (67 - 110)  ABP: --  ABP(mean): --  RR: 20 (29 Sep 2023 07:00) (15 - 28)  SpO2: 100% (29 Sep 2023 08:45) (86% - 100%)    O2 Parameters below as of 29 Sep 2023 04:00  Patient On (Oxygen Delivery Method): ventilator    O2 Concentration (%): 65        ## Physical Exam:  Gen: Adult female lying in bed, NAD  HEENT: NC/AT sclerae anicteric. ET tube in place  Resp: Mechanical breath sounds b/l, overbreathing vent  CV: S1, S2  Abd: Soft, + BS  Ext: WWP  Neuro: Sedated. unarousable    ## Vent Data  Mode: AC/ CMV (Assist Control/ Continuous Mandatory Ventilation)  RR (machine): 16  TV (machine): 380  FiO2: 65  PEEP: 8  ITime: 0.8  MAP: 11  PIP: 20      ## Labs:  Chem:  09-29    140  |  113<H>  |  2<L>  ----------------------------<  101<H>  3.7   |  22  |  0.71    Ca    8.1<L>      29 Sep 2023 05:45  Phos  3.2     09-29  Mg     1.9     09-29    TPro  6.7  /  Alb  2.0<L>  /  TBili  0.4  /  DBili  x   /  AST  31  /  ALT  15  /  AlkPhos  80  09-29    LIVER FUNCTIONS - ( 29 Sep 2023 05:45 )  Alb: 2.0 g/dL / Pro: 6.7 gm/dL / ALK PHOS: 80 U/L / ALT: 15 U/L / AST: 31 U/L / GGT: x           CBC:                        8.6    5.11  )-----------( 138      ( 29 Sep 2023 05:45 )             27.7     Coags:  PT/INR - ( 27 Sep 2023 12:05 )   PT: 11.3 sec;   INR: 0.94 ratio         PTT - ( 27 Sep 2023 12:05 )  PTT:36.7 sec    Urinalysis Basic - ( 29 Sep 2023 05:45 )    Color: x / Appearance: x / SG: x / pH: x  Gluc: 101 mg/dL / Ketone: x  / Bili: x / Urobili: x   Blood: x / Protein: x / Nitrite: x   Leuk Esterase: x / RBC: x / WBC x   Sq Epi: x / Non Sq Epi: x / Bacteria: x        ## Cardiac  CARDIAC MARKERS ( 27 Sep 2023 17:15 )  x     / x     / 229 U/L / x     / 2.8 ng/mL  CARDIAC MARKERS ( 27 Sep 2023 12:05 )  x     / x     / 325 U/L / x     / 3.9 ng/mL        ## Blood Gas  ABG - ( 28 Sep 2023 08:57 )  pH, Arterial: 7.44  pH, Blood: x     /  pCO2: 29    /  pO2: 89    / HCO3: 20    / Base Excess: -3.4  /  SaO2: 97.7                #I/Os  I&O's Detail    28 Sep 2023 07:01  -  29 Sep 2023 07:00  --------------------------------------------------------  IN:    Dexmedetomidine: 417.4 mL    IV PiggyBack: 200 mL    IV PiggyBack: 250 mL    IV PiggyBack: 400 mL    Jevity 1.2: 300 mL    Ketamine: 79.2 mL    Norepinephrine: 4.4 mL    Propofol: 427.2 mL    Vital1.0: 640 mL  Total IN: 2718.2 mL    OUT:    Incontinent per Collection Bag (mL): 1200 mL    Indwelling Catheter - Urethral (mL): 1000 mL  Total OUT: 2200 mL    Total NET: 518.2 mL          ## Imaging:    ## Medications:  MEDICATIONS  (STANDING):  chlorhexidine 0.12% Liquid 15 milliLiter(s) Oral Mucosa every 12 hours  chlorhexidine 2% Cloths 1 Application(s) Topical <User Schedule>  dexMEDEtomidine Infusion 0.5 MICROgram(s)/kG/Hr (7.41 mL/Hr) IV Continuous <Continuous>  enoxaparin Injectable 40 milliGRAM(s) SubCutaneous every 24 hours  folic acid Injectable 1 milliGRAM(s) IV Push daily  ketamine Infusion. 0.25 mG/kG/Hr (1.48 mL/Hr) IV Continuous <Continuous>  levETIRAcetam  IVPB 1500 milliGRAM(s) IV Intermittent every 12 hours  norepinephrine Infusion 0.05 MICROgram(s)/kG/Min (5.56 mL/Hr) IV Continuous <Continuous>  pantoprazole  Injectable 40 milliGRAM(s) IV Push every 12 hours  PHENobarbital Injectable 130 milliGRAM(s) IV Push every 6 hours  piperacillin/tazobactam IVPB.. 3.375 Gram(s) IV Intermittent every 8 hours  polyethylene glycol 3350 17 Gram(s) Oral daily  propofol Infusion 40 MICROgram(s)/kG/Min (14.2 mL/Hr) IV Continuous <Continuous>  senna 1 Tablet(s) Oral at bedtime  thiamine Injectable 100 milliGRAM(s) IV Push daily  vancomycin  IVPB 1000 milliGRAM(s) IV Intermittent every 12 hours    MEDICATIONS  (PRN):  acetaminophen     Tablet .. 650 milliGRAM(s) Oral every 6 hours PRN Temp greater or equal to 38C (100.4F), Mild Pain (1 - 3)  ondansetron Injectable 4 milliGRAM(s) IV Push every 6 hours PRN Nausea and/or Vomiting  PHENobarbital Injectable 260 milliGRAM(s) IV Push every 2 hours PRN agitation

## 2023-09-29 NOTE — PROGRESS NOTE ADULT - SUBJECTIVE AND OBJECTIVE BOX
[facility]  INFECTIOUS DISEASES   [address] [place] [address]  Tel: [phone]     Fax: [phone]  ==============================================================================  MD Lucio Rose, DO Karly Menesse, NP   ==============================================================================      KEYA RODRIGUEZ  MRN-[id number]  28y ([birth year])      Interval History:    ROS:    [ ] Unobtainable because:  [ ] All other systems negative except as noted    Constitutional: no fever, no chills  Head: no trauma  Eyes: no vision changes, no eye pain  ENT:  no sore throat, no rhinorrhea  Cardiovascular:  no chest pain, no palpitation  Respiratory:  no SOB, no cough  GI:  no abd pain, no vomiting, no diarrhea  urinary: no dysuria, no hematuria, no flank pain  musculoskeletal:  no joint pain, no joint swelling  skin:  no rash  neurology:  no headache, no seizure, no change in mental status  psych: no anxiety, no depression         Allergies  No Known Allergies        ANTIMICROBIALS:  piperacillin/tazobactam IVPB.. 3.375 every 8 hours  vancomycin  IVPB 1000 every 12 hours        Physical Exam:  Vital Signs Last 24 Hrs  T(C): 37.2 (29 Sep 2023 07:00), Max: 38.3 (28 Sep 2023 17:00)  T(F): 99 (29 Sep 2023 07:00), Max: 100.9 (28 Sep 2023 17:00)  HR: 76 (29 Sep 2023 08:45) (76 - 85)  BP: 101/71 (29 Sep 2023 07:00) (89/58 - 116/105)  BP(mean): 81 (29 Sep 2023 07:00) (67 - 110)  RR: 20 (29 Sep 2023 07:00) (15 - 28)  SpO2: 100% (29 Sep 2023 08:45) (86% - 100%)    Parameters below as of 29 Sep 2023 04:00  Patient On (Oxygen Delivery Method): ventilator    O2 Concentration (%): 65    09-28-23 @ 07:01  -  09-29-23 @ 07:00  --------------------------------------------------------  IN: 2718.2 mL / OUT: 2200 mL / NET: 518.2 mL      General:    NAD,  non toxic  Head: atraumatic, normocephalic  Eye: normal sclera and conjunctiva  ENT:    no oral lesions, neck supple  Cardio:     regular S1, S2,  no murmur  Respiratory:    clear b/l,    no wheezing  abd:     soft,   BS +,   no tenderness  :   no CVAT,  no suprapubic tenderness,   no  johnson  Musculoskeletal:   no joint swelling,   no edema  vascular: no central lines, +PIV   Skin:    no rash  Neurologic:     no focal deficit  psych: normal affect    WBC Count: 5.11 K/uL (09-29 @ 05:45)  WBC Count: 9.74 K/uL (09-28 @ 03:05)  WBC Count: 10.51 K/uL (09-27 @ 12:05)  WBC Count: 9.85 K/uL (09-27 @ 04:00)  WBC Count: 5.93 K/uL (09-26 @ 02:40)  WBC Count: 10.15 K/uL (09-25 @ 13:55)  WBC Count: 11.57 K/uL (09-25 @ 08:50)                            8.6    5.11  )-----------( 138      ( 29 Sep 2023 05:45 )             27.7       09-29    140  |  113<H>  |  2<L>  ----------------------------<  101<H>  3.7   |  22  |  0.71    Ca    8.1<L>      29 Sep 2023 05:45  Phos  3.2     09-29  Mg     1.9     09-29    TPro  6.7  /  Alb  2.0<L>  /  TBili  0.4  /  DBili  x   /  AST  31  /  ALT  15  /  AlkPhos  80  09-29      Urinalysis Basic - ( 29 Sep 2023 05:45 )    Color: x / Appearance: x / SG: x / pH: x  Gluc: 101 mg/dL / Ketone: x  / Bili: x / Urobili: x   Blood: x / Protein: x / Nitrite: x   Leuk Esterase: x / RBC: x / WBC x   Sq Epi: x / Non Sq Epi: x / Bacteria: x        Lipase: 31 U/L (09-27-23 @ 12:05)    Creatinine Trend: 0.71<--, 0.66<--, 0.52<--, 0.55<--, 0.53<--, 0.61<--  Blood Gas Arterial, Lactate: 1.30 mmol/L (09-28-23 @ 08:57)  Blood Gas Arterial, Lactate: 1.20 mmol/L (09-27-23 @ 17:24)  Lactate, Blood: 1.2 mmol/L (09-27-23 @ 12:05)  Blood Gas Arterial, Lactate: 1.60 mmol/L (09-27-23 @ 10:40)      MICROBIOLOGY:  v  ET Tube ET Tube  09-25-23   Normal Respiratory Ligia present  --    Moderate polymorphonuclear leukocytes seen per low power field  No Squamous epithelial cells seen per low power field  Rare Gram positive cocci in pairs seen per oil power field      .CSF CSF  09-25-23   No growth  --    No polymorphonuclear leukocytes seen  No organisms seen  by cytocentrifuge      Clean Catch Clean Catch (Midstream)  09-25-23   No growth  --  --      .Blood Blood-Peripheral  09-25-23   No growth at 4 days  --  --            Rapid RVP Result: NotDetec (09-25 @ 04:55)                  SARS-CoV-2: NotDetec (09-25-23 @ 04:55)  Rapid RVP Result: NotDetec (09-25-23 @ 04:55)        RADIOLOGY:   [facility]  INFECTIOUS DISEASES   [address] [place] [address]  Tel: [phone]     Fax: [phone]  ==============================================================================  MD Lucio Rose, DO Aliyah Meneses, NP   ==============================================================================      KEYA RODRIGUEZ  [id number]  28y ([birth year])      Interval History:    Patient seen and examined today in ICU.  remains on the vent.  Afebrile today.      ROS:    Unable to obtain as patient is sedated and intubated.      Allergies  No Known Allergies    ANTIMICROBIALS:  piperacillin/tazobactam IVPB.. 3.375 every 8 hours  vancomycin  IVPB 1000 every 12 hours      Physical Exam:  Vital Signs Last 24 Hrs  T(C): 37.2 (29 Sep 2023 07:00), Max: 38.3 (28 Sep 2023 17:00)  T(F): 99 (29 Sep 2023 07:00), Max: 100.9 (28 Sep 2023 17:00)  HR: 76 (29 Sep 2023 08:45) (76 - 85)  BP: 101/71 (29 Sep 2023 07:00) (89/58 - 116/105)  BP(mean): 81 (29 Sep 2023 07:00) (67 - 110)  RR: 20 (29 Sep 2023 07:00) (15 - 28)  SpO2: 100% (29 Sep 2023 08:45) (86% - 100%)    Parameters below as of 29 Sep 2023 04:00  Patient On (Oxygen Delivery Method): ventilator    O2 Concentration (%): 65    09-28-23 @ 07:01  -  09-29-23 @ 07:00  --------------------------------------------------------  IN: 2718.2 mL / OUT: 2200 mL / NET: 518.2 mL      General:   on the vent and sedated  ENT:    ET and OGT in place  Cardio:    S1S2  tachycardia,  no murmur  Respiratory:    coarse breath sounds bilaterally, on the vent  abd:     soft,   BS +,   no tenderness, ND  Musculoskeletal:   no joint swelling,   no edema  Neurologic:     sedated    WBC Count: 5.11 K/uL (09-29 @ 05:45)  WBC Count: 9.74 K/uL (09-28 @ 03:05)  WBC Count: 10.51 K/uL (09-27 @ 12:05)  WBC Count: 9.85 K/uL (09-27 @ 04:00)  WBC Count: 5.93 K/uL (09-26 @ 02:40)  WBC Count: 10.15 K/uL (09-25 @ 13:55)  WBC Count: 11.57 K/uL (09-25 @ 08:50)                            8.6    5.11  )-----------( 138      ( 29 Sep 2023 05:45 )             27.7       09-29    140  |  113<H>  |  2<L>  ----------------------------<  101<H>  3.7   |  22  |  0.71    Ca    8.1<L>      29 Sep 2023 05:45  Phos  3.2     09-29  Mg     1.9     09-29    TPro  6.7  /  Alb  2.0<L>  /  TBili  0.4  /  DBili  x   /  AST  31  /  ALT  15  /  AlkPhos  80  09-29      Urinalysis Basic - ( 29 Sep 2023 05:45 )    Color: x / Appearance: x / SG: x / pH: x  Gluc: 101 mg/dL / Ketone: x  / Bili: x / Urobili: x   Blood: x / Protein: x / Nitrite: x   Leuk Esterase: x / RBC: x / WBC x   Sq Epi: x / Non Sq Epi: x / Bacteria: x        Lipase: 31 U/L (09-27-23 @ 12:05)    Creatinine Trend: 0.71<--, 0.66<--, 0.52<--, 0.55<--, 0.53<--, 0.61<--  Blood Gas Arterial, Lactate: 1.30 mmol/L (09-28-23 @ 08:57)  Blood Gas Arterial, Lactate: 1.20 mmol/L (09-27-23 @ 17:24)  Lactate, Blood: 1.2 mmol/L (09-27-23 @ 12:05)  Blood Gas Arterial, Lactate: 1.60 mmol/L (09-27-23 @ 10:40)      MICROBIOLOGY:  v  ET Tube ET Tube  09-25-23   Normal Respiratory Ligia present  --    Moderate polymorphonuclear leukocytes seen per low power field  No Squamous epithelial cells seen per low power field  Rare Gram positive cocci in pairs seen per oil power field      .CSF CSF  09-25-23   No growth  --    No polymorphonuclear leukocytes seen  No organisms seen  by cytocentrifuge      Clean Catch Clean Catch (Midstream)  09-25-23   No growth  --  --      .Blood Blood-Peripheral  09-25-23   No growth at 4 days  --  --      Rapid RVP Result: NotDetec (09-25 @ 04:55)    SARS-CoV-2: NotDetec (09-25-23 @ 04:55)  Rapid RVP Result: NotDetec (09-25-23 @ 04:55)        RADIOLOGY:    < from: Xray Chest 1 View- PORTABLE-Urgent (Xray Chest 1 View- PORTABLE-Urgent .) (09.27.23 @ 02:39) >  ACC: [id number] EXAM:  XR CHEST PORTABLE URGENT 1V   ORDERED BY: ALIYAH CHATMAN     PROCEDURE DATE:  09/27/2023          INTERPRETATION:  Chest one view    HISTORY: Hypoxia    COMPARISON STUDY: 9/26/2023    Frontal expiratory view of the chest shows the heart to be normal in   size. The lungs show progression of infiltrates in the right upper lobe   and lung bases. There is no evidence of pneumothorax nor pleural effusion.    IMPRESSION:  Progression of infiltrates.        Thank you for thecourtesy of this referral.    --- End of Report ---      NANCY DOVER MD; Attending Interventional Radiologist  This document has been electronically signed. Sep 27 2023  1:52PM    < end of copied text >

## 2023-09-29 NOTE — PROGRESS NOTE ADULT - ASSESSMENT
27 y/o F w/polysubstance abuse including ETOH abuse and seizure disorder admitted for status epilepticus. Seizures presumably secondary to ETOH withdrawal. Acute hypoxemic respiratory failure likely secondary to aspiration PNA. B/l dense lower lobe consolidations on CT scan. Hypotension likely secondary to combination of severe sepsis with septic shock and sedation.    - Wean sedation as tolerated, goal RASS -1 to 0  - Continue keppra  - Daily SAT/SBT  - Titrate pressors as needed goal MAP >= 65  - Complete course of abx  - DVT prophylaxis  - Full code    I have personally provided 35 minutes of attending critical care time excluding procedures. 27 y/o F w/polysubstance abuse including ETOH abuse and seizure disorder admitted for status epilepticus. Seizures presumably secondary to ETOH withdrawal. Acute hypoxemic respiratory failure likely secondary to aspiration PNA. B/l dense lower lobe consolidations on CT scan. Hypotension likely secondary to combination of severe sepsis with septic shock and sedation.    - Wean sedation as tolerated, goal RASS -1 to 0  - Continue keppra  - Continue mechanical ventilation  - Titrate pressors as needed goal MAP >= 65  - Complete course of abx  - DVT prophylaxis  - Full code    I have personally provided 35 minutes of attending critical care time excluding procedures.

## 2023-09-29 NOTE — PROGRESS NOTE ADULT - ASSESSMENT
A/P-  28 year old female s/p multiple seizure  episode admitted to ICU and intubated for airway protection.    remains on the vent  afebrile today   minimal leukocytosis has resolved  LP - negative  csf gram stain and culture- Negative to date  Blood cx- Neg x 2  trach asp cx- negative  covid-19- negative  urine legionella AG - negative    chest Ct - multilobar pneumonia and aspiration pneumonia       plan-  continue with zosyn day #3 for multifocal pneumonia .  Also in light of the  chest ct findings advise to continue with  vanco , day #2  keep trough <15.  check mycoplasma IGM.  vent and seizure management as per ICU team.    All labs and imaging and chart notes reviewed.     critical care time 35 minutes.    please note  is covering ID service this weekend and if any questions please call ID service number   [phone]

## 2023-09-30 LAB
-  BLOOD PCR PANEL: SIGNIFICANT CHANGE UP
ALBUMIN SERPL ELPH-MCNC: 2 G/DL — LOW (ref 3.3–5)
ALP SERPL-CCNC: 80 U/L — SIGNIFICANT CHANGE UP (ref 40–120)
ALT FLD-CCNC: 28 U/L — SIGNIFICANT CHANGE UP (ref 12–78)
ANION GAP SERPL CALC-SCNC: 8 MMOL/L — SIGNIFICANT CHANGE UP (ref 5–17)
AST SERPL-CCNC: 46 U/L — HIGH (ref 15–37)
BILIRUB SERPL-MCNC: 0.4 MG/DL — SIGNIFICANT CHANGE UP (ref 0.2–1.2)
BUN SERPL-MCNC: 3 MG/DL — LOW (ref 7–23)
CALCIUM SERPL-MCNC: 7.9 MG/DL — LOW (ref 8.5–10.1)
CHLORIDE SERPL-SCNC: 109 MMOL/L — HIGH (ref 96–108)
CO2 SERPL-SCNC: 22 MMOL/L — SIGNIFICANT CHANGE UP (ref 22–31)
CREAT SERPL-MCNC: 0.65 MG/DL — SIGNIFICANT CHANGE UP (ref 0.5–1.3)
CULTURE RESULTS: SIGNIFICANT CHANGE UP
EGFR: 121 ML/MIN/1.73M2 — SIGNIFICANT CHANGE UP
GLUCOSE BLDC GLUCOMTR-MCNC: 101 MG/DL — HIGH (ref 70–99)
GLUCOSE BLDC GLUCOMTR-MCNC: 102 MG/DL — HIGH (ref 70–99)
GLUCOSE BLDC GLUCOMTR-MCNC: 104 MG/DL — HIGH (ref 70–99)
GLUCOSE BLDC GLUCOMTR-MCNC: 92 MG/DL — SIGNIFICANT CHANGE UP (ref 70–99)
GLUCOSE BLDC GLUCOMTR-MCNC: 95 MG/DL — SIGNIFICANT CHANGE UP (ref 70–99)
GLUCOSE SERPL-MCNC: 113 MG/DL — HIGH (ref 70–99)
HCT VFR BLD CALC: 27.6 % — LOW (ref 34.5–45)
HGB BLD-MCNC: 8.6 G/DL — LOW (ref 11.5–15.5)
MAGNESIUM SERPL-MCNC: 1.6 MG/DL — SIGNIFICANT CHANGE UP (ref 1.6–2.6)
MCHC RBC-ENTMCNC: 26.1 PG — LOW (ref 27–34)
MCHC RBC-ENTMCNC: 31.2 G/DL — LOW (ref 32–36)
MCV RBC AUTO: 83.9 FL — SIGNIFICANT CHANGE UP (ref 80–100)
METHOD TYPE: SIGNIFICANT CHANGE UP
NRBC # BLD: 0 /100 WBCS — SIGNIFICANT CHANGE UP (ref 0–0)
PHOSPHATE SERPL-MCNC: 2.3 MG/DL — LOW (ref 2.5–4.5)
PLATELET # BLD AUTO: 160 K/UL — SIGNIFICANT CHANGE UP (ref 150–400)
POTASSIUM SERPL-MCNC: 3.4 MMOL/L — LOW (ref 3.5–5.3)
POTASSIUM SERPL-SCNC: 3.4 MMOL/L — LOW (ref 3.5–5.3)
PROT SERPL-MCNC: 7.1 GM/DL — SIGNIFICANT CHANGE UP (ref 6–8.3)
RBC # BLD: 3.29 M/UL — LOW (ref 3.8–5.2)
RBC # FLD: 24.1 % — HIGH (ref 10.3–14.5)
SODIUM SERPL-SCNC: 139 MMOL/L — SIGNIFICANT CHANGE UP (ref 135–145)
SPECIMEN SOURCE: SIGNIFICANT CHANGE UP
WBC # BLD: 6.05 K/UL — SIGNIFICANT CHANGE UP (ref 3.8–10.5)
WBC # FLD AUTO: 6.05 K/UL — SIGNIFICANT CHANGE UP (ref 3.8–10.5)

## 2023-09-30 PROCEDURE — 99291 CRITICAL CARE FIRST HOUR: CPT

## 2023-09-30 RX ORDER — MIDAZOLAM HYDROCHLORIDE 1 MG/ML
4 INJECTION, SOLUTION INTRAMUSCULAR; INTRAVENOUS ONCE
Refills: 0 | Status: DISCONTINUED | OUTPATIENT
Start: 2023-09-30 | End: 2023-09-30

## 2023-09-30 RX ORDER — POTASSIUM PHOSPHATE, MONOBASIC POTASSIUM PHOSPHATE, DIBASIC 236; 224 MG/ML; MG/ML
15 INJECTION, SOLUTION INTRAVENOUS ONCE
Refills: 0 | Status: COMPLETED | OUTPATIENT
Start: 2023-09-30 | End: 2023-09-30

## 2023-09-30 RX ORDER — POTASSIUM CHLORIDE 20 MEQ
10 PACKET (EA) ORAL
Refills: 0 | Status: COMPLETED | OUTPATIENT
Start: 2023-09-30 | End: 2023-09-30

## 2023-09-30 RX ORDER — FENTANYL CITRATE 50 UG/ML
100 INJECTION INTRAVENOUS ONCE
Refills: 0 | Status: DISCONTINUED | OUTPATIENT
Start: 2023-09-30 | End: 2023-09-30

## 2023-09-30 RX ADMIN — Medication 250 MILLIGRAM(S): at 05:43

## 2023-09-30 RX ADMIN — Medication 100 MILLIEQUIVALENT(S): at 09:41

## 2023-09-30 RX ADMIN — Medication 100 MILLIEQUIVALENT(S): at 08:39

## 2023-09-30 RX ADMIN — Medication 130 MILLIGRAM(S): at 17:35

## 2023-09-30 RX ADMIN — PROPOFOL 14.2 MICROGRAM(S)/KG/MIN: 10 INJECTION, EMULSION INTRAVENOUS at 20:41

## 2023-09-30 RX ADMIN — CHLORHEXIDINE GLUCONATE 15 MILLILITER(S): 213 SOLUTION TOPICAL at 19:44

## 2023-09-30 RX ADMIN — SENNA PLUS 1 TABLET(S): 8.6 TABLET ORAL at 23:09

## 2023-09-30 RX ADMIN — DEXMEDETOMIDINE HYDROCHLORIDE IN 0.9% SODIUM CHLORIDE 7.41 MICROGRAM(S)/KG/HR: 4 INJECTION INTRAVENOUS at 21:32

## 2023-09-30 RX ADMIN — Medication 130 MILLIGRAM(S): at 05:00

## 2023-09-30 RX ADMIN — Medication 130 MILLIGRAM(S): at 00:41

## 2023-09-30 RX ADMIN — DEXMEDETOMIDINE HYDROCHLORIDE IN 0.9% SODIUM CHLORIDE 7.41 MICROGRAM(S)/KG/HR: 4 INJECTION INTRAVENOUS at 17:12

## 2023-09-30 RX ADMIN — PANTOPRAZOLE SODIUM 40 MILLIGRAM(S): 20 TABLET, DELAYED RELEASE ORAL at 05:43

## 2023-09-30 RX ADMIN — LEVETIRACETAM 400 MILLIGRAM(S): 250 TABLET, FILM COATED ORAL at 05:42

## 2023-09-30 RX ADMIN — LEVETIRACETAM 400 MILLIGRAM(S): 250 TABLET, FILM COATED ORAL at 17:35

## 2023-09-30 RX ADMIN — KETAMINE HYDROCHLORIDE 1.48 MG/KG/HR: 100 INJECTION INTRAMUSCULAR; INTRAVENOUS at 15:00

## 2023-09-30 RX ADMIN — PIPERACILLIN AND TAZOBACTAM 25 GRAM(S): 4; .5 INJECTION, POWDER, LYOPHILIZED, FOR SOLUTION INTRAVENOUS at 23:09

## 2023-09-30 RX ADMIN — Medication 130 MILLIGRAM(S): at 23:09

## 2023-09-30 RX ADMIN — CHLORHEXIDINE GLUCONATE 15 MILLILITER(S): 213 SOLUTION TOPICAL at 05:43

## 2023-09-30 RX ADMIN — PIPERACILLIN AND TAZOBACTAM 25 GRAM(S): 4; .5 INJECTION, POWDER, LYOPHILIZED, FOR SOLUTION INTRAVENOUS at 14:42

## 2023-09-30 RX ADMIN — DEXMEDETOMIDINE HYDROCHLORIDE IN 0.9% SODIUM CHLORIDE 7.41 MICROGRAM(S)/KG/HR: 4 INJECTION INTRAVENOUS at 23:50

## 2023-09-30 RX ADMIN — MIDAZOLAM HYDROCHLORIDE 4 MILLIGRAM(S): 1 INJECTION, SOLUTION INTRAMUSCULAR; INTRAVENOUS at 07:19

## 2023-09-30 RX ADMIN — Medication 100 MILLIEQUIVALENT(S): at 10:43

## 2023-09-30 RX ADMIN — PROPOFOL 14.2 MICROGRAM(S)/KG/MIN: 10 INJECTION, EMULSION INTRAVENOUS at 06:32

## 2023-09-30 RX ADMIN — PROPOFOL 14.2 MICROGRAM(S)/KG/MIN: 10 INJECTION, EMULSION INTRAVENOUS at 23:50

## 2023-09-30 RX ADMIN — DEXMEDETOMIDINE HYDROCHLORIDE IN 0.9% SODIUM CHLORIDE 7.41 MICROGRAM(S)/KG/HR: 4 INJECTION INTRAVENOUS at 12:13

## 2023-09-30 RX ADMIN — PROPOFOL 14.2 MICROGRAM(S)/KG/MIN: 10 INJECTION, EMULSION INTRAVENOUS at 17:36

## 2023-09-30 RX ADMIN — DEXMEDETOMIDINE HYDROCHLORIDE IN 0.9% SODIUM CHLORIDE 7.41 MICROGRAM(S)/KG/HR: 4 INJECTION INTRAVENOUS at 08:23

## 2023-09-30 RX ADMIN — FENTANYL CITRATE 100 MICROGRAM(S): 50 INJECTION INTRAVENOUS at 06:50

## 2023-09-30 RX ADMIN — FENTANYL CITRATE 100 MICROGRAM(S): 50 INJECTION INTRAVENOUS at 06:32

## 2023-09-30 RX ADMIN — DEXMEDETOMIDINE HYDROCHLORIDE IN 0.9% SODIUM CHLORIDE 7.41 MICROGRAM(S)/KG/HR: 4 INJECTION INTRAVENOUS at 03:15

## 2023-09-30 RX ADMIN — PROPOFOL 14.2 MICROGRAM(S)/KG/MIN: 10 INJECTION, EMULSION INTRAVENOUS at 02:31

## 2023-09-30 RX ADMIN — DEXMEDETOMIDINE HYDROCHLORIDE IN 0.9% SODIUM CHLORIDE 7.41 MICROGRAM(S)/KG/HR: 4 INJECTION INTRAVENOUS at 05:43

## 2023-09-30 RX ADMIN — PANTOPRAZOLE SODIUM 40 MILLIGRAM(S): 20 TABLET, DELAYED RELEASE ORAL at 17:35

## 2023-09-30 RX ADMIN — PROPOFOL 14.2 MICROGRAM(S)/KG/MIN: 10 INJECTION, EMULSION INTRAVENOUS at 15:19

## 2023-09-30 RX ADMIN — DEXMEDETOMIDINE HYDROCHLORIDE IN 0.9% SODIUM CHLORIDE 7.41 MICROGRAM(S)/KG/HR: 4 INJECTION INTRAVENOUS at 00:52

## 2023-09-30 RX ADMIN — CHLORHEXIDINE GLUCONATE 1 APPLICATION(S): 213 SOLUTION TOPICAL at 05:44

## 2023-09-30 RX ADMIN — PIPERACILLIN AND TAZOBACTAM 25 GRAM(S): 4; .5 INJECTION, POWDER, LYOPHILIZED, FOR SOLUTION INTRAVENOUS at 08:40

## 2023-09-30 RX ADMIN — Medication 100 MILLIGRAM(S): at 11:02

## 2023-09-30 RX ADMIN — Medication 1 MILLIGRAM(S): at 11:02

## 2023-09-30 RX ADMIN — MIDAZOLAM HYDROCHLORIDE 4 MILLIGRAM(S): 1 INJECTION, SOLUTION INTRAMUSCULAR; INTRAVENOUS at 05:17

## 2023-09-30 RX ADMIN — Medication 130 MILLIGRAM(S): at 11:02

## 2023-09-30 RX ADMIN — ENOXAPARIN SODIUM 40 MILLIGRAM(S): 100 INJECTION SUBCUTANEOUS at 05:43

## 2023-09-30 RX ADMIN — POTASSIUM PHOSPHATE, MONOBASIC POTASSIUM PHOSPHATE, DIBASIC 62.5 MILLIMOLE(S): 236; 224 INJECTION, SOLUTION INTRAVENOUS at 08:40

## 2023-09-30 RX ADMIN — DEXMEDETOMIDINE HYDROCHLORIDE IN 0.9% SODIUM CHLORIDE 7.41 MICROGRAM(S)/KG/HR: 4 INJECTION INTRAVENOUS at 19:11

## 2023-09-30 NOTE — PROGRESS NOTE ADULT - ASSESSMENT
27 y/o F w/polysubstance abuse including ETOH abuse and seizure disorder admitted for status epilepticus. Seizures presumably secondary to ETOH withdrawal. Acute hypoxemic respiratory failure likely secondary to aspiration PNA. B/l dense lower lobe consolidations on CT scan. Hypotension likely secondary to combination of severe sepsis with septic shock and sedation. Gram positive bacteremia.    - Wean sedation as tolerated, goal RASS -1 to 0  - Continue keppra  - Continue mechanical ventilation  - Titrate pressors as needed goal MAP >= 65  - Complete course of abx  - DVT prophylaxis  - Full code    I have personally provided 35 minutes of attending critical care time excluding procedures.

## 2023-09-30 NOTE — PROGRESS NOTE ADULT - SUBJECTIVE AND OBJECTIVE BOX
HPI:  29 yo f pmhx polysubstance abuse, ETOH abuse, seizure disorder on Keppra presented with seizures. Limited hx available but per ED staff, father endorsed she usually drinks daily but hasn't drank in 2 days.  Patient with 9 back to back seizures with EMS, given Versed 10mg IM and 5mg IV prior to seizures stopping.  In ED patient intubated for airway protection, patient persistent seizures, lactate >17.  Patient also noted to be febrile, LP performed in ED results pending. Patinet sedated on propofol and Versed gtt.  Patient still appeared to be seizing in ED, given additional Versed 10mg IVP. Admit to ICU.  (25 Sep 2023 07:29)      24 hr events: No acute events.     ## ROS:  [x ] unable to obtain      ## Vitals  ICU Vital Signs Last 24 Hrs  T(C): 38.2 (30 Sep 2023 07:38), Max: 38.2 (30 Sep 2023 07:38)  T(F): 100.8 (30 Sep 2023 07:38), Max: 100.8 (30 Sep 2023 07:38)  HR: 87 (30 Sep 2023 08:20) (76 - 110)  BP: 125/99 (30 Sep 2023 07:38) (88/58 - 125/99)  BP(mean): 109 (30 Sep 2023 07:00) (61 - 109)  ABP: --  ABP(mean): --  RR: 18 (30 Sep 2023 07:38) (12 - 27)  SpO2: 100% (30 Sep 2023 08:20) (96% - 100%)        ## Physical Exam:  Gen: Adult female lying in bed, NAD  HEENT: NC/AT sclerae anicteric. ET tube in place  Resp: Mechanical breath sounds b/l, overbreathing vent  CV: S1, S2  Abd: Soft, + BS  Ext: WWP  Neuro: Sedated. unarousable    ## Vent Data  Mode: AC/ CMV (Assist Control/ Continuous Mandatory Ventilation)  RR (machine): 10  TV (machine): 380  FiO2: 40  PEEP: 10  ITime: 0.8  MAP: 11  PIP: 17      ## Labs:  Chem:  09-30    139  |  109<H>  |  3<L>  ----------------------------<  113<H>  3.4<L>   |  22  |  0.65    Ca    7.9<L>      30 Sep 2023 05:10  Phos  2.3     09-30  Mg     1.6     09-30    TPro  7.1  /  Alb  2.0<L>  /  TBili  0.4  /  DBili  x   /  AST  46<H>  /  ALT  28  /  AlkPhos  80  09-30    LIVER FUNCTIONS - ( 30 Sep 2023 05:10 )  Alb: 2.0 g/dL / Pro: 7.1 gm/dL / ALK PHOS: 80 U/L / ALT: 28 U/L / AST: 46 U/L / GGT: x           CBC:                        8.6    6.05  )-----------( 160      ( 30 Sep 2023 05:10 )             27.6     Coags:      Urinalysis Basic - ( 30 Sep 2023 05:10 )    Color: x / Appearance: x / SG: x / pH: x  Gluc: 113 mg/dL / Ketone: x  / Bili: x / Urobili: x   Blood: x / Protein: x / Nitrite: x   Leuk Esterase: x / RBC: x / WBC x   Sq Epi: x / Non Sq Epi: x / Bacteria: x        ## Cardiac        ## Blood Gas      #I/Os  I&O's Detail    29 Sep 2023 07:01  -  30 Sep 2023 07:00  --------------------------------------------------------  IN:    Dexmedetomidine: 532.8 mL    IV PiggyBack: 100 mL    IV PiggyBack: 201 mL    IV PiggyBack: 500 mL    Ketamine: 71.3 mL    Propofol: 425.4 mL    Vital1.0: 1055 mL  Total IN: 2885.5 mL    OUT:    Incontinent per Collection Bag (mL): 950 mL    Norepinephrine: 0 mL  Total OUT: 950 mL    Total NET: 1935.5 mL          ## Imaging:    ## Medications:  MEDICATIONS  (STANDING):  chlorhexidine 0.12% Liquid 15 milliLiter(s) Oral Mucosa every 12 hours  chlorhexidine 2% Cloths 1 Application(s) Topical <User Schedule>  dexMEDEtomidine Infusion 0.5 MICROgram(s)/kG/Hr (7.41 mL/Hr) IV Continuous <Continuous>  enoxaparin Injectable 40 milliGRAM(s) SubCutaneous every 24 hours  folic acid Injectable 1 milliGRAM(s) IV Push daily  ketamine Infusion. 0.25 mG/kG/Hr (1.48 mL/Hr) IV Continuous <Continuous>  levETIRAcetam  IVPB 1500 milliGRAM(s) IV Intermittent every 12 hours  norepinephrine Infusion 0.05 MICROgram(s)/kG/Min (5.56 mL/Hr) IV Continuous <Continuous>  pantoprazole  Injectable 40 milliGRAM(s) IV Push every 12 hours  PHENobarbital Injectable 130 milliGRAM(s) IV Push four times a day  piperacillin/tazobactam IVPB.. 3.375 Gram(s) IV Intermittent every 8 hours  polyethylene glycol 3350 17 Gram(s) Oral daily  potassium chloride  10 mEq/100 mL IVPB 10 milliEquivalent(s) IV Intermittent every 1 hour  propofol Infusion 40 MICROgram(s)/kG/Min (14.2 mL/Hr) IV Continuous <Continuous>  senna 1 Tablet(s) Oral at bedtime  thiamine Injectable 100 milliGRAM(s) IV Push daily    MEDICATIONS  (PRN):  acetaminophen     Tablet .. 650 milliGRAM(s) Oral every 6 hours PRN Temp greater or equal to 38C (100.4F), Mild Pain (1 - 3)  ondansetron Injectable 4 milliGRAM(s) IV Push every 6 hours PRN Nausea and/or Vomiting

## 2023-10-01 LAB
ALBUMIN SERPL ELPH-MCNC: 1.8 G/DL — LOW (ref 3.3–5)
ALP SERPL-CCNC: 78 U/L — SIGNIFICANT CHANGE UP (ref 40–120)
ALT FLD-CCNC: 17 U/L — SIGNIFICANT CHANGE UP (ref 12–78)
ANION GAP SERPL CALC-SCNC: 5 MMOL/L — SIGNIFICANT CHANGE UP (ref 5–17)
ANISOCYTOSIS BLD QL: SLIGHT — SIGNIFICANT CHANGE UP
AST SERPL-CCNC: 28 U/L — SIGNIFICANT CHANGE UP (ref 15–37)
BASOPHILS # BLD AUTO: 0 K/UL — SIGNIFICANT CHANGE UP (ref 0–0.2)
BASOPHILS NFR BLD AUTO: 0 % — SIGNIFICANT CHANGE UP (ref 0–2)
BILIRUB SERPL-MCNC: 0.2 MG/DL — SIGNIFICANT CHANGE UP (ref 0.2–1.2)
BUN SERPL-MCNC: 2 MG/DL — LOW (ref 7–23)
CALCIUM SERPL-MCNC: 8.4 MG/DL — LOW (ref 8.5–10.1)
CHLORIDE SERPL-SCNC: 117 MMOL/L — HIGH (ref 96–108)
CO2 SERPL-SCNC: 23 MMOL/L — SIGNIFICANT CHANGE UP (ref 22–31)
CREAT SERPL-MCNC: 0.5 MG/DL — SIGNIFICANT CHANGE UP (ref 0.5–1.3)
EGFR: 129 ML/MIN/1.73M2 — SIGNIFICANT CHANGE UP
EOSINOPHIL # BLD AUTO: 0.12 K/UL — SIGNIFICANT CHANGE UP (ref 0–0.5)
EOSINOPHIL NFR BLD AUTO: 2 % — SIGNIFICANT CHANGE UP (ref 0–6)
GLUCOSE BLDC GLUCOMTR-MCNC: 84 MG/DL — SIGNIFICANT CHANGE UP (ref 70–99)
GLUCOSE SERPL-MCNC: 95 MG/DL — SIGNIFICANT CHANGE UP (ref 70–99)
HCT VFR BLD CALC: 25.7 % — LOW (ref 34.5–45)
HGB BLD-MCNC: 8.1 G/DL — LOW (ref 11.5–15.5)
HYPOCHROMIA BLD QL: SLIGHT — SIGNIFICANT CHANGE UP
LYMPHOCYTES # BLD AUTO: 1.27 K/UL — SIGNIFICANT CHANGE UP (ref 1–3.3)
LYMPHOCYTES # BLD AUTO: 22 % — SIGNIFICANT CHANGE UP (ref 13–44)
MAGNESIUM SERPL-MCNC: 1.6 MG/DL — SIGNIFICANT CHANGE UP (ref 1.6–2.6)
MANUAL SMEAR VERIFICATION: SIGNIFICANT CHANGE UP
MCHC RBC-ENTMCNC: 25.9 PG — LOW (ref 27–34)
MCHC RBC-ENTMCNC: 31.5 G/DL — LOW (ref 32–36)
MCV RBC AUTO: 82.1 FL — SIGNIFICANT CHANGE UP (ref 80–100)
MONOCYTES # BLD AUTO: 1.79 K/UL — HIGH (ref 0–0.9)
MONOCYTES NFR BLD AUTO: 31 % — HIGH (ref 2–14)
NEUTROPHILS # BLD AUTO: 2.6 K/UL — SIGNIFICANT CHANGE UP (ref 1.8–7.4)
NEUTROPHILS NFR BLD AUTO: 44 % — SIGNIFICANT CHANGE UP (ref 43–77)
NEUTS BAND # BLD: 1 % — SIGNIFICANT CHANGE UP (ref 0–8)
NRBC # BLD: 0 /100 — SIGNIFICANT CHANGE UP (ref 0–0)
NRBC # BLD: SIGNIFICANT CHANGE UP /100 WBCS (ref 0–0)
OVALOCYTES BLD QL SMEAR: SLIGHT — SIGNIFICANT CHANGE UP
PHOSPHATE SERPL-MCNC: 2.9 MG/DL — SIGNIFICANT CHANGE UP (ref 2.5–4.5)
PLAT MORPH BLD: NORMAL — SIGNIFICANT CHANGE UP
PLATELET # BLD AUTO: 246 K/UL — SIGNIFICANT CHANGE UP (ref 150–400)
POIKILOCYTOSIS BLD QL AUTO: SLIGHT — SIGNIFICANT CHANGE UP
POTASSIUM SERPL-MCNC: 3 MMOL/L — LOW (ref 3.5–5.3)
POTASSIUM SERPL-SCNC: 3 MMOL/L — LOW (ref 3.5–5.3)
PROT SERPL-MCNC: 6.6 GM/DL — SIGNIFICANT CHANGE UP (ref 6–8.3)
RBC # BLD: 3.13 M/UL — LOW (ref 3.8–5.2)
RBC # FLD: 23.9 % — HIGH (ref 10.3–14.5)
RBC BLD AUTO: ABNORMAL
SODIUM SERPL-SCNC: 145 MMOL/L — SIGNIFICANT CHANGE UP (ref 135–145)
TARGETS BLD QL SMEAR: SIGNIFICANT CHANGE UP
WBC # BLD: 5.78 K/UL — SIGNIFICANT CHANGE UP (ref 3.8–10.5)
WBC # FLD AUTO: 5.78 K/UL — SIGNIFICANT CHANGE UP (ref 3.8–10.5)

## 2023-10-01 PROCEDURE — 99291 CRITICAL CARE FIRST HOUR: CPT

## 2023-10-01 PROCEDURE — 93010 ELECTROCARDIOGRAM REPORT: CPT

## 2023-10-01 PROCEDURE — 71045 X-RAY EXAM CHEST 1 VIEW: CPT | Mod: 26

## 2023-10-01 RX ORDER — PHENOBARBITAL 60 MG
130 TABLET ORAL ONCE
Refills: 0 | Status: DISCONTINUED | OUTPATIENT
Start: 2023-10-01 | End: 2023-10-01

## 2023-10-01 RX ORDER — MAGNESIUM SULFATE 500 MG/ML
2 VIAL (ML) INJECTION ONCE
Refills: 0 | Status: COMPLETED | OUTPATIENT
Start: 2023-10-01 | End: 2023-10-01

## 2023-10-01 RX ORDER — MIDAZOLAM HYDROCHLORIDE 1 MG/ML
4 INJECTION, SOLUTION INTRAMUSCULAR; INTRAVENOUS ONCE
Refills: 0 | Status: DISCONTINUED | OUTPATIENT
Start: 2023-10-01 | End: 2023-10-01

## 2023-10-01 RX ORDER — MIDAZOLAM HYDROCHLORIDE 1 MG/ML
2 INJECTION, SOLUTION INTRAMUSCULAR; INTRAVENOUS ONCE
Refills: 0 | Status: DISCONTINUED | OUTPATIENT
Start: 2023-10-01 | End: 2023-10-01

## 2023-10-01 RX ORDER — POTASSIUM CHLORIDE 20 MEQ
10 PACKET (EA) ORAL
Refills: 0 | Status: COMPLETED | OUTPATIENT
Start: 2023-10-01 | End: 2023-10-01

## 2023-10-01 RX ORDER — POTASSIUM CHLORIDE 20 MEQ
40 PACKET (EA) ORAL ONCE
Refills: 0 | Status: COMPLETED | OUTPATIENT
Start: 2023-10-01 | End: 2023-10-01

## 2023-10-01 RX ORDER — FENTANYL CITRATE 50 UG/ML
100 INJECTION INTRAVENOUS ONCE
Refills: 0 | Status: DISCONTINUED | OUTPATIENT
Start: 2023-10-01 | End: 2023-10-01

## 2023-10-01 RX ORDER — ACETAMINOPHEN 500 MG
1000 TABLET ORAL ONCE
Refills: 0 | Status: COMPLETED | OUTPATIENT
Start: 2023-10-01 | End: 2023-10-01

## 2023-10-01 RX ORDER — QUETIAPINE FUMARATE 200 MG/1
50 TABLET, FILM COATED ORAL AT BEDTIME
Refills: 0 | Status: DISCONTINUED | OUTPATIENT
Start: 2023-10-01 | End: 2023-10-05

## 2023-10-01 RX ADMIN — Medication 25 GRAM(S): at 06:09

## 2023-10-01 RX ADMIN — Medication 400 MILLIGRAM(S): at 19:54

## 2023-10-01 RX ADMIN — Medication 130 MILLIGRAM(S): at 16:37

## 2023-10-01 RX ADMIN — DEXMEDETOMIDINE HYDROCHLORIDE IN 0.9% SODIUM CHLORIDE 7.41 MICROGRAM(S)/KG/HR: 4 INJECTION INTRAVENOUS at 08:48

## 2023-10-01 RX ADMIN — LEVETIRACETAM 400 MILLIGRAM(S): 250 TABLET, FILM COATED ORAL at 07:50

## 2023-10-01 RX ADMIN — Medication 100 MILLIEQUIVALENT(S): at 09:46

## 2023-10-01 RX ADMIN — PROPOFOL 14.2 MICROGRAM(S)/KG/MIN: 10 INJECTION, EMULSION INTRAVENOUS at 08:48

## 2023-10-01 RX ADMIN — DEXMEDETOMIDINE HYDROCHLORIDE IN 0.9% SODIUM CHLORIDE 7.41 MICROGRAM(S)/KG/HR: 4 INJECTION INTRAVENOUS at 03:57

## 2023-10-01 RX ADMIN — Medication 1000 MILLIGRAM(S): at 20:09

## 2023-10-01 RX ADMIN — QUETIAPINE FUMARATE 50 MILLIGRAM(S): 200 TABLET, FILM COATED ORAL at 20:37

## 2023-10-01 RX ADMIN — Medication 130 MILLIGRAM(S): at 19:55

## 2023-10-01 RX ADMIN — MIDAZOLAM HYDROCHLORIDE 2 MILLIGRAM(S): 1 INJECTION, SOLUTION INTRAMUSCULAR; INTRAVENOUS at 05:55

## 2023-10-01 RX ADMIN — FENTANYL CITRATE 100 MICROGRAM(S): 50 INJECTION INTRAVENOUS at 06:38

## 2023-10-01 RX ADMIN — Medication 130 MILLIGRAM(S): at 06:30

## 2023-10-01 RX ADMIN — PROPOFOL 14.2 MICROGRAM(S)/KG/MIN: 10 INJECTION, EMULSION INTRAVENOUS at 05:12

## 2023-10-01 RX ADMIN — DEXMEDETOMIDINE HYDROCHLORIDE IN 0.9% SODIUM CHLORIDE 7.41 MICROGRAM(S)/KG/HR: 4 INJECTION INTRAVENOUS at 12:00

## 2023-10-01 RX ADMIN — PIPERACILLIN AND TAZOBACTAM 25 GRAM(S): 4; .5 INJECTION, POWDER, LYOPHILIZED, FOR SOLUTION INTRAVENOUS at 06:10

## 2023-10-01 RX ADMIN — DEXMEDETOMIDINE HYDROCHLORIDE IN 0.9% SODIUM CHLORIDE 7.41 MICROGRAM(S)/KG/HR: 4 INJECTION INTRAVENOUS at 22:07

## 2023-10-01 RX ADMIN — PIPERACILLIN AND TAZOBACTAM 25 GRAM(S): 4; .5 INJECTION, POWDER, LYOPHILIZED, FOR SOLUTION INTRAVENOUS at 13:21

## 2023-10-01 RX ADMIN — DEXMEDETOMIDINE HYDROCHLORIDE IN 0.9% SODIUM CHLORIDE 7.41 MICROGRAM(S)/KG/HR: 4 INJECTION INTRAVENOUS at 17:01

## 2023-10-01 RX ADMIN — KETAMINE HYDROCHLORIDE 1.48 MG/KG/HR: 100 INJECTION INTRAMUSCULAR; INTRAVENOUS at 02:03

## 2023-10-01 RX ADMIN — MIDAZOLAM HYDROCHLORIDE 4 MILLIGRAM(S): 1 INJECTION, SOLUTION INTRAMUSCULAR; INTRAVENOUS at 06:11

## 2023-10-01 RX ADMIN — PANTOPRAZOLE SODIUM 40 MILLIGRAM(S): 20 TABLET, DELAYED RELEASE ORAL at 06:11

## 2023-10-01 RX ADMIN — CHLORHEXIDINE GLUCONATE 15 MILLILITER(S): 213 SOLUTION TOPICAL at 06:11

## 2023-10-01 RX ADMIN — Medication 100 MILLIGRAM(S): at 12:00

## 2023-10-01 RX ADMIN — DEXMEDETOMIDINE HYDROCHLORIDE IN 0.9% SODIUM CHLORIDE 7.41 MICROGRAM(S)/KG/HR: 4 INJECTION INTRAVENOUS at 19:54

## 2023-10-01 RX ADMIN — MIDAZOLAM HYDROCHLORIDE 2 MILLIGRAM(S): 1 INJECTION, SOLUTION INTRAMUSCULAR; INTRAVENOUS at 05:23

## 2023-10-01 RX ADMIN — Medication 1 MILLIGRAM(S): at 13:08

## 2023-10-01 RX ADMIN — DEXMEDETOMIDINE HYDROCHLORIDE IN 0.9% SODIUM CHLORIDE 7.41 MICROGRAM(S)/KG/HR: 4 INJECTION INTRAVENOUS at 01:55

## 2023-10-01 RX ADMIN — Medication 130 MILLIGRAM(S): at 12:00

## 2023-10-01 RX ADMIN — LEVETIRACETAM 400 MILLIGRAM(S): 250 TABLET, FILM COATED ORAL at 17:01

## 2023-10-01 RX ADMIN — ENOXAPARIN SODIUM 40 MILLIGRAM(S): 100 INJECTION SUBCUTANEOUS at 06:09

## 2023-10-01 RX ADMIN — Medication 2 MILLIGRAM(S): at 21:27

## 2023-10-01 RX ADMIN — PROPOFOL 14.2 MICROGRAM(S)/KG/MIN: 10 INJECTION, EMULSION INTRAVENOUS at 07:43

## 2023-10-01 RX ADMIN — CHLORHEXIDINE GLUCONATE 1 APPLICATION(S): 213 SOLUTION TOPICAL at 06:28

## 2023-10-01 RX ADMIN — MIDAZOLAM HYDROCHLORIDE 2 MILLIGRAM(S): 1 INJECTION, SOLUTION INTRAMUSCULAR; INTRAVENOUS at 07:40

## 2023-10-01 RX ADMIN — DEXMEDETOMIDINE HYDROCHLORIDE IN 0.9% SODIUM CHLORIDE 7.41 MICROGRAM(S)/KG/HR: 4 INJECTION INTRAVENOUS at 06:08

## 2023-10-01 RX ADMIN — PANTOPRAZOLE SODIUM 40 MILLIGRAM(S): 20 TABLET, DELAYED RELEASE ORAL at 17:02

## 2023-10-01 RX ADMIN — Medication 130 MILLIGRAM(S): at 17:01

## 2023-10-01 RX ADMIN — Medication 100 MILLIEQUIVALENT(S): at 07:43

## 2023-10-01 RX ADMIN — PIPERACILLIN AND TAZOBACTAM 25 GRAM(S): 4; .5 INJECTION, POWDER, LYOPHILIZED, FOR SOLUTION INTRAVENOUS at 22:08

## 2023-10-01 RX ADMIN — FENTANYL CITRATE 100 MICROGRAM(S): 50 INJECTION INTRAVENOUS at 06:23

## 2023-10-01 RX ADMIN — Medication 100 MILLIEQUIVALENT(S): at 06:08

## 2023-10-01 RX ADMIN — ONDANSETRON 4 MILLIGRAM(S): 8 TABLET, FILM COATED ORAL at 05:12

## 2023-10-01 NOTE — PROGRESS NOTE ADULT - ASSESSMENT
27 y/o F w/polysubstance abuse including ETOH abuse and seizure disorder admitted for status epilepticus. Seizures presumably secondary to ETOH withdrawal. Acute hypoxemic respiratory failure likely secondary to aspiration PNA. B/l dense lower lobe consolidations on CT scan. Hypotension likely secondary to combination of severe sepsis with septic shock and sedation.    - Wean sedation as tolerated, goal RASS -1 to 0  - Continue keppra  - Continue mechanical ventilation  - Titrate pressors as needed goal MAP >= 65  - Complete course of abx  - DVT prophylaxis  - Full code    I have personally provided 35 minutes of attending critical care time excluding procedures.

## 2023-10-01 NOTE — PROGRESS NOTE ADULT - SUBJECTIVE AND OBJECTIVE BOX
HPI:  27 yo f pmhx polysubstance abuse, ETOH abuse, seizure disorder on Keppra presented with seizures. Limited hx available but per ED staff, father endorsed she usually drinks daily but hasn't drank in 2 days.  Patient with 9 back to back seizures with EMS, given Versed 10mg IM and 5mg IV prior to seizures stopping.  In ED patient intubated for airway protection, patient persistent seizures, lactate >17.  Patient also noted to be febrile, LP performed in ED results pending. Patinet sedated on propofol and Versed gtt.  Patient still appeared to be seizing in ED, given additional Versed 10mg IVP. Admit to ICU.  (25 Sep 2023 07:29)      24 hr events: No acute events.     ## ROS:  [x ] unable to obtain      ## Vitals  ICU Vital Signs Last 24 Hrs  T(C): 36.5 (01 Oct 2023 07:00), Max: 37.6 (30 Sep 2023 09:00)  T(F): 97.7 (01 Oct 2023 07:00), Max: 99.7 (30 Sep 2023 09:00)  HR: 87 (01 Oct 2023 07:00) (70 - 107)  BP: 108/75 (01 Oct 2023 07:00) (84/58 - 143/107)  BP(mean): 86 (01 Oct 2023 07:00) (67 - 120)  ABP: --  ABP(mean): --  RR: 18 (01 Oct 2023 07:00) (9 - 25)  SpO2: 93% (01 Oct 2023 07:00) (91% - 100%)    O2 Parameters below as of 01 Oct 2023 02:00  Patient On (Oxygen Delivery Method): ventilator            ## Physical Exam:  Gen: Adult female lying in bed, NAD  HEENT: NC/AT sclerae anicteric. ET tube in place  Resp: Mechanical breath sounds b/l, overbreathing vent  CV: S1, S2  Abd: Soft, + BS  Ext: WWP  Neuro: Sedated. unarousable    ## Vent Data  Mode: AC/ CMV (Assist Control/ Continuous Mandatory Ventilation)  RR (machine): 10  TV (machine): 380  FiO2: 30  PEEP: 5  ITime: 1  MAP: 8  PIP: 18      ## Labs:  Chem:  10-01    145  |  117<H>  |  2<L>  ----------------------------<  95  3.0<L>   |  23  |  0.50    Ca    8.4<L>      01 Oct 2023 03:30  Phos  2.9     10-01  Mg     1.6     10-01    TPro  6.6  /  Alb  1.8<L>  /  TBili  0.2  /  DBili  x   /  AST  28  /  ALT  17  /  AlkPhos  78  10-01    LIVER FUNCTIONS - ( 01 Oct 2023 03:30 )  Alb: 1.8 g/dL / Pro: 6.6 gm/dL / ALK PHOS: 78 U/L / ALT: 17 U/L / AST: 28 U/L / GGT: x           CBC:                        8.1    5.78  )-----------( 246      ( 01 Oct 2023 03:30 )             25.7     Coags:      Urinalysis Basic - ( 01 Oct 2023 03:30 )    Color: x / Appearance: x / SG: x / pH: x  Gluc: 95 mg/dL / Ketone: x  / Bili: x / Urobili: x   Blood: x / Protein: x / Nitrite: x   Leuk Esterase: x / RBC: x / WBC x   Sq Epi: x / Non Sq Epi: x / Bacteria: x        ## Cardiac        ## Blood Gas      #I/Os  I&O's Detail    30 Sep 2023 07:01  -  01 Oct 2023 07:00  --------------------------------------------------------  IN:    Dexmedetomidine: 534.3 mL    IV PiggyBack: 100 mL    IV PiggyBack: 350 mL    IV PiggyBack: 400 mL    Ketamine: 226.7 mL    Propofol: 554.4 mL    Vital1.0: 765 mL  Total IN: 2930.4 mL    OUT:    Incontinent per Collection Bag (mL): 2500 mL    Indwelling Catheter - Urethral (mL): 400 mL    Norepinephrine: 0 mL  Total OUT: 2900 mL    Total NET: 30.4 mL          ## Imaging:    ## Medications:  MEDICATIONS  (STANDING):  chlorhexidine 0.12% Liquid 15 milliLiter(s) Oral Mucosa every 12 hours  chlorhexidine 2% Cloths 1 Application(s) Topical <User Schedule>  dexMEDEtomidine Infusion 0.5 MICROgram(s)/kG/Hr (7.41 mL/Hr) IV Continuous <Continuous>  enoxaparin Injectable 40 milliGRAM(s) SubCutaneous every 24 hours  folic acid Injectable 1 milliGRAM(s) IV Push daily  ketamine Infusion. 0.25 mG/kG/Hr (1.48 mL/Hr) IV Continuous <Continuous>  levETIRAcetam  IVPB 1500 milliGRAM(s) IV Intermittent every 12 hours  norepinephrine Infusion 0.05 MICROgram(s)/kG/Min (5.56 mL/Hr) IV Continuous <Continuous>  pantoprazole  Injectable 40 milliGRAM(s) IV Push every 12 hours  PHENobarbital Injectable 130 milliGRAM(s) IV Push four times a day  piperacillin/tazobactam IVPB.. 3.375 Gram(s) IV Intermittent every 8 hours  polyethylene glycol 3350 17 Gram(s) Oral daily  potassium chloride  10 mEq/100 mL IVPB 10 milliEquivalent(s) IV Intermittent every 1 hour  propofol Infusion 40 MICROgram(s)/kG/Min (14.2 mL/Hr) IV Continuous <Continuous>  senna 1 Tablet(s) Oral at bedtime  thiamine Injectable 100 milliGRAM(s) IV Push daily    MEDICATIONS  (PRN):  acetaminophen     Tablet .. 650 milliGRAM(s) Oral every 6 hours PRN Temp greater or equal to 38C (100.4F), Mild Pain (1 - 3)  ondansetron Injectable 4 milliGRAM(s) IV Push every 6 hours PRN Nausea and/or Vomiting

## 2023-10-02 LAB
ALBUMIN SERPL ELPH-MCNC: 2 G/DL — LOW (ref 3.3–5)
ALBUMIN SERPL ELPH-MCNC: 2.2 G/DL — LOW (ref 3.3–5)
ALP SERPL-CCNC: 100 U/L — SIGNIFICANT CHANGE UP (ref 40–120)
ALP SERPL-CCNC: 98 U/L — SIGNIFICANT CHANGE UP (ref 40–120)
ALT FLD-CCNC: 20 U/L — SIGNIFICANT CHANGE UP (ref 12–78)
ALT FLD-CCNC: 24 U/L — SIGNIFICANT CHANGE UP (ref 12–78)
ANION GAP SERPL CALC-SCNC: 7 MMOL/L — SIGNIFICANT CHANGE UP (ref 5–17)
ANION GAP SERPL CALC-SCNC: 8 MMOL/L — SIGNIFICANT CHANGE UP (ref 5–17)
AST SERPL-CCNC: 35 U/L — SIGNIFICANT CHANGE UP (ref 15–37)
AST SERPL-CCNC: 48 U/L — HIGH (ref 15–37)
BASOPHILS # BLD AUTO: 0.1 K/UL — SIGNIFICANT CHANGE UP (ref 0–0.2)
BASOPHILS NFR BLD AUTO: 0.9 % — SIGNIFICANT CHANGE UP (ref 0–2)
BILIRUB SERPL-MCNC: 0.3 MG/DL — SIGNIFICANT CHANGE UP (ref 0.2–1.2)
BILIRUB SERPL-MCNC: 0.3 MG/DL — SIGNIFICANT CHANGE UP (ref 0.2–1.2)
BUN SERPL-MCNC: 2 MG/DL — LOW (ref 7–23)
BUN SERPL-MCNC: 2 MG/DL — LOW (ref 7–23)
CALCIUM SERPL-MCNC: 7.9 MG/DL — LOW (ref 8.5–10.1)
CALCIUM SERPL-MCNC: 8.3 MG/DL — LOW (ref 8.5–10.1)
CHLORIDE SERPL-SCNC: 111 MMOL/L — HIGH (ref 96–108)
CHLORIDE SERPL-SCNC: 114 MMOL/L — HIGH (ref 96–108)
CO2 SERPL-SCNC: 20 MMOL/L — LOW (ref 22–31)
CO2 SERPL-SCNC: 21 MMOL/L — LOW (ref 22–31)
CREAT SERPL-MCNC: 0.44 MG/DL — LOW (ref 0.5–1.3)
CREAT SERPL-MCNC: 0.56 MG/DL — SIGNIFICANT CHANGE UP (ref 0.5–1.3)
EGFR: 126 ML/MIN/1.73M2 — SIGNIFICANT CHANGE UP
EGFR: 133 ML/MIN/1.73M2 — SIGNIFICANT CHANGE UP
EOSINOPHIL # BLD AUTO: 0.06 K/UL — SIGNIFICANT CHANGE UP (ref 0–0.5)
EOSINOPHIL NFR BLD AUTO: 0.5 % — SIGNIFICANT CHANGE UP (ref 0–6)
GLUCOSE BLDC GLUCOMTR-MCNC: 111 MG/DL — HIGH (ref 70–99)
GLUCOSE BLDC GLUCOMTR-MCNC: 72 MG/DL — SIGNIFICANT CHANGE UP (ref 70–99)
GLUCOSE BLDC GLUCOMTR-MCNC: 78 MG/DL — SIGNIFICANT CHANGE UP (ref 70–99)
GLUCOSE BLDC GLUCOMTR-MCNC: 79 MG/DL — SIGNIFICANT CHANGE UP (ref 70–99)
GLUCOSE BLDC GLUCOMTR-MCNC: 81 MG/DL — SIGNIFICANT CHANGE UP (ref 70–99)
GLUCOSE SERPL-MCNC: 82 MG/DL — SIGNIFICANT CHANGE UP (ref 70–99)
GLUCOSE SERPL-MCNC: 86 MG/DL — SIGNIFICANT CHANGE UP (ref 70–99)
HCT VFR BLD CALC: 23.6 % — LOW (ref 34.5–45)
HCT VFR BLD CALC: 25.9 % — LOW (ref 34.5–45)
HGB BLD-MCNC: 7.3 G/DL — LOW (ref 11.5–15.5)
HGB BLD-MCNC: 7.9 G/DL — LOW (ref 11.5–15.5)
IMM GRANULOCYTES NFR BLD AUTO: 4.2 % — HIGH (ref 0–0.9)
LYMPHOCYTES # BLD AUTO: 2.23 K/UL — SIGNIFICANT CHANGE UP (ref 1–3.3)
LYMPHOCYTES # BLD AUTO: 20.3 % — SIGNIFICANT CHANGE UP (ref 13–44)
MAGNESIUM SERPL-MCNC: 1.6 MG/DL — SIGNIFICANT CHANGE UP (ref 1.6–2.6)
MAGNESIUM SERPL-MCNC: 1.8 MG/DL — SIGNIFICANT CHANGE UP (ref 1.6–2.6)
MCHC RBC-ENTMCNC: 24.5 PG — LOW (ref 27–34)
MCHC RBC-ENTMCNC: 25.2 PG — LOW (ref 27–34)
MCHC RBC-ENTMCNC: 30.5 G/DL — LOW (ref 32–36)
MCHC RBC-ENTMCNC: 30.9 G/DL — LOW (ref 32–36)
MCV RBC AUTO: 80.4 FL — SIGNIFICANT CHANGE UP (ref 80–100)
MCV RBC AUTO: 81.4 FL — SIGNIFICANT CHANGE UP (ref 80–100)
MONOCYTES # BLD AUTO: 3.1 K/UL — HIGH (ref 0–0.9)
MONOCYTES NFR BLD AUTO: 28.3 % — HIGH (ref 2–14)
NEUTROPHILS # BLD AUTO: 5.02 K/UL — SIGNIFICANT CHANGE UP (ref 1.8–7.4)
NEUTROPHILS NFR BLD AUTO: 45.8 % — SIGNIFICANT CHANGE UP (ref 43–77)
NRBC # BLD: 0 /100 WBCS — SIGNIFICANT CHANGE UP (ref 0–0)
NRBC # BLD: 0 /100 WBCS — SIGNIFICANT CHANGE UP (ref 0–0)
PHOSPHATE SERPL-MCNC: 2.5 MG/DL — SIGNIFICANT CHANGE UP (ref 2.5–4.5)
PHOSPHATE SERPL-MCNC: 2.6 MG/DL — SIGNIFICANT CHANGE UP (ref 2.5–4.5)
PLATELET # BLD AUTO: 341 K/UL — SIGNIFICANT CHANGE UP (ref 150–400)
PLATELET # BLD AUTO: 412 K/UL — HIGH (ref 150–400)
POTASSIUM SERPL-MCNC: 3 MMOL/L — LOW (ref 3.5–5.3)
POTASSIUM SERPL-MCNC: 3.1 MMOL/L — LOW (ref 3.5–5.3)
POTASSIUM SERPL-SCNC: 3 MMOL/L — LOW (ref 3.5–5.3)
POTASSIUM SERPL-SCNC: 3.1 MMOL/L — LOW (ref 3.5–5.3)
PROT SERPL-MCNC: 6.7 GM/DL — SIGNIFICANT CHANGE UP (ref 6–8.3)
PROT SERPL-MCNC: 7.2 GM/DL — SIGNIFICANT CHANGE UP (ref 6–8.3)
RBC # BLD: 2.9 M/UL — LOW (ref 3.8–5.2)
RBC # BLD: 3.22 M/UL — LOW (ref 3.8–5.2)
RBC # FLD: 24.3 % — HIGH (ref 10.3–14.5)
RBC # FLD: 24.4 % — HIGH (ref 10.3–14.5)
SODIUM SERPL-SCNC: 139 MMOL/L — SIGNIFICANT CHANGE UP (ref 135–145)
SODIUM SERPL-SCNC: 142 MMOL/L — SIGNIFICANT CHANGE UP (ref 135–145)
WBC # BLD: 10.97 K/UL — HIGH (ref 3.8–10.5)
WBC # BLD: 12.55 K/UL — HIGH (ref 3.8–10.5)
WBC # FLD AUTO: 10.97 K/UL — HIGH (ref 3.8–10.5)
WBC # FLD AUTO: 12.55 K/UL — HIGH (ref 3.8–10.5)

## 2023-10-02 PROCEDURE — 99233 SBSQ HOSP IP/OBS HIGH 50: CPT

## 2023-10-02 PROCEDURE — 99291 CRITICAL CARE FIRST HOUR: CPT

## 2023-10-02 PROCEDURE — 90792 PSYCH DIAG EVAL W/MED SRVCS: CPT

## 2023-10-02 RX ORDER — DEXTROSE 50 % IN WATER 50 %
25 SYRINGE (ML) INTRAVENOUS ONCE
Refills: 0 | Status: COMPLETED | OUTPATIENT
Start: 2023-10-02 | End: 2023-10-02

## 2023-10-02 RX ORDER — POTASSIUM CHLORIDE 20 MEQ
10 PACKET (EA) ORAL
Refills: 0 | Status: COMPLETED | OUTPATIENT
Start: 2023-10-02 | End: 2023-10-02

## 2023-10-02 RX ORDER — POTASSIUM CHLORIDE 20 MEQ
40 PACKET (EA) ORAL
Refills: 0 | Status: COMPLETED | OUTPATIENT
Start: 2023-10-02 | End: 2023-10-02

## 2023-10-02 RX ORDER — PHENOBARBITAL 60 MG
60 TABLET ORAL
Refills: 0 | Status: DISCONTINUED | OUTPATIENT
Start: 2023-10-02 | End: 2023-10-03

## 2023-10-02 RX ORDER — MAGNESIUM SULFATE 500 MG/ML
2 VIAL (ML) INJECTION ONCE
Refills: 0 | Status: COMPLETED | OUTPATIENT
Start: 2023-10-02 | End: 2023-10-02

## 2023-10-02 RX ADMIN — Medication 130 MILLIGRAM(S): at 11:29

## 2023-10-02 RX ADMIN — Medication 100 MILLIEQUIVALENT(S): at 05:15

## 2023-10-02 RX ADMIN — Medication 130 MILLIGRAM(S): at 00:45

## 2023-10-02 RX ADMIN — Medication 130 MILLIGRAM(S): at 05:15

## 2023-10-02 RX ADMIN — Medication 0.2 MILLIGRAM(S): at 17:59

## 2023-10-02 RX ADMIN — DEXMEDETOMIDINE HYDROCHLORIDE IN 0.9% SODIUM CHLORIDE 7.41 MICROGRAM(S)/KG/HR: 4 INJECTION INTRAVENOUS at 02:10

## 2023-10-02 RX ADMIN — Medication 0.2 MILLIGRAM(S): at 23:44

## 2023-10-02 RX ADMIN — PANTOPRAZOLE SODIUM 40 MILLIGRAM(S): 20 TABLET, DELAYED RELEASE ORAL at 05:15

## 2023-10-02 RX ADMIN — Medication 40 MILLIEQUIVALENT(S): at 20:26

## 2023-10-02 RX ADMIN — Medication 60 MILLIGRAM(S): at 17:58

## 2023-10-02 RX ADMIN — Medication 1 PATCH: at 11:59

## 2023-10-02 RX ADMIN — PIPERACILLIN AND TAZOBACTAM 25 GRAM(S): 4; .5 INJECTION, POWDER, LYOPHILIZED, FOR SOLUTION INTRAVENOUS at 05:15

## 2023-10-02 RX ADMIN — PIPERACILLIN AND TAZOBACTAM 25 GRAM(S): 4; .5 INJECTION, POWDER, LYOPHILIZED, FOR SOLUTION INTRAVENOUS at 22:04

## 2023-10-02 RX ADMIN — Medication 25 MILLILITER(S): at 02:11

## 2023-10-02 RX ADMIN — Medication 1 MILLIGRAM(S): at 11:59

## 2023-10-02 RX ADMIN — Medication 100 MILLIGRAM(S): at 11:58

## 2023-10-02 RX ADMIN — ENOXAPARIN SODIUM 40 MILLIGRAM(S): 100 INJECTION SUBCUTANEOUS at 05:15

## 2023-10-02 RX ADMIN — DEXMEDETOMIDINE HYDROCHLORIDE IN 0.9% SODIUM CHLORIDE 7.41 MICROGRAM(S)/KG/HR: 4 INJECTION INTRAVENOUS at 20:25

## 2023-10-02 RX ADMIN — QUETIAPINE FUMARATE 50 MILLIGRAM(S): 200 TABLET, FILM COATED ORAL at 22:08

## 2023-10-02 RX ADMIN — LEVETIRACETAM 400 MILLIGRAM(S): 250 TABLET, FILM COATED ORAL at 05:16

## 2023-10-02 RX ADMIN — Medication 100 MILLIEQUIVALENT(S): at 07:43

## 2023-10-02 RX ADMIN — Medication 60 MILLIGRAM(S): at 23:44

## 2023-10-02 RX ADMIN — PANTOPRAZOLE SODIUM 40 MILLIGRAM(S): 20 TABLET, DELAYED RELEASE ORAL at 17:58

## 2023-10-02 RX ADMIN — Medication 40 MILLIEQUIVALENT(S): at 17:59

## 2023-10-02 RX ADMIN — DEXMEDETOMIDINE HYDROCHLORIDE IN 0.9% SODIUM CHLORIDE 7.41 MICROGRAM(S)/KG/HR: 4 INJECTION INTRAVENOUS at 10:33

## 2023-10-02 RX ADMIN — PIPERACILLIN AND TAZOBACTAM 25 GRAM(S): 4; .5 INJECTION, POWDER, LYOPHILIZED, FOR SOLUTION INTRAVENOUS at 15:06

## 2023-10-02 RX ADMIN — Medication 25 GRAM(S): at 17:58

## 2023-10-02 RX ADMIN — CHLORHEXIDINE GLUCONATE 1 APPLICATION(S): 213 SOLUTION TOPICAL at 05:16

## 2023-10-02 RX ADMIN — Medication 100 MILLIEQUIVALENT(S): at 06:33

## 2023-10-02 RX ADMIN — DEXMEDETOMIDINE HYDROCHLORIDE IN 0.9% SODIUM CHLORIDE 7.41 MICROGRAM(S)/KG/HR: 4 INJECTION INTRAVENOUS at 07:43

## 2023-10-02 NOTE — PROGRESS NOTE ADULT - SUBJECTIVE AND OBJECTIVE BOX
# CC: Patient is a 28y old  Female who presents with a chief complaint of Status Epilepticus (02 Oct 2023 09:38)      ## HPI:  27 yo f pmhx polysubstance abuse, ETOH abuse, seizure disorder on Keppra presented with seizures. Limited hx available but per ED staff, father endorsed she usually drinks daily but hasn't drank in 2 days.  Patient with 9 back to back seizures with EMS, given Versed 10mg IM and 5mg IV prior to seizures stopping.  In ED patient intubated for airway protection, patient persistent seizures, lactate >17.  Patient also noted to be febrile, LP performed in ED results pending. Patinet sedated on propofol and Versed gtt.  Patient still appeared to be seizing in ED, given additional Versed 10mg IVP. Admit to ICU.  (25 Sep 2023 07:29)      **O/N:**    ## ROS:    ## Labs:  ** CBC: **                        7.9    12.55 )-----------( 412      ( 02 Oct 2023 12:40 )             25.9     ** Chem:  **  10-02    142  |  114<H>  |  2<L>  ----------------------------<  86  3.1<L>   |  20<L>  |  0.56    Ca    7.9<L>      02 Oct 2023 03:45  Phos  2.5     10-02  Mg     1.8     10-02    TPro  6.7  /  Alb  2.0<L>  /  TBili  0.3  /  DBili  x   /  AST  35  /  ALT  20  /  AlkPhos  100  10-02    ** Coags: **      CAPILLARY BLOOD GLUCOSE      POCT Blood Glucose.: 79 mg/dL (02 Oct 2023 12:07)  POCT Blood Glucose.: 81 mg/dL (02 Oct 2023 09:51)  POCT Blood Glucose.: 72 mg/dL (02 Oct 2023 06:35)  POCT Blood Glucose.: 111 mg/dL (02 Oct 2023 02:31)  POCT Blood Glucose.: 78 mg/dL (02 Oct 2023 00:47)        Culture - Blood (collected 30 Sep 2023 09:51)  Source: .Blood Blood-Peripheral  Preliminary Report (02 Oct 2023 14:02):    No growth at 48 Hours    Culture - Blood (collected 30 Sep 2023 09:51)  Source: .Blood Blood-Peripheral  Preliminary Report (02 Oct 2023 14:02):    No growth at 48 Hours    Culture - Sputum (collected 25 Sep 2023 11:05)  Source: ET Tube ET Tube  Gram Stain (27 Sep 2023 16:38):    Moderate polymorphonuclear leukocytes seen per low power field    No Squamous epithelial cells seen per low power field    Rare Gram positive cocci in pairs seen per oil power field  Final Report (27 Sep 2023 16:38):    Normal Respiratory Ligia present    Culture - CSF with Gram Stain (collected 25 Sep 2023 06:11)  Source: .CSF CSF  Gram Stain (28 Sep 2023 08:49):    No polymorphonuclear leukocytes seen    No organisms seen    by cytocentrifuge  Final Report (28 Sep 2023 08:49):    No growth    Culture - Urine (collected 25 Sep 2023 05:25)  Source: Clean Catch Clean Catch (Midstream)  Final Report (26 Sep 2023 07:23):    No growth    Culture - Blood (collected 25 Sep 2023 04:55)  Source: .Blood Blood-Peripheral  Final Report (30 Sep 2023 09:00):    No growth at 5 days    Culture - Blood (collected 25 Sep 2023 04:55)  Source: .Blood Blood-Peripheral  Gram Stain (prelim) (30 Sep 2023 05:28):    Growth in aerobic bottle: Gram Positive Rods  Preliminary Report (30 Sep 2023 05:28):    Growth in aerobic bottle: Gram Positive Rods    Direct identification is available within approximately 3-5    hours either by Blood Panel Multiplexed PCR or Direct    MALDI-TOF. Details: [email or web address]  Organism: Blood Culture PCR (30 Sep 2023 06:30)  Organism: Blood Culture PCR (30 Sep 2023 06:30)      Method Type: PCR      -  Blood PCR Panel: NEG        ## Imaging:    ## Medications:    piperacillin/tazobactam IVPB.. 3.375 Gram(s) IV Intermittent every 8 hours      acetaminophen     Tablet .. 650 milliGRAM(s) Oral every 6 hours PRN  dexMEDEtomidine Infusion 0.5 MICROgram(s)/kG/Hr IV Continuous <Continuous>  levETIRAcetam  IVPB 1500 milliGRAM(s) IV Intermittent every 12 hours  ondansetron Injectable 4 milliGRAM(s) IV Push every 6 hours PRN  PHENobarbital Injectable 130 milliGRAM(s) IV Push four times a day  QUEtiapine 50 milliGRAM(s) Oral at bedtime      enoxaparin Injectable 40 milliGRAM(s) SubCutaneous every 24 hours    pantoprazole  Injectable 40 milliGRAM(s) IV Push every 12 hours  polyethylene glycol 3350 17 Gram(s) Oral daily  senna 1 Tablet(s) Oral at bedtime              ## O/E:  ICU Vital Signs Last 24 Hrs  T(C): 37.2 (02 Oct 2023 12:00), Max: 38.3 (01 Oct 2023 19:00)  T(F): 98.9 (02 Oct 2023 12:00), Max: 101 (01 Oct 2023 19:00)  HR: 88 (02 Oct 2023 15:00) (88 - 121)  BP: 108/75 (02 Oct 2023 15:00) (81/25 - 141/100)  BP(mean): 85 (02 Oct 2023 15:00) (38 - 117)  ABP: --  ABP(mean): --  RR: 18 (02 Oct 2023 15:00) (16 - 32)  SpO2: 98% (02 Oct 2023 15:00) (88% - 100%)    O2 Parameters below as of 02 Oct 2023 14:00  Patient On (Oxygen Delivery Method): nasal cannula  O2 Flow (L/min): 3        I&O's Summary    01 Oct 2023 07:01  -  02 Oct 2023 07:00  --------------------------------------------------------  IN: 1659.3 mL / OUT: 1500 mL / NET: 159.3 mL    02 Oct 2023 07:01  -  02 Oct 2023 15:54  --------------------------------------------------------  IN: 389.8 mL / OUT: 0 mL / NET: 389.8 mL        Gen: lying comfortably in bed in no apparent distress  HEENT: PERRL, EOMI  Resp: CTA B/L no c/r/w  CVS: S1S2 no m/r/g  Abd: soft NT/ND +BS  Ext: no c/c/e  Neuro: A&Ox3    ## Code status:  Goals of care discussion: [x] yes [ ] no  [x] full code  [ ] DNR  [ ] DNI  [ ] MOLST # CC: Patient is a 28y old  Female who presents with a chief complaint of Status Epilepticus (02 Oct 2023 09:38)      ## HPI:  29 yo f pmhx polysubstance abuse, ETOH abuse, seizure disorder on Keppra presented with seizures. Limited hx available but per ED staff, father endorsed she usually drinks daily but hasn't drank in 2 days.  Patient with 9 back to back seizures with EMS, given Versed 10mg IM and 5mg IV prior to seizures stopping.  In ED patient intubated for airway protection, patient persistent seizures, lactate >17.  Patient also noted to be febrile, LP performed in ED results pending. Patinet sedated on propofol and Versed gtt.  Patient still appeared to be seizing in ED, given additional Versed 10mg IVP. Admit to ICU.  (25 Sep 2023 07:29)      **O/N:**  Extubated yesterday    ## ROS:  Poor  of symptoms, denies active issues    ## Labs:  ** CBC: **                        7.9    12.55 )-----------( 412      ( 02 Oct 2023 12:40 )             25.9     ** Chem:  **  10-02    142  |  114<H>  |  2<L>  ----------------------------<  86  3.1<L>   |  20<L>  |  0.56    Ca    7.9<L>      02 Oct 2023 03:45  Phos  2.5     10-02  Mg     1.8     10-02    TPro  6.7  /  Alb  2.0<L>  /  TBili  0.3  /  DBili  x   /  AST  35  /  ALT  20  /  AlkPhos  100  10-02    ** Coags: **      CAPILLARY BLOOD GLUCOSE      POCT Blood Glucose.: 79 mg/dL (02 Oct 2023 12:07)  POCT Blood Glucose.: 81 mg/dL (02 Oct 2023 09:51)  POCT Blood Glucose.: 72 mg/dL (02 Oct 2023 06:35)  POCT Blood Glucose.: 111 mg/dL (02 Oct 2023 02:31)  POCT Blood Glucose.: 78 mg/dL (02 Oct 2023 00:47)        Culture - Blood (collected 30 Sep 2023 09:51)  Source: .Blood Blood-Peripheral  Preliminary Report (02 Oct 2023 14:02):    No growth at 48 Hours    Culture - Blood (collected 30 Sep 2023 09:51)  Source: .Blood Blood-Peripheral  Preliminary Report (02 Oct 2023 14:02):    No growth at 48 Hours    Culture - Sputum (collected 25 Sep 2023 11:05)  Source: ET Tube ET Tube  Gram Stain (27 Sep 2023 16:38):    Moderate polymorphonuclear leukocytes seen per low power field    No Squamous epithelial cells seen per low power field    Rare Gram positive cocci in pairs seen per oil power field  Final Report (27 Sep 2023 16:38):    Normal Respiratory Ligia present    Culture - CSF with Gram Stain (collected 25 Sep 2023 06:11)  Source: .CSF CSF  Gram Stain (28 Sep 2023 08:49):    No polymorphonuclear leukocytes seen    No organisms seen    by cytocentrifuge  Final Report (28 Sep 2023 08:49):    No growth    Culture - Urine (collected 25 Sep 2023 05:25)  Source: Clean Catch Clean Catch (Midstream)  Final Report (26 Sep 2023 07:23):    No growth    Culture - Blood (collected 25 Sep 2023 04:55)  Source: .Blood Blood-Peripheral  Final Report (30 Sep 2023 09:00):    No growth at 5 days    Culture - Blood (collected 25 Sep 2023 04:55)  Source: .Blood Blood-Peripheral  Gram Stain (prelim) (30 Sep 2023 05:28):    Growth in aerobic bottle: Gram Positive Rods  Preliminary Report (30 Sep 2023 05:28):    Growth in aerobic bottle: Gram Positive Rods    Direct identification is available within approximately 3-5    hours either by Blood Panel Multiplexed PCR or Direct    MALDI-TOF. Details: [email or web address]  Organism: Blood Culture PCR (30 Sep 2023 06:30)  Organism: Blood Culture PCR (30 Sep 2023 06:30)      Method Type: PCR      -  Blood PCR Panel: NEG        ## Imaging:    ## Medications:    piperacillin/tazobactam IVPB.. 3.375 Gram(s) IV Intermittent every 8 hours      acetaminophen     Tablet .. 650 milliGRAM(s) Oral every 6 hours PRN  dexMEDEtomidine Infusion 0.5 MICROgram(s)/kG/Hr IV Continuous <Continuous>  levETIRAcetam  IVPB 1500 milliGRAM(s) IV Intermittent every 12 hours  ondansetron Injectable 4 milliGRAM(s) IV Push every 6 hours PRN  PHENobarbital Injectable 130 milliGRAM(s) IV Push four times a day  QUEtiapine 50 milliGRAM(s) Oral at bedtime      enoxaparin Injectable 40 milliGRAM(s) SubCutaneous every 24 hours    pantoprazole  Injectable 40 milliGRAM(s) IV Push every 12 hours  polyethylene glycol 3350 17 Gram(s) Oral daily  senna 1 Tablet(s) Oral at bedtime              ## O/E:  ICU Vital Signs Last 24 Hrs  T(C): 37.2 (02 Oct 2023 12:00), Max: 38.3 (01 Oct 2023 19:00)  T(F): 98.9 (02 Oct 2023 12:00), Max: 101 (01 Oct 2023 19:00)  HR: 88 (02 Oct 2023 15:00) (88 - 121)  BP: 108/75 (02 Oct 2023 15:00) (81/25 - 141/100)  BP(mean): 85 (02 Oct 2023 15:00) (38 - 117)  ABP: --  ABP(mean): --  RR: 18 (02 Oct 2023 15:00) (16 - 32)  SpO2: 98% (02 Oct 2023 15:00) (88% - 100%)    O2 Parameters below as of 02 Oct 2023 14:00  Patient On (Oxygen Delivery Method): nasal cannula  O2 Flow (L/min): 3        I&O's Summary    01 Oct 2023 07:01  -  02 Oct 2023 07:00  --------------------------------------------------------  IN: 1659.3 mL / OUT: 1500 mL / NET: 159.3 mL    02 Oct 2023 07:01  -  02 Oct 2023 15:54  --------------------------------------------------------  IN: 389.8 mL / OUT: 0 mL / NET: 389.8 mL        Gen: lying comfortably in bed in no apparent distress  HEENT: PERRL, EOMI  Resp: CTA B/L no c/r/w  CVS: S1S2 no m/r/g  Abd: soft NT/ND +BS  Ext: no c/c/e  Neuro: A&Ox3    ## Code status:  Goals of care discussion: [x] yes [ ] no  [x] full code  [ ] DNR  [ ] DNI  [ ] MOLST

## 2023-10-02 NOTE — BH CONSULTATION LIAISON ASSESSMENT NOTE - NSBHCONSULTRECOMMENDOTHER_PSY_A_CORE FT
- previous chart history does not mention drug/alcohol abuse or seizure disorder  - consider Apetamin associated convulsions (literature reviewed; a banned substance in the [place]); contains antihistamine cyproheptadine   - alcohol withdrawal seizures are usually generalized tonic-clonic though can be partial. RARE to have status epilepticus   - highly recommend MRI of the head (CT showing generalized volume loss out of proportion to the patient's age & ventriculomegaly. Septum pellucidum is not visualized, and a portion of the falx cerebri is also not seen...this is not explained by Pt's insignificant PMH on record)          - previous chart history does not mention drug/alcohol abuse or seizure disorder  - consider Apetamin associated convulsions (literature reviewed; a banned substance in the [place]); contains antihistamine cyproheptadine   - alcohol withdrawal seizures are usually generalized tonic-clonic though can be partial. RARE to have status epilepticus   - highly recommend MRI of the head (CT showing generalized volume loss out of proportion to the patient's age & ventriculomegaly. Septum pellucidum is not visualized, and a portion of the falx cerebri is also not seen...this is not explained by Pt's insignificant PMH on record)   - NO longer in the window of alcohol withdrawal; would discontinue phenobarb   - no history of a primary seizure disorder; would start weaning off keppra

## 2023-10-02 NOTE — BH CONSULTATION LIAISON ASSESSMENT NOTE - CURRENT MEDICATION
MEDICATIONS  (STANDING):  chlorhexidine 2% Cloths 1 Application(s) Topical <User Schedule>  dexMEDEtomidine Infusion 0.5 MICROgram(s)/kG/Hr (7.41 mL/Hr) IV Continuous <Continuous>  enoxaparin Injectable 40 milliGRAM(s) SubCutaneous every 24 hours  folic acid Injectable 1 milliGRAM(s) IV Push daily  levETIRAcetam  IVPB 1500 milliGRAM(s) IV Intermittent every 12 hours  pantoprazole  Injectable 40 milliGRAM(s) IV Push every 12 hours  PHENobarbital Injectable 130 milliGRAM(s) IV Push four times a day  piperacillin/tazobactam IVPB.. 3.375 Gram(s) IV Intermittent every 8 hours  polyethylene glycol 3350 17 Gram(s) Oral daily  QUEtiapine 50 milliGRAM(s) Oral at bedtime  senna 1 Tablet(s) Oral at bedtime  thiamine Injectable 100 milliGRAM(s) IV Push daily    MEDICATIONS  (PRN):  acetaminophen     Tablet .. 650 milliGRAM(s) Oral every 6 hours PRN Temp greater or equal to 38C (100.4F), Mild Pain (1 - 3)  ondansetron Injectable 4 milliGRAM(s) IV Push every 6 hours PRN Nausea and/or Vomiting

## 2023-10-02 NOTE — PROGRESS NOTE ADULT - ASSESSMENT
27 y/o F w/polysubstance abuse including ETOH abuse and seizure disorder admitted for status epilepticus. Seizures presumably secondary to ETOH withdrawal. Acute hypoxemic respiratory failure likely secondary to aspiration PNA. B/l dense lower lobe consolidations on CT scan. Hypotension likely secondary to combination of severe sepsis with septic shock and sedation.  - extubated yesterday, now on 3LNC  - remains on Precedex  - Remains on empiric Zosyn  - phenobarb Q6h and Seroquel  - c/w Keppra for now  - DVT ppx: Lovenox

## 2023-10-02 NOTE — PROGRESS NOTE ADULT - ASSESSMENT
A/P-  28 year old female s/p multiple seizure  episode admitted to ICU and intubated for airway protection.    extubated on NC  afebrile past 3 days  minimal leukocytosis   LP - negative  csf gram stain and culture- Negative to date  csf PCR panel- all negative  CSF WNV - negative  1 of the 2 Blood cx- were reported negative and now i see today that initial blood cx from 9/25 is reported on 9/30 as Gram positive Rods ( we were not notified)  trach asp cx- negative  blood cx from 9/30- neg x 2  covid-19- negative  urine legionella AG - negative    chest Ct - multilobar pneumonia and aspiration pneumonia       plan-  continue with zosyn day #6 for multifocal pneumonia .  advise to d/c vanco.  check 2 more blood cx.  will need to find out from Micro what is the ID of the Gram positive malena from 9/25 blood cx that after 5 days was posted and no one notified.  seizure management as per neurology and ICU teams.    All labs and imaging and chart notes reviewed.     critical care time 35 minutes.    Jayesh Delgado MD  Infectious Disease Attending    for any questions please do not hesitate to contact me either via teams or by calling [phone]

## 2023-10-02 NOTE — BH CONSULTATION LIAISON ASSESSMENT NOTE - SUMMARY
Patient now drinking > 1 bottle of hard liquor a day, does have a history of withdrawals including seizures. She has NO history of primary seizure disorder, epilepsy and never had status epilepticus.  Patient still uses street-bought Apetamin in syrup form which she gets 'high from fast' as per her mother. Initially used it for appetite stimulation but has used it to get high lately as per parents. Suspecting multi-substance cause for the status epilepticus in this case

## 2023-10-02 NOTE — BH CONSULTATION LIAISON ASSESSMENT NOTE - HPI (INCLUDE ILLNESS QUALITY, SEVERITY, DURATION, TIMING, CONTEXT, MODIFYING FACTORS, ASSOCIATED SIGNS AND SYMPTOMS)
29 yo f pmhx polysubstance abuse, ETOH abuse, seizure disorder on Keppra presented with seizures. Limited hx available but per ED staff, father endorsed she usually drinks daily but hasn't drank in 2 days.  Patient with 9 back to back seizures with EMS, given Versed 10mg IM and 5mg IV prior to seizures stopping.  In ED patient intubated for airway protection, patient persistent seizures, lactate >17.  Patient also noted to be febrile, LP performed in ED results pending. Patinet sedated on propofol and Versed gtt.  Patient still appeared to be seizing in ED, given additional Versed 10mg IVP. UTOX + only for benzo, serum tox negative, Admit to ICU. seen by Neurology, ID. Extubated on 10/1/23.       ISTOP Reference #: [id number] no record of Patient  CVM no record of patient  HAS ANOTHER SUNRISE MRN [id number] (***this chart does not mention substance use or seizure disorder):  29 yo f pmhx polysubstance abuse, ETOH abuse, seizure disorder on Keppra presented with seizures. Limited hx available but per ED staff, father endorsed she usually drinks daily but hasn't drank in 2 days.  Patient with 9 back to back seizures with EMS, given Versed 10mg IM and 5mg IV prior to seizures stopping.  In ED patient intubated for airway protection, patient persistent seizures, lactate >17.  Patient also noted to be febrile, LP performed in ED results pending. Patinet sedated on propofol and Versed gtt.  Patient still appeared to be seizing in ED, given additional Versed 10mg IVP. UTOX + only for benzo, serum tox negative, Admit to ICU. seen by Neurology, ID. Extubated on 10/1/23.     CHART HISTORY MED ADMISSION AT [facility] ON 3/22/21: "25F w/ no significant PMH presents with worsening epigastric and lower abdominal pain, n/v x 3 weeks, found to have elevated lipase and CT findings c/w acute pancreatitis of unknown etiology. Pt also found to have worsening transaminitis and severe hepatic steatosis on imaging. Worsening transaminitis and hepatic steatosis most likely i/s/o possible Apetamin (OTC supplement) use. Pt denied history of etoh use and no evidence of stones on imaging. Igg4 negative. Hepatitis panel wnl. Triglycerides were moderately elevated. Her course has been complicated by hypertension (both systolic and diastolic hypertension) which improved after IVF cessation.  Echocardiogram, renal u/s performed were unremarkable. Laboratory work-up for primary causes of hypertension have not resulted yet. Other remarkable laboratory findings include extremely low vitamin D (<5), elevated TSH (>9) w/ normal T4, and normocytic anemia. Patient's abdominal pain improved and she was able to tolerate po. She was seen be gastroenterology who recommended no further w/u. Pt hemodynamically stable and medically clear for discharge home...Patient with acute pancreatitis, unclear etiology, possibly due to herbal supplement vs under reported ETOH use. clinically improved quickly, now tolerating PO. Noted elevated TSH, but in setting of acute pancreatitis, more likely sick euthyroid vs subclinical. With no medical comorbidities, will defer to outpatient monitoring and treatment if needed. Establish can with PCP to continue monitoring of her LFT and repeat TSH."       ISTOP Reference #: [id number] no record of Patient  CVM no record of patient  HAS ANOTHER SUNRISE MRN [id number] (***this chart does not mention substance use or seizure disorder):  29 yo f pmhx polysubstance abuse, ETOH abuse, seizure disorder on Keppra presented with seizures. Limited hx available but per ED staff, father endorsed she usually drinks daily but hasn't drank in 2 days.  Patient with 9 back to back seizures with EMS, given Versed 10mg IM and 5mg IV prior to seizures stopping.  In ED patient intubated for airway protection, patient persistent seizures, lactate >17.  Patient also noted to be febrile, LP performed in ED results pending. Patinet sedated on propofol and Versed gtt.  Patient still appeared to be seizing in ED, given additional Versed 10mg IVP. UTOX + only for benzo, serum tox negative, Admit to ICU. seen by Neurology, ID. Extubated on 10/1/23.     CHART HISTORY MED ADMISSION AT [facility] ON 3/22/21: "25F w/ no significant PMH presents with worsening epigastric and lower abdominal pain, n/v x 3 weeks, found to have elevated lipase and CT findings c/w acute pancreatitis of unknown etiology. Pt also found to have worsening transaminitis and severe hepatic steatosis on imaging. Worsening transaminitis and hepatic steatosis most likely i/s/o possible Apetamin (OTC supplement) use. Pt denied history of etoh use and no evidence of stones on imaging. Igg4 negative. Hepatitis panel wnl. Triglycerides were moderately elevated. Her course has been complicated by hypertension (both systolic and diastolic hypertension) which improved after IVF cessation.  Echocardiogram, renal u/s performed were unremarkable. Laboratory work-up for primary causes of hypertension have not resulted yet. Other remarkable laboratory findings include extremely low vitamin D (<5), elevated TSH (>9) w/ normal T4, and normocytic anemia. Patient's abdominal pain improved and she was able to tolerate po. She was seen be gastroenterology who recommended no further w/u. Pt hemodynamically stable and medically clear for discharge home...Patient with acute pancreatitis, unclear etiology, possibly due to herbal supplement vs under reported ETOH use. clinically improved quickly, now tolerating PO. Noted elevated TSH, but in setting of acute pancreatitis, more likely sick euthyroid vs subclinical. With no medical comorbidities, will defer to outpatient monitoring and treatment if needed. Establish can with PCP to continue monitoring of her LFT and repeat TSH."     ISTOP Reference #: [id number] no record of Patient  CVM no record of patient     EXAM: sedated; not able to be interviewed. COLLATERAL FROM PARENTS AT BEDSIDE: Patient started drinking alcohol in college trying to deal with the stress and her use escalated and gotten worst since. Patient now drinking > 1 bottle of hard liquor a day, does have a history of withdrawals including seizures. She has NO history of primary seizure disorder, epilepsy and never had status epilepticus.  Patient still uses street-bought Apetamin in syrup form which she gets 'high from fast' as per her mother. Initially used it for appetite stimulation but has used it to get high lately as per parents.

## 2023-10-02 NOTE — BH CONSULTATION LIAISON ASSESSMENT NOTE - RISK ASSESSMENT
Chronic risk factors: substance abuse impacting health and causing dysfunction; single. Protective factors: young; female; no formal psychiatric diagnosis; no suicide attempts; no self-injurious behavior; no hx of aggression/violence; no legal issues; stable domicile; strong family support; access to health services. No acute risk factors identified

## 2023-10-02 NOTE — PROGRESS NOTE ADULT - SUBJECTIVE AND OBJECTIVE BOX
[facility]  INFECTIOUS DISEASES   [address] [place] [address]  Tel: [phone]     Fax: [phone]  ==============================================================================  MD Lucio Rose, DO Karly Meneses, NP   ==============================================================================      KEYA RODRIGUEZ  MRN-[id number]  28y ([birth year])      Interval History:    ROS:    [ ] Unobtainable because:  [ ] All other systems negative except as noted    Constitutional: no fever, no chills  Head: no trauma  Eyes: no vision changes, no eye pain  ENT:  no sore throat, no rhinorrhea  Cardiovascular:  no chest pain, no palpitation  Respiratory:  no SOB, no cough  GI:  no abd pain, no vomiting, no diarrhea  urinary: no dysuria, no hematuria, no flank pain  musculoskeletal:  no joint pain, no joint swelling  skin:  no rash  neurology:  no headache, no seizure, no change in mental status  psych: no anxiety, no depression         Allergies  No Known Allergies        ANTIMICROBIALS:  piperacillin/tazobactam IVPB.. 3.375 every 8 hours        Physical Exam:  Vital Signs Last 24 Hrs  T(C): 37.1 (02 Oct 2023 08:00), Max: 38.3 (01 Oct 2023 19:00)  T(F): 98.8 (02 Oct 2023 08:00), Max: 101 (01 Oct 2023 19:00)  HR: 103 (02 Oct 2023 09:00) (90 - 121)  BP: 126/99 (02 Oct 2023 09:00) (81/25 - 146/114)  BP(mean): 109 (02 Oct 2023 09:00) (38 - 126)  RR: 21 (02 Oct 2023 09:00) (15 - 43)  SpO2: 100% (02 Oct 2023 09:00) (88% - 100%)    Parameters below as of 02 Oct 2023 08:00  Patient On (Oxygen Delivery Method): nasal cannula  O2 Flow (L/min): 3      10-01-23 @ 07:01  -  10-02-23 @ 07:00  --------------------------------------------------------  IN: 1659.3 mL / OUT: 1500 mL / NET: 159.3 mL    10-02-23 @ 07:01  -  10-02-23 @ 09:39  --------------------------------------------------------  IN: 122.2 mL / OUT: 0 mL / NET: 122.2 mL      General:    NAD,  non toxic  Head: atraumatic, normocephalic  Eye: normal sclera and conjunctiva  ENT:    no oral lesions, neck supple  Cardio:     regular S1, S2,  no murmur  Respiratory:    clear b/l,    no wheezing  abd:     soft,   BS +,   no tenderness  :   no CVAT,  no suprapubic tenderness,   no  johnson  Musculoskeletal:   no joint swelling,   no edema  vascular: no central lines, +PIV   Skin:    no rash  Neurologic:     no focal deficit  psych: normal affect    WBC Count: 10.97 K/uL (10-02 @ 03:45)  WBC Count: 5.78 K/uL (10-01 @ 03:30)  WBC Count: 6.05 K/uL (09-30 @ 05:10)  WBC Count: 5.11 K/uL (09-29 @ 05:45)  WBC Count: 9.74 K/uL (09-28 @ 03:05)  WBC Count: 10.51 K/uL (09-27 @ 12:05)  WBC Count: 9.85 K/uL (09-27 @ 04:00)                            7.3    10.97 )-----------( 341      ( 02 Oct 2023 03:45 )             23.6       10-02    142  |  114<H>  |  2<L>  ----------------------------<  86  3.1<L>   |  20<L>  |  0.56    Ca    7.9<L>      02 Oct 2023 03:45  Phos  2.5     10-02  Mg     1.8     10-02    TPro  6.7  /  Alb  2.0<L>  /  TBili  0.3  /  DBili  x   /  AST  35  /  ALT  20  /  AlkPhos  100  10-02      Urinalysis Basic - ( 02 Oct 2023 03:45 )    Color: x / Appearance: x / SG: x / pH: x  Gluc: 86 mg/dL / Ketone: x  / Bili: x / Urobili: x   Blood: x / Protein: x / Nitrite: x   Leuk Esterase: x / RBC: x / WBC x   Sq Epi: x / Non Sq Epi: x / Bacteria: x          Creatinine Trend: 0.56<--, 0.50<--, 0.65<--, 0.71<--, 0.66<--, 0.52<--      MICROBIOLOGY:  v  .Blood Blood-Peripheral  09-30-23   No growth at 24 hours  --  --      ET Tube ET Tube  09-25-23   Normal Respiratory Ligia present  --    Moderate polymorphonuclear leukocytes seen per low power field  No Squamous epithelial cells seen per low power field  Rare Gram positive cocci in pairs seen per oil power field      .CSF CSF  09-25-23   No growth  --    No polymorphonuclear leukocytes seen  No organisms seen  by cytocentrifuge      Clean Catch Clean Catch (Midstream)  09-25-23   No growth  --  --      .Blood Blood-Peripheral  09-25-23   Growth in aerobic bottle: Gram Positive Rods  Direct identification is available within approximately 3-5  hours either by Blood Panel Multiplexed PCR or Direct  MALDI-TOF. Details: [email or web address]  --  Blood Culture PCR                                  RADIOLOGY:   [facility]  INFECTIOUS DISEASES   [address] [place] [address]  Tel: [phone]     Fax: [phone]  ==============================================================================  MD Lucio Rose, DO Karly Meneses, NP   ==============================================================================      KEYA RODRIGUEZ  MRN-[id number]  28y ([birth year])      Interval History:  extubated on NC.  mental status somewhat agitated and confused ( not sure if baseline is same)    ROS:    [ ] Unobtainable because:  nasrin does not answer any of my questions.        Allergies  No Known Allergies        ANTIMICROBIALS:  piperacillin/tazobactam IVPB.. 3.375 every 8 hours        Physical Exam:  Vital Signs Last 24 Hrs  T(C): 37.1 (02 Oct 2023 08:00), Max: 38.3 (01 Oct 2023 19:00)  T(F): 98.8 (02 Oct 2023 08:00), Max: 101 (01 Oct 2023 19:00)  HR: 103 (02 Oct 2023 09:00) (90 - 121)  BP: 126/99 (02 Oct 2023 09:00) (81/25 - 146/114)  BP(mean): 109 (02 Oct 2023 09:00) (38 - 126)  RR: 21 (02 Oct 2023 09:00) (15 - 43)  SpO2: 100% (02 Oct 2023 09:00) (88% - 100%)    Parameters below as of 02 Oct 2023 08:00  Patient On (Oxygen Delivery Method): nasal cannula  O2 Flow (L/min): 3      10-01-23 @ 07:01  -  10-02-23 @ 07:00  --------------------------------------------------------  IN: 1659.3 mL / OUT: 1500 mL / NET: 159.3 mL    10-02-23 @ 07:01  -  10-02-23 @ 09:39  --------------------------------------------------------  IN: 122.2 mL / OUT: 0 mL / NET: 122.2 mL      General:    NAD,  non toxic  Head: atraumatic, normocephalic  Eye: normal sclera and conjunctiva  Cardio:     regular S1, S2,  no murmur  Respiratory:    breath sounds heard b/l,    no wheezing  abd:     soft,   BS +,   no tenderness  Musculoskeletal:   no joint swelling,   no edema  vascular: no central lines, +PIV   Skin:    no rash  Neurologic:     confused    WBC Count: 10.97 K/uL (10-02 @ 03:45)  WBC Count: 5.78 K/uL (10-01 @ 03:30)  WBC Count: 6.05 K/uL (09-30 @ 05:10)  WBC Count: 5.11 K/uL (09-29 @ 05:45)  WBC Count: 9.74 K/uL (09-28 @ 03:05)  WBC Count: 10.51 K/uL (09-27 @ 12:05)  WBC Count: 9.85 K/uL (09-27 @ 04:00)                            7.3    10.97 )-----------( 341      ( 02 Oct 2023 03:45 )             23.6       10-02    142  |  114<H>  |  2<L>  ----------------------------<  86  3.1<L>   |  20<L>  |  0.56    Ca    7.9<L>      02 Oct 2023 03:45  Phos  2.5     10-02  Mg     1.8     10-02    TPro  6.7  /  Alb  2.0<L>  /  TBili  0.3  /  DBili  x   /  AST  35  /  ALT  20  /  AlkPhos  100  10-02      Urinalysis Basic - ( 02 Oct 2023 03:45 )    Color: x / Appearance: x / SG: x / pH: x  Gluc: 86 mg/dL / Ketone: x  / Bili: x / Urobili: x   Blood: x / Protein: x / Nitrite: x   Leuk Esterase: x / RBC: x / WBC x   Sq Epi: x / Non Sq Epi: x / Bacteria: x          Creatinine Trend: 0.56<--, 0.50<--, 0.65<--, 0.71<--, 0.66<--, 0.52<--      MICROBIOLOGY:  v  .Blood Blood-Peripheral  09-30-23   No growth at 24 hours  --  --      ET Tube ET Tube  09-25-23   Normal Respiratory Ligia present  --    Moderate polymorphonuclear leukocytes seen per low power field  No Squamous epithelial cells seen per low power field  Rare Gram positive cocci in pairs seen per oil power field      .CSF CSF  09-25-23   No growth  --    No polymorphonuclear leukocytes seen  No organisms seen  by cytocentrifuge      Clean Catch Clean Catch (Midstream)  09-25-23   No growth  --  --      .Blood Blood-Peripheral  09-25-23   Growth in aerobic bottle: Gram Positive Rods  Direct identification is available within approximately 3-5  hours either by Blood Panel Multiplexed PCR or Direct  MALDI-TOF. Details: [email or web address]  --  Blood Culture PCR                                  RADIOLOGY:

## 2023-10-02 NOTE — BH CONSULTATION LIAISON ASSESSMENT NOTE - NSBHCHARTREVIEWLAB_PSY_A_CORE FT
10-02    139  |  111<H>  |  2<L>  ----------------------------<  82  3.0<L>   |  21<L>  |  0.44<L>    Ca    8.3<L>      02 Oct 2023 13:40  Phos  2.6     10-02  Mg     1.6     10-02    TPro  7.2  /  Alb  2.2<L>  /  TBili  0.3  /  DBili  x   /  AST  48<H>  /  ALT  24  /  AlkPhos  98  10-02

## 2023-10-02 NOTE — BH CONSULTATION LIAISON ASSESSMENT NOTE - NSBHCHARTREVIEWVS_PSY_A_CORE FT
Vital Signs Last 24 Hrs  T(C): 37.2 (02 Oct 2023 12:00), Max: 38.3 (01 Oct 2023 19:00)  T(F): 98.9 (02 Oct 2023 12:00), Max: 101 (01 Oct 2023 19:00)  HR: 102 (02 Oct 2023 13:00) (90 - 121)  BP: 124/95 (02 Oct 2023 13:00) (81/25 - 141/100)  BP(mean): 104 (02 Oct 2023 13:00) (38 - 121)  RR: 26 (02 Oct 2023 13:00) (16 - 32)  SpO2: 100% (02 Oct 2023 13:00) (88% - 100%)    Parameters below as of 02 Oct 2023 12:00  Patient On (Oxygen Delivery Method): nasal cannula  O2 Flow (L/min): 3

## 2023-10-03 LAB
ALBUMIN SERPL ELPH-MCNC: 2 G/DL — LOW (ref 3.3–5)
ALP SERPL-CCNC: 104 U/L — SIGNIFICANT CHANGE UP (ref 40–120)
ALT FLD-CCNC: 20 U/L — SIGNIFICANT CHANGE UP (ref 12–78)
ANION GAP SERPL CALC-SCNC: 7 MMOL/L — SIGNIFICANT CHANGE UP (ref 5–17)
AST SERPL-CCNC: 31 U/L — SIGNIFICANT CHANGE UP (ref 15–37)
BASOPHILS # BLD AUTO: 0.07 K/UL — SIGNIFICANT CHANGE UP (ref 0–0.2)
BASOPHILS NFR BLD AUTO: 0.6 % — SIGNIFICANT CHANGE UP (ref 0–2)
BILIRUB SERPL-MCNC: 0.2 MG/DL — SIGNIFICANT CHANGE UP (ref 0.2–1.2)
BUN SERPL-MCNC: 6 MG/DL — LOW (ref 7–23)
CALCIUM SERPL-MCNC: 8.3 MG/DL — LOW (ref 8.5–10.1)
CHLORIDE SERPL-SCNC: 107 MMOL/L — SIGNIFICANT CHANGE UP (ref 96–108)
CO2 SERPL-SCNC: 23 MMOL/L — SIGNIFICANT CHANGE UP (ref 22–31)
CREAT SERPL-MCNC: 0.49 MG/DL — LOW (ref 0.5–1.3)
EGFR: 130 ML/MIN/1.73M2 — SIGNIFICANT CHANGE UP
EOSINOPHIL # BLD AUTO: 0.11 K/UL — SIGNIFICANT CHANGE UP (ref 0–0.5)
EOSINOPHIL NFR BLD AUTO: 0.9 % — SIGNIFICANT CHANGE UP (ref 0–6)
GLUCOSE SERPL-MCNC: 99 MG/DL — SIGNIFICANT CHANGE UP (ref 70–99)
HCT VFR BLD CALC: 24.5 % — LOW (ref 34.5–45)
HGB BLD-MCNC: 7.8 G/DL — LOW (ref 11.5–15.5)
IMM GRANULOCYTES NFR BLD AUTO: 2.3 % — HIGH (ref 0–0.9)
LYMPHOCYTES # BLD AUTO: 18.3 % — SIGNIFICANT CHANGE UP (ref 13–44)
LYMPHOCYTES # BLD AUTO: 2.12 K/UL — SIGNIFICANT CHANGE UP (ref 1–3.3)
M PNEUMO IGM SER-ACNC: 1.4 INDEX — HIGH (ref 0–0.9)
MAGNESIUM SERPL-MCNC: 2.2 MG/DL — SIGNIFICANT CHANGE UP (ref 1.6–2.6)
MCHC RBC-ENTMCNC: 25.2 PG — LOW (ref 27–34)
MCHC RBC-ENTMCNC: 31.8 G/DL — LOW (ref 32–36)
MCV RBC AUTO: 79 FL — LOW (ref 80–100)
MONOCYTES # BLD AUTO: 2.15 K/UL — HIGH (ref 0–0.9)
MONOCYTES NFR BLD AUTO: 18.5 % — HIGH (ref 2–14)
MYCOPLASMA AG SPEC QL: POSITIVE
NEUTROPHILS # BLD AUTO: 6.89 K/UL — SIGNIFICANT CHANGE UP (ref 1.8–7.4)
NEUTROPHILS NFR BLD AUTO: 59.4 % — SIGNIFICANT CHANGE UP (ref 43–77)
NRBC # BLD: 0 /100 WBCS — SIGNIFICANT CHANGE UP (ref 0–0)
PHOSPHATE SERPL-MCNC: 2.5 MG/DL — SIGNIFICANT CHANGE UP (ref 2.5–4.5)
PLATELET # BLD AUTO: 345 K/UL — SIGNIFICANT CHANGE UP (ref 150–400)
POTASSIUM SERPL-MCNC: 3.5 MMOL/L — SIGNIFICANT CHANGE UP (ref 3.5–5.3)
POTASSIUM SERPL-SCNC: 3.5 MMOL/L — SIGNIFICANT CHANGE UP (ref 3.5–5.3)
PROT SERPL-MCNC: 6.9 GM/DL — SIGNIFICANT CHANGE UP (ref 6–8.3)
RBC # BLD: 3.1 M/UL — LOW (ref 3.8–5.2)
RBC # FLD: 24.2 % — HIGH (ref 10.3–14.5)
SODIUM SERPL-SCNC: 137 MMOL/L — SIGNIFICANT CHANGE UP (ref 135–145)
WBC # BLD: 11.61 K/UL — HIGH (ref 3.8–10.5)
WBC # FLD AUTO: 11.61 K/UL — HIGH (ref 3.8–10.5)

## 2023-10-03 PROCEDURE — 99231 SBSQ HOSP IP/OBS SF/LOW 25: CPT

## 2023-10-03 PROCEDURE — 99291 CRITICAL CARE FIRST HOUR: CPT

## 2023-10-03 RX ORDER — AZITHROMYCIN 500 MG/1
TABLET, FILM COATED ORAL
Refills: 0 | Status: DISCONTINUED | OUTPATIENT
Start: 2023-10-03 | End: 2023-10-04

## 2023-10-03 RX ORDER — AZITHROMYCIN 500 MG/1
500 TABLET, FILM COATED ORAL ONCE
Refills: 0 | Status: COMPLETED | OUTPATIENT
Start: 2023-10-03 | End: 2023-10-03

## 2023-10-03 RX ORDER — PHENOBARBITAL 60 MG
32.4 TABLET ORAL EVERY 6 HOURS
Refills: 0 | Status: DISCONTINUED | OUTPATIENT
Start: 2023-10-03 | End: 2023-10-03

## 2023-10-03 RX ORDER — PHENOBARBITAL 60 MG
32.4 TABLET ORAL THREE TIMES A DAY
Refills: 0 | Status: DISCONTINUED | OUTPATIENT
Start: 2023-10-03 | End: 2023-10-04

## 2023-10-03 RX ORDER — AZITHROMYCIN 500 MG/1
500 TABLET, FILM COATED ORAL EVERY 24 HOURS
Refills: 0 | Status: DISCONTINUED | OUTPATIENT
Start: 2023-10-04 | End: 2023-10-04

## 2023-10-03 RX ADMIN — ENOXAPARIN SODIUM 40 MILLIGRAM(S): 100 INJECTION SUBCUTANEOUS at 05:51

## 2023-10-03 RX ADMIN — Medication 60 MILLIGRAM(S): at 05:51

## 2023-10-03 RX ADMIN — Medication 1 PATCH: at 07:54

## 2023-10-03 RX ADMIN — Medication 32.4 MILLIGRAM(S): at 11:30

## 2023-10-03 RX ADMIN — Medication 0.2 MILLIGRAM(S): at 05:51

## 2023-10-03 RX ADMIN — Medication 0.2 MILLIGRAM(S): at 11:30

## 2023-10-03 RX ADMIN — CHLORHEXIDINE GLUCONATE 1 APPLICATION(S): 213 SOLUTION TOPICAL at 02:00

## 2023-10-03 RX ADMIN — Medication 0.2 MILLIGRAM(S): at 17:36

## 2023-10-03 RX ADMIN — PANTOPRAZOLE SODIUM 40 MILLIGRAM(S): 20 TABLET, DELAYED RELEASE ORAL at 17:37

## 2023-10-03 RX ADMIN — AZITHROMYCIN 500 MILLIGRAM(S): 500 TABLET, FILM COATED ORAL at 10:27

## 2023-10-03 RX ADMIN — Medication 650 MILLIGRAM(S): at 14:00

## 2023-10-03 RX ADMIN — QUETIAPINE FUMARATE 50 MILLIGRAM(S): 200 TABLET, FILM COATED ORAL at 22:22

## 2023-10-03 RX ADMIN — Medication 1 MILLIGRAM(S): at 12:55

## 2023-10-03 RX ADMIN — Medication 1 PATCH: at 05:12

## 2023-10-03 RX ADMIN — Medication 32.4 MILLIGRAM(S): at 22:22

## 2023-10-03 RX ADMIN — PIPERACILLIN AND TAZOBACTAM 25 GRAM(S): 4; .5 INJECTION, POWDER, LYOPHILIZED, FOR SOLUTION INTRAVENOUS at 14:10

## 2023-10-03 RX ADMIN — PIPERACILLIN AND TAZOBACTAM 25 GRAM(S): 4; .5 INJECTION, POWDER, LYOPHILIZED, FOR SOLUTION INTRAVENOUS at 05:48

## 2023-10-03 RX ADMIN — SENNA PLUS 1 TABLET(S): 8.6 TABLET ORAL at 22:22

## 2023-10-03 RX ADMIN — PANTOPRAZOLE SODIUM 40 MILLIGRAM(S): 20 TABLET, DELAYED RELEASE ORAL at 05:51

## 2023-10-03 RX ADMIN — Medication 650 MILLIGRAM(S): at 12:56

## 2023-10-03 RX ADMIN — Medication 100 MILLIGRAM(S): at 11:30

## 2023-10-03 RX ADMIN — PIPERACILLIN AND TAZOBACTAM 25 GRAM(S): 4; .5 INJECTION, POWDER, LYOPHILIZED, FOR SOLUTION INTRAVENOUS at 22:23

## 2023-10-03 NOTE — PROGRESS NOTE ADULT - ASSESSMENT
A/P-  28 year old female s/p multiple seizure  episode admitted to ICU and intubated for airway protection.    on NC.  afebrile past  days  minimal leukocytosis  resolved  LP - negative  csf gram stain and culture- Negative to date  csf PCR panel- all negative  CSF WNV - negative  1 of the 2 Blood cx- were reported negative and now i see today that initial blood cx from 9/25 is reported on 9/30 as Gram positive Rods ( we were not notified)  most likely contaminant  trach asp cx- negative  blood cx from 9/30- neg x 2  covid-19- negative  urine legionella AG - negative    chest Ct - multilobar pneumonia and aspiration pneumonia       plan-  continue with zosyn day #7 for multifocal pneumonia .will d/c it after today's dose  was started on zithromax by ICU team as her mycoplasma IGM was reported positive.  check 2 more blood cx.  seizure management as per neurology and ICU teams.    All labs and imaging and chart notes reviewed.     critical care time 35 minutes.    Jayesh Delgado MD  Infectious Disease Attending    for any questions please do not hesitate to contact me either via teams or by calling [phone]    my recs were d/w ICU team.

## 2023-10-03 NOTE — PROGRESS NOTE ADULT - ASSESSMENT
28F ?etOH ?polysub p/w seizure-like activity requiring intubation.  - Now extubated, doing well  - Mycoplasma positive, on azithromycin. Remains on empiric Zosyn, would continue for no more than 3-5 days if no positive cultures.  - D/C Keppra  - Off Precedex. On clonidine and Seroquel  - Titrating phenobarb  - Regular diet  - DVT ppx: Lovenox  - Transfer to general medicine for further management

## 2023-10-03 NOTE — PROGRESS NOTE ADULT - SUBJECTIVE AND OBJECTIVE BOX
# CC: Patient is a 28y old  Female who presents with a chief complaint of Status Epilepticus (03 Oct 2023 10:34)      ## HPI:  27 yo f pmhx polysubstance abuse, ETOH abuse, seizure disorder on Keppra presented with seizures. Limited hx available but per ED staff, father endorsed she usually drinks daily but hasn't drank in 2 days.  Patient with 9 back to back seizures with EMS, given Versed 10mg IM and 5mg IV prior to seizures stopping.  In ED patient intubated for airway protection, patient persistent seizures, lactate >17.  Patient also noted to be febrile, LP performed in ED results pending. Patinet sedated on propofol and Versed gtt.  Patient still appeared to be seizing in ED, given additional Versed 10mg IVP. Admit to ICU.  (25 Sep 2023 07:29)      **O/N:**  No acute events    ## ROS:  Offers no complaints    ## Labs:  ** CBC: **                        7.8    11.61 )-----------( 345      ( 03 Oct 2023 05:00 )             24.5     ** Chem:  **  10-03    137  |  107  |  6<L>  ----------------------------<  99  3.5   |  23  |  0.49<L>    Ca    8.3<L>      03 Oct 2023 05:00  Phos  2.5     10-03  Mg     2.2     10-03    TPro  6.9  /  Alb  2.0<L>  /  TBili  0.2  /  DBili  x   /  AST  31  /  ALT  20  /  AlkPhos  104  10-03    ** Coags: **      CAPILLARY BLOOD GLUCOSE            Culture - Blood (collected 30 Sep 2023 09:51)  Source: .Blood Blood-Peripheral  Preliminary Report (03 Oct 2023 14:01):    No growth at 72 Hours    Culture - Blood (collected 30 Sep 2023 09:51)  Source: .Blood Blood-Peripheral  Preliminary Report (03 Oct 2023 14:01):    No growth at 72 Hours    Culture - Sputum (collected 25 Sep 2023 11:05)  Source: ET Tube ET Tube  Gram Stain (27 Sep 2023 16:38):    Moderate polymorphonuclear leukocytes seen per low power field    No Squamous epithelial cells seen per low power field    Rare Gram positive cocci in pairs seen per oil power field  Final Report (27 Sep 2023 16:38):    Normal Respiratory Ligia present    Culture - CSF with Gram Stain (collected 25 Sep 2023 06:11)  Source: .CSF CSF  Gram Stain (28 Sep 2023 08:49):    No polymorphonuclear leukocytes seen    No organisms seen    by cytocentrifuge  Final Report (28 Sep 2023 08:49):    No growth    Culture - Urine (collected 25 Sep 2023 05:25)  Source: Clean Catch Clean Catch (Midstream)  Final Report (26 Sep 2023 07:23):    No growth    Culture - Blood (collected 25 Sep 2023 04:55)  Source: .Blood Blood-Peripheral  Final Report (30 Sep 2023 09:00):    No growth at 5 days    Culture - Blood (collected 25 Sep 2023 04:55)  Source: .Blood Blood-Peripheral  Gram Stain (prelim) (30 Sep 2023 05:28):    Growth in aerobic bottle: Gram Positive Rods  Preliminary Report (30 Sep 2023 05:28):    Growth in aerobic bottle: Gram Positive Rods    Direct identification is available within approximately 3-5    hours either by Blood Panel Multiplexed PCR or Direct    MALDI-TOF. Details: [email or web address]  Organism: Blood Culture PCR (30 Sep 2023 06:30)  Organism: Blood Culture PCR (30 Sep 2023 06:30)      Method Type: PCR      -  Blood PCR Panel: NEG        ## Imaging:    ## Medications:  cloNIDine 0.2 milliGRAM(s) Oral every 6 hours    azithromycin  IVPB      piperacillin/tazobactam IVPB.. 3.375 Gram(s) IV Intermittent every 8 hours      acetaminophen     Tablet .. 650 milliGRAM(s) Oral every 6 hours PRN  ondansetron Injectable 4 milliGRAM(s) IV Push every 6 hours PRN  PHENobarbital Injectable 32.4 milliGRAM(s) IV Push every 6 hours  QUEtiapine 50 milliGRAM(s) Oral at bedtime      enoxaparin Injectable 40 milliGRAM(s) SubCutaneous every 24 hours    pantoprazole  Injectable 40 milliGRAM(s) IV Push every 12 hours  polyethylene glycol 3350 17 Gram(s) Oral daily  senna 1 Tablet(s) Oral at bedtime              ## O/E:  ICU Vital Signs Last 24 Hrs  T(C): 37.1 (03 Oct 2023 14:00), Max: 37.6 (02 Oct 2023 19:18)  T(F): 98.7 (03 Oct 2023 14:00), Max: 99.7 (02 Oct 2023 19:18)  HR: 93 (03 Oct 2023 15:00) (86 - 115)  BP: 109/80 (03 Oct 2023 14:00) (81/71 - 131/95)  BP(mean): 90 (03 Oct 2023 14:00) (76 - 118)  ABP: --  ABP(mean): --  RR: 19 (03 Oct 2023 15:00) (15 - 33)  SpO2: 97% (03 Oct 2023 14:00) (96% - 100%)    O2 Parameters below as of 03 Oct 2023 04:00  Patient On (Oxygen Delivery Method): nasal cannula  O2 Flow (L/min): 3        I&O's Summary    02 Oct 2023 07:01  -  03 Oct 2023 07:00  --------------------------------------------------------  IN: 1819.2 mL / OUT: 650 mL / NET: 1169.2 mL    03 Oct 2023 07:01  -  03 Oct 2023 15:37  --------------------------------------------------------  IN: 400 mL / OUT: 250 mL / NET: 150 mL        Gen: lying comfortably in bed in no apparent distress  HEENT: PERRL, EOMI  Resp: CTA B/L no c/r/w  CVS: S1S2 no m/r/g  Abd: soft NT/ND +BS  Ext: no c/c/e  Neuro: A&Ox3    ## Code status:  Goals of care discussion: [x] yes [ ] no  [x] full code  [ ] DNR  [ ] DNI  [ ] CONNER

## 2023-10-03 NOTE — CHART NOTE - NSCHARTNOTEFT_GEN_A_CORE
HPI: 27 yo f pmhx polysubstance abuse, ETOH abuse, seizure disorder ? on Keppra presented with seizures. Limited hx available but per ED staff, father endorsed she usually drinks daily but hasn't drank in 2 days.  Patient with 9 back to back seizures with EMS, given Versed 10mg IM and 5mg IV prior to seizures stopping.  In ED patient intubated for airway protection, patient also noted to be febrile, LP performed in ED. Course complicated by agitation, self extubation and aspiration PNA with mycoplasma. Now resolved.    Recs;   Neuro: Required heavy amounts of sedation when intubated. s/p phenobarb now taperd down, precedex off, pscyh following. On clonidine patch and PO clonidine & seroquel HS, improved. OOB to chair. ETOH abuse, out of the window of acute withdrawal. Per psych notes, uses apetamin on street. pending MRI per psych recs. Keppra d/c'd. Seen by Neuro.     Respiratory: s/p intubated x 2, extubated 10/1 3L NC, Incentive spirometry. OOB to chair.      Cardiac: no active issues. normotensive not on pressors, TTE: EF= 45-50%    GI: Tolerating PO diet    Renal: Voids     Heme/Onc: CTA neg, Lovenox dvt ppx    ID: Zosyn (9/27- ) presumed ASP PNA. vanc and cef empiric tx in ED 9/25, cef d/c 9/26, + mycoplasma azithro added (10/3-)  ID follwoing       *Downgrade to Medicine service, plan of care discussed with ICU attending MD Cross. Sign out to hospitalist MD

## 2023-10-03 NOTE — BH CONSULTATION LIAISON PROGRESS NOTE - NSBHCONSULTRECOMMENDOTHER_PSY_A_CORE FT
- previous chart history does not mention drug/alcohol abuse or seizure disorder  - consider Apetamin associated convulsions (literature reviewed; a banned substance in the [place]); contains antihistamine cyproheptadine   - alcohol withdrawal seizures are usually generalized tonic-clonic though can be partial. RARE to have status epilepticus   - highly recommend MRI of the head (CT showing generalized volume loss out of proportion to the patient's age & ventriculomegaly. Septum pellucidum is not visualized, and a portion of the falx cerebri is also not seen...this is not explained by Pt's insignificant PMH on record)   - NO longer in the window of alcohol withdrawal; would discontinue phenobarb   - no history of a primary seizure disorder; would start weaning off keppra          10/2/23: previous chart history does not mention drug/alcohol abuse or seizure disorder  - consider Apetamin associated convulsions (literature reviewed; a banned substance in the [place]); contains antihistamine cyproheptadine   - alcohol withdrawal seizures are usually generalized tonic-clonic though can be partial. RARE to have status epilepticus   - highly recommend MRI of the head (CT showing generalized volume loss out of proportion to the patient's age & ventriculomegaly. Septum pellucidum is not visualized, and a portion of the falx cerebri is also not seen...this is not explained by Pt's insignificant PMH on record)   - NO longer in the window of alcohol withdrawal; would discontinue phenobarb   - no history of a primary seizure disorder; would start weaning off keppra   10/3/23: convert IVP phenobarb to PO and continue daily taper down (ordered)  - right arm IV infiltrated, asked nurse to remove   - follow MRI of head

## 2023-10-03 NOTE — BH CONSULTATION LIAISON PROGRESS NOTE - NSBHFUPINTERVALHXFT_PSY_A_CORE
Patient downgraded from ICU today. Off of Keppra; Phenobarb being tapered off.  Patient downgraded from ICU today. Off of Keppra; Phenobarb being tapered off and still on IVP. Seen on Unit 1E - sitting in chair next to bed eating a burger. Has somatic complaints - right arm pain (examined: tender, boggy, swollen consistent with infiltrated IV; nurse made aware); some residual thorat discomfort - education Pt about postextubation discomfort and advised her to take small bites, chew thoroughly and sip fluids in between bites. C/o low back area pain - wound, advised to sleep on side and not put pressure on it. Pt denies and also does not manifest any withdrawal sxs. She is able to follow direction, linear and oriented.

## 2023-10-03 NOTE — PROGRESS NOTE ADULT - SUBJECTIVE AND OBJECTIVE BOX
[facility]  INFECTIOUS DISEASES   [address] [place] [address]  Tel: [phone]     Fax: [phone]  ==============================================================================  MD Lucio Rose, DO Karly Meneses, NP   ==============================================================================      KEYA RODRIGUEZ  MRN-[id number]  28y ([birth year])      Interval History:    ROS:    [ ] Unobtainable because:  [ ] All other systems negative except as noted    Constitutional: no fever, no chills  Head: no trauma  Eyes: no vision changes, no eye pain  ENT:  no sore throat, no rhinorrhea  Cardiovascular:  no chest pain, no palpitation  Respiratory:  no SOB, no cough  GI:  no abd pain, no vomiting, no diarrhea  urinary: no dysuria, no hematuria, no flank pain  musculoskeletal:  no joint pain, no joint swelling  skin:  no rash  neurology:  no headache, no seizure, no change in mental status  psych: no anxiety, no depression         Allergies  No Known Allergies        ANTIMICROBIALS:  azithromycin  IVPB    piperacillin/tazobactam IVPB.. 3.375 every 8 hours        Physical Exam:  Vital Signs Last 24 Hrs  T(C): 37.1 (03 Oct 2023 08:00), Max: 37.6 (02 Oct 2023 19:18)  T(F): 98.7 (03 Oct 2023 08:00), Max: 99.7 (02 Oct 2023 19:18)  HR: 99 (03 Oct 2023 10:05) (86 - 115)  BP: 103/74 (03 Oct 2023 10:05) (81/71 - 136/95)  BP(mean): 83 (03 Oct 2023 10:05) (76 - 118)  RR: 25 (03 Oct 2023 10:05) (15 - 33)  SpO2: 100% (03 Oct 2023 10:05) (96% - 100%)    Parameters below as of 03 Oct 2023 04:00  Patient On (Oxygen Delivery Method): nasal cannula  O2 Flow (L/min): 3      10-02-23 @ 07:01  -  10-03-23 @ 07:00  --------------------------------------------------------  IN: 1819.2 mL / OUT: 650 mL / NET: 1169.2 mL    10-03-23 @ 07:01  -  10-03-23 @ 10:34  --------------------------------------------------------  IN: 50 mL / OUT: 0 mL / NET: 50 mL      General:    NAD,  non toxic  Head: atraumatic, normocephalic  Eye: normal sclera and conjunctiva  ENT:    no oral lesions, neck supple  Cardio:     regular S1, S2,  no murmur  Respiratory:    clear b/l,    no wheezing  abd:     soft,   BS +,   no tenderness  :   no CVAT,  no suprapubic tenderness,   no  johnson  Musculoskeletal:   no joint swelling,   no edema  vascular: no central lines, +PIV   Skin:    no rash  Neurologic:     no focal deficit  psych: normal affect    WBC Count: 11.61 K/uL (10-03 @ 05:00)  WBC Count: 12.55 K/uL (10-02 @ 12:40)  WBC Count: 10.97 K/uL (10-02 @ 03:45)  WBC Count: 5.78 K/uL (10-01 @ 03:30)  WBC Count: 6.05 K/uL (09-30 @ 05:10)  WBC Count: 5.11 K/uL (09-29 @ 05:45)  WBC Count: 9.74 K/uL (09-28 @ 03:05)                            7.8    11.61 )-----------( 345      ( 03 Oct 2023 05:00 )             24.5       10-03    137  |  107  |  6<L>  ----------------------------<  99  3.5   |  23  |  0.49<L>    Ca    8.3<L>      03 Oct 2023 05:00  Phos  2.5     10-03  Mg     2.2     10-03    TPro  6.9  /  Alb  2.0<L>  /  TBili  0.2  /  DBili  x   /  AST  31  /  ALT  20  /  AlkPhos  104  10-03      Urinalysis Basic - ( 03 Oct 2023 05:00 )    Color: x / Appearance: x / SG: x / pH: x  Gluc: 99 mg/dL / Ketone: x  / Bili: x / Urobili: x   Blood: x / Protein: x / Nitrite: x   Leuk Esterase: x / RBC: x / WBC x   Sq Epi: x / Non Sq Epi: x / Bacteria: x          Creatinine Trend: 0.49<--, 0.44<--, 0.56<--, 0.50<--, 0.65<--, 0.71<--      MICROBIOLOGY:  v  .Blood Blood-Peripheral  09-30-23   No growth at 48 Hours  --  --      ET Tube ET Tube  09-25-23   Normal Respiratory Ligia present  --    Moderate polymorphonuclear leukocytes seen per low power field  No Squamous epithelial cells seen per low power field  Rare Gram positive cocci in pairs seen per oil power field      .CSF CSF  09-25-23   No growth  --    No polymorphonuclear leukocytes seen  No organisms seen  by cytocentrifuge      Clean Catch Clean Catch (Midstream)  09-25-23   No growth  --  --      .Blood Blood-Peripheral  09-25-23   Growth in aerobic bottle: Gram Positive Rods  Direct identification is available within approximately 3-5  hours either by Blood Panel Multiplexed PCR or Direct  MALDI-TOF. Details: [email or web address]  --  Blood Culture PCR                                  RADIOLOGY:   [facility]  INFECTIOUS DISEASES   [address] [place] [address]  Tel: [phone]     Fax: [phone]  ==============================================================================  MD Lucio Rose, DO Karly Meneses, NP   ==============================================================================      KEYA RODRIGUEZ  MRN-[id number]  28y ([birth year])      Interval History:    Patient seen and examined in ICU.  sitting in chair today.    on NC.  mental status unchanged.  Afebrile  no new events overnight    ROS:    [ ] Unobtainable because:- Patient does not answer any questions      Allergies  No Known Drug  Allergies    ANTIMICROBIALS:  azithromycin  IVPB    piperacillin/tazobactam IVPB.. 3.375 every 8 hours        Physical Exam:  Vital Signs Last 24 Hrs  T(C): 37.1 (03 Oct 2023 08:00), Max: 37.6 (02 Oct 2023 19:18)  T(F): 98.7 (03 Oct 2023 08:00), Max: 99.7 (02 Oct 2023 19:18)  HR: 99 (03 Oct 2023 10:05) (86 - 115)  BP: 103/74 (03 Oct 2023 10:05) (81/71 - 136/95)  BP(mean): 83 (03 Oct 2023 10:05) (76 - 118)  RR: 25 (03 Oct 2023 10:05) (15 - 33)  SpO2: 100% (03 Oct 2023 10:05) (96% - 100%)    Parameters below as of 03 Oct 2023 04:00  Patient On (Oxygen Delivery Method): nasal cannula  O2 Flow (L/min): 3      10-02-23 @ 07:01  -  10-03-23 @ 07:00  --------------------------------------------------------  IN: 1819.2 mL / OUT: 650 mL / NET: 1169.2 mL    10-03-23 @ 07:01  -  10-03-23 @ 10:34  --------------------------------------------------------  IN: 50 mL / OUT: 0 mL / NET: 50 mL        General:    NAD,  non toxic  Head: atraumatic, normocephalic  Cardio:     regular S1, S2,  no murmur  Respiratory:   b/l breath sounds heard, no wheezing  abd:     soft,   BS +,   no tenderness, ND  Musculoskeletal:   no joint swelling,   no edema  vascular: no central lines, +PIV   Skin:    no rash  Neurologic:     does not follow commands  psych: flat affect        WBC Count: 11.61 K/uL (10-03 @ 05:00)  WBC Count: 12.55 K/uL (10-02 @ 12:40)  WBC Count: 10.97 K/uL (10-02 @ 03:45)  WBC Count: 5.78 K/uL (10-01 @ 03:30)  WBC Count: 6.05 K/uL (09-30 @ 05:10)  WBC Count: 5.11 K/uL (09-29 @ 05:45)  WBC Count: 9.74 K/uL (09-28 @ 03:05)                            7.8    11.61 )-----------( 345      ( 03 Oct 2023 05:00 )             24.5       10-03    137  |  107  |  6<L>  ----------------------------<  99  3.5   |  23  |  0.49<L>    Ca    8.3<L>      03 Oct 2023 05:00  Phos  2.5     10-03  Mg     2.2     10-03    TPro  6.9  /  Alb  2.0<L>  /  TBili  0.2  /  DBili  x   /  AST  31  /  ALT  20  /  AlkPhos  104  10-03      Urinalysis Basic - ( 03 Oct 2023 05:00 )    Color: x / Appearance: x / SG: x / pH: x  Gluc: 99 mg/dL / Ketone: x  / Bili: x / Urobili: x   Blood: x / Protein: x / Nitrite: x   Leuk Esterase: x / RBC: x / WBC x   Sq Epi: x / Non Sq Epi: x / Bacteria: x          Creatinine Trend: 0.49<--, 0.44<--, 0.56<--, 0.50<--, 0.65<--, 0.71<--      MICROBIOLOGY:  v  .Blood Blood-Peripheral  09-30-23   No growth at 48 Hours  --  --      ET Tube ET Tube  09-25-23   Normal Respiratory Ligia present  --    Moderate polymorphonuclear leukocytes seen per low power field  No Squamous epithelial cells seen per low power field  Rare Gram positive cocci in pairs seen per oil power field      .CSF CSF  09-25-23   No growth  --    No polymorphonuclear leukocytes seen  No organisms seen  by cytocentrifuge      Clean Catch Clean Catch (Midstream)  09-25-23   No growth  --  --      .Blood Blood-Peripheral  09-25-23   Growth in aerobic bottle: Gram Positive Rods  Direct identification is available within approximately 3-5  hours either by Blood Panel Multiplexed PCR or Direct  MALDI-TOF. Details: [email or web address]  --  Blood Culture PCR                                  RADIOLOGY:

## 2023-10-04 PROCEDURE — 99232 SBSQ HOSP IP/OBS MODERATE 35: CPT

## 2023-10-04 PROCEDURE — 99231 SBSQ HOSP IP/OBS SF/LOW 25: CPT

## 2023-10-04 PROCEDURE — 93971 EXTREMITY STUDY: CPT | Mod: 26,RT

## 2023-10-04 RX ORDER — FOLIC ACID 0.8 MG
1 TABLET ORAL DAILY
Refills: 0 | Status: DISCONTINUED | OUTPATIENT
Start: 2023-10-04 | End: 2023-10-06

## 2023-10-04 RX ORDER — THIAMINE MONONITRATE (VIT B1) 100 MG
100 TABLET ORAL DAILY
Refills: 0 | Status: DISCONTINUED | OUTPATIENT
Start: 2023-10-04 | End: 2023-10-06

## 2023-10-04 RX ORDER — PANTOPRAZOLE SODIUM 20 MG/1
40 TABLET, DELAYED RELEASE ORAL
Refills: 0 | Status: DISCONTINUED | OUTPATIENT
Start: 2023-10-04 | End: 2023-10-06

## 2023-10-04 RX ORDER — PHENOBARBITAL 60 MG
32.4 TABLET ORAL
Refills: 0 | Status: DISCONTINUED | OUTPATIENT
Start: 2023-10-04 | End: 2023-10-05

## 2023-10-04 RX ORDER — AZITHROMYCIN 500 MG/1
500 TABLET, FILM COATED ORAL DAILY
Refills: 0 | Status: DISCONTINUED | OUTPATIENT
Start: 2023-10-04 | End: 2023-10-05

## 2023-10-04 RX ADMIN — Medication 0.2 MILLIGRAM(S): at 06:05

## 2023-10-04 RX ADMIN — AZITHROMYCIN 500 MILLIGRAM(S): 500 TABLET, FILM COATED ORAL at 18:46

## 2023-10-04 RX ADMIN — Medication 32.4 MILLIGRAM(S): at 18:50

## 2023-10-04 RX ADMIN — PANTOPRAZOLE SODIUM 40 MILLIGRAM(S): 20 TABLET, DELAYED RELEASE ORAL at 18:47

## 2023-10-04 RX ADMIN — Medication 100 MILLIGRAM(S): at 18:47

## 2023-10-04 RX ADMIN — CHLORHEXIDINE GLUCONATE 1 APPLICATION(S): 213 SOLUTION TOPICAL at 06:10

## 2023-10-04 RX ADMIN — PANTOPRAZOLE SODIUM 40 MILLIGRAM(S): 20 TABLET, DELAYED RELEASE ORAL at 06:05

## 2023-10-04 RX ADMIN — SENNA PLUS 1 TABLET(S): 8.6 TABLET ORAL at 21:13

## 2023-10-04 RX ADMIN — PIPERACILLIN AND TAZOBACTAM 25 GRAM(S): 4; .5 INJECTION, POWDER, LYOPHILIZED, FOR SOLUTION INTRAVENOUS at 06:04

## 2023-10-04 RX ADMIN — ENOXAPARIN SODIUM 40 MILLIGRAM(S): 100 INJECTION SUBCUTANEOUS at 06:04

## 2023-10-04 RX ADMIN — QUETIAPINE FUMARATE 50 MILLIGRAM(S): 200 TABLET, FILM COATED ORAL at 21:13

## 2023-10-04 RX ADMIN — POLYETHYLENE GLYCOL 3350 17 GRAM(S): 17 POWDER, FOR SOLUTION ORAL at 11:37

## 2023-10-04 RX ADMIN — Medication 32.4 MILLIGRAM(S): at 06:08

## 2023-10-04 RX ADMIN — Medication 0.2 MILLIGRAM(S): at 18:48

## 2023-10-04 RX ADMIN — Medication 0.2 MILLIGRAM(S): at 11:32

## 2023-10-04 RX ADMIN — Medication 1 MILLIGRAM(S): at 18:46

## 2023-10-04 RX ADMIN — Medication 0.2 MILLIGRAM(S): at 23:59

## 2023-10-04 NOTE — BH CONSULTATION LIAISON PROGRESS NOTE - NSBHCONSULTRECOMMENDOTHER_PSY_A_CORE FT
10/2/23: previous chart history does not mention drug/alcohol abuse or seizure disorder  - consider Apetamin associated convulsions (literature reviewed; a banned substance in the [place]); contains antihistamine cyproheptadine   - alcohol withdrawal seizures are usually generalized tonic-clonic though can be partial. RARE to have status epilepticus   - highly recommend MRI of the head (CT showing generalized volume loss out of proportion to the patient's age & ventriculomegaly. Septum pellucidum is not visualized, and a portion of the falx cerebri is also not seen...this is not explained by Pt's insignificant PMH on record)   - NO longer in the window of alcohol withdrawal; would discontinue phenobarb   - no history of a primary seizure disorder; would start weaning off keppra   10/3/23: convert IVP phenobarb to PO and continue daily taper down (ordered)  - right arm IV infiltrated, asked nurse to remove   - follow MRI of head   10/4/23: continue phenobarb taper off; last dose in AM  - MRI of head still pending   - MSE much improved   - offer inpatient alcohol rehab given extend of use

## 2023-10-04 NOTE — CHART NOTE - NSCHARTNOTEFT_GEN_A_CORE
Pt with PMH of polysubstance abuse, ETOH abuse, seizure disorder. Pt presented with seizure-like activity requiring intubation, extubated 10/01 & downgraded from ICU to medical floor on 10/03. Also mycoplasma positive, on abx. Pt was on NGT feedings since  until 10/03 when NGT feeding d/c'd and pt initiated on PO diet.    Factors impacting intake: [x] none    Diet Prescription: Diet, Regular (10-03-23 @ 06:35)    Intake: % x 1 day PO diet; pt eating well, tolerating with no N/V/D/C. States that throat still sore from tube, and requests soft consistency; will oblige and change diet to soft/easy to chew consistency.    Current Weight: 61.7 kg (10/02), 59.3 kg ()  % Weight Change: 4% increase x 1 week  1+ generalized edema; 2+ edema b/l arms    Physical appearance: Pt with no physical signs of malnutrition noted    Pertinent Medications: MEDICATIONS  (STANDING):  azithromycin  IVPB 500 milliGRAM(s) IV Intermittent every 24 hours  azithromycin  IVPB      chlorhexidine 2% Cloths 1 Application(s) Topical <User Schedule>  cloNIDine 0.2 milliGRAM(s) Oral every 6 hours  enoxaparin Injectable 40 milliGRAM(s) SubCutaneous every 24 hours  folic acid Injectable 1 milliGRAM(s) IV Push daily  pantoprazole  Injectable 40 milliGRAM(s) IV Push every 12 hours  PHENobarbital 32.4 milliGRAM(s) Oral three times a day  piperacillin/tazobactam IVPB.. 3.375 Gram(s) IV Intermittent every 8 hours  polyethylene glycol 3350 17 Gram(s) Oral daily  QUEtiapine 50 milliGRAM(s) Oral at bedtime  senna 1 Tablet(s) Oral at bedtime  thiamine Injectable 100 milliGRAM(s) IV Push daily    MEDICATIONS  (PRN):  acetaminophen     Tablet .. 650 milliGRAM(s) Oral every 6 hours PRN Temp greater or equal to 38C (100.4F), Mild Pain (1 - 3)  benzonatate 100 milliGRAM(s) Oral every 8 hours PRN Cough  guaiFENesin Oral Liquid (Sugar-Free) 100 milliGRAM(s) Oral every 6 hours PRN Cough  ondansetron Injectable 4 milliGRAM(s) IV Push every 6 hours PRN Nausea and/or Vomiting    Pertinent Labs: 10-03 Na137 mmol/L Glu 99 mg/dL K+ 3.5 mmol/L Cr  0.49 mg/dL<L> BUN 6 mg/dL<L> 10-03 Phos 2.5 mg/dL 10-03 Alb 2.0 g/dL<L>    Skin: No pressure ulcers noted    Estimated Needs:   [x] no change since previous assessment on  for estimated energy & fluid needs (25-30 kcal/k6194-7973 kcal/day & 25-30 ml/k1108-3984 ml fluid/day)  [x] recalculated: Protein; no longer critical care; using IBW 52.1 kg for calculation below:  0.8-1.0 g/k.7-52.1 g protein/day    Previous Nutrition Diagnosis:   [x] Inadequate Energy Intake  Etiology: acute illness  Signs/Symptoms: <50% nutrition needs x 2 days    Previous Goal: pt to meet >75% protein-energy needs - goal being met    Nutrition Diagnosis is [ ] ongoing  [x] resolved [ ] not applicable     New Nutrition Diagnosis: [x] not applicable       Interventions:   Recommend  [x] Change Diet To: Easy to chew  [ ] Nutrition Supplement  [ ] Nutrition Support  [ ] Other:     Monitoring and Evaluation:   [ x ] PO intake [ x ] Tolerance to diet prescription [ x ] weights [ x ] labs[ x ] follow up per protocol  [ ] other:

## 2023-10-04 NOTE — BH CONSULTATION LIAISON PROGRESS NOTE - NSBHCHARTREVIEWLAB_PSY_A_CORE FT
10-03    137  |  107  |  6<L>  ----------------------------<  99  3.5   |  23  |  0.49<L>    Ca    8.3<L>      03 Oct 2023 05:00  Phos  2.5     10-03  Mg     2.2     10-03    TPro  6.9  /  Alb  2.0<L>  /  TBili  0.2  /  DBili  x   /  AST  31  /  ALT  20  /  AlkPhos  104  10-03  
10-03    137  |  107  |  6<L>  ----------------------------<  99  3.5   |  23  |  0.49<L>    Ca    8.3<L>      03 Oct 2023 05:00  Phos  2.5     10-03  Mg     2.2     10-03    TPro  6.9  /  Alb  2.0<L>  /  TBili  0.2  /  DBili  x   /  AST  31  /  ALT  20  /  AlkPhos  104  10-03

## 2023-10-04 NOTE — BH CONSULTATION LIAISON PROGRESS NOTE - NSBHFUPINTERVALHXFT_PSY_A_CORE
No significant interval events. Phenobarb being tapered off and last dose will be in AM. Maintaining the same clinical presentation which is likely to be baseline. Pt denies and also does not manifest any withdrawal sxs. She is able to follow direction, linear and oriented. MRI of head still not done.

## 2023-10-04 NOTE — PROGRESS NOTE ADULT - ASSESSMENT
27 y/o F  w/ PMH of ETOH abuse, polysubstance abuse,  p/w seizure-like activity requiring intubation.initially admitted to [facility]- now transferred to floor last night    s/p acute resp. failure - s/p extubation- s/p icu course, on NC , resp. status stable  Multifocal Pna, , likely from aspiration during seizures, Mycoplasma positive, on azithromycin., IV Zosyn, seen by ID- last day of zosyn today.    Alcohol w/d seizure-vs. Apetamin (drug abuse) associated convulsions   seen by psych- no role for keppra in etoh related seizures- keppra stopped  s/p Precedex. On clonidine and Seroquel  seen by neuro Dr. Nation on admit- EEG  tapering phenobarb  per psych will need further psych assessment for safety  and will require psych clearance for dc    AMS- on phenobarb, titrate phenobarb, ventriculomegaly on CTH- await MRI HEAD    RUE edema-pain- check V doppler RUE  remove PIV line from RUE-  d/w RN    - DVT ppx: Lovenox    prognosis guarded.    d/w father [phone] Mr. Ozuna - he request SW consult for alcohol rehab

## 2023-10-04 NOTE — PROGRESS NOTE ADULT - SUBJECTIVE AND OBJECTIVE BOX
CHIEF COMPLAINT/INTERVAL HISTORY:    Patient is a 28y old  Female who presents with a chief complaint of Status Epilepticus (03 Oct 2023 15:36)      HPI:  27 yo f pmhx polysubstance abuse, ETOH abuse, seizure disorder on Keppra presented with seizures. Limited hx available but per ED staff, father endorsed she usually drinks daily but hasn't drank in 2 days.  Patient with 9 back to back seizures with EMS, given Versed 10mg IM and 5mg IV prior to seizures stopping.  In ED patient intubated for airway protection, patient persistent seizures, lactate >17.  Patient also noted to be febrile, LP performed in ED results pending. Patinet sedated on propofol and Versed gtt.  Patient still appeared to be seizing in ED, given additional Versed 10mg IVP. Admit to ICU.  (25 Sep 2023 07:29)      SUBJECTIVE & OBJECTIVE: Pt seen and examined at bedside. c/o pain in right UE, edema of right UE.  denies cp, sob or any other complaints.   low grade fevers    Vital Signs Last 24 Hrs  T(C): 37.2 (04 Oct 2023 11:50), Max: 37.9 (04 Oct 2023 04:55)  T(F): 99 (04 Oct 2023 11:50), Max: 100.3 (04 Oct 2023 04:55)  HR: 104 (04 Oct 2023 11:50) (93 - 107)  BP: 110/75 (04 Oct 2023 11:50) (105/62 - 128/84)  BP(mean): 86 (03 Oct 2023 16:00) (86 - 90)  ABP: --  ABP(mean): --  RR: 17 (04 Oct 2023 11:50) (17 - 23)  SpO2: 95% (04 Oct 2023 11:50) (93% - 97%)    O2 Parameters below as of 04 Oct 2023 11:50  Patient On (Oxygen Delivery Method): room air          MEDICATIONS  (STANDING):  azithromycin  IVPB 500 milliGRAM(s) IV Intermittent every 24 hours  azithromycin  IVPB      chlorhexidine 2% Cloths 1 Application(s) Topical <User Schedule>  cloNIDine 0.2 milliGRAM(s) Oral every 6 hours  enoxaparin Injectable 40 milliGRAM(s) SubCutaneous every 24 hours  folic acid Injectable 1 milliGRAM(s) IV Push daily  pantoprazole  Injectable 40 milliGRAM(s) IV Push every 12 hours  PHENobarbital 32.4 milliGRAM(s) Oral three times a day  piperacillin/tazobactam IVPB.. 3.375 Gram(s) IV Intermittent every 8 hours  polyethylene glycol 3350 17 Gram(s) Oral daily  QUEtiapine 50 milliGRAM(s) Oral at bedtime  senna 1 Tablet(s) Oral at bedtime  thiamine Injectable 100 milliGRAM(s) IV Push daily    MEDICATIONS  (PRN):  acetaminophen     Tablet .. 650 milliGRAM(s) Oral every 6 hours PRN Temp greater or equal to 38C (100.4F), Mild Pain (1 - 3)  benzonatate 100 milliGRAM(s) Oral every 8 hours PRN Cough  guaiFENesin Oral Liquid (Sugar-Free) 100 milliGRAM(s) Oral every 6 hours PRN Cough  ondansetron Injectable 4 milliGRAM(s) IV Push every 6 hours PRN Nausea and/or Vomiting        PHYSICAL EXAM:    GENERAL: NAD, well-developed  HEAD:  Atraumatic, Normocephalic  EYES: EOMI, PERRLA, conjunctiva and sclera clear, ++ anemia  ENMT: Moist mucous membranes  NECK: Supple  NERVOUS SYSTEM:  alert / awake, confused.   CHEST/LUNG: Clear to auscultation bilaterally; No rales, rhonchi, wheezing  CVS; S1, S2  ABDOMEN: Soft, Nontender,  Bowel sounds present  EXTREMITIES: no cyanosis, or edema    LABS:                        7.8    11.61 )-----------( 345      ( 03 Oct 2023 05:00 )             24.5     10-03    137  |  107  |  6<L>  ----------------------------<  99  3.5   |  23  |  0.49<L>    Ca    8.3<L>      03 Oct 2023 05:00  Phos  2.5     10-03  Mg     2.2     10-03    TPro  6.9  /  Alb  2.0<L>  /  TBili  0.2  /  DBili  x   /  AST  31  /  ALT  20  /  AlkPhos  104  10-03      Urinalysis Basic - ( 03 Oct 2023 05:00 )    Color: x / Appearance: x / SG: x / pH: x  Gluc: 99 mg/dL / Ketone: x  / Bili: x / Urobili: x   Blood: x / Protein: x / Nitrite: x   Leuk Esterase: x / RBC: x / WBC x   Sq Epi: x / Non Sq Epi: x / Bacteria: x

## 2023-10-05 DIAGNOSIS — R56.9 UNSPECIFIED CONVULSIONS: ICD-10-CM

## 2023-10-05 DIAGNOSIS — R46.89 OTHER SYMPTOMS AND SIGNS INVOLVING APPEARANCE AND BEHAVIOR: ICD-10-CM

## 2023-10-05 LAB
CULTURE RESULTS: SIGNIFICANT CHANGE UP
CULTURE RESULTS: SIGNIFICANT CHANGE UP
GLUCOSE BLDC GLUCOMTR-MCNC: 103 MG/DL — HIGH (ref 70–99)
GLUCOSE BLDC GLUCOMTR-MCNC: 107 MG/DL — HIGH (ref 70–99)
SPECIMEN SOURCE: SIGNIFICANT CHANGE UP
SPECIMEN SOURCE: SIGNIFICANT CHANGE UP

## 2023-10-05 PROCEDURE — 99239 HOSP IP/OBS DSCHRG MGMT >30: CPT

## 2023-10-05 PROCEDURE — 99232 SBSQ HOSP IP/OBS MODERATE 35: CPT

## 2023-10-05 PROCEDURE — 99231 SBSQ HOSP IP/OBS SF/LOW 25: CPT

## 2023-10-05 RX ORDER — AZITHROMYCIN 500 MG/1
1 TABLET, FILM COATED ORAL
Qty: 0 | Refills: 0 | DISCHARGE
Start: 2023-10-05

## 2023-10-05 RX ORDER — AZITHROMYCIN 500 MG/1
1 TABLET, FILM COATED ORAL
Qty: 3 | Refills: 0
Start: 2023-10-05 | End: 2023-10-07

## 2023-10-05 RX ADMIN — SENNA PLUS 1 TABLET(S): 8.6 TABLET ORAL at 22:05

## 2023-10-05 RX ADMIN — POLYETHYLENE GLYCOL 3350 17 GRAM(S): 17 POWDER, FOR SOLUTION ORAL at 12:45

## 2023-10-05 RX ADMIN — Medication 1 PATCH: at 12:40

## 2023-10-05 RX ADMIN — Medication 32.4 MILLIGRAM(S): at 05:44

## 2023-10-05 RX ADMIN — Medication 0.2 MILLIGRAM(S): at 12:46

## 2023-10-05 RX ADMIN — Medication 1 MILLIGRAM(S): at 12:45

## 2023-10-05 RX ADMIN — Medication 0.2 MILLIGRAM(S): at 05:44

## 2023-10-05 RX ADMIN — AZITHROMYCIN 500 MILLIGRAM(S): 500 TABLET, FILM COATED ORAL at 12:45

## 2023-10-05 RX ADMIN — Medication 100 MILLIGRAM(S): at 12:45

## 2023-10-05 RX ADMIN — Medication 1 PATCH: at 19:04

## 2023-10-05 RX ADMIN — PANTOPRAZOLE SODIUM 40 MILLIGRAM(S): 20 TABLET, DELAYED RELEASE ORAL at 09:22

## 2023-10-05 NOTE — BH CONSULTATION LIAISON PROGRESS NOTE - NSBHCONSULTRECOMMENDOTHER_PSY_A_CORE FT
10/2/23: previous chart history does not mention drug/alcohol abuse or seizure disorder  - consider Apetamin associated convulsions (literature reviewed; a banned substance in the [place]); contains antihistamine cyproheptadine   - alcohol withdrawal seizures are usually generalized tonic-clonic though can be partial. RARE to have status epilepticus   - highly recommend MRI of the head (CT showing generalized volume loss out of proportion to the patient's age & ventriculomegaly. Septum pellucidum is not visualized, and a portion of the falx cerebri is also not seen...this is not explained by Pt's insignificant PMH on record)   - NO longer in the window of alcohol withdrawal; would discontinue phenobarb   - no history of a primary seizure disorder; would start weaning off keppra   10/3/23: convert IVP phenobarb to PO and continue daily taper down (ordered)  - right arm IV infiltrated, asked nurse to remove   - follow MRI of head   10/4/23: continue phenobarb taper off; last dose in AM  - MRI of head still pending   - MSE much improved   - offer inpatient alcohol rehab given extend of use   10/5/23: phenobarb tapered off; does not need Seroquel, clonidine any longer   - declined inpatient rehab / outpt substance abuse services  - Patient has capacity to make her medical decisions at this time   - [facility] signing off

## 2023-10-05 NOTE — PROGRESS NOTE ADULT - SUBJECTIVE AND OBJECTIVE BOX
KEYA RODRIGUEZ  MRN-[id number]    Follow Up:  Mycoplasma PNA     Interval History:    PAST MEDICAL & SURGICAL HISTORY:  Leg pain      No significant past surgical history          ROS:    [ ] Unobtainable because:  [ ] All other systems negative    Constitutional: no fever, no chills  Head: no trauma  Eyes: no vision changes, no eye pain  ENT:  no sore throat, no rhinorrhea  Cardiovascular:  no chest pain, no palpitation  Respiratory:  no SOB, no cough  GI:  no abd pain, no vomiting, no diarrhea  urinary: no dysuria, no hematuria, no flank pain  musculoskeletal:  no joint pain, no joint swelling  skin:  no rash  neurology:  no headache, no seizure, no change in mental status  psych: no anxiety, no depression         Allergies  No Known Allergies        ANTIMICROBIALS:  azithromycin   Tablet 500 daily      OTHER MEDS:  acetaminophen     Tablet .. 650 milliGRAM(s) Oral every 6 hours PRN  benzonatate 100 milliGRAM(s) Oral every 8 hours PRN  enoxaparin Injectable 40 milliGRAM(s) SubCutaneous every 24 hours  folic acid 1 milliGRAM(s) Oral daily  guaiFENesin Oral Liquid (Sugar-Free) 100 milliGRAM(s) Oral every 6 hours PRN  ondansetron Injectable 4 milliGRAM(s) IV Push every 6 hours PRN  pantoprazole    Tablet 40 milliGRAM(s) Oral before breakfast  polyethylene glycol 3350 17 Gram(s) Oral daily  senna 1 Tablet(s) Oral at bedtime  thiamine 100 milliGRAM(s) Oral daily      Vital Signs Last 24 Hrs  T(C): 37.2 (05 Oct 2023 11:38), Max: 37.2 (05 Oct 2023 11:38)  T(F): 98.9 (05 Oct 2023 11:38), Max: 98.9 (05 Oct 2023 11:38)  HR: 89 (05 Oct 2023 11:38) (82 - 89)  BP: 114/75 (05 Oct 2023 11:38) (110/75 - 119/84)  BP(mean): --  RR: 20 (05 Oct 2023 11:38) (18 - 20)  SpO2: 94% (05 Oct 2023 11:38) (93% - 97%)    Parameters below as of 05 Oct 2023 11:38  Patient On (Oxygen Delivery Method): room air        Physical Exam:  General:    NAD,  non toxic  Head: atraumatic, normocephalic  Cardio:     regular S1, S2,  no murmur  Respiratory:   b/l breath sounds heard, no wheezing  abd:     soft,   BS +,   no tenderness, ND  Musculoskeletal:   no joint swelling,   no edema  vascular: no central lines, +PIV   Skin:    no rash  Neurologic:     does not follow commands  psych: flat affect    WBC Count: 11.61 K/uL (10-03 @ 05:00)  WBC Count: 12.55 K/uL (10-02 @ 12:40)  WBC Count: 10.97 K/uL (10-02 @ 03:45)  WBC Count: 5.78 K/uL (10-01 @ 03:30)  WBC Count: 6.05 K/uL (09-30 @ 05:10)  WBC Count: 5.11 K/uL (09-29 @ 05:45)                      Creatinine Trend: 0.49<--, 0.44<--, 0.56<--, 0.50<--, 0.65<--, 0.71<--      MICROBIOLOGY:  v  .Blood Blood-Peripheral  10-03-23   No growth at 48 Hours  --  --      .Blood Blood-Peripheral  09-30-23   No growth at 5 days  --  --      ET Tube ET Tube  09-25-23   Normal Respiratory Ligia present  --    Moderate polymorphonuclear leukocytes seen per low power field  No Squamous epithelial cells seen per low power field  Rare Gram positive cocci in pairs seen per oil power field      .CSF CSF  09-25-23   No growth  --    No polymorphonuclear leukocytes seen  No organisms seen  by cytocentrifuge      Clean Catch Clean Catch (Midstream)  09-25-23   No growth  --  --      .Blood Blood-Peripheral  09-25-23   Growth in aerobic bottle: Gram Positive Rods Sent to  [facility]  for Identification  Direct identification is available within approximately 3-5  hours either by Blood Panel Multiplexed PCR or Direct  MALDI-TOF.Details: [email or web address]  --  Blood Culture PCR                            SARS-CoV-2: Anju (25 Sep 2023 04:55)    RADIOLOGY:     KEYA RODRIGUEZ  MRN-[id number]    Follow Up:  Mycoplasma PNA     Interval History: the pt was seen and examined earlier, no acute distress, pt is awake and alert, still has cough, no fevers with in last 24 hrs, RA.     PAST MEDICAL & SURGICAL HISTORY:  Leg pain      No significant past surgical history          ROS:    [ ] Unobtainable because:  [x ] All other systems negative    Constitutional: no fever, no chills  Head: no trauma  Eyes: no vision changes, no eye pain  ENT:  no sore throat, no rhinorrhea  Cardiovascular:  no chest pain, no palpitation  Respiratory:  no SOB, + cough  GI:  no abd pain, no vomiting, no diarrhea  urinary: no dysuria, no hematuria, no flank pain  musculoskeletal:  no joint pain, no joint swelling  skin:  no rash  neurology:  no headache, no seizure, no change in mental status  psych: no anxiety, no depression         Allergies  No Known Allergies        ANTIMICROBIALS:  azithromycin   Tablet 500 daily      OTHER MEDS:  acetaminophen     Tablet .. 650 milliGRAM(s) Oral every 6 hours PRN  benzonatate 100 milliGRAM(s) Oral every 8 hours PRN  enoxaparin Injectable 40 milliGRAM(s) SubCutaneous every 24 hours  folic acid 1 milliGRAM(s) Oral daily  guaiFENesin Oral Liquid (Sugar-Free) 100 milliGRAM(s) Oral every 6 hours PRN  ondansetron Injectable 4 milliGRAM(s) IV Push every 6 hours PRN  pantoprazole    Tablet 40 milliGRAM(s) Oral before breakfast  polyethylene glycol 3350 17 Gram(s) Oral daily  senna 1 Tablet(s) Oral at bedtime  thiamine 100 milliGRAM(s) Oral daily      Vital Signs Last 24 Hrs  T(C): 37.2 (05 Oct 2023 11:38), Max: 37.2 (05 Oct 2023 11:38)  T(F): 98.9 (05 Oct 2023 11:38), Max: 98.9 (05 Oct 2023 11:38)  HR: 89 (05 Oct 2023 11:38) (82 - 89)  BP: 114/75 (05 Oct 2023 11:38) (110/75 - 119/84)  BP(mean): --  RR: 20 (05 Oct 2023 11:38) (18 - 20)  SpO2: 94% (05 Oct 2023 11:38) (93% - 97%)    Parameters below as of 05 Oct 2023 11:38  Patient On (Oxygen Delivery Method): room air        Physical Exam:  General:  NAD,  non toxic  Head: atraumatic, normocephalic  Cardio:     regular S1, S2,  no murmur  Respiratory:   b/l breath sounds heard, no wheezing  abd:  soft,   BS +,   no tenderness, ND  Musculoskeletal:  no joint swelling,   right hand swelling   vascular: no central lines, +PIV   Skin:    no rash, right hand swelling, erythema, warm to touch, + blister, tender to touch   Neurologic:  awake and alert x 3, answers questions, follows commands   psych: somewhat flat affect    WBC Count: 11.61 K/uL (10-03 @ 05:00)  WBC Count: 12.55 K/uL (10-02 @ 12:40)  WBC Count: 10.97 K/uL (10-02 @ 03:45)  WBC Count: 5.78 K/uL (10-01 @ 03:30)  WBC Count: 6.05 K/uL (09-30 @ 05:10)  WBC Count: 5.11 K/uL (09-29 @ 05:45)    Creatinine Trend: 0.49<--, 0.44<--, 0.56<--, 0.50<--, 0.65<--, 0.71<--      MICROBIOLOGY:  v  .Blood Blood-Peripheral  10-03-23   No growth at 48 Hours  --  --      .Blood Blood-Peripheral  09-30-23   No growth at 5 days  --  --      ET Tube ET Tube  09-25-23   Normal Respiratory Ligia present  --    Moderate polymorphonuclear leukocytes seen per low power field  No Squamous epithelial cells seen per low power field  Rare Gram positive cocci in pairs seen per oil power field      .CSF CSF  09-25-23   No growth  --    No polymorphonuclear leukocytes seen  No organisms seen  by cytocentrifuge      Clean Catch Clean Catch (Midstream)  09-25-23   No growth  --  --      .Blood Blood-Peripheral  09-25-23   Growth in aerobic bottle: Gram Positive Rods Sent to  [facility]  for Identification  Direct identification is available within approximately 3-5  hours either by Blood Panel Multiplexed PCR or Direct  MALDI-TOF.Details: [email or web address]  --  Blood Culture PCR      SARS-CoV-2: Anju (25 Sep 2023 04:55)    RADIOLOGY:

## 2023-10-05 NOTE — PROGRESS NOTE ADULT - NSPROGADDITIONALINFOA_GEN_ALL_CORE
All labs and cultures and imaging and pertinent chart notes reviewed by me.    case d/w Np Gideon at length and agree with her assessment and plan.    switched abx to doxycyline to cover for both mycoplasma pneumonia and to also treat the possible small localized cellulitis on the hand.    Jayesh Delgado MD  Infectious Disease Attending    for any questions please do not hesitate to contact me either via teams or by calling [phone]

## 2023-10-05 NOTE — BH CONSULTATION LIAISON PROGRESS NOTE - NSBHCHARTREVIEWINVESTIGATE_PSY_A_CORE FT
CT Head No Cont (09.25.23) Atrophy out of proportion to the patient's age. Ventriculomegaly.      

## 2023-10-05 NOTE — BH CONSULTATION LIAISON PROGRESS NOTE - CURRENT MEDICATION
MEDICATIONS  (STANDING):  azithromycin  IVPB      chlorhexidine 2% Cloths 1 Application(s) Topical <User Schedule>  cloNIDine 0.2 milliGRAM(s) Oral every 6 hours  enoxaparin Injectable 40 milliGRAM(s) SubCutaneous every 24 hours  folic acid Injectable 1 milliGRAM(s) IV Push daily  pantoprazole  Injectable 40 milliGRAM(s) IV Push every 12 hours  PHENobarbital Injectable 32.4 milliGRAM(s) IV Push every 6 hours  piperacillin/tazobactam IVPB.. 3.375 Gram(s) IV Intermittent every 8 hours  polyethylene glycol 3350 17 Gram(s) Oral daily  QUEtiapine 50 milliGRAM(s) Oral at bedtime  senna 1 Tablet(s) Oral at bedtime  thiamine Injectable 100 milliGRAM(s) IV Push daily    MEDICATIONS  (PRN):  acetaminophen     Tablet .. 650 milliGRAM(s) Oral every 6 hours PRN Temp greater or equal to 38C (100.4F), Mild Pain (1 - 3)  ondansetron Injectable 4 milliGRAM(s) IV Push every 6 hours PRN Nausea and/or Vomiting  
MEDICATIONS  (STANDING):  azithromycin  IVPB 500 milliGRAM(s) IV Intermittent every 24 hours  azithromycin  IVPB      chlorhexidine 2% Cloths 1 Application(s) Topical <User Schedule>  cloNIDine 0.2 milliGRAM(s) Oral every 6 hours  enoxaparin Injectable 40 milliGRAM(s) SubCutaneous every 24 hours  folic acid Injectable 1 milliGRAM(s) IV Push daily  pantoprazole  Injectable 40 milliGRAM(s) IV Push every 12 hours  PHENobarbital 32.4 milliGRAM(s) Oral two times a day  piperacillin/tazobactam IVPB.. 3.375 Gram(s) IV Intermittent every 8 hours  polyethylene glycol 3350 17 Gram(s) Oral daily  QUEtiapine 50 milliGRAM(s) Oral at bedtime  senna 1 Tablet(s) Oral at bedtime  thiamine Injectable 100 milliGRAM(s) IV Push daily    MEDICATIONS  (PRN):  acetaminophen     Tablet .. 650 milliGRAM(s) Oral every 6 hours PRN Temp greater or equal to 38C (100.4F), Mild Pain (1 - 3)  benzonatate 100 milliGRAM(s) Oral every 8 hours PRN Cough  guaiFENesin Oral Liquid (Sugar-Free) 100 milliGRAM(s) Oral every 6 hours PRN Cough  ondansetron Injectable 4 milliGRAM(s) IV Push every 6 hours PRN Nausea and/or Vomiting  
MEDICATIONS  (STANDING):  azithromycin   Tablet 500 milliGRAM(s) Oral daily  enoxaparin Injectable 40 milliGRAM(s) SubCutaneous every 24 hours  folic acid 1 milliGRAM(s) Oral daily  pantoprazole    Tablet 40 milliGRAM(s) Oral before breakfast  polyethylene glycol 3350 17 Gram(s) Oral daily  senna 1 Tablet(s) Oral at bedtime  thiamine 100 milliGRAM(s) Oral daily    MEDICATIONS  (PRN):  acetaminophen     Tablet .. 650 milliGRAM(s) Oral every 6 hours PRN Temp greater or equal to 38C (100.4F), Mild Pain (1 - 3)  benzonatate 100 milliGRAM(s) Oral every 8 hours PRN Cough  guaiFENesin Oral Liquid (Sugar-Free) 100 milliGRAM(s) Oral every 6 hours PRN Cough  ondansetron Injectable 4 milliGRAM(s) IV Push every 6 hours PRN Nausea and/or Vomiting

## 2023-10-05 NOTE — BH CONSULTATION LIAISON PROGRESS NOTE - NSBHCHARTREVIEWVS_PSY_A_CORE FT
Vital Signs Last 24 Hrs  T(C): 37.2 (04 Oct 2023 11:50), Max: 37.9 (04 Oct 2023 04:55)  T(F): 99 (04 Oct 2023 11:50), Max: 100.3 (04 Oct 2023 04:55)  HR: 104 (04 Oct 2023 11:50) (96 - 107)  BP: 110/75 (04 Oct 2023 11:50) (105/62 - 128/84)  BP(mean): 86 (03 Oct 2023 16:00) (86 - 86)  RR: 17 (04 Oct 2023 11:50) (17 - 18)  SpO2: 95% (04 Oct 2023 11:50) (93% - 95%)    Parameters below as of 04 Oct 2023 11:50  Patient On (Oxygen Delivery Method): room air    
Vital Signs Last 24 Hrs  T(C): 37.1 (03 Oct 2023 14:00), Max: 37.6 (02 Oct 2023 19:18)  T(F): 98.7 (03 Oct 2023 14:00), Max: 99.7 (02 Oct 2023 19:18)  HR: 93 (03 Oct 2023 15:00) (86 - 115)  BP: 109/80 (03 Oct 2023 14:00) (81/71 - 131/95)  BP(mean): 90 (03 Oct 2023 14:00) (76 - 118)  RR: 19 (03 Oct 2023 15:00) (15 - 33)  SpO2: 97% (03 Oct 2023 14:00) (96% - 100%)    Parameters below as of 03 Oct 2023 04:00  Patient On (Oxygen Delivery Method): nasal cannula  O2 Flow (L/min): 3  
Vital Signs Last 24 Hrs  T(C): 37.2 (05 Oct 2023 11:38), Max: 37.2 (05 Oct 2023 11:38)  T(F): 98.9 (05 Oct 2023 11:38), Max: 98.9 (05 Oct 2023 11:38)  HR: 89 (05 Oct 2023 11:38) (82 - 89)  BP: 114/75 (05 Oct 2023 11:38) (110/75 - 119/84)  BP(mean): --  RR: 20 (05 Oct 2023 11:38) (18 - 20)  SpO2: 94% (05 Oct 2023 11:38) (93% - 97%)    Parameters below as of 05 Oct 2023 11:38  Patient On (Oxygen Delivery Method): room air

## 2023-10-05 NOTE — BH CONSULTATION LIAISON PROGRESS NOTE - NSBHATTESTBILLING_PSY_A_CORE
37657-Cojxvfprhi OBS or IP - low complexity OR 25-34 mins
74255-Fymtgzwntk OBS or IP - low complexity OR 25-34 mins
03735-Ghtstpsxug OBS or IP - low complexity OR 25-34 mins

## 2023-10-05 NOTE — DISCHARGE NOTE PROVIDER - NSDCCPCAREPLAN_GEN_ALL_CORE_FT
PRINCIPAL DISCHARGE DIAGNOSIS  Diagnosis: Status epilepticus  Assessment and Plan of Treatment: You required intubation  Followed by the psychiatry team      SECONDARY DISCHARGE DIAGNOSES  Diagnosis: CAP (community acquired pneumonia)  Assessment and Plan of Treatment: Please complete oral antibiotics as prescribed     PRINCIPAL DISCHARGE DIAGNOSIS  Diagnosis: Status epilepticus  Assessment and Plan of Treatment: You presented with seizure-like activity requiring intubation.  seen by neuro Dr. Nation on admit- EEG      SECONDARY DISCHARGE DIAGNOSES  Diagnosis: CAP (community acquired pneumonia)  Assessment and Plan of Treatment: likely from aspiration during seizure like activity  Please complete oral antibiotics as prescribed     PRINCIPAL DISCHARGE DIAGNOSIS  Diagnosis: Status epilepticus  Assessment and Plan of Treatment: You presented with seizure-like activity requiring intubation.  seen by neuro Dr. Nation on admit- EEG. You refused MRI. You are leaving against medical advice and are assuming the risks with leaving against medical advice.      SECONDARY DISCHARGE DIAGNOSES  Diagnosis: CAP (community acquired pneumonia)  Assessment and Plan of Treatment: likely from aspiration during seizure like activity  Please complete oral antibiotics as prescribed

## 2023-10-05 NOTE — BH CONSULTATION LIAISON PROGRESS NOTE - NSBHCONSFOLLOWNEEDS_PSY_ALL_CORE
Needs further psych safety assessment prior to discharge
No psychiatric contraindications to discharge
No psychiatric contraindications to discharge

## 2023-10-05 NOTE — BH CONSULTATION LIAISON PROGRESS NOTE - NSBHFUPINTERVALHXFT_PSY_A_CORE
No significant interval events. Phenobarb tapered off as of this morning. Patient is maintaining the same clinical presentation which is likely to be baseline. Pt denies and also does not manifest any withdrawal sxs. MSE back to baseline - linear and oriented with no evidence of a primary psychiatric process along the mood/psychosis/anxiety spectrum. Main issue continues to be heavy alcohol abuse with misuse of street bought appetite stimulating supplement that is banned in the [place]. Patient provided psychoeducation about the danger with continued use and encouraged to go to an inpatient rehab - declined; encouraged to go to outpatient addiction services which was also declined. MRI not done still.

## 2023-10-05 NOTE — DISCHARGE NOTE PROVIDER - NSDCMRMEDTOKEN_GEN_ALL_CORE_FT
mg oral tablet: 1 tab(s) orally every 6 hours   azithromycin 500 mg oral tablet: 1 tab(s) orally once a day   mg oral tablet: 1 tab(s) orally every 6 hours

## 2023-10-05 NOTE — PROGRESS NOTE ADULT - ASSESSMENT
A/P-  28 year old female s/p multiple seizure  episode admitted to ICU and intubated for airway protection.    on NC.  afebrile past  days  minimal leukocytosis  resolved  LP - negative  csf gram stain and culture- Negative to date  csf PCR panel- all negative  CSF WNV - negative  1 of the 2 Blood cx- were reported negative and now i see today that initial blood cx from 9/25 is reported on 9/30 as Gram positive Rods ( we were not notified)  most likely contaminant  trach asp cx- negative  blood cx from 9/30- neg x 2  covid-19- negative  urine legionella AG - negative    chest Ct - multilobar pneumonia and aspiration pneumonia       plan-  continue with zosyn day #7 for multifocal pneumonia .will d/c it after today's dose  was started on zithromax by ICU team as her mycoplasma IGM was reported positive.  check 2 more blood cx.  seizure management as per neurology and ICU teams.    All labs and imaging and chart notes reviewed.     critical care time 35 minutes.    Jayesh Delgado MD  Infectious Disease Attending    for any questions please do not hesitate to contact me either via teams or by calling [phone]    my recs were d/w ICU team.         A/P-  28 year old female s/p multiple seizure  episode admitted to ICU and intubated for airway protection.    on RA  afebrile within last 24 hrs   minimal leukocytosis  resolved  LP - negative  csf gram stain and culture- Negative to date  csf PCR panel- all negative  CSF WNV - negative  1 of the 2 Blood cx- were reported negative and now i see today that initial blood cx from 9/25 is reported on 9/30 as Gram positive Rods ( we were not notified)  most likely contaminant  trach asp cx- negative  blood cx from 9/30- neg x 2  covid-19- negative  urine legionella AG - negative    chest Ct - multilobar pneumonia and aspiration pneumonia       plan-  course of Zosyn x 7 days is complete   treating mycoplasma pna with zithromax, all iv medications were changed to po as the pt no longer has iv access  pt has right hand phlebitis / cellulitis, changing abx to po doxycycline to continue the treatment of Mycoplasma PNA and initiate the treatment of possible MRSA infection of pt's right hand   follow BCs - all repeat BCs NGTD  seizure management as per neurology and ICU teams  monitor phlebitis / cellulitis site     Discussed with Dr. Sandy Masong sent to Dr. Hopkins  A/P-  28 year old female s/p multiple seizure  episode admitted to ICU and intubated for airway protection.    on RA  afebrile within last 24 hrs   minimal leukocytosis  resolved  LP - negative  csf gram stain and culture- Negative to date  csf PCR panel- all negative  CSF WNV - negative  1 of the 2 Blood cx- were reported negative and then om 9/30  initial blood cx from 9/25 is reported on 9/30 as Gram positive Rods ( we were not notified)  most likely contaminant  trach asp cx- negative  blood cx from 9/30- neg x 2  covid-19- negative  urine legionella AG - negative  Mycoplasma IGM- positive    chest Ct - multilobar pneumonia and aspiration pneumonia       plan-  course of Zosyn x 7 days is complete   treating mycoplasma pna with zithromax, all iv medications were changed to po as the pt no longer has iv access  pt has right hand phlebitis / cellulitis, changing abx to po doxycycline to continue the treatment of Mycoplasma PNA and initiate the treatment of possible MRSA infection of pt's right hand   follow BCs - all repeat BCs NGTD  seizure management as per neurology and ICU teams  monitor phlebitis / cellulitis site     Discussed with Dr. Delgado  Msg sent to Dr. Hopkins

## 2023-10-05 NOTE — DISCHARGE NOTE PROVIDER - HOSPITAL COURSE
Patient is a 29 y/o F  w/ PMH of ETOH abuse, polysubstance abuse,  p/w seizure-like activity requiring intubation. Initially admitted to CCU-  Patient is s/p acute resp failure - s/p extubation- s/p icu course, on NC , resp. status stable  Multifocal Pna, likely from aspiration during seizures, Mycoplasma positive, on azithromycin. Completed course of IV Zosyn    Alcohol w/d seizure-vs. Apetamin (drug abuse) associated convulsions  Followed by Psychiatry - no role for keppra in etoh related seizures- keppra stopped  s/p Precedex. Now on clonidine and Seroquel  tapering phenobarb. requires   per psych will need further psych assessment for safety  and will require psych clearance for dc    AMS- on phenobarb, titrate phenobarb, ventriculomegaly on CTH- await MRI HEAD           Patient is a 29 y/o F  w/ PMH of ETOH abuse, polysubstance abuse,  p/w seizure-like activity requiring intubation. Initially admitted to CCU-  Patient is s/p acute resp failure - s/p extubation- s/p icu course, on NC , resp. status stable  Multifocal Pna, likely from aspiration during seizures, Mycoplasma positive, on azithromycin. Completed course of IV Zosyn    Alcohol w/d seizure-vs. Apetamin (drug abuse) associated convulsions  Followed by Psychiatry - no role for keppra in etoh related seizures- keppra stopped  s/p Precedex. Now on clonidine ,Seroquel and Po phenobarbital  ventriculomegaly on CTH- Patient refused MRI HEAD  10/5/23: phenobarb tapered off; does not need Seroquel, clonidine any longer   Patient declined inpatient rehab / outpt substance abuse. Patient cleared from psychiatry standpoint.           Patient is a 27 y/o F  w/ PMH of ETOH abuse, polysubstance abuse,  p/w seizure-like activity requiring intubation. Initially admitted to CCU-  Patient is s/p acute respiratory failure : s/p intubated x 2, extubated 10/1 3L NC,  presumed ASP PNA. vanc and cef empiric tx in ED 9/25, cef d/c 9/26, + mycoplasma azithro added   Alcohol w/d seizure-vs. Apetamin (drug abuse) associated convulsions  Neurology consulted and recommended Keppra &EEG  ventriculomegaly on CTH- Patient refused MRI HEAD  Followed by Psychiatry - no role for keppra in etoh related seizures- keppra stopped  10/5/23: phenobarb tapered off; does not need Seroquel, clonidine any longer   Patient declined inpatient rehab / outpt substance abuse. Patient cleared from psychiatry standpoint.           Patient is a 29 y/o F  w/ PMH of ETOH abuse, polysubstance abuse,  p/w seizure-like activity requiring intubation. Initially admitted to CCU-  Patient is s/p acute respiratory failure : s/p intubated x 2, extubated 10/1 3L NC,  presumed ASP PNA. vanc and cef empiric tx in ED 9/25, cef d/c 9/26, + mycoplasma azithro added   Alcohol w/d seizure-vs. Apetamin (drug abuse) associated convulsions  Neurology consulted and recommended Keppra &EEG  ventriculomegaly on CTH- Patient refused MRI HEAD  Followed by Psychiatry - no role for keppra in etoh related seizures- keppra stopped  10/5/23: phenobarb tapered off; does not need Seroquel, clonidine any longer   Patient declined inpatient rehab / outpt substance abuse. Patient cleared from psychiatry standpoint.    Hospital course discussed with medical attending. Patient will sign against medical advice. Per psychiatry patient has capacity to make decisions. Patient is a 29 y/o F  w/ PMH of ETOH abuse, polysubstance abuse,  p/w seizure-like activity requiring intubation. Initially admitted to CCU-  Patient is s/p acute respiratory failure : s/p intubated x 2, extubated 10/1 3L NC,  presumed ASP PNA. vanc and cef empiric tx in ED 9/25, cef d/c 9/26, + mycoplasma azithro added   Alcohol w/d seizure-vs. Apetamin (drug abuse) associated convulsions  Neurology consulted and recommended Keppra &EEG  ventriculomegaly on CTH- Patient refused MRI HEAD  Followed by Psychiatry - no role for keppra in etoh related seizures- keppra stopped  10/5/23: phenobarb tapered off; does not need Seroquel, clonidine any longer   Patient declined inpatient rehab / outpt substance abuse.     Hospital course discussed with medical attending. Patient will sign against medical advice. Workup for seizures is still complete, as patient has not had an MRI and has consistently refused. Per psychiatry patient has capacity to make decisions. Patient is a 29 y/o F  w/ PMH of ETOH abuse, polysubstance abuse,  p/w seizure-like activity requiring intubation. Initially admitted to CCU-  Patient is s/p acute respiratory failure : s/p intubated x 2, extubated 10/1 3L NC,  presumed ASP PNA. vanc and cef empiric tx in ED 9/25, cef d/c 9/26, + mycoplasma azithro added   Alcohol w/d seizure-vs. Apetamin (drug abuse) associated convulsions  Neurology consulted and recommended Keppra &EEG  ventriculomegaly on CTH- Patient refused MRI HEAD  Followed by Psychiatry - no role for keppra in etoh related seizures- keppra stopped  10/5/23: phenobarb tapered off; does not need Seroquel, clonidine any longer   Patient declined inpatient rehab / outpt substance abuse.     Hospital course discussed with medical attending. Patient will sign against medical advice. Workup for seizures is still incomplete, as patient has not had an MRI and has consistently refused. Per psychiatry patient has capacity to make decisions.

## 2023-10-06 VITALS
OXYGEN SATURATION: 96 % | SYSTOLIC BLOOD PRESSURE: 125 MMHG | DIASTOLIC BLOOD PRESSURE: 82 MMHG | HEART RATE: 84 BPM | RESPIRATION RATE: 18 BRPM | TEMPERATURE: 98 F

## 2023-10-06 PROCEDURE — 99232 SBSQ HOSP IP/OBS MODERATE 35: CPT

## 2023-10-06 RX ADMIN — Medication 100 MILLIGRAM(S): at 05:01

## 2023-10-06 RX ADMIN — Medication 1 PATCH: at 07:00

## 2023-10-06 NOTE — CHART NOTE - NSCHARTNOTEFT_GEN_A_CORE
Patient had expressed desire to leave AMA 10/5. Patient has capacity to make decisions. However she did leave yesterday. I spoke to her today and she still wants to leave AMA, and has capacity. Patient is to leave against medical advice. To facilitate her care, will send doxycycline 100mg BID to her preferred pharmacy of [facility].     Irvin Hopkins MD  Division of Hospital Medicine  Available via MS Teams Patient had expressed desire to leave AMA 10/5. Patient has capacity to make decisions, signed paperwork and stated she would go home. However she did not leave yesterday.  I spoke to her today and she still wants to leave AMA, and has capacity. Patient is to leave against medical advice. To facilitate her care, will send doxycycline 100mg BID to her preferred pharmacy of [facility].     Irvin Hopkins MD  Division of Hospital Medicine  Available via MS Teams

## 2023-10-06 NOTE — PROGRESS NOTE ADULT - NS ATTEND AMEND GEN_ALL_CORE FT
All labs and cultures and imaging and pertinent chart notes reviewed by me.    case d/w Np Gideon at length and agree with her assessment and plan.    complete course of doxy to treat both mycoplasma and hand cellulitis.    Jayesh Delgado MD  Infectious Disease Attending    for any questions please do not hesitate to contact me either via teams or by calling [phone]

## 2023-10-06 NOTE — PROGRESS NOTE ADULT - PROVIDER SPECIALTY LIST ADULT
Critical Care
Infectious Disease
Critical Care
Hospitalist
Infectious Disease
Critical Care
Critical Care
Infectious Disease
Critical Care
Infectious Disease
Critical Care
Critical Care

## 2023-10-06 NOTE — PROGRESS NOTE ADULT - REASON FOR ADMISSION
Status Epilepticus

## 2023-10-06 NOTE — PROGRESS NOTE ADULT - ASSESSMENT
A/P-  28 year old female s/p multiple seizure  episode admitted to ICU and intubated for airway protection.    on RA  afebrile   minimal leukocytosis  resolved - no new lab work  LP - negative  csf gram stain and culture- Negative to date  csf PCR panel- all negative  CSF WNV - negative  1 of the 2 Blood cx- were reported negative and then om 9/30  initial blood cx from 9/25 is reported on 9/30 as Gram positive Rods ( we were not notified) - PCR panel negative   most likely contaminant  trach asp cx- negative  blood cx from 9/30- neg x 2  covid-19- negative  urine legionella AG - negative  Mycoplasma IGM- positive    chest Ct - multilobar pneumonia and aspiration pneumonia       plan-  course of Zosyn x 7 days is complete   treating mycoplasma pna with zithromax, all iv medications were changed to po as the pt no longer has iv access  pt has right hand phlebitis / cellulitis, changing abx to po doxycycline to continue the treatment of Mycoplasma PNA and initiate the treatment of possible MRSA infection of pt's right hand   follow BCs - all repeat BCs NGTD  seizure management as per neurology and ICU teams  monitor phlebitis / cellulitis site - improving  continue Doxycycline (received three days of Azithromycin, today is day #4 of Mycoplasma treatment) - pt needs 3 more days of abx to complete 7 days course       Discussed with Dr. Delgado

## 2023-10-06 NOTE — DISCHARGE NOTE NURSING/CASE MANAGEMENT/SOCIAL WORK - PATIENT PORTAL LINK FT
You can access the FollowMyHealth Patient Portal offered by [facility] by registering at the following website: [email or web address]. By joining [facility]’s FollowMyHealth portal, you will also be able to view your health information using other applications (apps) compatible with our system.

## 2023-10-06 NOTE — PROGRESS NOTE ADULT - SUBJECTIVE AND OBJECTIVE BOX
KEYA RODRIGUEZ  MRN-[id number]    Follow Up:  Mycoplasma PNA, right hand cellulitis     Interval History: the pt was seen and examined earlier, no acute distress, no new complaints, feeling better, pt's father is present. Pt is afebrile, RA, no new lab work.     PAST MEDICAL & SURGICAL HISTORY:  Leg pain      No significant past surgical history          ROS:    [ ] Unobtainable because:  [x ] All other systems negative    Constitutional: no fever, no chills  Head: no trauma  Eyes: no vision changes, no eye pain  ENT:  no sore throat, no rhinorrhea  Cardiovascular:  no chest pain, no palpitation  Respiratory:  no SOB, no cough  GI:  no abd pain, no vomiting, no diarrhea  urinary: no dysuria, no hematuria, no flank pain  musculoskeletal:  no joint pain, no joint swelling  skin:  no rash, right hand is better   neurology:  no headache, no seizure, no change in mental status  psych: no anxiety, no depression         Allergies  No Known Allergies        ANTIMICROBIALS:  doxycycline monohydrate Capsule 100 every 12 hours      OTHER MEDS:  acetaminophen     Tablet .. 650 milliGRAM(s) Oral every 6 hours PRN  benzonatate 100 milliGRAM(s) Oral every 8 hours PRN  enoxaparin Injectable 40 milliGRAM(s) SubCutaneous every 24 hours  folic acid 1 milliGRAM(s) Oral daily  guaiFENesin Oral Liquid (Sugar-Free) 100 milliGRAM(s) Oral every 6 hours PRN  ondansetron Injectable 4 milliGRAM(s) IV Push every 6 hours PRN  pantoprazole    Tablet 40 milliGRAM(s) Oral before breakfast  polyethylene glycol 3350 17 Gram(s) Oral daily  senna 1 Tablet(s) Oral at bedtime  thiamine 100 milliGRAM(s) Oral daily      Vital Signs Last 24 Hrs  T(C): 36.9 (06 Oct 2023 10:57), Max: 36.9 (06 Oct 2023 10:57)  T(F): 98.4 (06 Oct 2023 10:57), Max: 98.4 (06 Oct 2023 10:57)  HR: 84 (06 Oct 2023 10:57) (78 - 84)  BP: 125/82 (06 Oct 2023 10:57) (125/82 - 135/91)  BP(mean): --  RR: 18 (06 Oct 2023 10:57) (18 - 18)  SpO2: 96% (06 Oct 2023 10:57) (95% - 96%)    Parameters below as of 06 Oct 2023 04:58  Patient On (Oxygen Delivery Method): room air        Physical Exam:  General:  NAD,  non toxic, RA  Head: atraumatic, normocephalic  Cardio:     regular S1, S2,  no murmur  Respiratory:   b/l breath sounds are clear, no wheezing, no rhonchi   abd:  soft,   BS +,   no tenderness, ND  Musculoskeletal:  no joint swelling,   right hand with less swelling   vascular: no central lines, +PIV   Skin:    no rash, right hand swelling with improvement, less of erythema, less warm to touch, + blister deflated , less tender to touch   Neurologic:  awake and alert x 3, answers questions, follows commands   psych: appropriate     WBC Count: 11.61 K/uL (10-03 @ 05:00)  WBC Count: 12.55 K/uL (10-02 @ 12:40)  WBC Count: 10.97 K/uL (10-02 @ 03:45)  WBC Count: 5.78 K/uL (10-01 @ 03:30)  WBC Count: 6.05 K/uL (09-30 @ 05:10)      Creatinine Trend: 0.49<--, 0.44<--, 0.56<--, 0.50<--, 0.65<--, 0.71<--      MICROBIOLOGY:  v  .Blood Blood-Peripheral  10-03-23   No growth at 72 Hours  --  --      .Blood Blood-Peripheral  09-30-23   No growth at 5 days  --  --      ET Tube ET Tube  09-25-23   Normal Respiratory Ligia present  --    Moderate polymorphonuclear leukocytes seen per low power field  No Squamous epithelial cells seen per low power field  Rare Gram positive cocci in pairs seen per oil power field      .CSF CSF  09-25-23   No growth  --    No polymorphonuclear leukocytes seen  No organisms seen  by cytocentrifuge      Clean Catch Clean Catch (Midstream)  09-25-23   No growth  --  --      .Blood Blood-Peripheral  09-25-23   Growth in aerobic bottle: Gram Positive Rods Sent to  [facility]  for Identification  Direct identification is available within approximately 3-5  hours either by Blood Panel Multiplexed PCR or Direct  MALDI-TOF.Details: [email or web address]  --  Blood Culture PCR      SARS-CoV-2: Anju (25 Sep 2023 04:55)    RADIOLOGY:

## 2023-10-08 LAB
CULTURE RESULTS: SIGNIFICANT CHANGE UP
CULTURE RESULTS: SIGNIFICANT CHANGE UP
SPECIMEN SOURCE: SIGNIFICANT CHANGE UP
SPECIMEN SOURCE: SIGNIFICANT CHANGE UP

## 2023-10-13 DIAGNOSIS — G40.901 EPILEPSY, UNSPECIFIED, NOT INTRACTABLE, WITH STATUS EPILEPTICUS: ICD-10-CM

## 2023-10-13 DIAGNOSIS — Z53.29 PROCEDURE AND TREATMENT NOT CARRIED OUT BECAUSE OF PATIENT'S DECISION FOR OTHER REASONS: ICD-10-CM

## 2023-10-13 DIAGNOSIS — R65.21 SEVERE SEPSIS WITH SEPTIC SHOCK: ICD-10-CM

## 2023-10-13 DIAGNOSIS — F10.10 ALCOHOL ABUSE, UNCOMPLICATED: ICD-10-CM

## 2023-10-13 DIAGNOSIS — J15.7 PNEUMONIA DUE TO MYCOPLASMA PNEUMONIAE: ICD-10-CM

## 2023-10-13 DIAGNOSIS — J96.01 ACUTE RESPIRATORY FAILURE WITH HYPOXIA: ICD-10-CM

## 2023-10-13 DIAGNOSIS — A41.9 SEPSIS, UNSPECIFIED ORGANISM: ICD-10-CM

## 2023-10-13 DIAGNOSIS — J69.0 PNEUMONITIS DUE TO INHALATION OF FOOD AND VOMIT: ICD-10-CM

## 2023-10-13 DIAGNOSIS — L03.113 CELLULITIS OF RIGHT UPPER LIMB: ICD-10-CM

## 2023-10-28 ENCOUNTER — EMERGENCY (EMERGENCY)
Facility: HOSPITAL | Age: 28
LOS: 0 days | Discharge: HOME | End: 2023-10-29
Attending: STUDENT IN AN ORGANIZED HEALTH CARE EDUCATION/TRAINING PROGRAM
Payer: MEDICAID

## 2023-10-28 VITALS
HEART RATE: 94 BPM | DIASTOLIC BLOOD PRESSURE: 88 MMHG | RESPIRATION RATE: 18 BRPM | HEIGHT: 63 IN | OXYGEN SATURATION: 100 % | WEIGHT: 134.92 LBS | SYSTOLIC BLOOD PRESSURE: 139 MMHG | TEMPERATURE: 98 F

## 2023-10-28 DIAGNOSIS — Z94.5 SKIN TRANSPLANT STATUS: Chronic | ICD-10-CM

## 2023-10-28 DIAGNOSIS — R22.0 LOCALIZED SWELLING, MASS AND LUMP, HEAD: ICD-10-CM

## 2023-10-28 DIAGNOSIS — R51.9 HEADACHE, UNSPECIFIED: ICD-10-CM

## 2023-10-28 DIAGNOSIS — R56.9 UNSPECIFIED CONVULSIONS: ICD-10-CM

## 2023-10-28 DIAGNOSIS — Z20.822 CONTACT WITH AND (SUSPECTED) EXPOSURE TO COVID-19: ICD-10-CM

## 2023-10-28 DIAGNOSIS — J69.0 PNEUMONITIS DUE TO INHALATION OF FOOD AND VOMIT: ICD-10-CM

## 2023-10-28 LAB
ALBUMIN SERPL ELPH-MCNC: 3.1 G/DL — LOW (ref 3.3–5)
ALBUMIN SERPL ELPH-MCNC: 3.1 G/DL — LOW (ref 3.3–5)
ALP SERPL-CCNC: 70 U/L — SIGNIFICANT CHANGE UP (ref 40–120)
ALP SERPL-CCNC: 70 U/L — SIGNIFICANT CHANGE UP (ref 40–120)
ALT FLD-CCNC: 23 U/L — SIGNIFICANT CHANGE UP (ref 12–78)
ALT FLD-CCNC: 23 U/L — SIGNIFICANT CHANGE UP (ref 12–78)
ANION GAP SERPL CALC-SCNC: 8 MMOL/L — SIGNIFICANT CHANGE UP (ref 5–17)
ANION GAP SERPL CALC-SCNC: 8 MMOL/L — SIGNIFICANT CHANGE UP (ref 5–17)
APPEARANCE UR: CLEAR — SIGNIFICANT CHANGE UP
APPEARANCE UR: CLEAR — SIGNIFICANT CHANGE UP
AST SERPL-CCNC: 39 U/L — HIGH (ref 15–37)
AST SERPL-CCNC: 39 U/L — HIGH (ref 15–37)
BASOPHILS # BLD AUTO: 0.05 K/UL — SIGNIFICANT CHANGE UP (ref 0–0.2)
BASOPHILS # BLD AUTO: 0.05 K/UL — SIGNIFICANT CHANGE UP (ref 0–0.2)
BASOPHILS NFR BLD AUTO: 0.7 % — SIGNIFICANT CHANGE UP (ref 0–2)
BASOPHILS NFR BLD AUTO: 0.7 % — SIGNIFICANT CHANGE UP (ref 0–2)
BILIRUB SERPL-MCNC: 0.5 MG/DL — SIGNIFICANT CHANGE UP (ref 0.2–1.2)
BILIRUB SERPL-MCNC: 0.5 MG/DL — SIGNIFICANT CHANGE UP (ref 0.2–1.2)
BILIRUB UR-MCNC: NEGATIVE — SIGNIFICANT CHANGE UP
BILIRUB UR-MCNC: NEGATIVE — SIGNIFICANT CHANGE UP
BUN SERPL-MCNC: 7 MG/DL — SIGNIFICANT CHANGE UP (ref 7–23)
BUN SERPL-MCNC: 7 MG/DL — SIGNIFICANT CHANGE UP (ref 7–23)
CALCIUM SERPL-MCNC: 9.1 MG/DL — SIGNIFICANT CHANGE UP (ref 8.5–10.1)
CALCIUM SERPL-MCNC: 9.1 MG/DL — SIGNIFICANT CHANGE UP (ref 8.5–10.1)
CHLORIDE SERPL-SCNC: 103 MMOL/L — SIGNIFICANT CHANGE UP (ref 96–108)
CHLORIDE SERPL-SCNC: 103 MMOL/L — SIGNIFICANT CHANGE UP (ref 96–108)
CO2 SERPL-SCNC: 24 MMOL/L — SIGNIFICANT CHANGE UP (ref 22–31)
CO2 SERPL-SCNC: 24 MMOL/L — SIGNIFICANT CHANGE UP (ref 22–31)
COLOR SPEC: YELLOW — SIGNIFICANT CHANGE UP
COLOR SPEC: YELLOW — SIGNIFICANT CHANGE UP
CREAT SERPL-MCNC: 0.77 MG/DL — SIGNIFICANT CHANGE UP (ref 0.5–1.3)
CREAT SERPL-MCNC: 0.77 MG/DL — SIGNIFICANT CHANGE UP (ref 0.5–1.3)
DIFF PNL FLD: ABNORMAL
DIFF PNL FLD: ABNORMAL
EGFR: 106 ML/MIN/1.73M2 — SIGNIFICANT CHANGE UP
EGFR: 106 ML/MIN/1.73M2 — SIGNIFICANT CHANGE UP
EOSINOPHIL # BLD AUTO: 0.01 K/UL — SIGNIFICANT CHANGE UP (ref 0–0.5)
EOSINOPHIL # BLD AUTO: 0.01 K/UL — SIGNIFICANT CHANGE UP (ref 0–0.5)
EOSINOPHIL NFR BLD AUTO: 0.1 % — SIGNIFICANT CHANGE UP (ref 0–6)
EOSINOPHIL NFR BLD AUTO: 0.1 % — SIGNIFICANT CHANGE UP (ref 0–6)
EPI CELLS # UR: SIGNIFICANT CHANGE UP
EPI CELLS # UR: SIGNIFICANT CHANGE UP
GLUCOSE SERPL-MCNC: 138 MG/DL — HIGH (ref 70–99)
GLUCOSE SERPL-MCNC: 138 MG/DL — HIGH (ref 70–99)
GLUCOSE UR QL: NEGATIVE MG/DL — SIGNIFICANT CHANGE UP
GLUCOSE UR QL: NEGATIVE MG/DL — SIGNIFICANT CHANGE UP
HCG SERPL-ACNC: <1 MIU/ML — SIGNIFICANT CHANGE UP
HCG SERPL-ACNC: <1 MIU/ML — SIGNIFICANT CHANGE UP
HCT VFR BLD CALC: 31.4 % — LOW (ref 34.5–45)
HCT VFR BLD CALC: 31.4 % — LOW (ref 34.5–45)
HGB BLD-MCNC: 9.6 G/DL — LOW (ref 11.5–15.5)
HGB BLD-MCNC: 9.6 G/DL — LOW (ref 11.5–15.5)
IMM GRANULOCYTES NFR BLD AUTO: 0.3 % — SIGNIFICANT CHANGE UP (ref 0–0.9)
IMM GRANULOCYTES NFR BLD AUTO: 0.3 % — SIGNIFICANT CHANGE UP (ref 0–0.9)
KETONES UR-MCNC: NEGATIVE — SIGNIFICANT CHANGE UP
KETONES UR-MCNC: NEGATIVE — SIGNIFICANT CHANGE UP
LEUKOCYTE ESTERASE UR-ACNC: ABNORMAL
LEUKOCYTE ESTERASE UR-ACNC: ABNORMAL
LYMPHOCYTES # BLD AUTO: 1.56 K/UL — SIGNIFICANT CHANGE UP (ref 1–3.3)
LYMPHOCYTES # BLD AUTO: 1.56 K/UL — SIGNIFICANT CHANGE UP (ref 1–3.3)
LYMPHOCYTES # BLD AUTO: 21.6 % — SIGNIFICANT CHANGE UP (ref 13–44)
LYMPHOCYTES # BLD AUTO: 21.6 % — SIGNIFICANT CHANGE UP (ref 13–44)
MCHC RBC-ENTMCNC: 23.2 PG — LOW (ref 27–34)
MCHC RBC-ENTMCNC: 23.2 PG — LOW (ref 27–34)
MCHC RBC-ENTMCNC: 30.6 G/DL — LOW (ref 32–36)
MCHC RBC-ENTMCNC: 30.6 G/DL — LOW (ref 32–36)
MCV RBC AUTO: 76 FL — LOW (ref 80–100)
MCV RBC AUTO: 76 FL — LOW (ref 80–100)
MONOCYTES # BLD AUTO: 0.93 K/UL — HIGH (ref 0–0.9)
MONOCYTES # BLD AUTO: 0.93 K/UL — HIGH (ref 0–0.9)
MONOCYTES NFR BLD AUTO: 12.9 % — SIGNIFICANT CHANGE UP (ref 2–14)
MONOCYTES NFR BLD AUTO: 12.9 % — SIGNIFICANT CHANGE UP (ref 2–14)
NEUTROPHILS # BLD AUTO: 4.64 K/UL — SIGNIFICANT CHANGE UP (ref 1.8–7.4)
NEUTROPHILS # BLD AUTO: 4.64 K/UL — SIGNIFICANT CHANGE UP (ref 1.8–7.4)
NEUTROPHILS NFR BLD AUTO: 64.4 % — SIGNIFICANT CHANGE UP (ref 43–77)
NEUTROPHILS NFR BLD AUTO: 64.4 % — SIGNIFICANT CHANGE UP (ref 43–77)
NITRITE UR-MCNC: NEGATIVE — SIGNIFICANT CHANGE UP
NITRITE UR-MCNC: NEGATIVE — SIGNIFICANT CHANGE UP
NRBC # BLD: 0 /100 WBCS — SIGNIFICANT CHANGE UP (ref 0–0)
NRBC # BLD: 0 /100 WBCS — SIGNIFICANT CHANGE UP (ref 0–0)
PH UR: 8 — SIGNIFICANT CHANGE UP (ref 5–8)
PH UR: 8 — SIGNIFICANT CHANGE UP (ref 5–8)
PLATELET # BLD AUTO: 256 K/UL — SIGNIFICANT CHANGE UP (ref 150–400)
PLATELET # BLD AUTO: 256 K/UL — SIGNIFICANT CHANGE UP (ref 150–400)
POTASSIUM SERPL-MCNC: 3.8 MMOL/L — SIGNIFICANT CHANGE UP (ref 3.5–5.3)
POTASSIUM SERPL-MCNC: 3.8 MMOL/L — SIGNIFICANT CHANGE UP (ref 3.5–5.3)
POTASSIUM SERPL-SCNC: 3.8 MMOL/L — SIGNIFICANT CHANGE UP (ref 3.5–5.3)
POTASSIUM SERPL-SCNC: 3.8 MMOL/L — SIGNIFICANT CHANGE UP (ref 3.5–5.3)
PROT SERPL-MCNC: 8.8 GM/DL — HIGH (ref 6–8.3)
PROT SERPL-MCNC: 8.8 GM/DL — HIGH (ref 6–8.3)
PROT UR-MCNC: 15 MG/DL
PROT UR-MCNC: 15 MG/DL
RAPID RVP RESULT: SIGNIFICANT CHANGE UP
RAPID RVP RESULT: SIGNIFICANT CHANGE UP
RBC # BLD: 4.13 M/UL — SIGNIFICANT CHANGE UP (ref 3.8–5.2)
RBC # BLD: 4.13 M/UL — SIGNIFICANT CHANGE UP (ref 3.8–5.2)
RBC # FLD: 20.5 % — HIGH (ref 10.3–14.5)
RBC # FLD: 20.5 % — HIGH (ref 10.3–14.5)
RBC CASTS # UR COMP ASSIST: ABNORMAL /HPF (ref 0–4)
RBC CASTS # UR COMP ASSIST: ABNORMAL /HPF (ref 0–4)
SARS-COV-2 RNA SPEC QL NAA+PROBE: SIGNIFICANT CHANGE UP
SARS-COV-2 RNA SPEC QL NAA+PROBE: SIGNIFICANT CHANGE UP
SODIUM SERPL-SCNC: 135 MMOL/L — SIGNIFICANT CHANGE UP (ref 135–145)
SODIUM SERPL-SCNC: 135 MMOL/L — SIGNIFICANT CHANGE UP (ref 135–145)
SP GR SPEC: 1.01 — SIGNIFICANT CHANGE UP (ref 1.01–1.02)
SP GR SPEC: 1.01 — SIGNIFICANT CHANGE UP (ref 1.01–1.02)
UROBILINOGEN FLD QL: NEGATIVE MG/DL — SIGNIFICANT CHANGE UP
UROBILINOGEN FLD QL: NEGATIVE MG/DL — SIGNIFICANT CHANGE UP
WBC # BLD: 7.21 K/UL — SIGNIFICANT CHANGE UP (ref 3.8–10.5)
WBC # BLD: 7.21 K/UL — SIGNIFICANT CHANGE UP (ref 3.8–10.5)
WBC # FLD AUTO: 7.21 K/UL — SIGNIFICANT CHANGE UP (ref 3.8–10.5)
WBC # FLD AUTO: 7.21 K/UL — SIGNIFICANT CHANGE UP (ref 3.8–10.5)
WBC UR QL: SIGNIFICANT CHANGE UP
WBC UR QL: SIGNIFICANT CHANGE UP

## 2023-10-28 PROCEDURE — G1004: CPT

## 2023-10-28 PROCEDURE — 99285 EMERGENCY DEPT VISIT HI MDM: CPT

## 2023-10-28 PROCEDURE — 71045 X-RAY EXAM CHEST 1 VIEW: CPT | Mod: 26

## 2023-10-28 PROCEDURE — 70450 CT HEAD/BRAIN W/O DYE: CPT | Mod: 26,MF

## 2023-10-28 RX ORDER — LEVETIRACETAM 250 MG/1
1000 TABLET, FILM COATED ORAL ONCE
Refills: 0 | Status: COMPLETED | OUTPATIENT
Start: 2023-10-28 | End: 2023-10-28

## 2023-10-28 RX ORDER — KETOROLAC TROMETHAMINE 30 MG/ML
15 SYRINGE (ML) INJECTION ONCE
Refills: 0 | Status: DISCONTINUED | OUTPATIENT
Start: 2023-10-28 | End: 2023-10-28

## 2023-10-28 RX ORDER — MAGNESIUM SULFATE 500 MG/ML
2 VIAL (ML) INJECTION ONCE
Refills: 0 | Status: COMPLETED | OUTPATIENT
Start: 2023-10-28 | End: 2023-10-28

## 2023-10-28 RX ORDER — ACETAMINOPHEN 500 MG
650 TABLET ORAL ONCE
Refills: 0 | Status: COMPLETED | OUTPATIENT
Start: 2023-10-28 | End: 2023-10-28

## 2023-10-28 RX ORDER — MAGNESIUM SULFATE 500 MG/ML
2 VIAL (ML) INJECTION ONCE
Refills: 0 | Status: DISCONTINUED | OUTPATIENT
Start: 2023-10-28 | End: 2023-10-28

## 2023-10-28 RX ORDER — GUAIFENESIN/DEXTROMETHORPHAN 600MG-30MG
10 TABLET, EXTENDED RELEASE 12 HR ORAL ONCE
Refills: 0 | Status: COMPLETED | OUTPATIENT
Start: 2023-10-28 | End: 2023-10-28

## 2023-10-28 RX ORDER — METOCLOPRAMIDE HCL 10 MG
10 TABLET ORAL ONCE
Refills: 0 | Status: COMPLETED | OUTPATIENT
Start: 2023-10-28 | End: 2023-10-28

## 2023-10-28 RX ORDER — TRAMADOL HYDROCHLORIDE 50 MG/1
25 TABLET ORAL ONCE
Refills: 0 | Status: DISCONTINUED | OUTPATIENT
Start: 2023-10-28 | End: 2023-10-28

## 2023-10-28 RX ADMIN — Medication 15 MILLIGRAM(S): at 23:11

## 2023-10-28 RX ADMIN — Medication 15 MILLIGRAM(S): at 22:41

## 2023-10-28 RX ADMIN — Medication 10 MILLILITER(S): at 23:59

## 2023-10-28 RX ADMIN — LEVETIRACETAM 400 MILLIGRAM(S): 250 TABLET, FILM COATED ORAL at 21:34

## 2023-10-28 RX ADMIN — Medication 650 MILLIGRAM(S): at 22:04

## 2023-10-28 RX ADMIN — Medication 650 MILLIGRAM(S): at 21:34

## 2023-10-28 NOTE — ED ADULT TRIAGE NOTE - CHIEF COMPLAINT QUOTE
PMH of seizure (on levetiracetam)  BIBEMS, from group home, c/o seizure like activity 630pm today, also complaining flu-like symptoms for past few days, taking doxycyline. Now AOX3 in triage, reports mild headache, denies any other symptoms.

## 2023-10-28 NOTE — ED PROVIDER NOTE - NSFOLLOWUPINSTRUCTIONS_ED_ALL_ED_FT
You were evaluated in the ER for a seizure. Your CT brain shows no new abnormalities and your blood tests and urine are normal. You were given an IV dose of your keppra. Preliminary results of your chest xray show no new pneumonia. Medication has been sent for your continued cough and pain associated with cough. A neurology referral is included for you to call and schedule an appointment for continued workup and MRI. Return to ER for new or worsening symptoms.

## 2023-10-28 NOTE — ED PROVIDER NOTE - PATIENT PORTAL LINK FT
You can access the FollowMyHealth Patient Portal offered by [facility] by registering at the following website: [email or web address]. By joining [employer]’s FollowMyHealth portal, you will also be able to view your health information using other applications (apps) compatible with our system.

## 2023-10-28 NOTE — ED ADULT NURSE NOTE - NSFALLUNIVINTERV_ED_ALL_ED
Bed/Stretcher in lowest position, wheels locked, appropriate side rails in place/Call bell, personal items and telephone in reach/Instruct patient to call for assistance before getting out of bed/chair/stretcher/Non-slip footwear applied when patient is off stretcher/[place] to call system/Physically safe environment - no spills, clutter or unnecessary equipment/Purposeful proactive rounding/Room/bathroom lighting operational, light cord in reach

## 2023-10-28 NOTE — ED PROVIDER NOTE - OBJECTIVE STATEMENT
27 yo F pmh from group [facility] etoh abuse, seizures on keppra 1g nightly, recent admission for seizure requiring intubation, aspiration pna on doxy currently, and left ama 10/6/23 as was refusing MRI, here as had a seizure at group home that was unwitnessed and pt states she has L sided headache and bump and believes she hit her head. States still has cough despite doxy. Last drink 1 week ago and states does not drink daily. Pt currently axoxo3 without postictal period and without neurologic deficit.

## 2023-10-28 NOTE — ED PROVIDER NOTE - CARE PROVIDER_API CALL
Ginger Lara  Neurology  [facility] [place] [address]  Phone: [phone]  Fax: [phone]  Follow Up Time: 1-3 Days

## 2023-10-28 NOTE — ED ADULT NURSE NOTE - OBJECTIVE STATEMENT
pt is AOx3, ambulatory, from group [place], pt presents to the ED tonight for unwitnessed seizure. pt has a known history of seizures and takes keppra, pt states shes non compliant with seizure medication. pt states she was on a staircase when she had the seizure, when she woke up no one was around her. unsure of the duration of the seizure. pt reports that she might have hit the back on her head, denies taking blood thinners. pt reports having a headache at this time, pt rates her pain a 8/10, denies taking medication prior to arrival in the ED. pt denies CP, SOB, fever, chills, N/V/D at this time. pt placed on cardiac monitor, seizure precautions in place.       ERIC, from [facility], c/o seizure like activity 630pm today, also complaining flu-like symptoms for past few days, taking doxycyline. Now AOX3 in triage, pt is AOx3, ambulatory, from home, pt presents to the ED tonight for unwitnessed seizure. pt has a known history of seizures and takes keppra, pt states shes non compliant with seizure medication. pt states she was on a staircase when she had the seizure, when she woke up no one was around her. unsure of the duration of the seizure. pt reports that she might have hit the back on her head, denies taking blood thinners. pt reports having a headache at this time, pt rates her pain a 8/10, denies taking medication prior to arrival in the ED. pt denies CP, SOB, fever, chills, N/V/D at this time. pt placed on cardiac monitor, seizure precautions in place.       ERIC, from senior living, c/o seizure like activity 630pm today, also complaining flu-like symptoms for past few days, taking doxycyline. Now AOX3 in triage,

## 2023-10-28 NOTE — ED PROVIDER NOTE - PHYSICAL EXAMINATION
General: well appearing in nad, lying comfortably in stretcher  HEENT: NC/AT, MMM, PERRL  Cards: RRR no m/r/g  Pulm: CTAB no w/r/r  Abd: soft, nd/nt no rebound or guarding  Ext: no LE edema, ttp, deformities  Neuro: axoxo3, face symmetric, speaking full sentences, moving all extremities, ambulatory

## 2023-10-28 NOTE — ED ADULT NURSE NOTE - NS_NURSE_DISC_TEACHING_YN_ED_ALL_ED
BRIEF OPERATIVE NOTE    Date of Procedure: 5/2/2018   Preoperative Diagnosis: BENIGN NASAL TUMOR/LESION   Postoperative Diagnosis: BENIGN NASAL TUMOR/LESION     Procedure(s):  EXCISION OF INTRANASAL LESION LEFT  BY INTERNAL APPROACH   Surgeon(s) and Role:     * Daylin Saenz MD - Primary         Surgical Assistant: none    Surgical Staff:  Circ-1: Adrian Bell RN  Scrub RN-1: Melina Boyce RN  Event Time In   Incision Start 1052   Incision Close 1102     Anesthesia: MAC   Estimated Blood Loss: 5cc  Specimens:   ID Type Source Tests Collected by Time Destination   1 : Gabriel Aguirre MD 5/2/2018 4913 Pathology      Findings: lesion left side nasal septum   Complications: none  Implants: * No implants in log *
Yes

## 2023-10-28 NOTE — ED PROVIDER NOTE - CLINICAL SUMMARY MEDICAL DECISION MAKING FREE TEXT BOX
Pt here for a breakthrough seizure. Reports compliance with meds. Also states has residual cough after recent aspiration pna in last admission this month. Bedside cxr read without obvious infiltrate. cth at baseline. Pt requesting neuro referral for mri for continued management of seizures and migraine. NO infectious or metabolic reason on today's workup for sz. Pt currently asymptoamtic and stable for dc home. No seizures in ed.

## 2023-10-29 VITALS
HEART RATE: 58 BPM | TEMPERATURE: 99 F | OXYGEN SATURATION: 100 % | DIASTOLIC BLOOD PRESSURE: 78 MMHG | RESPIRATION RATE: 15 BRPM | SYSTOLIC BLOOD PRESSURE: 115 MMHG

## 2023-10-29 RX ORDER — LEVETIRACETAM 250 MG/1
1 TABLET, FILM COATED ORAL
Qty: 30 | Refills: 0
Start: 2023-10-29 | End: 2023-11-27

## 2023-10-29 RX ADMIN — Medication 150 GRAM(S): at 00:01

## 2023-10-29 RX ADMIN — Medication 10 MILLIGRAM(S): at 00:00

## 2023-10-29 NOTE — ED ADULT NURSE REASSESSMENT NOTE - NS ED NURSE REASSESS COMMENT FT1
report received from night shift RN,  pt stable and resting comfortably in bed; demonstrating no s/s of acute distress at this time. provider orders reviewed and addressed appropriately. RN inquired about pt current needs and addressed accordingly. pt safety maintained. awaiting p/u by father at 0800.

## 2023-10-31 LAB
LEVETIRACETAM SERPL-MCNC: <2 UG/ML — LOW (ref 10–40)
LEVETIRACETAM SERPL-MCNC: <2 UG/ML — LOW (ref 10–40)

## 2023-11-01 LAB
-  AMPICILLIN: SIGNIFICANT CHANGE UP
-  AMPICILLIN: SIGNIFICANT CHANGE UP
-  CIPROFLOXACIN: SIGNIFICANT CHANGE UP
-  CIPROFLOXACIN: SIGNIFICANT CHANGE UP
-  LEVOFLOXACIN: SIGNIFICANT CHANGE UP
-  LEVOFLOXACIN: SIGNIFICANT CHANGE UP
-  NITROFURANTOIN: SIGNIFICANT CHANGE UP
-  NITROFURANTOIN: SIGNIFICANT CHANGE UP
-  TETRACYCLINE: SIGNIFICANT CHANGE UP
-  TETRACYCLINE: SIGNIFICANT CHANGE UP
-  VANCOMYCIN: SIGNIFICANT CHANGE UP
-  VANCOMYCIN: SIGNIFICANT CHANGE UP
CULTURE RESULTS: ABNORMAL
GRAM STN FLD: ABNORMAL
GRAM STN FLD: ABNORMAL
METHOD TYPE: SIGNIFICANT CHANGE UP
METHOD TYPE: SIGNIFICANT CHANGE UP
ORGANISM # SPEC MICROSCOPIC CNT: ABNORMAL
ORGANISM # SPEC MICROSCOPIC CNT: SIGNIFICANT CHANGE UP
SPECIMEN SOURCE: SIGNIFICANT CHANGE UP

## 2023-11-02 RX ORDER — NITROFURANTOIN MACROCRYSTAL 50 MG
1 CAPSULE ORAL
Qty: 14 | Refills: 0
Start: 2023-11-02 | End: 2023-11-08

## 2023-11-02 NOTE — ED POST DISCHARGE NOTE - RESULT SUMMARY
Patient called back after getting voice mail. Chart reviewed, pt had 10-49,000 enterococcus faecalis in urine with sensitivities.

## 2023-12-21 ENCOUNTER — EMERGENCY (EMERGENCY)
Facility: HOSPITAL | Age: 28
LOS: 0 days | Discharge: HOME | End: 2023-12-21
Attending: STUDENT IN AN ORGANIZED HEALTH CARE EDUCATION/TRAINING PROGRAM
Payer: MEDICAID

## 2023-12-21 VITALS
RESPIRATION RATE: 20 BRPM | WEIGHT: 145.06 LBS | OXYGEN SATURATION: 100 % | TEMPERATURE: 99 F | HEART RATE: 99 BPM | DIASTOLIC BLOOD PRESSURE: 113 MMHG | SYSTOLIC BLOOD PRESSURE: 146 MMHG | HEIGHT: 63 IN

## 2023-12-21 VITALS
DIASTOLIC BLOOD PRESSURE: 93 MMHG | SYSTOLIC BLOOD PRESSURE: 130 MMHG | OXYGEN SATURATION: 100 % | RESPIRATION RATE: 16 BRPM | TEMPERATURE: 99 F | HEART RATE: 93 BPM

## 2023-12-21 DIAGNOSIS — R51.9 HEADACHE, UNSPECIFIED: ICD-10-CM

## 2023-12-21 DIAGNOSIS — R56.9 UNSPECIFIED CONVULSIONS: ICD-10-CM

## 2023-12-21 DIAGNOSIS — G40.909 EPILEPSY, UNSPECIFIED, NOT INTRACTABLE, WITHOUT STATUS EPILEPTICUS: ICD-10-CM

## 2023-12-21 DIAGNOSIS — Z94.5 SKIN TRANSPLANT STATUS: Chronic | ICD-10-CM

## 2023-12-21 LAB
ALBUMIN SERPL ELPH-MCNC: 4 G/DL — SIGNIFICANT CHANGE UP (ref 3.3–5)
ALBUMIN SERPL ELPH-MCNC: 4 G/DL — SIGNIFICANT CHANGE UP (ref 3.3–5)
ALP SERPL-CCNC: 64 U/L — SIGNIFICANT CHANGE UP (ref 40–120)
ALP SERPL-CCNC: 64 U/L — SIGNIFICANT CHANGE UP (ref 40–120)
ALT FLD-CCNC: 16 U/L — SIGNIFICANT CHANGE UP (ref 12–78)
ALT FLD-CCNC: 16 U/L — SIGNIFICANT CHANGE UP (ref 12–78)
AMPHET UR-MCNC: NEGATIVE — SIGNIFICANT CHANGE UP
AMPHET UR-MCNC: NEGATIVE — SIGNIFICANT CHANGE UP
ANION GAP SERPL CALC-SCNC: 15 MMOL/L — SIGNIFICANT CHANGE UP (ref 5–17)
ANION GAP SERPL CALC-SCNC: 15 MMOL/L — SIGNIFICANT CHANGE UP (ref 5–17)
APPEARANCE UR: ABNORMAL
APPEARANCE UR: ABNORMAL
AST SERPL-CCNC: 24 U/L — SIGNIFICANT CHANGE UP (ref 15–37)
AST SERPL-CCNC: 24 U/L — SIGNIFICANT CHANGE UP (ref 15–37)
BACTERIA # UR AUTO: ABNORMAL /HPF
BACTERIA # UR AUTO: ABNORMAL /HPF
BARBITURATES UR SCN-MCNC: NEGATIVE — SIGNIFICANT CHANGE UP
BARBITURATES UR SCN-MCNC: NEGATIVE — SIGNIFICANT CHANGE UP
BASOPHILS # BLD AUTO: 0.06 K/UL — SIGNIFICANT CHANGE UP (ref 0–0.2)
BASOPHILS # BLD AUTO: 0.06 K/UL — SIGNIFICANT CHANGE UP (ref 0–0.2)
BASOPHILS NFR BLD AUTO: 0.9 % — SIGNIFICANT CHANGE UP (ref 0–2)
BASOPHILS NFR BLD AUTO: 0.9 % — SIGNIFICANT CHANGE UP (ref 0–2)
BENZODIAZ UR-MCNC: NEGATIVE — SIGNIFICANT CHANGE UP
BENZODIAZ UR-MCNC: NEGATIVE — SIGNIFICANT CHANGE UP
BILIRUB SERPL-MCNC: 2.6 MG/DL — HIGH (ref 0.2–1.2)
BILIRUB SERPL-MCNC: 2.6 MG/DL — HIGH (ref 0.2–1.2)
BILIRUB UR-MCNC: NEGATIVE — SIGNIFICANT CHANGE UP
BILIRUB UR-MCNC: NEGATIVE — SIGNIFICANT CHANGE UP
BUN SERPL-MCNC: 14 MG/DL — SIGNIFICANT CHANGE UP (ref 7–23)
BUN SERPL-MCNC: 14 MG/DL — SIGNIFICANT CHANGE UP (ref 7–23)
CALCIUM SERPL-MCNC: 7.3 MG/DL — LOW (ref 8.5–10.1)
CALCIUM SERPL-MCNC: 7.3 MG/DL — LOW (ref 8.5–10.1)
CHLORIDE SERPL-SCNC: 98 MMOL/L — SIGNIFICANT CHANGE UP (ref 96–108)
CHLORIDE SERPL-SCNC: 98 MMOL/L — SIGNIFICANT CHANGE UP (ref 96–108)
CO2 SERPL-SCNC: 19 MMOL/L — LOW (ref 22–31)
CO2 SERPL-SCNC: 19 MMOL/L — LOW (ref 22–31)
COCAINE METAB.OTHER UR-MCNC: NEGATIVE — SIGNIFICANT CHANGE UP
COCAINE METAB.OTHER UR-MCNC: NEGATIVE — SIGNIFICANT CHANGE UP
COLOR SPEC: YELLOW — SIGNIFICANT CHANGE UP
COLOR SPEC: YELLOW — SIGNIFICANT CHANGE UP
CREAT SERPL-MCNC: 0.68 MG/DL — SIGNIFICANT CHANGE UP (ref 0.5–1.3)
CREAT SERPL-MCNC: 0.68 MG/DL — SIGNIFICANT CHANGE UP (ref 0.5–1.3)
DIFF PNL FLD: ABNORMAL
DIFF PNL FLD: ABNORMAL
EGFR: 120 ML/MIN/1.73M2 — SIGNIFICANT CHANGE UP
EGFR: 120 ML/MIN/1.73M2 — SIGNIFICANT CHANGE UP
EOSINOPHIL # BLD AUTO: 0.03 K/UL — SIGNIFICANT CHANGE UP (ref 0–0.5)
EOSINOPHIL # BLD AUTO: 0.03 K/UL — SIGNIFICANT CHANGE UP (ref 0–0.5)
EOSINOPHIL NFR BLD AUTO: 0.5 % — SIGNIFICANT CHANGE UP (ref 0–6)
EOSINOPHIL NFR BLD AUTO: 0.5 % — SIGNIFICANT CHANGE UP (ref 0–6)
EPI CELLS # UR: PRESENT
EPI CELLS # UR: PRESENT
ETHANOL SERPL-MCNC: <10 MG/DL — SIGNIFICANT CHANGE UP (ref 0–10)
ETHANOL SERPL-MCNC: <10 MG/DL — SIGNIFICANT CHANGE UP (ref 0–10)
GLUCOSE SERPL-MCNC: 64 MG/DL — LOW (ref 70–99)
GLUCOSE SERPL-MCNC: 64 MG/DL — LOW (ref 70–99)
GLUCOSE UR QL: 500 MG/DL
GLUCOSE UR QL: 500 MG/DL
HCG SERPL-ACNC: <1 MIU/ML — SIGNIFICANT CHANGE UP
HCG SERPL-ACNC: <1 MIU/ML — SIGNIFICANT CHANGE UP
HCT VFR BLD CALC: 40.5 % — SIGNIFICANT CHANGE UP (ref 34.5–45)
HCT VFR BLD CALC: 40.5 % — SIGNIFICANT CHANGE UP (ref 34.5–45)
HGB BLD-MCNC: 12.4 G/DL — SIGNIFICANT CHANGE UP (ref 11.5–15.5)
HGB BLD-MCNC: 12.4 G/DL — SIGNIFICANT CHANGE UP (ref 11.5–15.5)
IMM GRANULOCYTES NFR BLD AUTO: 0.3 % — SIGNIFICANT CHANGE UP (ref 0–0.9)
IMM GRANULOCYTES NFR BLD AUTO: 0.3 % — SIGNIFICANT CHANGE UP (ref 0–0.9)
KETONES UR-MCNC: >=160 MG/DL
KETONES UR-MCNC: >=160 MG/DL
LEUKOCYTE ESTERASE UR-ACNC: ABNORMAL
LEUKOCYTE ESTERASE UR-ACNC: ABNORMAL
LYMPHOCYTES # BLD AUTO: 1.87 K/UL — SIGNIFICANT CHANGE UP (ref 1–3.3)
LYMPHOCYTES # BLD AUTO: 1.87 K/UL — SIGNIFICANT CHANGE UP (ref 1–3.3)
LYMPHOCYTES # BLD AUTO: 28.2 % — SIGNIFICANT CHANGE UP (ref 13–44)
LYMPHOCYTES # BLD AUTO: 28.2 % — SIGNIFICANT CHANGE UP (ref 13–44)
MAGNESIUM SERPL-MCNC: 1.7 MG/DL — SIGNIFICANT CHANGE UP (ref 1.6–2.6)
MAGNESIUM SERPL-MCNC: 1.7 MG/DL — SIGNIFICANT CHANGE UP (ref 1.6–2.6)
MCHC RBC-ENTMCNC: 24.5 PG — LOW (ref 27–34)
MCHC RBC-ENTMCNC: 24.5 PG — LOW (ref 27–34)
MCHC RBC-ENTMCNC: 30.6 G/DL — LOW (ref 32–36)
MCHC RBC-ENTMCNC: 30.6 G/DL — LOW (ref 32–36)
MCV RBC AUTO: 79.9 FL — LOW (ref 80–100)
MCV RBC AUTO: 79.9 FL — LOW (ref 80–100)
METHADONE UR-MCNC: NEGATIVE — SIGNIFICANT CHANGE UP
METHADONE UR-MCNC: NEGATIVE — SIGNIFICANT CHANGE UP
MONOCYTES # BLD AUTO: 0.55 K/UL — SIGNIFICANT CHANGE UP (ref 0–0.9)
MONOCYTES # BLD AUTO: 0.55 K/UL — SIGNIFICANT CHANGE UP (ref 0–0.9)
MONOCYTES NFR BLD AUTO: 8.3 % — SIGNIFICANT CHANGE UP (ref 2–14)
MONOCYTES NFR BLD AUTO: 8.3 % — SIGNIFICANT CHANGE UP (ref 2–14)
NEUTROPHILS # BLD AUTO: 4.09 K/UL — SIGNIFICANT CHANGE UP (ref 1.8–7.4)
NEUTROPHILS # BLD AUTO: 4.09 K/UL — SIGNIFICANT CHANGE UP (ref 1.8–7.4)
NEUTROPHILS NFR BLD AUTO: 61.8 % — SIGNIFICANT CHANGE UP (ref 43–77)
NEUTROPHILS NFR BLD AUTO: 61.8 % — SIGNIFICANT CHANGE UP (ref 43–77)
NITRITE UR-MCNC: NEGATIVE — SIGNIFICANT CHANGE UP
NITRITE UR-MCNC: NEGATIVE — SIGNIFICANT CHANGE UP
NRBC # BLD: 0 /100 WBCS — SIGNIFICANT CHANGE UP (ref 0–0)
NRBC # BLD: 0 /100 WBCS — SIGNIFICANT CHANGE UP (ref 0–0)
OPIATES UR-MCNC: NEGATIVE — SIGNIFICANT CHANGE UP
OPIATES UR-MCNC: NEGATIVE — SIGNIFICANT CHANGE UP
PCP SPEC-MCNC: SIGNIFICANT CHANGE UP
PCP SPEC-MCNC: SIGNIFICANT CHANGE UP
PCP UR-MCNC: NEGATIVE — SIGNIFICANT CHANGE UP
PCP UR-MCNC: NEGATIVE — SIGNIFICANT CHANGE UP
PH UR: 5.5 — SIGNIFICANT CHANGE UP (ref 5–8)
PH UR: 5.5 — SIGNIFICANT CHANGE UP (ref 5–8)
PLATELET # BLD AUTO: 246 K/UL — SIGNIFICANT CHANGE UP (ref 150–400)
PLATELET # BLD AUTO: 246 K/UL — SIGNIFICANT CHANGE UP (ref 150–400)
POTASSIUM SERPL-MCNC: 3.8 MMOL/L — SIGNIFICANT CHANGE UP (ref 3.5–5.3)
POTASSIUM SERPL-MCNC: 3.8 MMOL/L — SIGNIFICANT CHANGE UP (ref 3.5–5.3)
POTASSIUM SERPL-SCNC: 3.8 MMOL/L — SIGNIFICANT CHANGE UP (ref 3.5–5.3)
POTASSIUM SERPL-SCNC: 3.8 MMOL/L — SIGNIFICANT CHANGE UP (ref 3.5–5.3)
PROT SERPL-MCNC: 10.2 GM/DL — HIGH (ref 6–8.3)
PROT SERPL-MCNC: 10.2 GM/DL — HIGH (ref 6–8.3)
PROT UR-MCNC: 30 MG/DL
PROT UR-MCNC: 30 MG/DL
RBC # BLD: 5.07 M/UL — SIGNIFICANT CHANGE UP (ref 3.8–5.2)
RBC # BLD: 5.07 M/UL — SIGNIFICANT CHANGE UP (ref 3.8–5.2)
RBC # FLD: 23.4 % — HIGH (ref 10.3–14.5)
RBC # FLD: 23.4 % — HIGH (ref 10.3–14.5)
RBC CASTS # UR COMP ASSIST: SIGNIFICANT CHANGE UP /HPF (ref 0–4)
RBC CASTS # UR COMP ASSIST: SIGNIFICANT CHANGE UP /HPF (ref 0–4)
SODIUM SERPL-SCNC: 132 MMOL/L — LOW (ref 135–145)
SODIUM SERPL-SCNC: 132 MMOL/L — LOW (ref 135–145)
SP GR SPEC: 1.03 — SIGNIFICANT CHANGE UP (ref 1–1.03)
SP GR SPEC: 1.03 — SIGNIFICANT CHANGE UP (ref 1–1.03)
THC UR QL: NEGATIVE — SIGNIFICANT CHANGE UP
THC UR QL: NEGATIVE — SIGNIFICANT CHANGE UP
UROBILINOGEN FLD QL: 0.2 MG/DL — SIGNIFICANT CHANGE UP (ref 0.2–1)
UROBILINOGEN FLD QL: 0.2 MG/DL — SIGNIFICANT CHANGE UP (ref 0.2–1)
WBC # BLD: 6.62 K/UL — SIGNIFICANT CHANGE UP (ref 3.8–10.5)
WBC # BLD: 6.62 K/UL — SIGNIFICANT CHANGE UP (ref 3.8–10.5)
WBC # FLD AUTO: 6.62 K/UL — SIGNIFICANT CHANGE UP (ref 3.8–10.5)
WBC # FLD AUTO: 6.62 K/UL — SIGNIFICANT CHANGE UP (ref 3.8–10.5)
WBC UR QL: >50 /HPF — SIGNIFICANT CHANGE UP (ref 0–5)
WBC UR QL: >50 /HPF — SIGNIFICANT CHANGE UP (ref 0–5)

## 2023-12-21 PROCEDURE — 99284 EMERGENCY DEPT VISIT MOD MDM: CPT

## 2023-12-21 RX ORDER — LEVETIRACETAM 250 MG/1
3000 TABLET, FILM COATED ORAL ONCE
Refills: 0 | Status: DISCONTINUED | OUTPATIENT
Start: 2023-12-21 | End: 2023-12-21

## 2023-12-21 RX ORDER — METOCLOPRAMIDE HCL 10 MG
10 TABLET ORAL ONCE
Refills: 0 | Status: COMPLETED | OUTPATIENT
Start: 2023-12-21 | End: 2023-12-21

## 2023-12-21 RX ORDER — IBUPROFEN 200 MG
600 TABLET ORAL ONCE
Refills: 0 | Status: COMPLETED | OUTPATIENT
Start: 2023-12-21 | End: 2023-12-21

## 2023-12-21 RX ORDER — ACETAMINOPHEN 500 MG
650 TABLET ORAL ONCE
Refills: 0 | Status: COMPLETED | OUTPATIENT
Start: 2023-12-21 | End: 2023-12-21

## 2023-12-21 RX ORDER — DEXTROSE 50 % IN WATER 50 %
50 SYRINGE (ML) INTRAVENOUS ONCE
Refills: 0 | Status: COMPLETED | OUTPATIENT
Start: 2023-12-21 | End: 2023-12-21

## 2023-12-21 RX ORDER — LEVETIRACETAM 250 MG/1
3000 TABLET, FILM COATED ORAL ONCE
Refills: 0 | Status: COMPLETED | OUTPATIENT
Start: 2023-12-21 | End: 2023-12-21

## 2023-12-21 RX ORDER — CEFTRIAXONE 500 MG/1
1000 INJECTION, POWDER, FOR SOLUTION INTRAMUSCULAR; INTRAVENOUS ONCE
Refills: 0 | Status: COMPLETED | OUTPATIENT
Start: 2023-12-21 | End: 2023-12-21

## 2023-12-21 RX ADMIN — Medication 650 MILLIGRAM(S): at 15:21

## 2023-12-21 RX ADMIN — LEVETIRACETAM 600 MILLIGRAM(S): 250 TABLET, FILM COATED ORAL at 15:30

## 2023-12-21 RX ADMIN — Medication 600 MILLIGRAM(S): at 20:13

## 2023-12-21 RX ADMIN — Medication 50 MILLILITER(S): at 15:23

## 2023-12-21 RX ADMIN — Medication 650 MILLIGRAM(S): at 16:30

## 2023-12-21 RX ADMIN — CEFTRIAXONE 100 MILLIGRAM(S): 500 INJECTION, POWDER, FOR SOLUTION INTRAMUSCULAR; INTRAVENOUS at 19:34

## 2023-12-21 RX ADMIN — Medication 10 MILLIGRAM(S): at 17:34

## 2023-12-21 RX ADMIN — LEVETIRACETAM 3000 MILLIGRAM(S): 250 TABLET, FILM COATED ORAL at 16:00

## 2023-12-21 NOTE — ED ADULT NURSE NOTE - OBJECTIVE STATEMENT
patient came here for unwitnessed seizures at home, patient is alert and oriented at this time, states she had a seizure at 1143pm then she passed out around 1230am and again around 0130am. patient states she had another unwitnessed seizure around 1230pm today and prior to arrival in the ED. states her boyfriend was the one who called 911 when boyfriend tried to call the patient on her cellphone and there was no answer. patient states she was recently diagnosed with seizure 2 months ago, 1 month ago had another unwitnessed seizure and hit her head, fell on the stairs, was taken to a hospital and was diagnosed with pneumonia and migraine. patient complaining of head pressure, nausea and dizziness at this time. states her usual aura is chest pain and head pain. patient denies alcohol/drug use. states after she was diagnosed with seizure she stopped drinking, no distress noted

## 2023-12-21 NOTE — ED ADULT NURSE NOTE - NSFALLRISKINTERV_ED_ALL_ED
Assistance OOB with selected safe patient handling equipment if applicable/Assistance with ambulation/Communicate fall risk and risk factors to all staff, patient, and family/Monitor gait and stability/Orthostatic vital signs/Provide visual cue: yellow wristband, yellow gown, etc/Reinforce activity limits and safety measures with patient and family/Call bell, personal items and telephone in reach/Instruct patient to call for assistance before getting out of bed/chair/stretcher/Non-slip footwear applied when patient is off stretcher/[place] to call system/Physically safe environment - no spills, clutter or unnecessary equipment/Purposeful Proactive Rounding/Room/bathroom lighting operational, light cord in reach Assistance OOB with selected safe patient handling equipment if applicable/Assistance with ambulation/Communicate fall risk and risk factors to all staff, patient, and family/Monitor gait and stability/Orthostatic vital signs/Provide visual cue: yellow wristband, yellow gown, etc/Reinforce activity limits and safety measures with patient and family/Call bell, personal items and telephone in reach/Instruct patient to call for assistance before getting out of bed/chair/stretcher/Non-slip footwear applied when patient is off stretcher/[place] to call system/Physically safe environment - no spills, clutter or unnecessary equipment/Purposeful Proactive Rounding/Room/bathroom lighting operational, light cord in reach

## 2023-12-21 NOTE — ED PROVIDER NOTE - ATTENDING APP SHARED VISIT CONTRIBUTION OF CARE
27 y/o F with PMH of seizure disorder on Keppra 1000 mg once a day at night presents emergency room for seizures. Per chart review, pt with history of alcohol and polysubstance abuse. States she last drank a few days ago and is not using other drugs currently. Previous neuro note states that seizure likely related to drug usage and did not recommend antiepileptic. Patient now states her neurologist placed her on 1000mg keppra which she only takes at night. She reports compliance with medication. Had unwitnessed seizure today. Denies other acute symptoms.  Vitals stable.  Patient endorses an aura but is neuro intact at baseline mental status.  Will keppra load, although unclear if she has epilpetic seizures.  Plan for labs and to monitor.  Likely will discharge s/p breakthrough seizure.

## 2023-12-21 NOTE — ED PROVIDER NOTE - NSFOLLOWUPINSTRUCTIONS_ED_ALL_ED_FT
Today you were seen in the ER for seizure.     Please see below for a copy of your results.     Seizure    A seizure is abnormal electrical activity in the brain; the specific cause may or may not be found. Prior to a seizure you may experience a warning sensation (aura) that may include fear, nausea, dizziness, and visual changes such as flashing lights of spots. Common symptoms during the seizure may include an altered mental status, rhythmic jerking movements, drooling, grunting, loss of bladder or bowel control, or tongue biting. After a seizure, you may feel confused and sleepy.     Do not swim, drive, operate machinery, or engage in any risky activity during which a seizure could cause further injury to you or others. Teach friends and family what to do if you HAVE a seizure which includes laying you on the ground with your head on a cushion and turning you to the side to keep your breathing passages clear in case of vomiting.    SEEK IMMEDIATE MEDICAL CARE IF YOU HAVE ANY OF THE FOLLOWING SYMPTOMS: seizure lasting over 5 minutes, not waking up or persistent altered mental status after the seizure, or more frequent or worsening seizures.    Advance activity as tolerated.      Continue all previously prescribed medications as directed unless otherwise instructed.      Follow up with your primary care physician and neurology in 48-72 hours- bring copies of your results.

## 2023-12-21 NOTE — ED ADULT TRIAGE NOTE - ARRIVAL FROM
----- Message from Jak Jason RD sent at 9/27/2022 10:49 AM CDT -----  Regarding: nutrition referral  Good Morning    Can you please enter a Nutrition Consult referral ([id number]) for [facility]al Counseling.  Thank you!    Jak Jason RD        
Home

## 2023-12-21 NOTE — ED PROVIDER NOTE - PROGRESS NOTE DETAILS
YVETTE Mendez: All results reviewed with patient, headache has improved.  Fingerstick improved after medications and eating has remained stable, patient has close follow-up with her neurologist, will DC with strict return precautions. Urine positive however patient asymptomatic and hcg neg, will not treat.

## 2023-12-21 NOTE — SBIRT NOTE ADULT - NSSBIRTBRIEFINTDET_GEN_A_CORE
Provided SBIRT services: Full screen positive. Brief Intervention Performed. Screening results were reviewed with the patient and patient was provided information about healthy guidelines and potential negative consequences associated with level of risk. Motivation and readiness to reduce or stop use was discussed and goals and activities to make changes were suggested/offered. Patient reports that since she was last d/c from the hospital she has only drank once (Monday 12/18/23). Discussed risk of mixing alcohol with Keppra.

## 2023-12-21 NOTE — ED PROVIDER NOTE - CLINICAL SUMMARY MEDICAL DECISION MAKING FREE TEXT BOX
28-year-old female with past medical history of seizure disorder on Keppra 1000 mg once a day at night presents emergency room for seizures.  Patient reports that she recently had an increase in her medication from 500 mg to 1000 mg at night, reports to seizures today without fall or head strike. Denies fever, chills, abdominal pain, nausea, vomiting, diarrhea or urinary complaints.  Reports mild generalized headache, states her chest pain is her aura. FS low, given amp of d50, feeding, FS improving, concern for breakthrough seizure secondary to low keppra level, will get labs, meds ( keppra load), reassess. No indication for head imaging at this time. Dispo pending result.s

## 2023-12-21 NOTE — ED PROVIDER NOTE - PATIENT PORTAL LINK FT
You can access the FollowMyHealth Patient Portal offered by [facility] by registering at the following website: [email or web address]. By joining [employer]’s FollowMyHealth portal, you will also be able to view your health information using other applications (apps) compatible with our system. You can access the FollowMyHealth Patient Portal offered by [facility] by registering at the following website: [email or web address]. By joining [employer]’s FollowMyHealth portal, you will also be able to view your health information using other applications (apps) compatible with our system.

## 2023-12-21 NOTE — ED ADULT NURSE NOTE - HOW MANY DRINKS CONTAINING ALCOHOL DO YOU HAVE ON A TYPICAL DAY WHEN YOU ARE DRINKING?
1 or 2 Complex Repair And Ftsg Text: The defect edges were debeveled with a #15 scalpel blade.  The primary defect was closed partially with a complex linear closure.  Given the location of the defect, shape of the defect and the proximity to free margins a full thickness skin graft was deemed most appropriate to repair the remaining defect.  The graft was trimmed to fit the size of the remaining defect.  The graft was then placed in the primary defect, oriented appropriately, and sutured into place.

## 2024-02-01 NOTE — ED PROVIDER NOTE - DOMESTIC TRAVEL HIGH RISK QUESTION
"Chief Complaint   Patient presents with    Ent Problem     Pt arrived with mom for recurrent otitis media, tonsillar hypertrophy, and snoring w/ possible apnea (per mom)       Temp 97.2  F (36.2  C) (Temporal)   Ht 2' 7.97\" (81.2 cm)   Wt 25 lb 9.2 oz (11.6 kg)   BMI 17.59 kg/m      Candelario Schwartz    " No

## 2024-02-26 RX ORDER — IBUPROFEN 200 MG
0 TABLET ORAL
Qty: 0 | Refills: 0 | DISCHARGE

## 2024-03-06 ENCOUNTER — EMERGENCY (EMERGENCY)
Facility: HOSPITAL | Age: 29
LOS: 0 days | Discharge: HOME | End: 2024-03-06
Attending: STUDENT IN AN ORGANIZED HEALTH CARE EDUCATION/TRAINING PROGRAM
Payer: MEDICAID

## 2024-03-06 ENCOUNTER — EMERGENCY (EMERGENCY)
Facility: HOSPITAL | Age: 29
LOS: 0 days | Discharge: HOME | End: 2024-03-06
Attending: EMERGENCY MEDICINE
Payer: MEDICAID

## 2024-03-06 VITALS
DIASTOLIC BLOOD PRESSURE: 107 MMHG | RESPIRATION RATE: 37 BRPM | HEART RATE: 71 BPM | OXYGEN SATURATION: 100 % | SYSTOLIC BLOOD PRESSURE: 149 MMHG | TEMPERATURE: 98 F

## 2024-03-06 VITALS
HEART RATE: 80 BPM | RESPIRATION RATE: 19 BRPM | WEIGHT: 138.01 LBS | HEIGHT: 62 IN | OXYGEN SATURATION: 98 % | DIASTOLIC BLOOD PRESSURE: 97 MMHG | TEMPERATURE: 99 F | SYSTOLIC BLOOD PRESSURE: 161 MMHG

## 2024-03-06 VITALS
DIASTOLIC BLOOD PRESSURE: 104 MMHG | SYSTOLIC BLOOD PRESSURE: 153 MMHG | TEMPERATURE: 98 F | HEART RATE: 60 BPM | OXYGEN SATURATION: 98 % | RESPIRATION RATE: 20 BRPM

## 2024-03-06 VITALS
TEMPERATURE: 98 F | HEART RATE: 80 BPM | WEIGHT: 138.01 LBS | SYSTOLIC BLOOD PRESSURE: 146 MMHG | OXYGEN SATURATION: 100 % | HEIGHT: 62 IN | RESPIRATION RATE: 18 BRPM | DIASTOLIC BLOOD PRESSURE: 102 MMHG

## 2024-03-06 DIAGNOSIS — G40.909 EPILEPSY, UNSPECIFIED, NOT INTRACTABLE, WITHOUT STATUS EPILEPTICUS: ICD-10-CM

## 2024-03-06 DIAGNOSIS — Z94.5 SKIN TRANSPLANT STATUS: Chronic | ICD-10-CM
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
## 2024-03-06 DIAGNOSIS — R07.89 OTHER CHEST PAIN: ICD-10-CM

## 2024-03-06 DIAGNOSIS — R10.13 EPIGASTRIC PAIN: ICD-10-CM

## 2024-03-06 DIAGNOSIS — M79.671 PAIN IN RIGHT FOOT: ICD-10-CM

## 2024-03-06 DIAGNOSIS — F41.0 PANIC DISORDER [EPISODIC PAROXYSMAL ANXIETY]: ICD-10-CM

## 2024-03-06 DIAGNOSIS — K29.70 GASTRITIS, UNSPECIFIED, WITHOUT BLEEDING: ICD-10-CM

## 2024-03-06 DIAGNOSIS — M25.571 PAIN IN RIGHT ANKLE AND JOINTS OF RIGHT FOOT: ICD-10-CM
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
## 2024-03-06 DIAGNOSIS — R06.4 HYPERVENTILATION: ICD-10-CM
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
## 2024-03-06 PROBLEM — Z78.9 OTHER SPECIFIED HEALTH STATUS: Chronic | Status: ACTIVE | Noted: 2021-03-22

## 2024-03-06 LAB
ALBUMIN SERPL ELPH-MCNC: 4 G/DL — SIGNIFICANT CHANGE UP (ref 3.3–5)
ALP SERPL-CCNC: 59 U/L — SIGNIFICANT CHANGE UP (ref 40–120)
ALT FLD-CCNC: 18 U/L — SIGNIFICANT CHANGE UP (ref 12–78)
ANION GAP SERPL CALC-SCNC: 14 MMOL/L — SIGNIFICANT CHANGE UP (ref 5–17)
ANISOCYTOSIS BLD QL: SLIGHT — SIGNIFICANT CHANGE UP
AST SERPL-CCNC: 28 U/L — SIGNIFICANT CHANGE UP (ref 15–37)
BASOPHILS # BLD AUTO: 0.09 K/UL — SIGNIFICANT CHANGE UP (ref 0–0.2)
BASOPHILS NFR BLD AUTO: 1 % — SIGNIFICANT CHANGE UP (ref 0–2)
BILIRUB SERPL-MCNC: 1.9 MG/DL — HIGH (ref 0.2–1.2)
BUN SERPL-MCNC: 7 MG/DL — SIGNIFICANT CHANGE UP (ref 7–23)
CALCIUM SERPL-MCNC: 9.7 MG/DL — SIGNIFICANT CHANGE UP (ref 8.5–10.1)
CHLORIDE SERPL-SCNC: 106 MMOL/L — SIGNIFICANT CHANGE UP (ref 96–108)
CO2 SERPL-SCNC: 17 MMOL/L — LOW (ref 22–31)
CREAT SERPL-MCNC: 0.84 MG/DL — SIGNIFICANT CHANGE UP (ref 0.5–1.3)
EGFR: 96 ML/MIN/1.73M2 — SIGNIFICANT CHANGE UP
EOSINOPHIL # BLD AUTO: 0.11 K/UL — SIGNIFICANT CHANGE UP (ref 0–0.5)
EOSINOPHIL NFR BLD AUTO: 1.3 % — SIGNIFICANT CHANGE UP (ref 0–6)
GLUCOSE SERPL-MCNC: 83 MG/DL — SIGNIFICANT CHANGE UP (ref 70–99)
HCG SERPL-ACNC: <1 MIU/ML — SIGNIFICANT CHANGE UP
HCT VFR BLD CALC: 37.4 % — SIGNIFICANT CHANGE UP (ref 34.5–45)
HGB BLD-MCNC: 12.3 G/DL — SIGNIFICANT CHANGE UP (ref 11.5–15.5)
HYPOCHROMIA BLD QL: SLIGHT — SIGNIFICANT CHANGE UP
IMM GRANULOCYTES NFR BLD AUTO: 0.5 % — SIGNIFICANT CHANGE UP (ref 0–0.9)
LYMPHOCYTES # BLD AUTO: 1.55 K/UL — SIGNIFICANT CHANGE UP (ref 1–3.3)
LYMPHOCYTES # BLD AUTO: 17.8 % — SIGNIFICANT CHANGE UP (ref 13–44)
MANUAL SMEAR VERIFICATION: SIGNIFICANT CHANGE UP
MCHC RBC-ENTMCNC: 26.6 PG — LOW (ref 27–34)
MCHC RBC-ENTMCNC: 32.9 G/DL — SIGNIFICANT CHANGE UP (ref 32–36)
MCV RBC AUTO: 80.8 FL — SIGNIFICANT CHANGE UP (ref 80–100)
MICROCYTES BLD QL: SLIGHT — SIGNIFICANT CHANGE UP
MONOCYTES # BLD AUTO: 0.98 K/UL — HIGH (ref 0–0.9)
MONOCYTES NFR BLD AUTO: 11.2 % — SIGNIFICANT CHANGE UP (ref 2–14)
NEUTROPHILS # BLD AUTO: 5.95 K/UL — SIGNIFICANT CHANGE UP (ref 1.8–7.4)
NEUTROPHILS NFR BLD AUTO: 68.2 % — SIGNIFICANT CHANGE UP (ref 43–77)
NRBC # BLD: 0 /100 WBCS — SIGNIFICANT CHANGE UP (ref 0–0)
PLAT MORPH BLD: NORMAL — SIGNIFICANT CHANGE UP
PLATELET # BLD AUTO: 331 K/UL — SIGNIFICANT CHANGE UP (ref 150–400)
POTASSIUM SERPL-MCNC: 3.2 MMOL/L — LOW (ref 3.5–5.3)
POTASSIUM SERPL-SCNC: 3.2 MMOL/L — LOW (ref 3.5–5.3)
PROT SERPL-MCNC: 9.6 GM/DL — HIGH (ref 6–8.3)
RBC # BLD: 4.63 M/UL — SIGNIFICANT CHANGE UP (ref 3.8–5.2)
RBC # FLD: 20.6 % — HIGH (ref 10.3–14.5)
RBC BLD AUTO: SIGNIFICANT CHANGE UP
SODIUM SERPL-SCNC: 137 MMOL/L — SIGNIFICANT CHANGE UP (ref 135–145)
TROPONIN I, HIGH SENSITIVITY RESULT: 3.9 NG/L — SIGNIFICANT CHANGE UP
WBC # BLD: 8.72 K/UL — SIGNIFICANT CHANGE UP (ref 3.8–10.5)
WBC # FLD AUTO: 8.72 K/UL — SIGNIFICANT CHANGE UP (ref 3.8–10.5)

## 2024-03-06 PROCEDURE — 99285 EMERGENCY DEPT VISIT HI MDM: CPT

## 2024-03-06 PROCEDURE — 93010 ELECTROCARDIOGRAM REPORT: CPT

## 2024-03-06 PROCEDURE — 71045 X-RAY EXAM CHEST 1 VIEW: CPT | Mod: 26

## 2024-03-06 PROCEDURE — 99284 EMERGENCY DEPT VISIT MOD MDM: CPT

## 2024-03-06 RX ORDER — SODIUM CHLORIDE 9 MG/ML
1000 INJECTION INTRAMUSCULAR; INTRAVENOUS; SUBCUTANEOUS ONCE
Refills: 0 | Status: COMPLETED | OUTPATIENT
Start: 2024-03-06 | End: 2024-03-06

## 2024-03-06 RX ORDER — LEVETIRACETAM 250 MG/1
1000 TABLET, FILM COATED ORAL ONCE
Refills: 0 | Status: COMPLETED | OUTPATIENT
Start: 2024-03-06 | End: 2024-03-06

## 2024-03-06 RX ORDER — LEVETIRACETAM 250 MG/1
1 TABLET, FILM COATED ORAL
Qty: 15 | Refills: 0
Start: 2024-03-06 | End: 2024-03-20

## 2024-03-06 RX ORDER — MIDAZOLAM HYDROCHLORIDE 1 MG/ML
2 INJECTION, SOLUTION INTRAMUSCULAR; INTRAVENOUS ONCE
Refills: 0 | Status: DISCONTINUED | OUTPATIENT
Start: 2024-03-06 | End: 2024-03-06

## 2024-03-06 RX ORDER — LEVETIRACETAM 250 MG/1
2 TABLET, FILM COATED ORAL
Qty: 60 | Refills: 0
Start: 2024-03-06

## 2024-03-06 RX ORDER — FAMOTIDINE 10 MG/ML
1 INJECTION INTRAVENOUS
Qty: 20 | Refills: 0
Start: 2024-03-06 | End: 2024-03-15

## 2024-03-06 RX ORDER — FAMOTIDINE 10 MG/ML
20 INJECTION INTRAVENOUS ONCE
Refills: 0 | Status: COMPLETED | OUTPATIENT
Start: 2024-03-06 | End: 2024-03-06

## 2024-03-06 RX ORDER — POTASSIUM CHLORIDE 20 MEQ
20 PACKET (EA) ORAL ONCE
Refills: 0 | Status: COMPLETED | OUTPATIENT
Start: 2024-03-06 | End: 2024-03-06

## 2024-03-06 RX ORDER — IBUPROFEN 200 MG
600 TABLET ORAL ONCE
Refills: 0 | Status: COMPLETED | OUTPATIENT
Start: 2024-03-06 | End: 2024-03-06

## 2024-03-06 RX ORDER — LEVETIRACETAM 250 MG/1
1 TABLET, FILM COATED ORAL
Qty: 60 | Refills: 0
Start: 2024-03-06 | End: 2024-04-04

## 2024-03-06 RX ADMIN — Medication 1 MILLIGRAM(S): at 09:06

## 2024-03-06 RX ADMIN — MIDAZOLAM HYDROCHLORIDE 2 MILLIGRAM(S): 1 INJECTION, SOLUTION INTRAMUSCULAR; INTRAVENOUS at 10:27

## 2024-03-06 RX ADMIN — SODIUM CHLORIDE 1000 MILLILITER(S): 9 INJECTION INTRAMUSCULAR; INTRAVENOUS; SUBCUTANEOUS at 06:29

## 2024-03-06 RX ADMIN — Medication 600 MILLIGRAM(S): at 10:14

## 2024-03-06 RX ADMIN — Medication 1 MILLIGRAM(S): at 05:56

## 2024-03-06 RX ADMIN — Medication 600 MILLIGRAM(S): at 10:15

## 2024-03-06 RX ADMIN — Medication 20 MILLIEQUIVALENT(S): at 06:28

## 2024-03-06 RX ADMIN — Medication 1 MILLIGRAM(S): at 09:31

## 2024-03-06 RX ADMIN — LEVETIRACETAM 400 MILLIGRAM(S): 250 TABLET, FILM COATED ORAL at 05:00

## 2024-03-06 RX ADMIN — SODIUM CHLORIDE 2000 MILLILITER(S): 9 INJECTION INTRAMUSCULAR; INTRAVENOUS; SUBCUTANEOUS at 05:28

## 2024-03-06 RX ADMIN — LEVETIRACETAM 1000 MILLIGRAM(S): 250 TABLET, FILM COATED ORAL at 05:30

## 2024-03-06 RX ADMIN — Medication 30 MILLILITER(S): at 16:11

## 2024-03-06 RX ADMIN — FAMOTIDINE 20 MILLIGRAM(S): 10 INJECTION INTRAVENOUS at 16:11

## 2024-03-06 NOTE — ED ADULT NURSE NOTE - OBJECTIVE STATEMENT
Pt has hx of seizures & takes Keppra for them & states that she has had 2 seizures since yesterday & is having trouble walking after falling at work yesterday while carrying a heavy box. Pt states she is having pain in her chest & is SOB, pt currently hyperventilating. Denies any recent cough/congestion or fever.

## 2024-03-06 NOTE — ED ADULT TRIAGE NOTE - CHIEF COMPLAINT QUOTE
witness sz at home when was in the bed , pt fall today at work , pt takes Keppra 500mg po at night c/o headache. pt has a laceration from the fall right foot ,pt is very jittery, pt has being taking her meds . witness sz at home when was in  bed , pt fall today at work , pt takes Keppra 500mg po at night c/o headache. pt has a laceration from the fall right foot ,pt is very jittery, pt has being taking her meds .
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
## 2024-03-06 NOTE — ED PROVIDER NOTE - CLINICAL SUMMARY MEDICAL DECISION MAKING FREE TEXT BOX
28-year-old female with past ministry of seizures on Keppra presenting with seizure.  Patient has had multiple ED stays for seizure in the setting of noncompliance, in the past that she is compliant with Keppra but Keppra levels have come back negative.  Patient states that she was told her PCP to decrease Keppra from 1000 mg to 500 mg.  Patient states she had seizure, denies any head trauma, states spontaneously and twisted right ankle, has mild pain in right foot from ankle.  Patient denies any symptoms at this time.  On exam neuro intact, appears well but anxious.  Lower extremity exam unremarkable, no lacerations.    Clinical presentation likely secondary to breakthrough seizure versus noncompliance, will load with 1 g of Keppra and have patient follow-up with PCP.  Labs are benign, patient given 1 mg Ativan as she began to have the panic attack..  Ankle/foot pain likely secondary to sprain, minimal tenderness on exam.  Will give a prescription

## 2024-03-06 NOTE — ED PROVIDER NOTE - PROGRESS NOTE DETAILS
Patient signed out to me pending SW eval. EKG with TWI in V2-V4. Will send trop. Surinder NEGRO.  Patient received multiple rounds of benzos for hyperventilation in the ED. States she is out of her seizure medication and takes keppra 500er 2 tabs at bedtime. Will send refill to pharmacy.  She is requesting transportation to her father's home.  Ryland Serrato DO Patient signed out to me pending SW eval. EKG with TWI in V2-V4. Will send trop. Ryland Serrato DO

## 2024-03-06 NOTE — ED PROVIDER NOTE - CLINICAL SUMMARY MEDICAL DECISION MAKING FREE TEXT BOX
Patient discomfort improved after Pepcid and Maalox and given food.  Otherwise PERC negative and otherwise with improvement will DC on Pepcid as well as will prescribe 1 month of Keppra for patient as patient is currently having issues with refill of medications.

## 2024-03-06 NOTE — ED ADULT NURSE NOTE - NS ED NURSE LEVEL OF CONSCIOUSNESS ORIENTATION
Met with pt and discussed role of CM  Pt lives with his dtr, JERE, and grandchildren in a 2-story home with 3 steps at entrance  Pt is independent in ADLs  Pt has DME including: abdoul  [facility]  [facility]  No MH tx hx  Pt reports inpt alcohol tx at [facility] 1 5 yrs ago--reports he has been sober since  PCP- Yuko Sunshine, [facility] ([phone])  Pt reports dtr Geno Mcdonnell as POA ([phone])  Copy of document requested  CM received consult for poor living conditions  CM discussed same with pt  Pt denies having any issues with home at this time  CM received VM from [address] [place] [address] ([phone]) to discuss pt  Return VM left for [facility]  CM reviewed d/c planning process including the following: identifying help at home, patient preference for d/c planning needs, Discharge Lounge, [facility] Meds to Bed program, availability of treatment team to discuss questions or concerns patient and/or family may have regarding understanding medications and recognizing signs and symptoms once discharged  CM also encouraged patient to follow up with all recommended appointments after discharge  Patient advised of importance for patient and family to participate in managing patients medical well being  Patient/caregiver received discharge checklist  Content reviewed  Patient/caregiver encouraged to participate in discharge plan of care prior to discharge home  Oriented - self; Oriented - place; Oriented - time

## 2024-03-06 NOTE — ED ADULT NURSE NOTE - NSFALLLASTSIX_ED_ALL_ED
Patient was seen today for wellness check. Patient is meeting milestones and reaching growth goals. Patient has not had any form of milk since the age of 1 mom states. Advised mom to change nutrition and re introduce whole milk to diet. Hemoglobin and hct ordered to check for deficiency. Patient also had Prevnar, HIB, and DTAP vaccine today.    No.

## 2024-03-06 NOTE — ED ADULT NURSE NOTE - CHIEF COMPLAINT QUOTE
witness sz at home when was in  bed , pt fall today at work , pt takes Keppra 500mg po at night c/o headache. pt has a laceration from the fall right foot ,pt is very jittery, pt has being taking her meds .

## 2024-03-06 NOTE — ED PROVIDER NOTE - PATIENT PORTAL LINK FT
You can access the FollowMyHealth Patient Portal offered by [facility] by registering at the following website: [email or web address]. By joining [employer]’s FollowMyHealth portal, you will also be able to view your health information using other applications (apps) compatible with our system. You can access the FollowMyHealth Patient Portal offered by [facility] by registering at the following website: [email or web address]. By joining [employer]’s FollowMyHealth portal, you will also be able to view your health information using other applications (apps) compatible with our system.

## 2024-03-06 NOTE — ED ADULT TRIAGE NOTE - CHIEF COMPLAINT QUOTE
Pt AAO x 3 c/o waiting for her cab s/p discharge and then feeling like her eyes were rolling back pt with no seizure activity noted in triage or waiting area  states it might have been that she has not ate anything. Cab canceled by security

## 2024-03-06 NOTE — ED PROVIDER NOTE - PATIENT PORTAL LINK FT
You can access the FollowMyHealth Patient Portal offered by [facility] by registering at the following website: [email or web address]. By joining [employer]’s FollowMyHealth portal, you will also be able to view your health information using other applications (apps) compatible with our system.

## 2024-03-06 NOTE — ED ADULT NURSE NOTE - CAS TRG GEN SKIN CONDITION
Telephone Encounter by Christie Bustillo at 04/13/17 01:57 PM     Author:  Christie Bustillo Service:  (none) Author Type:  Patient      Filed:  04/13/17 01:58 PM Encounter Date:  3/23/2017 Status:  Signed     :  Christie Bustillo (Patient )            Mailed trying to reach you letter to patient today.   Please give message below.   Thanks.[MS1.1T]     Please get more information.[MS1.1M]       Revision History        User Key Date/Time User Provider Type Action    > MS1.1 04/13/17 01:58 PM Christie Bustillo Patient  Sign    M - Manual, T - Template             Warm

## 2024-03-06 NOTE — ED ADULT NURSE NOTE - NSFALLUNIVINTERV_ED_ALL_ED
Bed/Stretcher in lowest position, wheels locked, appropriate side rails in place/Call bell, personal items and telephone in reach/Instruct patient to call for assistance before getting out of bed/chair/stretcher/Non-slip footwear applied when patient is off stretcher/[place] to call system/Physically safe environment - no spills, clutter or unnecessary equipment/Purposeful proactive rounding/Room/bathroom lighting operational, light cord in reach

## 2024-03-06 NOTE — ED PROVIDER NOTE - PHYSICAL EXAMINATION
General: No acute distress, mentation at baseline,  well nourished, well developed  HEENT: NCAT, Neck supple without meningismus, PERRL, no conjunctival injection  Lungs: CTAB, No wheeze or crackles, No retractions, No increased work of breathing  Heart: S1S2 RRR, No M/R/G, Pules equal Bilaterally in upper and lower extremities distally  Abd: soft, NT/ND, No guarding, No rebound.  No hernias, no palpable masses.  Extrem: FROM in all joints, no gross deformities appreciated, no significant edema noted, No ulcers. Cap refil < 2sec. no tenderness along R foot , no laceration or arbasion  Skin: No rash noted, warm dry.  Neuro:  General: alert and oriented, mood and affect normal  Speech: normal, no aphasia or dysarthria  Cranial nerves: CN2-12 in tact  Peripheral exam: 5/5 motor strength in bilateral UEs and LEs, sensation intact, no pronator drift, normal finger to nose  Gait: normal with normal Romberg and tandem gait   Psychiatric: Appropriate mood and affect.

## 2024-03-06 NOTE — ED PROVIDER NOTE - OBJECTIVE STATEMENT
28-year-old female with history of seizure disorder otherwise on Keppra 500 mg twice daily as per patient.  Patient was seen earlier today for seizure x 1 and otherwise states that she was having some epigastric/chest discomfort which had a workup in ED with negative cardiac enzyme as well as chest x-ray.  Patient states that as she was still feeling discomfort did not feel comfortable leaving so return to ER for further evaluation.  Patient denies any shortness of breath no fevers or chills no cough or URI symptoms.

## 2024-03-06 NOTE — ED PROVIDER NOTE - NSFOLLOWUPCLINICS_GEN_ALL_ED_FT
[facility]  Neurology  [address] [place] [address] 3rd [place] [address]  Phone: [phone]  Fax:

## 2024-03-06 NOTE — ED ADULT NURSE REASSESSMENT NOTE - NS ED NURSE REASSESS COMMENT FT1
Dr Denny at bedside for disposition, pt improved after Ativan & will be d/c'd. Pt cont SR on cardiac monitor with HR-70, no ectopy noted.
Pt cont to hyperventilate with RR-35, pt again encouraged to breath through her nose & out through her mouth, MD in to re-evaluate, IV Ativan given as ordered. Pt SR on cardiac monitor with HR-78, no ectopy noted. NO other changes in status.
Patient requesting to leave the emergency department in a gown, states all her clothing is soiled and prefers to leave with one.
Received pt in chair, as per endorsement pt is dc and waiting for father. On arrival, pt is alert and oriented x 4, appears agitated as evidence by tapping her foot, and argumentative responses. Pt states her father is not picking up and she has no way of getting home. Waiting for SW to arrive to see if they can assist in d/c home. While waiting pt states her chest is heavy, provider notified and ECG completed.

## 2024-03-06 NOTE — ED ADULT NURSE NOTE - OBJECTIVE STATEMENT
27 yo female complaining of chest discomfort. Pt was just seen here in the AM for 27 yo female complaining of chest discomfort. Pt was just seen here in the AM for sz episode. Was in the waiting room for cab when pt complained of chest tightness, was reregistered and EKG performed. BP was high 159/112 monitor applied. Pt attributes sx to not eating, has not eaten since yesterday, asking for food. (-) f/c, SOB, n/v/d, h/a

## 2024-03-08 LAB — LEVETIRACETAM SERPL-MCNC: 6.7 UG/ML — LOW (ref 10–40)

## 2024-06-13 ENCOUNTER — INPATIENT (INPATIENT)
Facility: HOSPITAL | Age: 29
LOS: 1 days | Discharge: HOME | End: 2024-06-15
Attending: HOSPITALIST | Admitting: HOSPITALIST
Payer: MEDICAID

## 2024-06-13 VITALS
DIASTOLIC BLOOD PRESSURE: 102 MMHG | HEIGHT: 62 IN | OXYGEN SATURATION: 100 % | TEMPERATURE: 98 F | SYSTOLIC BLOOD PRESSURE: 148 MMHG | RESPIRATION RATE: 29 BRPM | WEIGHT: 138.01 LBS | HEART RATE: 94 BPM

## 2024-06-13 DIAGNOSIS — Z94.5 SKIN TRANSPLANT STATUS: Chronic | ICD-10-CM

## 2024-06-13 DIAGNOSIS — R56.9 UNSPECIFIED CONVULSIONS: ICD-10-CM

## 2024-06-13 DIAGNOSIS — E87.8 OTHER DISORDERS OF ELECTROLYTE AND FLUID BALANCE, NOT ELSEWHERE CLASSIFIED: ICD-10-CM

## 2024-06-13 DIAGNOSIS — F10.939 ALCOHOL USE, UNSPECIFIED WITH WITHDRAWAL, UNSPECIFIED: ICD-10-CM

## 2024-06-13 LAB
ALBUMIN SERPL ELPH-MCNC: 4.2 G/DL — SIGNIFICANT CHANGE UP (ref 3.3–5)
ALP SERPL-CCNC: 70 U/L — SIGNIFICANT CHANGE UP (ref 40–120)
ALT FLD-CCNC: 20 U/L — SIGNIFICANT CHANGE UP (ref 12–78)
AMMONIA BLD-MCNC: 42 UMOL/L — HIGH (ref 11–32)
AMPHET UR-MCNC: NEGATIVE — SIGNIFICANT CHANGE UP
ANION GAP SERPL CALC-SCNC: 11 MMOL/L — SIGNIFICANT CHANGE UP (ref 5–17)
ANION GAP SERPL CALC-SCNC: 15 MMOL/L — SIGNIFICANT CHANGE UP (ref 5–17)
AST SERPL-CCNC: 42 U/L — HIGH (ref 15–37)
BARBITURATES UR SCN-MCNC: POSITIVE — SIGNIFICANT CHANGE UP
BASOPHILS # BLD AUTO: 0.05 K/UL — SIGNIFICANT CHANGE UP (ref 0–0.2)
BASOPHILS NFR BLD AUTO: 1 % — SIGNIFICANT CHANGE UP (ref 0–2)
BENZODIAZ UR-MCNC: NEGATIVE — SIGNIFICANT CHANGE UP
BILIRUB SERPL-MCNC: 1.3 MG/DL — HIGH (ref 0.2–1.2)
BUN SERPL-MCNC: 5 MG/DL — LOW (ref 7–23)
BUN SERPL-MCNC: 9 MG/DL — SIGNIFICANT CHANGE UP (ref 7–23)
CALCIUM SERPL-MCNC: 7.9 MG/DL — LOW (ref 8.5–10.1)
CALCIUM SERPL-MCNC: 9.8 MG/DL — SIGNIFICANT CHANGE UP (ref 8.5–10.1)
CHLORIDE SERPL-SCNC: 104 MMOL/L — SIGNIFICANT CHANGE UP (ref 96–108)
CHLORIDE SERPL-SCNC: 98 MMOL/L — SIGNIFICANT CHANGE UP (ref 96–108)
CO2 SERPL-SCNC: 19 MMOL/L — LOW (ref 22–31)
CO2 SERPL-SCNC: 21 MMOL/L — LOW (ref 22–31)
COCAINE METAB.OTHER UR-MCNC: NEGATIVE — SIGNIFICANT CHANGE UP
CREAT SERPL-MCNC: 0.49 MG/DL — LOW (ref 0.5–1.3)
CREAT SERPL-MCNC: 0.83 MG/DL — SIGNIFICANT CHANGE UP (ref 0.5–1.3)
EGFR: 129 ML/MIN/1.73M2 — SIGNIFICANT CHANGE UP
EGFR: 129 ML/MIN/1.73M2 — SIGNIFICANT CHANGE UP
EGFR: 97 ML/MIN/1.73M2 — SIGNIFICANT CHANGE UP
EGFR: 97 ML/MIN/1.73M2 — SIGNIFICANT CHANGE UP
EOSINOPHIL # BLD AUTO: 0.02 K/UL — SIGNIFICANT CHANGE UP (ref 0–0.5)
EOSINOPHIL NFR BLD AUTO: 0.4 % — SIGNIFICANT CHANGE UP (ref 0–6)
ETHANOL SERPL-MCNC: <10 MG/DL — SIGNIFICANT CHANGE UP (ref 0–10)
FLUAV AG NPH QL: SIGNIFICANT CHANGE UP
FLUBV AG NPH QL: SIGNIFICANT CHANGE UP
GLUCOSE SERPL-MCNC: 108 MG/DL — HIGH (ref 70–99)
GLUCOSE SERPL-MCNC: 86 MG/DL — SIGNIFICANT CHANGE UP (ref 70–99)
HCG SERPL-ACNC: <1 MIU/ML — SIGNIFICANT CHANGE UP
HCT VFR BLD CALC: 33.5 % — LOW (ref 34.5–45)
HGB BLD-MCNC: 11.3 G/DL — LOW (ref 11.5–15.5)
IMM GRANULOCYTES NFR BLD AUTO: 0.4 % — SIGNIFICANT CHANGE UP (ref 0–0.9)
LACTATE SERPL-SCNC: 2.2 MMOL/L — HIGH (ref 0.7–2)
LACTATE SERPL-SCNC: 2.6 MMOL/L — HIGH (ref 0.7–2)
LACTATE SERPL-SCNC: 4 MMOL/L — CRITICAL HIGH (ref 0.7–2)
LYMPHOCYTES # BLD AUTO: 1.55 K/UL — SIGNIFICANT CHANGE UP (ref 1–3.3)
LYMPHOCYTES # BLD AUTO: 31.8 % — SIGNIFICANT CHANGE UP (ref 13–44)
MAGNESIUM SERPL-MCNC: 0.8 MG/DL — CRITICAL LOW (ref 1.6–2.6)
MAGNESIUM SERPL-MCNC: 1.5 MG/DL — LOW (ref 1.6–2.6)
MCHC RBC-ENTMCNC: 28.3 PG — SIGNIFICANT CHANGE UP (ref 27–34)
MCHC RBC-ENTMCNC: 33.7 G/DL — SIGNIFICANT CHANGE UP (ref 32–36)
MCV RBC AUTO: 84 FL — SIGNIFICANT CHANGE UP (ref 80–100)
METHADONE UR-MCNC: NEGATIVE — SIGNIFICANT CHANGE UP
MONOCYTES # BLD AUTO: 0.61 K/UL — SIGNIFICANT CHANGE UP (ref 0–0.9)
MONOCYTES NFR BLD AUTO: 12.5 % — SIGNIFICANT CHANGE UP (ref 2–14)
NEUTROPHILS # BLD AUTO: 2.62 K/UL — SIGNIFICANT CHANGE UP (ref 1.8–7.4)
NEUTROPHILS NFR BLD AUTO: 53.9 % — SIGNIFICANT CHANGE UP (ref 43–77)
NRBC # BLD: 0 /100 WBCS — SIGNIFICANT CHANGE UP (ref 0–0)
NRBC BLD-RTO: 0 /100 WBCS — SIGNIFICANT CHANGE UP (ref 0–0)
OPIATES UR-MCNC: NEGATIVE — SIGNIFICANT CHANGE UP
PCP SPEC-MCNC: SIGNIFICANT CHANGE UP
PCP UR-MCNC: NEGATIVE — SIGNIFICANT CHANGE UP
PHOSPHATE SERPL-MCNC: 0.6 MG/DL — CRITICAL LOW (ref 2.5–4.5)
PHOSPHATE SERPL-MCNC: 3.2 MG/DL — SIGNIFICANT CHANGE UP (ref 2.5–4.5)
PLATELET # BLD AUTO: 395 K/UL — SIGNIFICANT CHANGE UP (ref 150–400)
POTASSIUM SERPL-MCNC: 3.2 MMOL/L — LOW (ref 3.5–5.3)
POTASSIUM SERPL-MCNC: 3.2 MMOL/L — LOW (ref 3.5–5.3)
POTASSIUM SERPL-SCNC: 3.2 MMOL/L — LOW (ref 3.5–5.3)
POTASSIUM SERPL-SCNC: 3.2 MMOL/L — LOW (ref 3.5–5.3)
PROLACTIN SERPL-MCNC: 33.8 NG/ML — HIGH (ref 3.4–24.1)
PROT SERPL-MCNC: 9.9 GM/DL — HIGH (ref 6–8.3)
RBC # BLD: 3.99 M/UL — SIGNIFICANT CHANGE UP (ref 3.8–5.2)
RBC # FLD: 19.9 % — HIGH (ref 10.3–14.5)
SARS-COV-2 RNA SPEC QL NAA+PROBE: SIGNIFICANT CHANGE UP
SODIUM SERPL-SCNC: 132 MMOL/L — LOW (ref 135–145)
SODIUM SERPL-SCNC: 136 MMOL/L — SIGNIFICANT CHANGE UP (ref 135–145)
THC UR QL: NEGATIVE — SIGNIFICANT CHANGE UP
TROPONIN I, HIGH SENSITIVITY RESULT: <3 NG/L — SIGNIFICANT CHANGE UP
VALPROATE SERPL-MCNC: <3 UG/ML — LOW (ref 50–100)
WBC # BLD: 4.87 K/UL — SIGNIFICANT CHANGE UP (ref 3.8–10.5)
WBC # FLD AUTO: 4.87 K/UL — SIGNIFICANT CHANGE UP (ref 3.8–10.5)

## 2024-06-13 PROCEDURE — 72125 CT NECK SPINE W/O DYE: CPT | Mod: 26,MC

## 2024-06-13 PROCEDURE — 99291 CRITICAL CARE FIRST HOUR: CPT

## 2024-06-13 PROCEDURE — 99223 1ST HOSP IP/OBS HIGH 75: CPT

## 2024-06-13 PROCEDURE — 99222 1ST HOSP IP/OBS MODERATE 55: CPT

## 2024-06-13 PROCEDURE — 93010 ELECTROCARDIOGRAM REPORT: CPT | Mod: 76

## 2024-06-13 PROCEDURE — 71045 X-RAY EXAM CHEST 1 VIEW: CPT | Mod: 26

## 2024-06-13 PROCEDURE — 70450 CT HEAD/BRAIN W/O DYE: CPT | Mod: 26,MC

## 2024-06-13 RX ORDER — LORAZEPAM 4 MG/ML
2 VIAL (ML) INJECTION ONCE
Refills: 0 | Status: DISCONTINUED | OUTPATIENT
Start: 2024-06-13 | End: 2024-06-13

## 2024-06-13 RX ORDER — LEVETIRACETAM 10 MG/ML
1500 INJECTION, SOLUTION INTRAVENOUS
Refills: 0 | Status: DISCONTINUED | OUTPATIENT
Start: 2024-06-13 | End: 2024-06-15

## 2024-06-13 RX ORDER — SOD PHOS DI, MONO/K PHOS MONO 250 MG
1 TABLET ORAL ONCE
Refills: 0 | Status: COMPLETED | OUTPATIENT
Start: 2024-06-13 | End: 2024-06-13

## 2024-06-13 RX ORDER — POTASSIUM PHOSPHATE, MONOBASIC POTASSIUM PHOSPHATE, DIBASIC INJECTION, 236; 224 MG/ML; MG/ML
30 SOLUTION, CONCENTRATE INTRAVENOUS ONCE
Refills: 0 | Status: COMPLETED | OUTPATIENT
Start: 2024-06-13 | End: 2024-06-13

## 2024-06-13 RX ORDER — PHENOBARBITAL 30 MG/1
260 TABLET ORAL ONCE
Refills: 0 | Status: DISCONTINUED | OUTPATIENT
Start: 2024-06-13 | End: 2024-06-13

## 2024-06-13 RX ORDER — B1/B2/B3/B5/B6/B12/VIT C/FOLIC 500-0.5 MG
1 TABLET ORAL DAILY
Refills: 0 | Status: DISCONTINUED | OUTPATIENT
Start: 2024-06-13 | End: 2024-06-15

## 2024-06-13 RX ORDER — ZONISAMIDE 25 MG
200 CAPSULE ORAL AT BEDTIME
Refills: 0 | Status: DISCONTINUED | OUTPATIENT
Start: 2024-06-13 | End: 2024-06-15

## 2024-06-13 RX ORDER — ACETAMINOPHEN 500 MG/5ML
650 LIQUID (ML) ORAL EVERY 6 HOURS
Refills: 0 | Status: DISCONTINUED | OUTPATIENT
Start: 2024-06-13 | End: 2024-06-15

## 2024-06-13 RX ORDER — MELATONIN 5 MG
3 TABLET ORAL AT BEDTIME
Refills: 0 | Status: DISCONTINUED | OUTPATIENT
Start: 2024-06-13 | End: 2024-06-15

## 2024-06-13 RX ORDER — MAGNESIUM SULFATE 500 MG/ML
2 SYRINGE (ML) INJECTION ONCE
Refills: 0 | Status: COMPLETED | OUTPATIENT
Start: 2024-06-13 | End: 2024-06-13

## 2024-06-13 RX ORDER — SODIUM CHLORIDE 9 G/1000ML
1000 INJECTION, SOLUTION INTRAVENOUS ONCE
Refills: 0 | Status: COMPLETED | OUTPATIENT
Start: 2024-06-13 | End: 2024-06-13

## 2024-06-13 RX ORDER — CHLORDIAZEPOXIDE HCL 10 MG
50 CAPSULE ORAL EVERY 6 HOURS
Refills: 0 | Status: DISCONTINUED | OUTPATIENT
Start: 2024-06-13 | End: 2024-06-14

## 2024-06-13 RX ORDER — FOLIC ACID 1 MG/1
1 TABLET ORAL DAILY
Refills: 0 | Status: DISCONTINUED | OUTPATIENT
Start: 2024-06-14 | End: 2024-06-15

## 2024-06-13 RX ORDER — CEFTRIAXONE 500 MG/1
1000 INJECTION, POWDER, FOR SOLUTION INTRAMUSCULAR; INTRAVENOUS ONCE
Refills: 0 | Status: DISCONTINUED | OUTPATIENT
Start: 2024-06-13 | End: 2024-06-13

## 2024-06-13 RX ORDER — LORAZEPAM 4 MG/ML
2 VIAL (ML) INJECTION
Refills: 0 | Status: DISCONTINUED | OUTPATIENT
Start: 2024-06-13 | End: 2024-06-14

## 2024-06-13 RX ORDER — LEVETIRACETAM 10 MG/ML
2000 INJECTION, SOLUTION INTRAVENOUS ONCE
Refills: 0 | Status: COMPLETED | OUTPATIENT
Start: 2024-06-13 | End: 2024-06-13

## 2024-06-13 RX ORDER — SODIUM CHLORIDE 9 G/1000ML
1550 INJECTION, SOLUTION INTRAVENOUS ONCE
Refills: 0 | Status: COMPLETED | OUTPATIENT
Start: 2024-06-13 | End: 2024-06-13

## 2024-06-13 RX ORDER — FOLIC ACID 1 MG/1
1 TABLET ORAL ONCE
Refills: 0 | Status: COMPLETED | OUTPATIENT
Start: 2024-06-13 | End: 2024-06-13

## 2024-06-13 RX ORDER — CHLORDIAZEPOXIDE HCL 10 MG
CAPSULE ORAL
Refills: 0 | Status: DISCONTINUED | OUTPATIENT
Start: 2024-06-13 | End: 2024-06-14

## 2024-06-13 RX ORDER — CHLORDIAZEPOXIDE HCL 10 MG
50 CAPSULE ORAL EVERY 8 HOURS
Refills: 0 | Status: DISCONTINUED | OUTPATIENT
Start: 2024-06-14 | End: 2024-06-14

## 2024-06-13 RX ADMIN — Medication 2 MILLIGRAM(S): at 07:20

## 2024-06-13 RX ADMIN — FOLIC ACID 1 MILLIGRAM(S): 1 TABLET ORAL at 08:34

## 2024-06-13 RX ADMIN — LEVETIRACETAM 600 MILLIGRAM(S): 10 INJECTION, SOLUTION INTRAVENOUS at 07:30

## 2024-06-13 RX ADMIN — Medication 25 GRAM(S): at 16:51

## 2024-06-13 RX ADMIN — SODIUM CHLORIDE 1550 MILLILITER(S): 9 INJECTION, SOLUTION INTRAVENOUS at 09:55

## 2024-06-13 RX ADMIN — POTASSIUM PHOSPHATE, MONOBASIC POTASSIUM PHOSPHATE, DIBASIC INJECTION, 83.33 MILLIMOLE(S): 236; 224 SOLUTION, CONCENTRATE INTRAVENOUS at 10:21

## 2024-06-13 RX ADMIN — PHENOBARBITAL 260 MILLIGRAM(S): 30 TABLET ORAL at 09:19

## 2024-06-13 RX ADMIN — LEVETIRACETAM 2000 MILLIGRAM(S): 10 INJECTION, SOLUTION INTRAVENOUS at 08:04

## 2024-06-13 RX ADMIN — Medication 50 MILLIGRAM(S): at 12:31

## 2024-06-13 RX ADMIN — Medication 100 MILLILITER(S): at 18:40

## 2024-06-13 RX ADMIN — Medication 2 MILLIGRAM(S): at 07:10

## 2024-06-13 RX ADMIN — SODIUM CHLORIDE 1000 MILLILITER(S): 9 INJECTION, SOLUTION INTRAVENOUS at 09:14

## 2024-06-13 RX ADMIN — Medication 650 MILLIGRAM(S): at 22:50

## 2024-06-13 RX ADMIN — Medication 105 MILLIGRAM(S): at 08:34

## 2024-06-13 RX ADMIN — Medication 200 MILLIGRAM(S): at 22:31

## 2024-06-13 RX ADMIN — Medication 20 MILLIEQUIVALENT(S): at 16:58

## 2024-06-13 RX ADMIN — Medication 1 TABLET(S): at 12:31

## 2024-06-13 RX ADMIN — Medication 3 MILLIGRAM(S): at 22:22

## 2024-06-13 RX ADMIN — Medication 40 MILLIEQUIVALENT(S): at 19:43

## 2024-06-13 RX ADMIN — Medication 1000 MILLILITER(S): at 07:05

## 2024-06-13 RX ADMIN — PHENOBARBITAL 204 MILLIGRAM(S): 30 TABLET ORAL at 08:33

## 2024-06-13 RX ADMIN — Medication 50 MILLIGRAM(S): at 18:16

## 2024-06-13 RX ADMIN — Medication 1000 MILLILITER(S): at 06:46

## 2024-06-13 RX ADMIN — Medication 25 GRAM(S): at 07:13

## 2024-06-13 RX ADMIN — LEVETIRACETAM 1500 MILLIGRAM(S): 10 INJECTION, SOLUTION INTRAVENOUS at 18:17

## 2024-06-13 RX ADMIN — Medication 20 MILLIEQUIVALENT(S): at 12:31

## 2024-06-13 RX ADMIN — Medication 650 MILLIGRAM(S): at 22:22

## 2024-06-13 RX ADMIN — Medication 500 MILLIGRAM(S): at 10:40

## 2024-06-13 RX ADMIN — Medication 2 GRAM(S): at 08:39

## 2024-06-14 LAB
A1C WITH ESTIMATED AVERAGE GLUCOSE RESULT: 5 % — SIGNIFICANT CHANGE UP (ref 4–5.6)
ALBUMIN SERPL ELPH-MCNC: 3.5 G/DL — SIGNIFICANT CHANGE UP (ref 3.3–5)
ALP SERPL-CCNC: 60 U/L — SIGNIFICANT CHANGE UP (ref 40–120)
ALT FLD-CCNC: 17 U/L — SIGNIFICANT CHANGE UP (ref 12–78)
ANION GAP SERPL CALC-SCNC: 6 MMOL/L — SIGNIFICANT CHANGE UP (ref 5–17)
AST SERPL-CCNC: 33 U/L — SIGNIFICANT CHANGE UP (ref 15–37)
BILIRUB SERPL-MCNC: 1.3 MG/DL — HIGH (ref 0.2–1.2)
BUN SERPL-MCNC: 2 MG/DL — LOW (ref 7–23)
CALCIUM SERPL-MCNC: 9.3 MG/DL — SIGNIFICANT CHANGE UP (ref 8.5–10.1)
CHLORIDE SERPL-SCNC: 106 MMOL/L — SIGNIFICANT CHANGE UP (ref 96–108)
CHOLEST SERPL-MCNC: 311 MG/DL — HIGH
CO2 SERPL-SCNC: 19 MMOL/L — LOW (ref 22–31)
CREAT SERPL-MCNC: 0.55 MG/DL — SIGNIFICANT CHANGE UP (ref 0.5–1.3)
CULTURE RESULTS: SIGNIFICANT CHANGE UP
EGFR: 126 ML/MIN/1.73M2 — SIGNIFICANT CHANGE UP
EGFR: 126 ML/MIN/1.73M2 — SIGNIFICANT CHANGE UP
ESTIMATED AVERAGE GLUCOSE: 97 MG/DL — SIGNIFICANT CHANGE UP (ref 68–114)
GLUCOSE SERPL-MCNC: 97 MG/DL — SIGNIFICANT CHANGE UP (ref 70–99)
HCT VFR BLD CALC: 33.1 % — LOW (ref 34.5–45)
HDLC SERPL-MCNC: 181 MG/DL — SIGNIFICANT CHANGE UP
HGB BLD-MCNC: 11 G/DL — LOW (ref 11.5–15.5)
LACTATE SERPL-SCNC: 1.5 MMOL/L — SIGNIFICANT CHANGE UP (ref 0.7–2)
LDLC SERPL-MCNC: 119 MG/DL — HIGH
LIPID PNL WITH DIRECT LDL SERPL: 119 MG/DL — HIGH
MAGNESIUM SERPL-MCNC: 1.8 MG/DL — SIGNIFICANT CHANGE UP (ref 1.6–2.6)
MCHC RBC-ENTMCNC: 28.6 PG — SIGNIFICANT CHANGE UP (ref 27–34)
MCHC RBC-ENTMCNC: 33.2 G/DL — SIGNIFICANT CHANGE UP (ref 32–36)
MCV RBC AUTO: 86 FL — SIGNIFICANT CHANGE UP (ref 80–100)
NONHDLC SERPL-MCNC: 130 MG/DL — HIGH
NRBC # BLD: 0 /100 WBCS — SIGNIFICANT CHANGE UP (ref 0–0)
NRBC BLD-RTO: 0 /100 WBCS — SIGNIFICANT CHANGE UP (ref 0–0)
PHOSPHATE SERPL-MCNC: 2.3 MG/DL — LOW (ref 2.5–4.5)
PLATELET # BLD AUTO: 345 K/UL — SIGNIFICANT CHANGE UP (ref 150–400)
POTASSIUM SERPL-MCNC: 4 MMOL/L — SIGNIFICANT CHANGE UP (ref 3.5–5.3)
POTASSIUM SERPL-SCNC: 4 MMOL/L — SIGNIFICANT CHANGE UP (ref 3.5–5.3)
PROCALCITONIN SERPL-MCNC: 0.12 NG/ML — HIGH (ref 0.02–0.1)
PROT SERPL-MCNC: 8.7 GM/DL — HIGH (ref 6–8.3)
RBC # BLD: 3.85 M/UL — SIGNIFICANT CHANGE UP (ref 3.8–5.2)
RBC # FLD: 20.5 % — HIGH (ref 10.3–14.5)
SODIUM SERPL-SCNC: 131 MMOL/L — LOW (ref 135–145)
SPECIMEN SOURCE: SIGNIFICANT CHANGE UP
TRIGL SERPL-MCNC: 80 MG/DL — SIGNIFICANT CHANGE UP
WBC # BLD: 7.51 K/UL — SIGNIFICANT CHANGE UP (ref 3.8–10.5)
WBC # FLD AUTO: 7.51 K/UL — SIGNIFICANT CHANGE UP (ref 3.8–10.5)

## 2024-06-14 PROCEDURE — 70553 MRI BRAIN STEM W/O & W/DYE: CPT | Mod: 26

## 2024-06-14 PROCEDURE — 95816 EEG AWAKE AND DROWSY: CPT | Mod: 26

## 2024-06-14 PROCEDURE — 99232 SBSQ HOSP IP/OBS MODERATE 35: CPT

## 2024-06-14 RX ORDER — LORAZEPAM 4 MG/ML
1 VIAL (ML) INJECTION EVERY 4 HOURS
Refills: 0 | Status: DISCONTINUED | OUTPATIENT
Start: 2024-06-14 | End: 2024-06-15

## 2024-06-14 RX ADMIN — LEVETIRACETAM 1500 MILLIGRAM(S): 10 INJECTION, SOLUTION INTRAVENOUS at 17:37

## 2024-06-14 RX ADMIN — Medication 50 MILLIGRAM(S): at 00:21

## 2024-06-14 RX ADMIN — Medication 100 MILLIGRAM(S): at 17:38

## 2024-06-14 RX ADMIN — Medication 100 MILLILITER(S): at 17:13

## 2024-06-14 RX ADMIN — LEVETIRACETAM 1500 MILLIGRAM(S): 10 INJECTION, SOLUTION INTRAVENOUS at 06:38

## 2024-06-14 RX ADMIN — Medication 50 MILLIGRAM(S): at 06:38

## 2024-06-14 RX ADMIN — Medication 200 MILLIGRAM(S): at 21:54

## 2024-06-14 RX ADMIN — FOLIC ACID 1 MILLIGRAM(S): 1 TABLET ORAL at 17:38

## 2024-06-15 VITALS
TEMPERATURE: 98 F | OXYGEN SATURATION: 99 % | RESPIRATION RATE: 18 BRPM | HEART RATE: 92 BPM | DIASTOLIC BLOOD PRESSURE: 79 MMHG | SYSTOLIC BLOOD PRESSURE: 119 MMHG

## 2024-06-15 LAB — LEVETIRACETAM SERPL-MCNC: <2 UG/ML — LOW (ref 10–40)

## 2024-06-15 PROCEDURE — 76377 3D RENDER W/INTRP POSTPROCES: CPT | Mod: 26

## 2024-06-15 PROCEDURE — 99239 HOSP IP/OBS DSCHRG MGMT >30: CPT

## 2024-06-15 RX ORDER — LEVETIRACETAM 10 MG/ML
2 INJECTION, SOLUTION INTRAVENOUS
Qty: 120 | Refills: 0
Start: 2024-06-15 | End: 2024-07-14

## 2024-06-15 RX ORDER — LEVETIRACETAM 10 MG/ML
3 INJECTION, SOLUTION INTRAVENOUS
Refills: 0 | DISCHARGE

## 2024-06-15 RX ORDER — ZONISAMIDE 25 MG
2 CAPSULE ORAL
Qty: 60 | Refills: 0
Start: 2024-06-15 | End: 2024-07-14

## 2024-06-15 RX ADMIN — Medication 1 TABLET(S): at 11:53

## 2024-06-15 RX ADMIN — Medication 100 MILLIGRAM(S): at 11:52

## 2024-06-15 RX ADMIN — FOLIC ACID 1 MILLIGRAM(S): 1 TABLET ORAL at 11:52

## 2024-06-15 RX ADMIN — LEVETIRACETAM 1500 MILLIGRAM(S): 10 INJECTION, SOLUTION INTRAVENOUS at 05:50

## 2024-06-25 DIAGNOSIS — R56.9 UNSPECIFIED CONVULSIONS: ICD-10-CM

## 2024-06-25 DIAGNOSIS — E87.1 HYPO-OSMOLALITY AND HYPONATREMIA: ICD-10-CM

## 2024-06-25 DIAGNOSIS — G40.901 EPILEPSY, UNSPECIFIED, NOT INTRACTABLE, WITH STATUS EPILEPTICUS: ICD-10-CM

## 2024-06-25 DIAGNOSIS — F10.230 ALCOHOL DEPENDENCE WITH WITHDRAWAL, UNCOMPLICATED: ICD-10-CM

## 2024-06-25 DIAGNOSIS — E87.6 HYPOKALEMIA: ICD-10-CM

## 2024-06-25 DIAGNOSIS — E87.20 ACIDOSIS, UNSPECIFIED: ICD-10-CM

## 2024-06-25 DIAGNOSIS — E83.42 HYPOMAGNESEMIA: ICD-10-CM

## 2024-08-24 ENCOUNTER — INPATIENT (INPATIENT)
Facility: HOSPITAL | Age: 29
LOS: 3 days | Discharge: HOME | End: 2024-08-28
Attending: STUDENT IN AN ORGANIZED HEALTH CARE EDUCATION/TRAINING PROGRAM | Admitting: STUDENT IN AN ORGANIZED HEALTH CARE EDUCATION/TRAINING PROGRAM
Payer: SELF-PAY

## 2024-08-24 VITALS
RESPIRATION RATE: 17 BRPM | WEIGHT: 138.01 LBS | OXYGEN SATURATION: 98 % | HEIGHT: 62 IN | HEART RATE: 93 BPM | DIASTOLIC BLOOD PRESSURE: 100 MMHG | SYSTOLIC BLOOD PRESSURE: 141 MMHG | TEMPERATURE: 99 F

## 2024-08-24 DIAGNOSIS — Z94.5 SKIN TRANSPLANT STATUS: Chronic | ICD-10-CM

## 2024-08-24 LAB
ALBUMIN SERPL ELPH-MCNC: 3.1 G/DL — LOW (ref 3.3–5)
ALP SERPL-CCNC: 118 U/L — SIGNIFICANT CHANGE UP (ref 40–120)
ALT FLD-CCNC: 18 U/L — SIGNIFICANT CHANGE UP (ref 12–78)
ANION GAP SERPL CALC-SCNC: 13 MMOL/L — SIGNIFICANT CHANGE UP (ref 5–17)
AST SERPL-CCNC: 37 U/L — SIGNIFICANT CHANGE UP (ref 15–37)
BASOPHILS # BLD AUTO: 0 K/UL — SIGNIFICANT CHANGE UP (ref 0–0.2)
BASOPHILS NFR BLD AUTO: 0 % — SIGNIFICANT CHANGE UP (ref 0–2)
BILIRUB SERPL-MCNC: 0.7 MG/DL — SIGNIFICANT CHANGE UP (ref 0.2–1.2)
BUN SERPL-MCNC: 6 MG/DL — LOW (ref 7–23)
CALCIUM SERPL-MCNC: 9.4 MG/DL — SIGNIFICANT CHANGE UP (ref 8.5–10.1)
CHLORIDE SERPL-SCNC: 99 MMOL/L — SIGNIFICANT CHANGE UP (ref 96–108)
CK SERPL-CCNC: 527 U/L — HIGH (ref 26–192)
CO2 SERPL-SCNC: 22 MMOL/L — SIGNIFICANT CHANGE UP (ref 22–31)
CREAT SERPL-MCNC: 0.66 MG/DL — SIGNIFICANT CHANGE UP (ref 0.5–1.3)
EGFR: 120 ML/MIN/1.73M2 — SIGNIFICANT CHANGE UP
EOSINOPHIL # BLD AUTO: 0.9 K/UL — HIGH (ref 0–0.5)
EOSINOPHIL NFR BLD AUTO: 8 % — HIGH (ref 0–6)
GLUCOSE SERPL-MCNC: 101 MG/DL — HIGH (ref 70–99)
HCT VFR BLD CALC: 28.2 % — LOW (ref 34.5–45)
HGB BLD-MCNC: 9 G/DL — LOW (ref 11.5–15.5)
LYMPHOCYTES # BLD AUTO: 0.23 K/UL — LOW (ref 1–3.3)
LYMPHOCYTES # BLD AUTO: 2 % — LOW (ref 13–44)
MAGNESIUM SERPL-MCNC: 1.2 MG/DL — LOW (ref 1.6–2.6)
MCHC RBC-ENTMCNC: 25.1 PG — LOW (ref 27–34)
MCHC RBC-ENTMCNC: 31.9 G/DL — LOW (ref 32–36)
MCV RBC AUTO: 78.8 FL — LOW (ref 80–100)
MONOCYTES # BLD AUTO: 0.68 K/UL — SIGNIFICANT CHANGE UP (ref 0–0.9)
MONOCYTES NFR BLD AUTO: 6 % — SIGNIFICANT CHANGE UP (ref 2–14)
NEUTROPHILS # BLD AUTO: 8.55 K/UL — HIGH (ref 1.8–7.4)
NEUTROPHILS NFR BLD AUTO: 76 % — SIGNIFICANT CHANGE UP (ref 43–77)
NRBC # BLD: SIGNIFICANT CHANGE UP /100 WBCS (ref 0–0)
PLATELET # BLD AUTO: 379 K/UL — SIGNIFICANT CHANGE UP (ref 150–400)
POTASSIUM SERPL-MCNC: 3.7 MMOL/L — SIGNIFICANT CHANGE UP (ref 3.5–5.3)
POTASSIUM SERPL-SCNC: 3.7 MMOL/L — SIGNIFICANT CHANGE UP (ref 3.5–5.3)
PROT SERPL-MCNC: 9 GM/DL — HIGH (ref 6–8.3)
RBC # BLD: 3.58 M/UL — LOW (ref 3.8–5.2)
RBC # FLD: 20.3 % — HIGH (ref 10.3–14.5)
SODIUM SERPL-SCNC: 134 MMOL/L — LOW (ref 135–145)
WBC # BLD: 11.25 K/UL — HIGH (ref 3.8–10.5)
WBC # FLD AUTO: 11.25 K/UL — HIGH (ref 3.8–10.5)

## 2024-08-24 PROCEDURE — 99285 EMERGENCY DEPT VISIT HI MDM: CPT

## 2024-08-24 PROCEDURE — 70450 CT HEAD/BRAIN W/O DYE: CPT | Mod: 26,MC

## 2024-08-24 PROCEDURE — 93010 ELECTROCARDIOGRAM REPORT: CPT

## 2024-08-24 RX ORDER — DIAZEPAM 10 MG
10 TABLET ORAL
Refills: 0 | Status: DISCONTINUED | OUTPATIENT
Start: 2024-08-24 | End: 2024-08-28

## 2024-08-24 RX ORDER — SODIUM CHLORIDE 9 MG/ML
1000 INJECTION INTRAMUSCULAR; INTRAVENOUS; SUBCUTANEOUS ONCE
Refills: 0 | Status: COMPLETED | OUTPATIENT
Start: 2024-08-24 | End: 2024-08-24

## 2024-08-24 RX ORDER — LEVETIRACETAM 1000 MG/1
1000 TABLET ORAL ONCE
Refills: 0 | Status: COMPLETED | OUTPATIENT
Start: 2024-08-24 | End: 2024-08-24

## 2024-08-24 RX ORDER — ACETAMINOPHEN 325 MG/1
1000 TABLET ORAL ONCE
Refills: 0 | Status: COMPLETED | OUTPATIENT
Start: 2024-08-24 | End: 2024-08-24

## 2024-08-24 RX ADMIN — LEVETIRACETAM 400 MILLIGRAM(S): 1000 TABLET ORAL at 20:15

## 2024-08-24 RX ADMIN — ACETAMINOPHEN 400 MILLIGRAM(S): 325 TABLET ORAL at 22:22

## 2024-08-24 RX ADMIN — Medication 10 MILLIGRAM(S): at 21:25

## 2024-08-24 RX ADMIN — LEVETIRACETAM 1000 MILLIGRAM(S): 1000 TABLET ORAL at 20:30

## 2024-08-24 RX ADMIN — SODIUM CHLORIDE 1000 MILLILITER(S): 9 INJECTION INTRAMUSCULAR; INTRAVENOUS; SUBCUTANEOUS at 22:21

## 2024-08-24 RX ADMIN — ACETAMINOPHEN 1000 MILLIGRAM(S): 325 TABLET ORAL at 23:22

## 2024-08-24 RX ADMIN — SODIUM CHLORIDE 1000 MILLILITER(S): 9 INJECTION INTRAMUSCULAR; INTRAVENOUS; SUBCUTANEOUS at 23:22

## 2024-08-24 NOTE — ED PROVIDER NOTE - CLINICAL SUMMARY MEDICAL DECISION MAKING FREE TEXT BOX
29-year-old female pmhx of seizures on Keppra comes to ED  status post seizure. Started randomly, witnessed by roommate who mentioned patient had a tonic-clonic seizure for 5 minutes.  EMS was called.  Patient did not take the medication for known history of seizures today.  Missed a Keppra 1000 mg dose, last dose was taken last night.  Their pain/symptom is moderate, constant, mediated with rest. Reports being tired otherwise ROS negative.    General: non-toxic, NAD   HEENT: NCAT, PERRL   Cardiac: RRR, no murmurs, 2+ peripheral pulses   Chest: CTA   Abdomen: soft, non-distended, bowel sounds present, no ttp, no rebound or guarding   Extremities: no peripheral edema, calf tenderness, or leg size discrepancies   Skin: no rashes   Neuro: AAOx3, 5+motor, sensory grossly intact   Psych: mood and affect appropriate    Impression: 29-year-old female pmhx of seizures on Keppra comes to ED  status post seizure. Their symptoms and exam findings are concerning for seizure secondary to missed medication dose.    Ordered labs, medications for diagnosis, management, and treatment. 29-year-old female pmhx of seizures on Keppra, ETOH withdrawal comes to ED  status post seizure. Started randomly, witnessed by roommate who mentioned patient had a tonic-clonic seizure for 5 minutes.  EMS was called.  Patient did not take the medication for known history of seizures today.  Missed a Keppra 1000 mg dose, last dose was taken last night.  Their pain/symptom is moderate, constant, mediated with rest. Reports being tired otherwise ROS negative.    General: tremulous  HEENT: NCAT, PERRL   Cardiac: RRR, no murmurs, 2+ peripheral pulses   Chest: CTAB  Abdomen: soft, non-distended, bowel sounds present, no ttp, no rebound or guarding   Extremities: no peripheral edema, calf tenderness, or leg size discrepancies   Skin: no rashes   Neuro: AAOx3, 5+motor, sensory grossly intact   Psych: anxious mood and affect appropriate    Initial CIWA of 14    Impression: 29-year-old female pmhx of seizures on Keppra, ETOH withdrawal comes to ED  status post seizure. Their symptoms and exam findings are concerning for seizure secondary to missed medication dose, Alcohol withdrawal.    Ordered labs, medications for diagnosis, management, and treatment.

## 2024-08-24 NOTE — ED ADULT TRIAGE NOTE - CHIEF COMPLAINT QUOTE
BIBA, witness seizure, on floor, whole body shaking, unknown time lasting.  takes Keppra 500mg  2xdaily, last dose 8/23, PM. .  #20guage L-forearm.  pt c/o headache at this time.  pt has had head congestion since yesterday.  pt aaox4 in triage.

## 2024-08-24 NOTE — ED ADULT NURSE REASSESSMENT NOTE - NS ED NURSE REASSESS COMMENT FT1
Covering for primary RN Kwadwo . Pt VSS nad noted at this time. safety maintained. Pt updated on POC at this time. Pt remains on CM. IV intact

## 2024-08-24 NOTE — ED ADULT NURSE NOTE - NSFALLRISKINTERV_ED_ALL_ED
Assistance OOB with selected safe patient handling equipment if applicable/Assistance with ambulation/Communicate fall risk and risk factors to all staff, patient, and family/Monitor gait and stability/Monitor for mental status changes and reorient to person, place, and time, as needed/Provide visual cue: yellow wristband, yellow gown, etc/Reinforce activity limits and safety measures with patient and family/Toileting schedule using arm’s reach rule for commode and bathroom/Use of alarms - bed, stretcher, chair and/or video monitoring/Call bell, personal items and telephone in reach/Instruct patient to call for assistance before getting out of bed/chair/stretcher/Non-slip footwear applied when patient is off stretcher/[place] to call system/Physically safe environment - no spills, clutter or unnecessary equipment/Purposeful Proactive Rounding/Room/bathroom lighting operational, light cord in reach

## 2024-08-24 NOTE — ED ADULT TRIAGE NOTE - PAIN RATING/NUMBER SCALE (0-10): ACTIVITY
Discontinued in July. [facility] previously advised.[facility]  
Second refill request:  Spironolactone 25 mg tablets, 1 tablet taken daily. Quantity: 90  
8 (severe pain)

## 2024-08-25 DIAGNOSIS — F10.239 ALCOHOL DEPENDENCE WITH WITHDRAWAL, UNSPECIFIED: ICD-10-CM

## 2024-08-25 DIAGNOSIS — E83.42 HYPOMAGNESEMIA: ICD-10-CM

## 2024-08-25 DIAGNOSIS — G40.909 EPILEPSY, UNSPECIFIED, NOT INTRACTABLE, WITHOUT STATUS EPILEPTICUS: ICD-10-CM

## 2024-08-25 DIAGNOSIS — E83.39 OTHER DISORDERS OF PHOSPHORUS METABOLISM: ICD-10-CM

## 2024-08-25 PROCEDURE — 99222 1ST HOSP IP/OBS MODERATE 55: CPT

## 2024-08-25 RX ORDER — ZONISAMIDE 100 MG/1
200 CAPSULE ORAL AT BEDTIME
Refills: 0 | Status: DISCONTINUED | OUTPATIENT
Start: 2024-08-25 | End: 2024-08-25

## 2024-08-25 RX ORDER — SODIUM CHLORIDE 9 MG/ML
1000 INJECTION INTRAMUSCULAR; INTRAVENOUS; SUBCUTANEOUS
Refills: 0 | Status: DISCONTINUED | OUTPATIENT
Start: 2024-08-25 | End: 2024-08-28

## 2024-08-25 RX ORDER — ACETAMINOPHEN 325 MG/1
650 TABLET ORAL EVERY 6 HOURS
Refills: 0 | Status: DISCONTINUED | OUTPATIENT
Start: 2024-08-25 | End: 2024-08-28

## 2024-08-25 RX ORDER — IBUPROFEN 600 MG
200 TABLET ORAL ONCE
Refills: 0 | Status: COMPLETED | OUTPATIENT
Start: 2024-08-25 | End: 2024-08-25

## 2024-08-25 RX ORDER — ONDANSETRON 2 MG/ML
4 INJECTION, SOLUTION INTRAMUSCULAR; INTRAVENOUS EVERY 6 HOURS
Refills: 0 | Status: DISCONTINUED | OUTPATIENT
Start: 2024-08-25 | End: 2024-08-28

## 2024-08-25 RX ORDER — THIAMINE HCL 250 MG
100 TABLET ORAL ONCE
Refills: 0 | Status: COMPLETED | OUTPATIENT
Start: 2024-08-25 | End: 2024-08-25

## 2024-08-25 RX ORDER — LEVETIRACETAM 1000 MG/1
1500 TABLET ORAL
Refills: 0 | Status: DISCONTINUED | OUTPATIENT
Start: 2024-08-25 | End: 2024-08-25

## 2024-08-25 RX ORDER — SODIUM PHOSPHATE, DIBASIC, ANHYDROUS, POTASSIUM PHOSPHATE, MONOBASIC, AND SODIUM PHOSPHATE, MONOBASIC, MONOHYDRATE 852; 155; 130 MG/1; MG/1; MG/1
1 TABLET, COATED ORAL
Refills: 0 | Status: COMPLETED | OUTPATIENT
Start: 2024-08-25 | End: 2024-08-25

## 2024-08-25 RX ORDER — FOLIC ACID 1 MG
1 TABLET ORAL DAILY
Refills: 0 | Status: DISCONTINUED | OUTPATIENT
Start: 2024-08-25 | End: 2024-08-28

## 2024-08-25 RX ORDER — THIAMINE HCL 250 MG
100 TABLET ORAL DAILY
Refills: 0 | Status: COMPLETED | OUTPATIENT
Start: 2024-08-25 | End: 2024-08-27

## 2024-08-25 RX ORDER — ZONISAMIDE 100 MG/1
200 CAPSULE ORAL AT BEDTIME
Refills: 0 | Status: DISCONTINUED | OUTPATIENT
Start: 2024-08-25 | End: 2024-08-26

## 2024-08-25 RX ORDER — PANTOPRAZOLE SODIUM 40 MG
40 TABLET, DELAYED RELEASE (ENTERIC COATED) ORAL
Refills: 0 | Status: DISCONTINUED | OUTPATIENT
Start: 2024-08-25 | End: 2024-08-28

## 2024-08-25 RX ORDER — LEVETIRACETAM 1000 MG/1
750 TABLET ORAL
Refills: 0 | Status: DISCONTINUED | OUTPATIENT
Start: 2024-08-25 | End: 2024-08-27

## 2024-08-25 RX ADMIN — ONDANSETRON 4 MILLIGRAM(S): 2 INJECTION, SOLUTION INTRAMUSCULAR; INTRAVENOUS at 02:36

## 2024-08-25 RX ADMIN — SODIUM PHOSPHATE, DIBASIC, ANHYDROUS, POTASSIUM PHOSPHATE, MONOBASIC, AND SODIUM PHOSPHATE, MONOBASIC, MONOHYDRATE 1 PACKET(S): 852; 155; 130 TABLET, COATED ORAL at 06:02

## 2024-08-25 RX ADMIN — Medication 25 GRAM(S): at 01:22

## 2024-08-25 RX ADMIN — LEVETIRACETAM 1500 MILLIGRAM(S): 1000 TABLET ORAL at 06:01

## 2024-08-25 RX ADMIN — SODIUM CHLORIDE 125 MILLILITER(S): 9 INJECTION INTRAMUSCULAR; INTRAVENOUS; SUBCUTANEOUS at 11:55

## 2024-08-25 RX ADMIN — ACETAMINOPHEN 650 MILLIGRAM(S): 325 TABLET ORAL at 04:01

## 2024-08-25 RX ADMIN — ACETAMINOPHEN 650 MILLIGRAM(S): 325 TABLET ORAL at 20:56

## 2024-08-25 RX ADMIN — ACETAMINOPHEN 650 MILLIGRAM(S): 325 TABLET ORAL at 02:59

## 2024-08-25 RX ADMIN — Medication 100 MILLIGRAM(S): at 06:20

## 2024-08-25 RX ADMIN — ONDANSETRON 4 MILLIGRAM(S): 2 INJECTION, SOLUTION INTRAMUSCULAR; INTRAVENOUS at 20:04

## 2024-08-25 RX ADMIN — Medication 1 MILLIGRAM(S): at 11:55

## 2024-08-25 RX ADMIN — SODIUM PHOSPHATE, DIBASIC, ANHYDROUS, POTASSIUM PHOSPHATE, MONOBASIC, AND SODIUM PHOSPHATE, MONOBASIC, MONOHYDRATE 1 PACKET(S): 852; 155; 130 TABLET, COATED ORAL at 03:01

## 2024-08-25 RX ADMIN — Medication 40 MILLIGRAM(S): at 06:01

## 2024-08-25 RX ADMIN — SODIUM PHOSPHATE, DIBASIC, ANHYDROUS, POTASSIUM PHOSPHATE, MONOBASIC, AND SODIUM PHOSPHATE, MONOBASIC, MONOHYDRATE 1 PACKET(S): 852; 155; 130 TABLET, COATED ORAL at 09:09

## 2024-08-25 RX ADMIN — Medication 100 MILLIGRAM(S): at 11:55

## 2024-08-25 RX ADMIN — Medication 200 MILLIGRAM(S): at 07:01

## 2024-08-25 RX ADMIN — Medication 200 MILLIGRAM(S): at 23:49

## 2024-08-25 RX ADMIN — ACETAMINOPHEN 650 MILLIGRAM(S): 325 TABLET ORAL at 21:56

## 2024-08-25 RX ADMIN — Medication 1 TABLET(S): at 11:55

## 2024-08-25 RX ADMIN — LEVETIRACETAM 750 MILLIGRAM(S): 1000 TABLET ORAL at 20:03

## 2024-08-25 RX ADMIN — ACETAMINOPHEN 650 MILLIGRAM(S): 325 TABLET ORAL at 10:10

## 2024-08-25 RX ADMIN — ACETAMINOPHEN 650 MILLIGRAM(S): 325 TABLET ORAL at 09:10

## 2024-08-25 RX ADMIN — ONDANSETRON 4 MILLIGRAM(S): 2 INJECTION, SOLUTION INTRAMUSCULAR; INTRAVENOUS at 11:55

## 2024-08-25 RX ADMIN — ZONISAMIDE 200 MILLIGRAM(S): 100 CAPSULE ORAL at 22:04

## 2024-08-25 RX ADMIN — Medication 200 MILLIGRAM(S): at 06:01

## 2024-08-25 NOTE — PATIENT PROFILE ADULT - FALL HARM RISK - HARM RISK INTERVENTIONS
Assistance with ambulation/Assistance OOB with selected safe patient handling equipment/Communicate Risk of Fall with Harm to all staff/Discuss with provider need for PT consult/Monitor for mental status changes/Monitor gait and stability/Reinforce activity limits and safety measures with patient and family/Tailored Fall Risk Interventions/Toileting schedule using arm’s reach rule for commode and bathroom/Use of alarms - bed, chair and/or voice tab/Visual Cue: Yellow wristband and red socks/Bed in lowest position, wheels locked, appropriate side rails in place/Call bell, personal items and telephone in reach/Instruct patient to call for assistance before getting out of bed or chair/Non-slip footwear when patient is out of bed/[place] to call system/Physically safe environment - no spills, clutter or unnecessary equipment/Purposeful Proactive Rounding/Room/bathroom lighting operational, light cord in reach

## 2024-08-25 NOTE — H&P ADULT - ASSESSMENT
29-year-old female pmhx of seizures on Keppra, ETOH withdrawal comes to ED  status post seizure. pt non complaint with medication. Also noted to be in ETOH withdrawal. so possible etoh seizure

## 2024-08-25 NOTE — PROGRESS NOTE ADULT - SUBJECTIVE AND OBJECTIVE BOX
Patient is a 29y old  Female who presents with a chief complaint of Seizures, ETOH withdrawal. (25 Aug 2024 02:19)      INTERVAL HPI/OVERNIGHT EVENTS: none     MEDICATIONS  (STANDING):  folic acid 1 milliGRAM(s) Oral daily  levETIRAcetam 750 milliGRAM(s) Oral two times a day  multivitamin 1 Tablet(s) Oral daily  ondansetron Injectable 4 milliGRAM(s) IV Push every 6 hours  pantoprazole    Tablet 40 milliGRAM(s) Oral before breakfast  sodium chloride 0.9%. 1000 milliLiter(s) (125 mL/Hr) IV Continuous <Continuous>  thiamine 100 milliGRAM(s) Oral daily  zonisamide 200 milliGRAM(s) Oral at bedtime    MEDICATIONS  (PRN):  acetaminophen     Tablet .. 650 milliGRAM(s) Oral every 6 hours PRN Temp greater or equal to 38C (100.4F), Mild Pain (1 - 3), Moderate Pain (4 - 6)  diazepam    Tablet 10 milliGRAM(s) Oral every 2 hours PRN CIWA-Ar score increase by 2 points and a total score of 7 or less  diazepam  Injectable 10 milliGRAM(s) IV Push every 1 hour PRN CIWA-Ar score of 8 or Greater      Allergies    No Known Allergies    Intolerances        REVIEW OF SYSTEMS:  CONSTITUTIONAL: No fever, weight loss  EYES: No eye pain, visual disturbances, or discharge  ENMT:  No difficulty hearing, tinnitus, vertigo; No sinus or throat pain  RESPIRATORY: No cough, wheezing, chills or hemoptysis; No shortness of breath  CARDIOVASCULAR: No chest pain, palpitations, dizziness, or leg swelling  GASTROINTESTINAL: No abdominal or epigastric pain. No nausea, vomiting, or hematemesis; No diarrhea or constipation. No melena or hematochezia.  GENITOURINARY: No dysuria, frequency, hematuria, or incontinence  NEUROLOGICAL: No headaches, memory loss, loss of strength, numbness, or tremors  SKIN: No itching, burning, rashes, or lesions   MUSCULOSKELETAL: No joint pain or swelling; No muscle, back, or extremity pain  PSYCHIATRIC: No depression, anxiety, mood swings, or difficulty sleeping  HEME/LYMPH: No easy bruising, or bleeding gums      Vital Signs Last 24 Hrs  T(C): 36.7 (25 Aug 2024 05:25), Max: 37.2 (24 Aug 2024 19:46)  T(F): 98.1 (25 Aug 2024 05:25), Max: 98.9 (24 Aug 2024 19:46)  HR: 73 (25 Aug 2024 05:25) (73 - 93)  BP: 131/87 (25 Aug 2024 05:25) (131/87 - 145/112)  BP(mean): --  RR: 19 (25 Aug 2024 05:25) (17 - 32)  SpO2: 99% (25 Aug 2024 05:25) (98% - 100%)    Parameters below as of 25 Aug 2024 02:25  Patient On (Oxygen Delivery Method): room air        PHYSICAL EXAM:  GENERAL: NAD  HEAD:  Atraumatic, Normocephalic  EYES: EOMI, PERRLA, conjunctiva and sclera clear  ENMT: No tonsillar erythema, exudates, or enlargement;   NECK: Supple, Normal thyroid  NERVOUS SYSTEM:  Alert & Oriented X3, Good concentration; Motor Strength 5/5 B/L upper and lower extremities; DTRs 2+ intact and symmetric  CHEST/LUNG: CTABL; No rales, rhonchi, wheezing, or rubs  HEART: Regular rate and rhythm; No murmurs, rubs, or gallops  ABDOMEN: Soft, Nontender, Nondistended; Bowel sounds present  EXTREMITIES:  2+ Peripheral Pulses, No clubbing, cyanosis, or edema  LYMPH: No lymphadenopathy noted  SKIN: No rashes or lesions    LABS:                        9.0    11.25 )-----------( [id number]      ( 24 Aug 2024 20:35 )             28.2     08-24    134<L>  |  99  |  6<L>  ----------------------------<  101<H>  3.7   |  22  |  0.66    Ca    9.4      24 Aug 2024 20:35  Phos  1.1     08-24  Mg     1.2     08-24    TPro  9.0<H>  /  Alb  3.1<L>  /  TBili  0.7  /  DBili  x   /  AST  37  /  ALT  18  /  AlkPhos  118  08-24      Urinalysis Basic - ( 24 Aug 2024 20:35 )    Color: x / Appearance: x / SG: x / pH: x  Gluc: 101 mg/dL / Ketone: x  / Bili: x / Urobili: x   Blood: x / Protein: x / Nitrite: x   Leuk Esterase: x / RBC: x / WBC x   Sq Epi: x / Non Sq Epi: x / Bacteria: x      CAPILLARY BLOOD GLUCOSE      POCT Blood Glucose.: 93 mg/dL (24 Aug 2024 19:53)      RADIOLOGY & ADDITIONAL TESTS:    Imaging Personally Reviewed:  [ ] YES  [ ] NO    Consultant(s) Notes Reviewed:  [ ] YES  [ ] NO    Care Discussed with Consultants/Other Providers [ ] YES  [ ] NO

## 2024-08-25 NOTE — PATIENT PROFILE ADULT - FUNCTIONAL ASSESSMENT - BASIC MOBILITY 1.
3 = A little assistance Double O-Z Plasty Text: The defect edges were debeveled with a #15 scalpel blade.  Given the location of the defect, shape of the defect and the proximity to free margins a Double O-Z plasty (double transposition flap) was deemed most appropriate.  Using a sterile surgical marker, the appropriate transposition flaps were drawn incorporating the defect and placing the expected incisions within the relaxed skin tension lines where possible. The area thus outlined was incised deep to adipose tissue with a #15 scalpel blade.  The skin margins were undermined to an appropriate distance in all directions utilizing iris scissors.  Hemostasis was achieved with electrocautery.  The flaps were then transposed into place, one clockwise and the other counterclockwise, and anchored with interrupted buried subcutaneous sutures.

## 2024-08-25 NOTE — H&P ADULT - NSHPPHYSICALEXAM_GEN_ALL_CORE
VITALS:   T(C): 36.7 (08-25-24 @ 05:25), Max: 37.2 (08-24-24 @ 19:46)  HR: 73 (08-25-24 @ 05:25) (73 - 93)  BP: 131/87 (08-25-24 @ 05:25) (131/87 - 145/112)  RR: 19 (08-25-24 @ 05:25) (17 - 32)  SpO2: 99% (08-25-24 @ 05:25) (98% - 100%)    GENERAL: anxious sitting up in bed  HEAD:  Atraumatic, Normocephalic  EYES:  conjunctiva and sclera clear  ENT: Moist mucous membranes, tongue fasiculations noted.   NECK: Supple, No JVD  CHEST/LUNG: Clear to auscultation bilaterally;  Unlabored respirations  HEART: Regular rate and rhythm; tachy   ABDOMEN: BSx4; Soft, nontender, nondistended  EXTREMITIES:  No clubbing, cyanosis, or edema  NERVOUS SYSTEM:  A&Ox3, no focal deficits tremulous   SKIN: No rashes or lesions

## 2024-08-25 NOTE — H&P ADULT - NSHPREVIEWOFSYSTEMS_GEN_ALL_CORE
REVIEW OF SYSTEMS:    CONSTITUTIONAL: No weakness, fevers or chills  EYES/ENT: No visual changes;  No vertigo or throat pain   NECK: No pain or stiffness  RESPIRATORY: No cough, wheezing, hemoptysis; No shortness of breath  CARDIOVASCULAR: No chest pain or palpitations  GASTROINTESTINAL: No abdominal or epigastric pain. No nausea, vomiting, or hematemesis; No diarrhea or constipation. No melena or hematochezia.  GENITOURINARY: No dysuria, frequency or hematuria  NEUROLOGICAL: No numbness or weakness + headache, +seizures   SKIN: No itching, rashes

## 2024-08-25 NOTE — PATIENT PROFILE ADULT - FALL HARM RISK - CONCLUSION
Urogyn eval yesterday frank  Lower abd cramping, is it due to pelvic alignment?  Shaina took it, gave some relief Almost like having a menstruation Fall with Harm Risk

## 2024-08-25 NOTE — ED ADULT NURSE REASSESSMENT NOTE - NS ED NURSE REASSESS COMMENT FT1
per pt, PMD has not addressed her HTN problem, states that PMD wanted to treat Sz problem first. Not currently prescribed BP meds at home

## 2024-08-25 NOTE — H&P ADULT - NSHPADDITIONALINFOADULT_GEN_ALL_CORE
CT with sinusitis but pt no fever no pain no congestion noted. had a headache but was treated with tylenol with resolution.   no indication for abx at this time.

## 2024-08-25 NOTE — H&P ADULT - NSHPLABSRESULTS_GEN_ALL_CORE
=======================================================  Labs:                        9.0    11.25 )-----------( 379      ( 24 Aug 2024 20:35 )             28.2     08-24    134<L>  |  99  |  6<L>  ----------------------------<  101<H>  3.7   |  22  |  0.66    Ca    9.4      24 Aug 2024 20:35  Phos  1.1     08-24  Mg     1.2     08-24    TPro  9.0<H>  /  Alb  3.1<L>  /  TBili  0.7  /  DBili  x   /  AST  37  /  ALT  18  /  AlkPhos  118  08-24        Creatinine: 0.66 mg/dL (08-24-24 @ 20:35)            WBC Count: 11.25 K/uL (08-24-24 @ 20:35)        Alkaline Phosphatase: 118 U/L (08-24-24 @ 20:35)  Alanine Aminotransferase (ALT/SGPT): 18 U/L (08-24-24 @ 20:35)  Aspartate Aminotransferase (AST/SGOT): 37 U/L (08-24-24 @ 20:35)  Bilirubin Total: 0.7 mg/dL (08-24-24 @ 20:35)        < from: CT Head No Cont (08.24.24 @ 22:41) >      There is no acute intra-axial or extra-axial hemorrhage. There is no mass   effect or shift of the midline. The basal cisterns are not effaced. The   pattern of cerebral and cerebellar volume loss and lateral and third   ventriculomegaly is unchanged. Gray-white matter differentiation is   preserved. There is no CT evidence of an acute vascular territorial   infarct.    The regional soft tissues and osseous structures are unremarkable. There   is near complete opacification of the left frontal sinus, left ethmoid   air cells, and visualized left maxillary sinus and left sphenoid sinus   mucosal thickening which is new compared with prior exam from 6/13/2024.   The tympanic and mastoid cavities are well aerated.    IMPRESSION:  No acute intracranial hemorrhage or mass effect. Pattern of cerebral and   cerebellar volume loss and lateral and third ventriculomegaly, unchanged.    Near complete opacification of the left frontal sinus, left ethmoid air   cells, and visualized left maxillary sinus and left sphenoid sinus   mucosal thickening which is new compared with prior exam from 6/13/2024.   Correlate for signs of acute sinusitis.          < end of copied text >

## 2024-08-25 NOTE — PATIENT PROFILE ADULT - FUNCTIONAL ASSESSMENT - BASIC MOBILITY 6.
3-calculated by average/Not able to assess (calculate score using [facility] averaging method)

## 2024-08-25 NOTE — H&P ADULT - HISTORY OF PRESENT ILLNESS
29-year-old female pmhx of seizures on Keppra, ETOH withdrawal comes to ED  status post seizure. Started randomly, witnessed by roommate who mentioned patient had a tonic-clonic seizure for 5 minutes.  EMS was called.  Patient did not take the medication for known history of seizures today.  Missed a Keppra 1000 mg dose, last dose was taken last night.  Their pain/symptom is moderate, constant, mediated with rest. Reports being tired otherwise     Pt seems confused about actual dose as last time was dc on 1500 BID shes taking 1000 BID. Also is not taking zonisamide ordered last time here.   Pt denies any drinking in the last month. says she does not drink but does get withdrawals. Denies any drug use. In ED pt with symptoms concerning for etoh withdrawal and treated as such.

## 2024-08-26 LAB
ANION GAP SERPL CALC-SCNC: 9 MMOL/L — SIGNIFICANT CHANGE UP (ref 5–17)
BUN SERPL-MCNC: 6 MG/DL — LOW (ref 7–23)
CALCIUM SERPL-MCNC: 9.2 MG/DL — SIGNIFICANT CHANGE UP (ref 8.5–10.1)
CHLORIDE SERPL-SCNC: 98 MMOL/L — SIGNIFICANT CHANGE UP (ref 96–108)
CK SERPL-CCNC: 387 U/L — HIGH (ref 26–192)
CO2 SERPL-SCNC: 26 MMOL/L — SIGNIFICANT CHANGE UP (ref 22–31)
CREAT SERPL-MCNC: 0.67 MG/DL — SIGNIFICANT CHANGE UP (ref 0.5–1.3)
EGFR: 120 ML/MIN/1.73M2 — SIGNIFICANT CHANGE UP
GLUCOSE SERPL-MCNC: 159 MG/DL — HIGH (ref 70–99)
MAGNESIUM SERPL-MCNC: 1.5 MG/DL — LOW (ref 1.6–2.6)
PHOSPHATE SERPL-MCNC: 2.4 MG/DL — LOW (ref 2.5–4.5)
POTASSIUM SERPL-MCNC: 3.1 MMOL/L — LOW (ref 3.5–5.3)
POTASSIUM SERPL-SCNC: 3.1 MMOL/L — LOW (ref 3.5–5.3)
SODIUM SERPL-SCNC: 133 MMOL/L — LOW (ref 135–145)

## 2024-08-26 PROCEDURE — 99239 HOSP IP/OBS DSCHRG MGMT >30: CPT

## 2024-08-26 RX ORDER — POTASSIUM PHOSPHATE 236; 224 MG/ML; MG/ML
30 INJECTION, SOLUTION INTRAVENOUS ONCE
Refills: 0 | Status: COMPLETED | OUTPATIENT
Start: 2024-08-26 | End: 2024-08-26

## 2024-08-26 RX ORDER — BUTALBITAL, ACETAMINOPHEN AND CAFFEINE 50; 325; 40 MG/1; MG/1; MG/1
1 TABLET ORAL ONCE
Refills: 0 | Status: COMPLETED | OUTPATIENT
Start: 2024-08-26 | End: 2024-08-26

## 2024-08-26 RX ORDER — KETOROLAC TROMETHAMINE 30 MG/ML
30 INJECTION, SOLUTION INTRAMUSCULAR ONCE
Refills: 0 | Status: DISCONTINUED | OUTPATIENT
Start: 2024-08-26 | End: 2024-08-26

## 2024-08-26 RX ADMIN — Medication 1 MILLIGRAM(S): at 11:32

## 2024-08-26 RX ADMIN — LEVETIRACETAM 750 MILLIGRAM(S): 1000 TABLET ORAL at 17:48

## 2024-08-26 RX ADMIN — Medication 40 MILLIGRAM(S): at 08:53

## 2024-08-26 RX ADMIN — LEVETIRACETAM 750 MILLIGRAM(S): 1000 TABLET ORAL at 05:24

## 2024-08-26 RX ADMIN — Medication 1 TABLET(S): at 11:32

## 2024-08-26 RX ADMIN — POTASSIUM PHOSPHATE 83.33 MILLIMOLE(S): 236; 224 INJECTION, SOLUTION INTRAVENOUS at 14:14

## 2024-08-26 RX ADMIN — ACETAMINOPHEN 650 MILLIGRAM(S): 325 TABLET ORAL at 10:15

## 2024-08-26 RX ADMIN — ONDANSETRON 4 MILLIGRAM(S): 2 INJECTION, SOLUTION INTRAMUSCULAR; INTRAVENOUS at 11:32

## 2024-08-26 RX ADMIN — ACETAMINOPHEN 650 MILLIGRAM(S): 325 TABLET ORAL at 09:48

## 2024-08-26 RX ADMIN — Medication 100 MILLIGRAM(S): at 11:32

## 2024-08-26 RX ADMIN — Medication 200 MILLIGRAM(S): at 00:48

## 2024-08-26 RX ADMIN — BUTALBITAL, ACETAMINOPHEN AND CAFFEINE 1 TABLET(S): 50; 325; 40 TABLET ORAL at 22:10

## 2024-08-26 RX ADMIN — BUTALBITAL, ACETAMINOPHEN AND CAFFEINE 1 TABLET(S): 50; 325; 40 TABLET ORAL at 21:13

## 2024-08-26 RX ADMIN — KETOROLAC TROMETHAMINE 30 MILLIGRAM(S): 30 INJECTION, SOLUTION INTRAMUSCULAR at 11:32

## 2024-08-26 RX ADMIN — Medication 25 GRAM(S): at 10:25

## 2024-08-26 RX ADMIN — KETOROLAC TROMETHAMINE 30 MILLIGRAM(S): 30 INJECTION, SOLUTION INTRAMUSCULAR at 12:41

## 2024-08-26 NOTE — CONSULT NOTE ADULT - ASSESSMENT
29F with seizures (?etoh related) on Keppra, ETOH abuse here s/p witnessed seizure. Hx of med noncompliance  Was previously on  keppra 1500mg BID - states she is taking 1g BID. Alzo on zonisamide  but not  taking  CTh neg  MRI brain from 6/24 neg  Labs with mild leukocytosis, hyopnatremia, low mg, phos  Ck 527  etoh level neg, utox not sent      29F with seizures (?etoh related) on Keppra, ETOH abuse here s/p witnessed seizure. Hx of med noncompliance  Was previously on  keppra 1500mg BID - states she is taking 1g BID. Alzo on zonisamide  but not  taking  CTh neg  MRI brain from 6/24 neg  Labs with mild leukocytosis, hyopnatremia, low mg, phos  Ck 527  etoh level neg, utox not sent   Unclear med compliance - states she has not missed Keppra doses. Is on levitracetam at home, states formulation in house is giving her HA  prior rEEG from June 24 neg    Seizures - unclear if releated to etoh use. As has not had drink in 2 months (per pt) - would not be classified as withdrawl seizures at this point    - stop zonisamide, pt not taking  - cont keppra 1g BID -> was previously taking 500mg BID  - ideally would benefit from prolonged eeg monitoring if continues to have episodes  - etoh cessation counseling  - consider uptitration of keppra vs addition of vimpat if sz still not controlled  Outpatient neuro followup on d.c

## 2024-08-26 NOTE — PROGRESS NOTE ADULT - PROBLEM SELECTOR PLAN 2
reduce Keppra 1500 q12 to 750 bid as the 1000 mg causes sedation and non-compliance   start Zonisamide as per prior admission  check CK
reduce Keppra 1500 q12 to 750 bid as the 1000 mg causes sedation and non-compliance   started Zonisamide as per prior admission  will get neuro input  dc home when cleared by neuro   will give a dose of toradol for HA   check CK

## 2024-08-26 NOTE — PROGRESS NOTE ADULT - PROBLEM SELECTOR PLAN 1
CIWA protocol  no tremors on exam   Valium symptom triggered (not a taper )  MVI  folic acid  thiamine  PPI  zofran
CIWA protocol  no tremors on exam   Valium symptom triggered (not a taper )  MVI  folic acid  thiamine  PPI  zofran
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
## 2024-08-26 NOTE — PROGRESS NOTE ADULT - SUBJECTIVE AND OBJECTIVE BOX
Patient is a 29y old  Female who presents with a chief complaint of Seizures, ETOH withdrawal. (25 Aug 2024 02:19)      INTERVAL HPI/OVERNIGHT EVENTS: c/o HA     MEDICATIONS  (STANDING):  folic acid 1 milliGRAM(s) Oral daily  levETIRAcetam 750 milliGRAM(s) Oral two times a day  multivitamin 1 Tablet(s) Oral daily  ondansetron Injectable 4 milliGRAM(s) IV Push every 6 hours  pantoprazole    Tablet 40 milliGRAM(s) Oral before breakfast  sodium chloride 0.9%. 1000 milliLiter(s) (125 mL/Hr) IV Continuous <Continuous>  thiamine 100 milliGRAM(s) Oral daily  zonisamide 200 milliGRAM(s) Oral at bedtime    MEDICATIONS  (PRN):  acetaminophen     Tablet .. 650 milliGRAM(s) Oral every 6 hours PRN Temp greater or equal to 38C (100.4F), Mild Pain (1 - 3), Moderate Pain (4 - 6)  diazepam    Tablet 10 milliGRAM(s) Oral every 2 hours PRN CIWA-Ar score increase by 2 points and a total score of 7 or less  diazepam  Injectable 10 milliGRAM(s) IV Push every 1 hour PRN CIWA-Ar score of 8 or Greater      Allergies    No Known Allergies    Intolerances        REVIEW OF SYSTEMS:  CONSTITUTIONAL: No fever, weight loss  EYES: No eye pain, visual disturbances, or discharge  ENMT:  No difficulty hearing, tinnitus, vertigo; No sinus or throat pain  RESPIRATORY: No cough, wheezing, chills or hemoptysis; No shortness of breath  CARDIOVASCULAR: No chest pain, palpitations, dizziness, or leg swelling  GASTROINTESTINAL: No abdominal or epigastric pain. No nausea, vomiting, or hematemesis; No diarrhea or constipation. No melena or hematochezia.  GENITOURINARY: No dysuria, frequency, hematuria, or incontinence  NEUROLOGICAL: No headaches, memory loss, loss of strength, numbness, or tremors  SKIN: No itching, burning, rashes, or lesions   MUSCULOSKELETAL: No joint pain or swelling; No muscle, back, or extremity pain  PSYCHIATRIC: No depression, anxiety, mood swings, or difficulty sleeping  HEME/LYMPH: No easy bruising, or bleeding gums      Vital Signs Last 24 Hrs  T(C): 37.2 (26 Aug 2024 05:24), Max: 37.5 (25 Aug 2024 17:12)  T(F): 98.9 (26 Aug 2024 05:24), Max: 99.5 (25 Aug 2024 17:12)  HR: 69 (26 Aug 2024 05:24) (69 - 86)  BP: 103/68 (26 Aug 2024 05:24) (103/68 - 147/99)  BP(mean): --  RR: 16 (26 Aug 2024 05:24) (16 - 18)  SpO2: 99% (26 Aug 2024 05:24) (97% - 99%)    Parameters below as of 26 Aug 2024 05:24  Patient On (Oxygen Delivery Method): room air          PHYSICAL EXAM:  GENERAL: NAD  HEAD:  Atraumatic, Normocephalic  EYES: EOMI, PERRLA, conjunctiva and sclera clear  ENMT: No tonsillar erythema, exudates, or enlargement;   NECK: Supple, Normal thyroid  NERVOUS SYSTEM:  Alert & Oriented X3, Good concentration; Motor Strength 5/5 B/L upper and lower extremities; DTRs 2+ intact and symmetric  CHEST/LUNG: CTABL; No rales, rhonchi, wheezing, or rubs  HEART: Regular rate and rhythm; No murmurs, rubs, or gallops  ABDOMEN: Soft, Nontender, Nondistended; Bowel sounds present  EXTREMITIES:  2+ Peripheral Pulses, No clubbing, cyanosis, or edema  LYMPH: No lymphadenopathy noted  SKIN: No rashes or lesions    LABS:                        9.0    11.25 )-----------( 379      ( 24 Aug 2024 20:35 )             28.2     08-24    134<L>  |  99  |  6<L>  ----------------------------<  101<H>  3.7   |  22  |  0.66    Ca    9.4      24 Aug 2024 20:35  Phos  1.1     08-24  Mg     1.2     08-24    TPro  9.0<H>  /  Alb  3.1<L>  /  TBili  0.7  /  DBili  x   /  AST  37  /  ALT  18  /  AlkPhos  118  08-24      Urinalysis Basic - ( 24 Aug 2024 20:35 )    Color: x / Appearance: x / SG: x / pH: x  Gluc: 101 mg/dL / Ketone: x  / Bili: x / Urobili: x   Blood: x / Protein: x / Nitrite: x   Leuk Esterase: x / RBC: x / WBC x   Sq Epi: x / Non Sq Epi: x / Bacteria: x      CAPILLARY BLOOD GLUCOSE      POCT Blood Glucose.: 93 mg/dL (24 Aug 2024 19:53)      RADIOLOGY & ADDITIONAL TESTS:    Imaging Personally Reviewed:  [ ] YES  [ ] NO    Consultant(s) Notes Reviewed:  [ ] YES  [ ] NO    Care Discussed with Consultants/Other Providers [ ] YES  [ ] NO

## 2024-08-27 LAB
ALBUMIN SERPL ELPH-MCNC: 2.5 G/DL — LOW (ref 3.3–5)
ALP SERPL-CCNC: 109 U/L — SIGNIFICANT CHANGE UP (ref 40–120)
ALT FLD-CCNC: 21 U/L — SIGNIFICANT CHANGE UP (ref 12–78)
ANION GAP SERPL CALC-SCNC: 8 MMOL/L — SIGNIFICANT CHANGE UP (ref 5–17)
AST SERPL-CCNC: 34 U/L — SIGNIFICANT CHANGE UP (ref 15–37)
BILIRUB SERPL-MCNC: 0.3 MG/DL — SIGNIFICANT CHANGE UP (ref 0.2–1.2)
BUN SERPL-MCNC: 4 MG/DL — LOW (ref 7–23)
CALCIUM SERPL-MCNC: 9.3 MG/DL — SIGNIFICANT CHANGE UP (ref 8.5–10.1)
CHLORIDE SERPL-SCNC: 101 MMOL/L — SIGNIFICANT CHANGE UP (ref 96–108)
CO2 SERPL-SCNC: 24 MMOL/L — SIGNIFICANT CHANGE UP (ref 22–31)
CREAT SERPL-MCNC: 0.55 MG/DL — SIGNIFICANT CHANGE UP (ref 0.5–1.3)
EGFR: 126 ML/MIN/1.73M2 — SIGNIFICANT CHANGE UP
GLUCOSE SERPL-MCNC: 101 MG/DL — HIGH (ref 70–99)
POTASSIUM SERPL-MCNC: 3.8 MMOL/L — SIGNIFICANT CHANGE UP (ref 3.5–5.3)
POTASSIUM SERPL-SCNC: 3.8 MMOL/L — SIGNIFICANT CHANGE UP (ref 3.5–5.3)
PROT SERPL-MCNC: 8.1 GM/DL — SIGNIFICANT CHANGE UP (ref 6–8.3)
SODIUM SERPL-SCNC: 133 MMOL/L — LOW (ref 135–145)

## 2024-08-27 PROCEDURE — 99232 SBSQ HOSP IP/OBS MODERATE 35: CPT

## 2024-08-27 RX ORDER — SODIUM PHOSPHATE, DIBASIC, ANHYDROUS, POTASSIUM PHOSPHATE, MONOBASIC, AND SODIUM PHOSPHATE, MONOBASIC, MONOHYDRATE 852; 155; 130 MG/1; MG/1; MG/1
1 TABLET, COATED ORAL ONCE
Refills: 0 | Status: DISCONTINUED | OUTPATIENT
Start: 2024-08-27 | End: 2024-08-27

## 2024-08-27 RX ORDER — IBUPROFEN 600 MG
600 TABLET ORAL ONCE
Refills: 0 | Status: COMPLETED | OUTPATIENT
Start: 2024-08-27 | End: 2024-08-27

## 2024-08-27 RX ORDER — KETOROLAC TROMETHAMINE 30 MG/ML
15 INJECTION, SOLUTION INTRAMUSCULAR EVERY 12 HOURS
Refills: 0 | Status: DISCONTINUED | OUTPATIENT
Start: 2024-08-27 | End: 2024-08-28

## 2024-08-27 RX ORDER — MAGNESIUM OXIDE TAB 400 MG (240 MG ELEMENTAL MG) 400 (240 MG) MG
400 TAB ORAL
Refills: 0 | Status: DISCONTINUED | OUTPATIENT
Start: 2024-08-27 | End: 2024-08-28

## 2024-08-27 RX ORDER — LEVETIRACETAM 1000 MG/1
1000 TABLET ORAL
Refills: 0 | Status: DISCONTINUED | OUTPATIENT
Start: 2024-08-27 | End: 2024-08-28

## 2024-08-27 RX ORDER — POTASSIUM PHOSPHATE 236; 224 MG/ML; MG/ML
30 INJECTION, SOLUTION INTRAVENOUS ONCE
Refills: 0 | Status: COMPLETED | OUTPATIENT
Start: 2024-08-27 | End: 2024-08-27

## 2024-08-27 RX ADMIN — LEVETIRACETAM 750 MILLIGRAM(S): 1000 TABLET ORAL at 07:02

## 2024-08-27 RX ADMIN — LEVETIRACETAM 1000 MILLIGRAM(S): 1000 TABLET ORAL at 17:57

## 2024-08-27 RX ADMIN — Medication 600 MILLIGRAM(S): at 14:07

## 2024-08-27 RX ADMIN — Medication 100 MILLIGRAM(S): at 13:08

## 2024-08-27 RX ADMIN — ACETAMINOPHEN 650 MILLIGRAM(S): 325 TABLET ORAL at 21:50

## 2024-08-27 RX ADMIN — Medication 1 TABLET(S): at 13:08

## 2024-08-27 RX ADMIN — MAGNESIUM OXIDE TAB 400 MG (240 MG ELEMENTAL MG) 400 MILLIGRAM(S): 400 (240 MG) TAB at 17:57

## 2024-08-27 RX ADMIN — ONDANSETRON 4 MILLIGRAM(S): 2 INJECTION, SOLUTION INTRAMUSCULAR; INTRAVENOUS at 01:25

## 2024-08-27 RX ADMIN — Medication 40 MILLIGRAM(S): at 07:02

## 2024-08-27 RX ADMIN — ACETAMINOPHEN 650 MILLIGRAM(S): 325 TABLET ORAL at 20:59

## 2024-08-27 RX ADMIN — Medication 1 MILLIGRAM(S): at 13:08

## 2024-08-27 RX ADMIN — Medication 600 MILLIGRAM(S): at 13:07

## 2024-08-27 NOTE — PROGRESS NOTE ADULT - SUBJECTIVE AND OBJECTIVE BOX
Patient is a 29y old  Female who presents with a chief complaint of Seizures, ETOH withdrawal. (26 Aug 2024 11:27)    INTERVAL HPI/OVERNIGHT EVENTS: Patients seen and examined at bedside this morning. No acute events overnight. Pt reports    MEDICATIONS  (STANDING):  folic acid 1 milliGRAM(s) Oral daily  levETIRAcetam 1000 milliGRAM(s) Oral two times a day  multivitamin 1 Tablet(s) Oral daily  ondansetron Injectable 4 milliGRAM(s) IV Push every 6 hours  pantoprazole    Tablet 40 milliGRAM(s) Oral before breakfast  sodium chloride 0.9%. 1000 milliLiter(s) (125 mL/Hr) IV Continuous <Continuous>  thiamine 100 milliGRAM(s) Oral daily    MEDICATIONS  (PRN):  acetaminophen     Tablet .. 650 milliGRAM(s) Oral every 6 hours PRN Temp greater or equal to 38C (100.4F), Mild Pain (1 - 3), Moderate Pain (4 - 6)  diazepam    Tablet 10 milliGRAM(s) Oral every 2 hours PRN CIWA-Ar score increase by 2 points and a total score of 7 or less  diazepam  Injectable 10 milliGRAM(s) IV Push every 1 hour PRN CIWA-Ar score of 8 or Greater    Allergies    No Known Allergies    Intolerances      REVIEW OF SYSTEMS:  All other systems reviewed and are negative    Vital Signs Last 24 Hrs  T(C): 36.8 (27 Aug 2024 05:11), Max: 36.8 (27 Aug 2024 05:11)  T(F): 98.2 (27 Aug 2024 05:11), Max: 98.2 (27 Aug 2024 05:11)  HR: 90 (27 Aug 2024 05:11) (62 - 90)  BP: 147/96 (27 Aug 2024 05:11) (140/97 - 153/85)  BP(mean): --  RR: 18 (27 Aug 2024 05:11) (18 - 18)  SpO2: 98% (27 Aug 2024 05:11) (97% - 98%)    Parameters below as of 27 Aug 2024 05:11  Patient On (Oxygen Delivery Method): room air      Daily     Daily Weight in k.4 (27 Aug 2024 05:11)  I&O's Summary    26 Aug 2024 07:01  -  27 Aug 2024 07:00  --------------------------------------------------------  IN: 640 mL / OUT: 0 mL / NET: 640 mL    27 Aug 2024 07:01  -  27 Aug 2024 09:24  --------------------------------------------------------  IN: 400 mL / OUT: 0 mL / NET: 400 mL      CAPILLARY BLOOD GLUCOSE        PHYSICAL EXAM:  GENERAL: NAD  HEAD:  Atraumatic, Normocephalic  EYES: EOMI, PERRLA, conjunctiva and sclera clear  ENMT: No tonsillar erythema, exudates, or enlargement;   NECK: Supple, Normal thyroid  NERVOUS SYSTEM:  Alert & Oriented X3, Good concentration; Motor Strength 5/5 B/L upper and lower extremities; DTRs 2+ intact and symmetric  CHEST/LUNG: CTABL; No rales, rhonchi, wheezing, or rubs  HEART: Regular rate and rhythm; No murmurs, rubs, or gallops  ABDOMEN: Soft, Nontender, Nondistended; Bowel sounds present  EXTREMITIES:  2+ Peripheral Pulses, No clubbing, cyanosis, or edema  LYMPH: No lymphadenopathy noted  SKIN: No rashes or lesions      Labs          133<L>  |  98  |  6<L>  ----------------------------<  159<H>  3.1<L>   |  26  |  0.67    Ca    9.2      26 Aug 2024 06:20  Phos  2.4       Mg     1.5                 Urinalysis Basic - ( 26 Aug 2024 06:20 )    Color: x / Appearance: x / SG: x / pH: x  Gluc: 159 mg/dL / Ketone: x  / Bili: x / Urobili: x   Blood: x / Protein: x / Nitrite: x   Leuk Esterase: x / RBC: x / WBC x   Sq Epi: x / Non Sq Epi: x / Bacteria: x                      Radiology and Imaging reviewed. Patient is a 29y old  Female who presents with a chief complaint of Seizures, ETOH withdrawal. (26 Aug 2024 11:27)    INTERVAL HPI/OVERNIGHT EVENTS: Patients seen and examined at bedside this morning. No acute events overnight. Pt reports headache when taking the medications.    MEDICATIONS  (STANDING):  folic acid 1 milliGRAM(s) Oral daily  levETIRAcetam 1000 milliGRAM(s) Oral two times a day  multivitamin 1 Tablet(s) Oral daily  ondansetron Injectable 4 milliGRAM(s) IV Push every 6 hours  pantoprazole    Tablet 40 milliGRAM(s) Oral before breakfast  sodium chloride 0.9%. 1000 milliLiter(s) (125 mL/Hr) IV Continuous <Continuous>  thiamine 100 milliGRAM(s) Oral daily    MEDICATIONS  (PRN):  acetaminophen     Tablet .. 650 milliGRAM(s) Oral every 6 hours PRN Temp greater or equal to 38C (100.4F), Mild Pain (1 - 3), Moderate Pain (4 - 6)  diazepam    Tablet 10 milliGRAM(s) Oral every 2 hours PRN CIWA-Ar score increase by 2 points and a total score of 7 or less  diazepam  Injectable 10 milliGRAM(s) IV Push every 1 hour PRN CIWA-Ar score of 8 or Greater    Allergies    No Known Allergies    Intolerances      REVIEW OF SYSTEMS:  All other systems reviewed and are negative    Vital Signs Last 24 Hrs  T(C): 36.8 (27 Aug 2024 05:11), Max: 36.8 (27 Aug 2024 05:11)  T(F): 98.2 (27 Aug 2024 05:11), Max: 98.2 (27 Aug 2024 05:11)  HR: 90 (27 Aug 2024 05:11) (62 - 90)  BP: 147/96 (27 Aug 2024 05:11) (140/97 - 153/85)  BP(mean): --  RR: 18 (27 Aug 2024 05:11) (18 - 18)  SpO2: 98% (27 Aug 2024 05:11) (97% - 98%)    Parameters below as of 27 Aug 2024 05:11  Patient On (Oxygen Delivery Method): room air      Daily     Daily Weight in k.4 (27 Aug 2024 05:11)  I&O's Summary    26 Aug 2024 07:01  -  27 Aug 2024 07:00  --------------------------------------------------------  IN: 640 mL / OUT: 0 mL / NET: 640 mL    27 Aug 2024 07:01  -  27 Aug 2024 09:24  --------------------------------------------------------  IN: 400 mL / OUT: 0 mL / NET: 400 mL      CAPILLARY BLOOD GLUCOSE        PHYSICAL EXAM:  GENERAL: NAD  HEAD:  Atraumatic, Normocephalic  EYES: EOMI, PERRLA, conjunctiva and sclera clear  ENMT: No tonsillar erythema, exudates, or enlargement;   NECK: Supple, Normal thyroid  NERVOUS SYSTEM:  Alert & Oriented X3, Good concentration; Motor Strength 5/5 B/L upper and lower extremities; DTRs 2+ intact and symmetric  CHEST/LUNG: CTABL; No rales, rhonchi, wheezing, or rubs  HEART: Regular rate and rhythm; No murmurs, rubs, or gallops  ABDOMEN: Soft, Nontender, Nondistended; Bowel sounds present  EXTREMITIES:  2+ Peripheral Pulses, No clubbing, cyanosis, or edema  LYMPH: No lymphadenopathy noted  SKIN: No rashes or lesions      Labs          133<L>  |  98  |  6<L>  ----------------------------<  159<H>  3.1<L>   |  26  |  0.67    Ca    9.2      26 Aug 2024 06:20  Phos  2.4       Mg     1.5                 Urinalysis Basic - ( 26 Aug 2024 06:20 )    Color: x / Appearance: x / SG: x / pH: x  Gluc: 159 mg/dL / Ketone: x  / Bili: x / Urobili: x   Blood: x / Protein: x / Nitrite: x   Leuk Esterase: x / RBC: x / WBC x   Sq Epi: x / Non Sq Epi: x / Bacteria: x                      Radiology and Imaging reviewed.

## 2024-08-27 NOTE — PROGRESS NOTE ADULT - ASSESSMENT
29-year-old female pmhx of seizures on Keppra, ETOH withdrawal comes to ED  status post seizure. pt non complaint with medication. Also noted to be in ETOH withdrawal. so possible etoh seizure           Problem/Plan - 1:  ·  Problem: Alcohol dependence with withdrawal.   ·  Plan: CIWA protocol  no tremors on exam   Valium symptom triggered (not a taper )  MVI  folic acid  thiamine  PPI  zofran.    Problem/Plan - 2:  ·  Problem: Seizure disorder.   ·  Plan: reduce Keppra 1500 q12 to 750 bid as the 1000 mg causes sedation and non-compliance   started Zonisamide as per prior admission  will get neuro input  dc home when cleared by neuro   will give a dose of toradol for HA   check CK.  EEG pending    Problem/Plan - 3:  ·  Problem: Hypomagnesemia.   ·  Plan: supplemented  FU repeat.    Problem/Plan - 4:  ·  Problem: Hypophosphatemia.   ·  Plan: supplemented  FU repeat.   29-year-old female pmhx of seizures on Keppra, ETOH withdrawal comes to ED  status post seizure. pt non complaint with medication. Also noted to be in ETOH withdrawal. so possible etoh seizure     Problem/Plan - 1:  ·  Problem: Alcohol dependence with withdrawal.   ·  Plan: CIWA protocol  no tremors on exam   Valium symptom triggered (not a taper )  MVI  folic acid  thiamine  PPI  zofran.  Ibuprofen for headache    Problem/Plan - 2:  ·  Problem: Seizure disorder.   ·  Plan: reduce Keppra 1500 q12 to 750 bid as the 1000 mg causes sedation and non-compliance   started Zonisamide as per prior admission  will give a dose of toradol for HA   EEG pending  Discharge pending improvement    Problem/Plan - 3:  ·  Problem: Hypomagnesemia.   ·  Plan: supplemented    Problem/Plan - 4:  ·  Problem: Hypophosphatemia.   ·  Plan: supplemented

## 2024-08-28 VITALS
HEART RATE: 71 BPM | SYSTOLIC BLOOD PRESSURE: 146 MMHG | DIASTOLIC BLOOD PRESSURE: 105 MMHG | OXYGEN SATURATION: 99 % | RESPIRATION RATE: 18 BRPM | TEMPERATURE: 98 F

## 2024-08-28 LAB
ALBUMIN SERPL ELPH-MCNC: 2.7 G/DL — LOW (ref 3.3–5)
ALP SERPL-CCNC: 103 U/L — SIGNIFICANT CHANGE UP (ref 40–120)
ALT FLD-CCNC: 20 U/L — SIGNIFICANT CHANGE UP (ref 12–78)
ANION GAP SERPL CALC-SCNC: 9 MMOL/L — SIGNIFICANT CHANGE UP (ref 5–17)
AST SERPL-CCNC: 24 U/L — SIGNIFICANT CHANGE UP (ref 15–37)
BILIRUB SERPL-MCNC: 0.3 MG/DL — SIGNIFICANT CHANGE UP (ref 0.2–1.2)
BUN SERPL-MCNC: 5 MG/DL — LOW (ref 7–23)
CALCIUM SERPL-MCNC: 9.6 MG/DL — SIGNIFICANT CHANGE UP (ref 8.5–10.1)
CHLORIDE SERPL-SCNC: 103 MMOL/L — SIGNIFICANT CHANGE UP (ref 96–108)
CO2 SERPL-SCNC: 22 MMOL/L — SIGNIFICANT CHANGE UP (ref 22–31)
CREAT SERPL-MCNC: 0.49 MG/DL — LOW (ref 0.5–1.3)
EGFR: 129 ML/MIN/1.73M2 — SIGNIFICANT CHANGE UP
GLUCOSE SERPL-MCNC: 98 MG/DL — SIGNIFICANT CHANGE UP (ref 70–99)
MAGNESIUM SERPL-MCNC: 1.6 MG/DL — SIGNIFICANT CHANGE UP (ref 1.6–2.6)
POTASSIUM SERPL-MCNC: 3.8 MMOL/L — SIGNIFICANT CHANGE UP (ref 3.5–5.3)
POTASSIUM SERPL-SCNC: 3.8 MMOL/L — SIGNIFICANT CHANGE UP (ref 3.5–5.3)
PROT SERPL-MCNC: 8.3 GM/DL — SIGNIFICANT CHANGE UP (ref 6–8.3)
SODIUM SERPL-SCNC: 134 MMOL/L — LOW (ref 135–145)

## 2024-08-28 PROCEDURE — 95816 EEG AWAKE AND DROWSY: CPT | Mod: 26

## 2024-08-28 PROCEDURE — 99239 HOSP IP/OBS DSCHRG MGMT >30: CPT

## 2024-08-28 RX ORDER — LEVETIRACETAM 1000 MG/1
0 TABLET ORAL
Qty: 0 | Refills: 0 | DISCHARGE

## 2024-08-28 RX ORDER — LEVETIRACETAM 1000 MG/1
1 TABLET ORAL
Qty: 0 | Refills: 0 | DISCHARGE
Start: 2024-08-28

## 2024-08-28 RX ORDER — SODIUM CHLORIDE 0.65 %
1 AEROSOL, SPRAY (ML) NASAL
Refills: 0 | Status: DISCONTINUED | OUTPATIENT
Start: 2024-08-28 | End: 2024-08-28

## 2024-08-28 RX ORDER — BUTALBITAL, ACETAMINOPHEN AND CAFFEINE 50; 325; 40 MG/1; MG/1; MG/1
1 TABLET ORAL
Refills: 0 | Status: DISCONTINUED | OUTPATIENT
Start: 2024-08-28 | End: 2024-08-28

## 2024-08-28 RX ORDER — LEVETIRACETAM 1000 MG/1
1 TABLET ORAL
Qty: 120 | Refills: 0
Start: 2024-08-28 | End: 2024-10-26

## 2024-08-28 RX ORDER — ZONISAMIDE 100 MG/1
0 CAPSULE ORAL
Qty: 0 | Refills: 0 | DISCHARGE

## 2024-08-28 RX ORDER — SODIUM CHLORIDE 0.65 %
1 AEROSOL, SPRAY (ML) NASAL
Qty: 1 | Refills: 0
Start: 2024-08-28 | End: 2024-09-10

## 2024-08-28 RX ORDER — AMLODIPINE BESYLATE 10 MG/1
1 TABLET ORAL
Qty: 30 | Refills: 2
Start: 2024-08-28 | End: 2024-11-25

## 2024-08-28 RX ORDER — AMLODIPINE BESYLATE 10 MG/1
5 TABLET ORAL DAILY
Refills: 0 | Status: DISCONTINUED | OUTPATIENT
Start: 2024-08-28 | End: 2024-08-28

## 2024-08-28 RX ADMIN — ACETAMINOPHEN 650 MILLIGRAM(S): 325 TABLET ORAL at 06:45

## 2024-08-28 RX ADMIN — Medication 1 TABLET(S): at 12:20

## 2024-08-28 RX ADMIN — Medication 40 MILLIGRAM(S): at 05:50

## 2024-08-28 RX ADMIN — BUTALBITAL, ACETAMINOPHEN AND CAFFEINE 1 TABLET(S): 50; 325; 40 TABLET ORAL at 12:20

## 2024-08-28 RX ADMIN — MAGNESIUM OXIDE TAB 400 MG (240 MG ELEMENTAL MG) 400 MILLIGRAM(S): 400 (240 MG) TAB at 17:24

## 2024-08-28 RX ADMIN — MAGNESIUM OXIDE TAB 400 MG (240 MG ELEMENTAL MG) 400 MILLIGRAM(S): 400 (240 MG) TAB at 09:38

## 2024-08-28 RX ADMIN — AMLODIPINE BESYLATE 5 MILLIGRAM(S): 10 TABLET ORAL at 15:46

## 2024-08-28 RX ADMIN — BUTALBITAL, ACETAMINOPHEN AND CAFFEINE 1 TABLET(S): 50; 325; 40 TABLET ORAL at 12:55

## 2024-08-28 RX ADMIN — LEVETIRACETAM 1000 MILLIGRAM(S): 1000 TABLET ORAL at 17:25

## 2024-08-28 RX ADMIN — ACETAMINOPHEN 650 MILLIGRAM(S): 325 TABLET ORAL at 05:50

## 2024-08-28 RX ADMIN — Medication 1 MILLIGRAM(S): at 12:20

## 2024-08-28 RX ADMIN — LEVETIRACETAM 1000 MILLIGRAM(S): 1000 TABLET ORAL at 05:51

## 2024-08-28 RX ADMIN — MAGNESIUM OXIDE TAB 400 MG (240 MG ELEMENTAL MG) 400 MILLIGRAM(S): 400 (240 MG) TAB at 12:20

## 2024-08-28 NOTE — DISCHARGE NOTE PROVIDER - NSTOBACCOUSAGEY/N_GEN_A_CS
Writer spoke with patient's mother via phone. Introduced role of the casemanager. Discussed PCP and outpatient mental health care. Patient's PCP to have records available upon discharge. Patient have outpatient providers with appointments in place. Casemanager to assist with outpatient followup care as needed. Mary Carmen Choi RN  2/27/2017     No

## 2024-08-28 NOTE — PROGRESS NOTE ADULT - ASSESSMENT
29-year-old female pmhx of seizures on Keppra, ETOH withdrawal comes to ED  status post seizure. pt non complaint with medication, admitted for seizure.     #Seizure  Keppra 1g BID  EEg negative   seen by neuro, cleared for discharge  was on Valium CIWA, low suspicion for withdrawal seizure and pt reports last drink 2 months ago  CIWA 2    #HTN  SBP up to 150s  start norvasc 5mg  outpt PCP followup    #Hypomagnesemia / Hypophosphatemia  Supplemented    DVT ppx: SCD  Dispo: medically ready for dc 8/28 29-year-old female pmhx of seizures on Keppra, ETOH withdrawal comes to ED  status post seizure. pt non complaint with medication, admitted for seizure.     #Seizure  Keppra 1g BID  EEg negative   seen by neuro, cleared for discharge  was on Valium CIWA, low suspicion for withdrawal seizure as pt reports last drink 2 months ago  CIWA 2    #HTN  SBP up to 150s  start norvasc 5mg  outpt PCP followup    #Hypomagnesemia / Hypophosphatemia  Supplemented    DVT ppx: SCD  Dispo: medically ready for dc 8/28

## 2024-08-28 NOTE — DISCHARGE NOTE PROVIDER - ATTENDING DISCHARGE PHYSICAL EXAMINATION:
VITALS:   T(C): 36.5 (08-28-24 @ 10:12), Max: 36.5 (08-28-24 @ 10:12)  HR: 82 (08-28-24 @ 10:12) (60 - 82)  BP: 150/101 (08-28-24 @ 10:12) (137/92 - 150/101)  RR: 18 (08-28-24 @ 10:12) (18 - 18)  SpO2: 100% (08-28-24 @ 10:12) (99% - 100%)    GENERAL: NAD, lying in bed comfortably  HEAD:  Atraumatic, Normocephalic  EYES: EOMI, PERRLA, conjunctiva and sclera clear  ENT: Moist mucous membranes  NECK: Supple, No JVD  CHEST/LUNG: Clear to auscultation bilaterally; No rales, rhonchi, wheezing, or rubs. Unlabored respirations  HEART: Regular rate and rhythm; No murmurs, rubs, or gallops  ABDOMEN: BSx4; Soft, nontender, nondistended  EXTREMITIES:  2+ Peripheral Pulses, brisk capillary refill. No clubbing, cyanosis, or edema  NERVOUS SYSTEM:  A&Ox3, no focal deficits   SKIN: No rashes or lesions  PSYCH: Normal affect, euthymic mood

## 2024-08-28 NOTE — DISCHARGE NOTE PROVIDER - NSDCCPCAREPLAN_GEN_ALL_CORE_FT
PRINCIPAL DISCHARGE DIAGNOSIS  Diagnosis: Seizure  Assessment and Plan of Treatment:       SECONDARY DISCHARGE DIAGNOSES  Diagnosis: Seizure  Assessment and Plan of Treatment:      PRINCIPAL DISCHARGE DIAGNOSIS  Diagnosis: Seizure  Assessment and Plan of Treatment:       SECONDARY DISCHARGE DIAGNOSES  Diagnosis: Seizure  Assessment and Plan of Treatment:     Diagnosis: HTN (hypertension)  Assessment and Plan of Treatment: Take all medicines as told by your caregiver. If you have problems with a prescribed medicine, talk to your caregiver. Do not stop taking it on your own.   Do not smoke.  Eat a healthy diet. Avoid a high salt diet. Talk to a dietician () about foods you should eat.   Stay as active as possible.   Be careful when using a hot tub. A hot tub can lower your blood pressure.   SEEK IMMEDIATE MEDICAL CARE IF:  You have severe shortness of breath, dizziness, blurry vision, headache  You develop chest pain or pressure.

## 2024-08-28 NOTE — DISCHARGE NOTE PROVIDER - HOSPITAL COURSE
29F with seizures on Keppra, ETOH abuse here s/p witnessed seizure. CTH and MR brain negative. s/p EEG which was also negative. Does not suspect ETOH related seizure as pt last drink 2 months ago. Seen by neuro, plan for 1g keppra BID. Cleared for discharge without outpatient neurology followup.    29F with seizures on Keppra, ETOH abuse here s/p witnessed seizure. CTH and MR brain negative. s/p EEG which was also negative. Does not suspect ETOH related seizure as pt last drink 2 months ago. Seen by neuro, plan for 1g keppra BID. Noted with HTN SBP up to 150s, start Novasc on dc and will need outpatient PCP followup for up titration of medications. Cleared for discharge without outpatient neurology followup.

## 2024-08-28 NOTE — PROGRESS NOTE ADULT - SUBJECTIVE AND OBJECTIVE BOX
Rafita Perez M.D.    Patient is a 29y old  Female who presents with a chief complaint of Seizures, ETOH withdrawal. (28 Aug 2024 15:12)      SUBJECTIVE / OVERNIGHT EVENTS: mild headache.    Patient denies chest pain, SOB, abd pain, N/V, fever, chills, dysuria or any other complaints. All remainder ROS negative.     MEDICATIONS  (STANDING):  amLODIPine   Tablet 5 milliGRAM(s) Oral daily  folic acid 1 milliGRAM(s) Oral daily  levETIRAcetam 1000 milliGRAM(s) Oral two times a day  magnesium oxide 400 milliGRAM(s) Oral three times a day with meals  multivitamin 1 Tablet(s) Oral daily  ondansetron Injectable 4 milliGRAM(s) IV Push every 6 hours  pantoprazole    Tablet 40 milliGRAM(s) Oral before breakfast    MEDICATIONS  (PRN):  acetaminophen     Tablet .. 650 milliGRAM(s) Oral every 6 hours PRN Temp greater or equal to 38C (100.4F), Mild Pain (1 - 3), Moderate Pain (4 - 6)  acetaminophen 325 mG/butalbital 50 mG/caffeine 40 mG 1 Tablet(s) Oral <User Schedule> PRN migraine  diazepam    Tablet 10 milliGRAM(s) Oral every 2 hours PRN CIWA-Ar score increase by 2 points and a total score of 7 or less  diazepam  Injectable 10 milliGRAM(s) IV Push every 1 hour PRN CIWA-Ar score of 8 or Greater  ketorolac   Injectable 15 milliGRAM(s) IV Push every 12 hours PRN Severe Pain (7 - 10)  sodium chloride 0.65% Nasal 1 Spray(s) Both Nostrils two times a day PRN Nasal Congestion      I&O's Summary    27 Aug 2024 07:01  -  28 Aug 2024 07:00  --------------------------------------------------------  IN: 1020 mL / OUT: 0 mL / NET: 1020 mL        PHYSICAL EXAM:  Vital Signs Last 24 Hrs  T(C): 36.2 (28 Aug 2024 14:59), Max: 36.5 (28 Aug 2024 10:12)  T(F): 97.2 (28 Aug 2024 14:59), Max: 97.7 (28 Aug 2024 10:12)  HR: 69 (28 Aug 2024 14:59) (60 - 82)  BP: 156/102 (28 Aug 2024 14:59) (137/92 - 156/102)  BP(mean): --  RR: 18 (28 Aug 2024 14:59) (18 - 18)  SpO2: 100% (28 Aug 2024 14:59) (99% - 100%)    Parameters below as of 28 Aug 2024 14:59  Patient On (Oxygen Delivery Method): room air        CONSTITUTIONAL: NAD, well-groomed  ENMT: Moist oral mucosa, no pharyngeal injection or exudates; normal dentition  RESPIRATORY: Normal respiratory effort; lungs are clear to auscultation bilaterally  CARDIOVASCULAR: Regular rate and rhythm, no LE edema  ABDOMEN: Nontender to palpation, normoactive bowel sounds  PSYCH: A+O x3; affect appropriate  NEUROLOGY: CN 2-12 are intact and symmetric; no gross sensory deficits;   SKIN: No rashes; no palpable lesions    LABS:    08-28    134<L>  |  103  |  5<L>  ----------------------------<  98  3.8   |  22  |  0.49<L>    Ca    9.6      28 Aug 2024 07:05  Mg     1.6     08-28    TPro  8.3  /  Alb  2.7<L>  /  TBili  0.3  /  DBili  x   /  AST  24  /  ALT  20  /  AlkPhos  103  08-28          Urinalysis Basic - ( 28 Aug 2024 07:05 )    Color: x / Appearance: x / SG: x / pH: x  Gluc: 98 mg/dL / Ketone: x  / Bili: x / Urobili: x   Blood: x / Protein: x / Nitrite: x   Leuk Esterase: x / RBC: x / WBC x   Sq Epi: x / Non Sq Epi: x / Bacteria: x        CAPILLARY BLOOD GLUCOSE          RADIOLOGY & ADDITIONAL TESTS:  Results Reviewed:   Imaging Personally Reviewed:  Electrocardiogram Personally Reviewed:

## 2024-08-28 NOTE — DISCHARGE NOTE NURSING/CASE MANAGEMENT/SOCIAL WORK - NSDCVIVACCINE_GEN_ALL_CORE_FT
Presented with complete heart block  sufferred cardiac arrest in ER  Was intubated and resuscitated  Received pacer  Now tachycardic with rapid atrial fibrillation  Cards following No Vaccines Administered.

## 2024-08-28 NOTE — DISCHARGE NOTE PROVIDER - NSDCMRMEDTOKEN_GEN_ALL_CORE_FT
benzonatate 100 mg oral capsule: 1 cap(s) orally 2 times a day  folic acid 1 mg oral tablet: 1 tab(s) orally once a day  levETIRAcetam 1000 mg oral tablet: 1 tab(s) orally 2 times a day  Multiple Vitamins oral tablet: 1 tab(s) orally once a day  sodium chloride 0.65% nasal spray: 1 spray(s) nasal 2 times a day as needed for  nasal congestion  thiamine 50 mg oral tablet: 1 tab(s) orally once a day   Vitamin D3 50,000 intl units (1250 mcg) oral capsule: 1 cap(s) orally once a week starting 4/2    amLODIPine 5 mg oral tablet: 1 tab(s) orally once a day  benzonatate 100 mg oral capsule: 1 cap(s) orally 2 times a day  folic acid 1 mg oral tablet: 1 tab(s) orally once a day  levETIRAcetam 1000 mg oral tablet: 1 tab(s) orally 2 times a day  Multiple Vitamins oral tablet: 1 tab(s) orally once a day  sodium chloride 0.65% nasal spray: 1 spray(s) nasal 2 times a day as needed for  nasal congestion  thiamine 50 mg oral tablet: 1 tab(s) orally once a day   Vitamin D3 50,000 intl units (1250 mcg) oral capsule: 1 cap(s) orally once a week starting 4/2

## 2024-08-28 NOTE — PROGRESS NOTE ADULT - REASON FOR ADMISSION
Seizures, ETOH withdrawal.

## 2024-08-28 NOTE — DISCHARGE NOTE PROVIDER - CARE PROVIDER_API CALL
Sharmila Barroso  Neurology  [address], [address]  [place] [address]  Phone: [phone]  Fax: [phone]  Follow Up Time: 2 weeks   Sharmila Barroso  Neurology  [address] [facility] [address], [address]  [place] [address]  Phone: [phone]  Fax: [phone]  Follow Up Time: 2 weeks    PCP,   Phone: (   )    -  Fax: (   )    -  Follow Up Time:

## 2024-08-28 NOTE — DISCHARGE NOTE PROVIDER - PROVIDER TOKENS
PROVIDER:[TOKEN:[[id number]],FOLLOWUP:[2 weeks]] PROVIDER:[TOKEN:[[id number]],FOLLOWUP:[2 weeks]],FREE:[LAST:[PCP],PHONE:[(   )    -],FAX:[(   )    -]]

## 2024-08-28 NOTE — EEG REPORT - NS EEG TEXT BOX
KEYA RODRIGUEZ [id number]     Study Date: 08-28-24  Duration: 20 min  --------------------------------------------------------------------------------------------------  History:  CC/ HPI Patient is a 29y old  Female who presents with a chief complaint of Seizures, ETOH withdrawal. (27 Aug 2024 09:24)    MEDICATIONS  (STANDING):  folic acid 1 milliGRAM(s) Oral daily  levETIRAcetam 1000 milliGRAM(s) Oral two times a day  magnesium oxide 400 milliGRAM(s) Oral three times a day with meals  multivitamin 1 Tablet(s) Oral daily  ondansetron Injectable 4 milliGRAM(s) IV Push every 6 hours  pantoprazole    Tablet 40 milliGRAM(s) Oral before breakfast  sodium chloride 0.9%. 1000 milliLiter(s) (125 mL/Hr) IV Continuous <Continuous>    --------------------------------------------------------------------------------------------------  Study Interpretation:    [[[Abbreviation Key:  PDR=alpha rhythm/posterior dominant rhythm. A-P=anterior posterior.  Amplitude: ‘very low’:<20; ‘low’:20-49; ‘medium’:; ‘high’:>150uV.  Persistence for periodic/rhythmic patterns (% of epoch) ‘rare’:<1%; ‘occasional’:1-10%; ‘frequent’:10-50%; ‘abundant’:50-90%; ‘continuous’:>90%.  Persistence for sporadic discharges: ‘rare’:<1/hr; ‘occasional’:1/min-1/hr; ‘frequent’:>1/min; ‘abundant’:>1/10 sec.  RPP=rhythmic and periodic patterns; GRDA=generalized rhythmic delta activity; FIRDA=frontal intermittent GRDA; LRDA=lateralized rhythmic delta activity; TIRDA=temporal intermittent rhythmic delta activity;  LPD=PLED=lateralized periodic discharges; GPD=generalized periodic discharges; BIPDs =bilateral independent periodic discharges; Mf=multifocal; SIRPDs=stimulus induced rhythmic, periodic, or ictal appearing discharges; BIRDs=brief potentially ictal rhythmic discharges >4 Hz, lasting .5-10s; PFA (paroxysmal bursts >13 Hz or =8 Hz <10s).  Modifiers: +F=with fast component; +S=with spike component; +R=with rhythmic component.  S-B=burst suppression pattern.  Max=maximal. N1-drowsy; N2-stage II sleep; N3-slow wave sleep. SSS/BETS=small sharp spikes/benign epileptiform transients of sleep. HV=hyperventilation; PS=photic stimulation]]]    Daily EEG Visual Analysis    FINDINGS:      Background:  Continuity: Continuous  Symmetry: Symmetric  Posterior dominant rhythm (PDR): 11 Hz, lower in amplitude on the left, reactive to eye closure. Symmetric low-amplitude frontal beta in wakefulness.  Voltage: Normal  Anterior-Posterior Gradient: Present  Other background findings: None  Breach: Absent    Background Slowing:  Generalized slowing: None  Focal slowing: None    State Changes:   Drowsiness is characterized by fragmentation, attenuation, and slowing of the background activity.  Stage 2 sleep is characterized by symmetric sleep spindles.    Interictal Findings:  None    Electrographic and Electroclinical seizures:  None    Other Clinical Events:  None    Activation Procedures:   Hyperventilation is not performed.    Photic stimulation is not performed.    Artifacts:  Intermittent myogenic and movement artifacts are present.    Single-lead EKG: Regular rhythm    EEG Classification / Summary:  Normal routine EEG in the awake, drowsy, and asleep states.   No focal or epileptiform abnormalities are captured.   No seizures.    Clinical Impression:  A normal routine EEG neither refutes nor supports a diagnosis of epilepsy.             -------------------------------------------------------------------------------------------------------    Mariposa Voss MD  Attending Physician, [facility] [place]    -------------------------------------------------------------------------------------------------------    To reach EEG technologist:  Please use the pager number for the appropriate hospital or contact the .  At [facility] - Pager #: [phone]    To reach EEG-reading physician:  EEG Reading Room Phone #: [phone]  Epilepsy Answering Service after 5PM and before 8:30AM: Phone #: [phone]

## 2024-08-31 NOTE — H&P ADULT - CRITICAL CARE ATTENDING COMMENT
28 year old with multiple seizure now intubated for airway protection.  Suspect etoh withdrawal vs seizure med non compliance as cause.  LP performed in ED without evidence of meningitis.  veeg. restart AED.  monitor for recurrent seizure. wean to extubate.
no

## 2024-09-04 DIAGNOSIS — I10 ESSENTIAL (PRIMARY) HYPERTENSION: ICD-10-CM

## 2024-09-04 DIAGNOSIS — F10.10 ALCOHOL ABUSE, UNCOMPLICATED: ICD-10-CM

## 2024-09-04 DIAGNOSIS — G40.409 OTHER GENERALIZED EPILEPSY AND EPILEPTIC SYNDROMES, NOT INTRACTABLE, WITHOUT STATUS EPILEPTICUS: ICD-10-CM

## 2024-09-04 DIAGNOSIS — G40.909 EPILEPSY, UNSPECIFIED, NOT INTRACTABLE, WITHOUT STATUS EPILEPTICUS: ICD-10-CM

## 2024-09-04 DIAGNOSIS — E83.39 OTHER DISORDERS OF PHOSPHORUS METABOLISM: ICD-10-CM

## 2024-09-04 DIAGNOSIS — D72.829 ELEVATED WHITE BLOOD CELL COUNT, UNSPECIFIED: ICD-10-CM

## 2024-09-04 DIAGNOSIS — E83.42 HYPOMAGNESEMIA: ICD-10-CM

## 2024-09-23 ENCOUNTER — INPATIENT (INPATIENT)
Facility: HOSPITAL | Age: 29
LOS: 1 days | Discharge: HOME | End: 2024-09-25
Attending: HOSPITALIST | Admitting: HOSPITALIST
Payer: COMMERCIAL

## 2024-09-23 VITALS
SYSTOLIC BLOOD PRESSURE: 161 MMHG | OXYGEN SATURATION: 97 % | RESPIRATION RATE: 20 BRPM | WEIGHT: 149.91 LBS | HEIGHT: 62 IN | HEART RATE: 187 BPM | DIASTOLIC BLOOD PRESSURE: 110 MMHG

## 2024-09-23 DIAGNOSIS — Z94.5 SKIN TRANSPLANT STATUS: Chronic | ICD-10-CM

## 2024-09-23 DIAGNOSIS — E87.29 OTHER ACIDOSIS: ICD-10-CM

## 2024-09-23 DIAGNOSIS — Z78.9 OTHER SPECIFIED HEALTH STATUS: ICD-10-CM

## 2024-09-23 DIAGNOSIS — E87.20 ACIDOSIS, UNSPECIFIED: ICD-10-CM

## 2024-09-23 DIAGNOSIS — G40.919 EPILEPSY, UNSPECIFIED, INTRACTABLE, WITHOUT STATUS EPILEPTICUS: ICD-10-CM

## 2024-09-23 DIAGNOSIS — G40.909 EPILEPSY, UNSPECIFIED, NOT INTRACTABLE, WITHOUT STATUS EPILEPTICUS: ICD-10-CM

## 2024-09-23 DIAGNOSIS — I10 ESSENTIAL (PRIMARY) HYPERTENSION: ICD-10-CM

## 2024-09-23 LAB
ALBUMIN SERPL ELPH-MCNC: 3.9 G/DL — SIGNIFICANT CHANGE UP (ref 3.3–5)
ALP SERPL-CCNC: 76 U/L — SIGNIFICANT CHANGE UP (ref 40–120)
ALT FLD-CCNC: 30 U/L — SIGNIFICANT CHANGE UP (ref 12–78)
ANION GAP SERPL CALC-SCNC: 24 MMOL/L — HIGH (ref 5–17)
AST SERPL-CCNC: 77 U/L — HIGH (ref 15–37)
BASOPHILS # BLD AUTO: 0.04 K/UL — SIGNIFICANT CHANGE UP (ref 0–0.2)
BASOPHILS NFR BLD AUTO: 0.7 % — SIGNIFICANT CHANGE UP (ref 0–2)
BILIRUB SERPL-MCNC: 0.8 MG/DL — SIGNIFICANT CHANGE UP (ref 0.2–1.2)
BUN SERPL-MCNC: 9 MG/DL — SIGNIFICANT CHANGE UP (ref 7–23)
CALCIUM SERPL-MCNC: 8.9 MG/DL — SIGNIFICANT CHANGE UP (ref 8.5–10.1)
CHLORIDE SERPL-SCNC: 97 MMOL/L — SIGNIFICANT CHANGE UP (ref 96–108)
CO2 SERPL-SCNC: 13 MMOL/L — LOW (ref 22–31)
CREAT SERPL-MCNC: 0.88 MG/DL — SIGNIFICANT CHANGE UP (ref 0.5–1.3)
EGFR: 90 ML/MIN/1.73M2 — SIGNIFICANT CHANGE UP
EOSINOPHIL # BLD AUTO: 0.01 K/UL — SIGNIFICANT CHANGE UP (ref 0–0.5)
EOSINOPHIL NFR BLD AUTO: 0.2 % — SIGNIFICANT CHANGE UP (ref 0–6)
GAS PNL BLDA: SIGNIFICANT CHANGE UP
GLUCOSE SERPL-MCNC: 149 MG/DL — HIGH (ref 70–99)
HCG SERPL-ACNC: <1 MIU/ML — SIGNIFICANT CHANGE UP
HCT VFR BLD CALC: 31 % — LOW (ref 34.5–45)
HGB BLD-MCNC: 9.6 G/DL — LOW (ref 11.5–15.5)
IMM GRANULOCYTES NFR BLD AUTO: 0.7 % — SIGNIFICANT CHANGE UP (ref 0–0.9)
LYMPHOCYTES # BLD AUTO: 1.82 K/UL — SIGNIFICANT CHANGE UP (ref 1–3.3)
LYMPHOCYTES # BLD AUTO: 29.7 % — SIGNIFICANT CHANGE UP (ref 13–44)
MCHC RBC-ENTMCNC: 23.9 PG — LOW (ref 27–34)
MCHC RBC-ENTMCNC: 31 G/DL — LOW (ref 32–36)
MCV RBC AUTO: 77.1 FL — LOW (ref 80–100)
MONOCYTES # BLD AUTO: 0.53 K/UL — SIGNIFICANT CHANGE UP (ref 0–0.9)
MONOCYTES NFR BLD AUTO: 8.6 % — SIGNIFICANT CHANGE UP (ref 2–14)
NEUTROPHILS # BLD AUTO: 3.69 K/UL — SIGNIFICANT CHANGE UP (ref 1.8–7.4)
NEUTROPHILS NFR BLD AUTO: 60.1 % — SIGNIFICANT CHANGE UP (ref 43–77)
NRBC # BLD: 0 /100 WBCS — SIGNIFICANT CHANGE UP (ref 0–0)
PLATELET # BLD AUTO: 196 K/UL — SIGNIFICANT CHANGE UP (ref 150–400)
POTASSIUM SERPL-MCNC: 3.3 MMOL/L — LOW (ref 3.5–5.3)
POTASSIUM SERPL-SCNC: 3.3 MMOL/L — LOW (ref 3.5–5.3)
PROT SERPL-MCNC: 9.6 GM/DL — HIGH (ref 6–8.3)
RBC # BLD: 4.02 M/UL — SIGNIFICANT CHANGE UP (ref 3.8–5.2)
RBC # FLD: 22.7 % — HIGH (ref 10.3–14.5)
SODIUM SERPL-SCNC: 134 MMOL/L — LOW (ref 135–145)
WBC # BLD: 6.13 K/UL — SIGNIFICANT CHANGE UP (ref 3.8–10.5)
WBC # FLD AUTO: 6.13 K/UL — SIGNIFICANT CHANGE UP (ref 3.8–10.5)

## 2024-09-23 PROCEDURE — 71045 X-RAY EXAM CHEST 1 VIEW: CPT | Mod: 26

## 2024-09-23 PROCEDURE — 93010 ELECTROCARDIOGRAM REPORT: CPT

## 2024-09-23 PROCEDURE — 99222 1ST HOSP IP/OBS MODERATE 55: CPT

## 2024-09-23 PROCEDURE — 70450 CT HEAD/BRAIN W/O DYE: CPT | Mod: 26,MC

## 2024-09-23 PROCEDURE — 99285 EMERGENCY DEPT VISIT HI MDM: CPT

## 2024-09-23 RX ORDER — MAG HYDROX/ALUMINUM HYD/SIMETH 200-200-20
30 SUSPENSION, ORAL (FINAL DOSE FORM) ORAL EVERY 4 HOURS
Refills: 0 | Status: DISCONTINUED | OUTPATIENT
Start: 2024-09-23 | End: 2024-09-25

## 2024-09-23 RX ORDER — 5-HYDROXYTRYPTOPHAN (5-HTP) 100 MG
3 TABLET,DISINTEGRATING ORAL AT BEDTIME
Refills: 0 | Status: DISCONTINUED | OUTPATIENT
Start: 2024-09-23 | End: 2024-09-25

## 2024-09-23 RX ORDER — LEVETIRACETAM 1000 MG
1000 TABLET ORAL ONCE
Refills: 0 | Status: COMPLETED | OUTPATIENT
Start: 2024-09-23 | End: 2024-09-23

## 2024-09-23 RX ORDER — SODIUM CHLORIDE 0.9 % (FLUSH) 0.9 %
1000 SYRINGE (ML) INJECTION ONCE
Refills: 0 | Status: COMPLETED | OUTPATIENT
Start: 2024-09-23 | End: 2024-09-23

## 2024-09-23 RX ORDER — ACETAMINOPHEN 325 MG
650 TABLET ORAL EVERY 6 HOURS
Refills: 0 | Status: DISCONTINUED | OUTPATIENT
Start: 2024-09-23 | End: 2024-09-25

## 2024-09-23 RX ORDER — ONDANSETRON HCL/PF 4 MG/2 ML
4 VIAL (ML) INJECTION EVERY 8 HOURS
Refills: 0 | Status: DISCONTINUED | OUTPATIENT
Start: 2024-09-23 | End: 2024-09-25

## 2024-09-23 RX ORDER — LEVETIRACETAM 1000 MG
1000 TABLET ORAL
Refills: 0 | Status: DISCONTINUED | OUTPATIENT
Start: 2024-09-23 | End: 2024-09-25

## 2024-09-23 RX ADMIN — Medication 1 MILLIGRAM(S): at 16:41

## 2024-09-23 RX ADMIN — Medication 400 MILLIGRAM(S): at 16:17

## 2024-09-23 RX ADMIN — Medication 600 MILLIGRAM(S): at 18:42

## 2024-09-23 RX ADMIN — Medication 1000 MILLILITER(S): at 16:17

## 2024-09-23 RX ADMIN — Medication 1000 MILLILITER(S): at 22:40

## 2024-09-23 RX ADMIN — Medication 1000 MILLIGRAM(S): at 16:42

## 2024-09-23 NOTE — H&P ADULT - TIME BILLING
coordination of care with ER physician and ER RN, reviewing notes from recent hospitalization here at [facility], obtaining history, performing a physical examination, reviewing and interpreting labs and imaging, ordering further studies and tests, explaining the diagnosis and treatment plan to patient, completing medication reconciliation, and documentation as above.

## 2024-09-23 NOTE — ED ADULT TRIAGE NOTE - NS ED TRIAGE AVPU SCALE
Alert-The patient is alert, awake and responds to voice. The patient is oriented to time, place, and person. The triage nurse is able to obtain subjective information.
Clear

## 2024-09-23 NOTE — ED PROVIDER NOTE - CLINICAL SUMMARY MEDICAL DECISION MAKING FREE TEXT BOX
Pt with seizures x 3 in past week otherwise no longer drinking alcohol x 1 year as per pt, will admit for further care of seizures with Medicine team - Dr Fu and Dr Rojas consulted.

## 2024-09-23 NOTE — ED PROVIDER NOTE - OBJECTIVE STATEMENT
29 year old female with PMH of seizure disorder after head injury in past - otherwise on keppra presenting to ED due to seizure witnessed noted by family - as per EMS lasted 5-10 minutes tonic clonic and with post ictal state noted there after when they picked her up. Pt states she did take her keppra dose last night and only takes keppra once a day at night 2 tabs. States otherwise no headache and no discomfort at this time but does feel anxious. Pt states she did miss a dose or 2 during the week of keppra. 29 year old female with PMH of seizure disorder after head injury in past - otherwise on keppra presenting to ED due to seizure witnessed noted by family - as per EMS lasted 5-10 minutes tonic clonic and with post ictal state noted there after when they picked her up. Pt states she did take her keppra dose last night and only takes keppra once a day at night 2 tabs. States otherwise no headache and no discomfort at this time but does feel anxious. Pt states she did miss a dose or 2 during the week of keppra. States this is her 3rd seizure this week. Has prior alcoholism hx but states has not drank in past year.

## 2024-09-23 NOTE — H&P ADULT - HISTORY OF PRESENT ILLNESS
La Ozuna is a 29 year old female with PMHx of HTN and seizure disorder who presented to the ED on 9/23/24 for complaints of witnessed seizure like activity.    Patient reports she had a seizure while at work last week. Associated tongue biting at that time. Today, patient fell off her bed while seizing. Landed on right side of body and hit her head. No associated tongue biting, urinary or bowel incontinence today. Developed right sided chest pain and right sided flank pain after hitting the ground which has since resolved. Reports compliance with keppra at home. States her seizures are typically due to alcohol withdrawal but she quit drinking. However, BAL 18.     Recent admission from 8/25/24 - 8/28/24 for seizure like activity. EEG unremarkable. Evaluated by neurology who recommends keppra 1 g BID. Found to have elevated blood pressure and started on amlodipine. Advised to follow up outpatient for blood pressure management.    In the ED, VSS except HR as elevated as 187 and BP as elevated as 161/110. No documented hypoxia but placed on 10L NRB due to anxiety? Hgb 9.6, sodium 134, potassium 3.3, AG 24. Lactic acid 2.6. ABG 7.72 / < 15 / 111. Blood alcohol level 18. CT head without acute intracranial findings. CXR unremarkable. Received 1L NS bolus, levetiracetam 1 g IV, lorazepam 1 mg IV, and ibuprofen 600 mg. ER physician discussed with neurologist who recommended keppra load.

## 2024-09-23 NOTE — ED PROVIDER NOTE - CONSTITUTIONAL, MLM
Well appearing, awake, alert, oriented to person, place, time/situation and appears anxious normal...

## 2024-09-23 NOTE — ED ADULT TRIAGE NOTE - ESI TRIAGE ACUITY LEVEL, MLM
Faxed.  
Please review
Provider: DR. KELLY    Caller: [employer]    Relationship to Patient: [employer]      Phone Number: [phone]    Reason for Call: [employer] NEEDS WORK COMP NOTE FROM 08/11/22, AND UPDATED WORK STATUS.  PLEASE FAX TO [phone]    When was the patient last seen: 08/11/22      
2

## 2024-09-23 NOTE — ED ADULT NURSE NOTE - OBJECTIVE STATEMENT
Patient alert and responsive, brought in by EMS due to having a seizure while at home. Pt noted very disoriented and confused but because more aware as time past. As pr pt she was in the bathroom when she  started having the seizures but does not remember hitting her head. pmhx: seizures is on Keppra

## 2024-09-23 NOTE — ED ADULT NURSE NOTE - CHIEF COMPLAINT QUOTE
BIBA- from home  Witnessed seizure by dad x1  HX denies, but stated to EMS seizure 2 years ago  EMS ,  pt still not AOX4

## 2024-09-23 NOTE — H&P ADULT - NSHPPHYSICALEXAM_GEN_ALL_CORE
T(C): 36.8 (09-23-24 @ 19:24), Max: 36.8 (09-23-24 @ 19:24)  HR: 108 (09-23-24 @ 19:24) (108 - 187)  BP: 136/92 (09-23-24 @ 19:24) (136/91 - 161/110)  RR: 20 (09-23-24 @ 19:24) (20 - 20)  SpO2: 99% (09-23-24 @ 19:24) (97% - 100%)    CONSTITUTIONAL: Well groomed, no apparent distress, anxious-appearing  EYES: PERRLA and symmetric, EOMI  ENMT: Oral mucosa with moist membranes  RESP: No respiratory distress, no use of accessory muscles; CTA b/l  CV: tachycardic, regular rhythm  GI: Soft, NT, ND

## 2024-09-23 NOTE — ED ADULT NURSE NOTE - NSFALLHARMRISKINTERV_ED_ALL_ED
Assistance OOB with selected safe patient handling equipment if applicable/Communicate risk of Fall with Harm to all staff, patient, and family/Monitor for mental status changes and reorient to person, place, and time, as needed/Move patient closer to nursing station/within visual sight of ED staff/Provide visual cue: red socks, yellow wristband, yellow gown, etc/Reinforce activity limits and safety measures with patient and family/Toileting schedule using arm’s reach rule for commode and bathroom/Use of alarms - bed, stretcher, chair and/or video monitoring/Bed in lowest position, wheels locked, appropriate side rails in place/Call bell, personal items and telephone in reach/Instruct patient to call for assistance before getting out of bed/chair/stretcher/Non-slip footwear applied when patient is off stretcher/[place] to call system/Physically safe environment - no spills, clutter or unnecessary equipment/Purposeful Proactive Rounding/Room/bathroom lighting operational, light cord in reach

## 2024-09-23 NOTE — H&P ADULT - NSHPLABSRESULTS_GEN_ALL_CORE
9.6    6.13  )-----------( 196      ( 23 Sep 2024 15:48 )             31.0     134[L]  |  97  |  9   ----------------------------<  149[H]     09-23  3.3[L]   |  13[L]  |  0.88    Ca    8.9      23 Sep 2024 15:48    TPro  9.6[H]  /  Alb  3.9  /  TBili  0.8  /  DBili  x   /  AST  77[H]  /  ALT  30  /  AlkPhos  76  09-23    CT head without contrast 9/23/24  IMPRESSION:  No acute intracranial hemorrhage, mass effect, or midline shift.    Chest x-ray 9/23/24  IMPRESSION:  No acute cardiopulmonary disease process.

## 2024-09-23 NOTE — H&P ADULT - ASSESSMENT
La Ozuna is a 29 year old female with PMHx of HTN and seizure disorder who presented to the ED on 9/23/24 for complaints of witnessed seizure like activity and admitted for breakthrough seizures.    Breakthrough seizures  In pt with known hx of seizure disorder  Reports seizure at work last week where she bit her tongue  Fell off her bed today, landed on R. sided of body and hit head, no associated tongue biting, urinary or bowel incontinence today  CT head without acute intracranial findings, CXR unremarkable  Recent MRI brain (on 6/14/24) without abnormal enhancement  Recent EEG (on 8/28/24) without epileptiform abnormalities  S/p 1L NS bolus, levetiracetam 1 g IV, lorazepam 1 mg IV in the ED  PTA levetiracetam 1 g BID  Will defer need for potential EEG to neurology given recent EEG  F/u levetiracetam level, prolactin, Utox  Telemetry  Pending official neurology consult - please f/u in AM    Lactic acidosis secondary to above  HAGMA   Lactic acid 2.6 and AG 24 on admission  S/p 1L NS bolus in the ED, additional bolus ordered  F/u serial lactates and BMP    Alcohol use  Reports she quit drinking  Blood alcohol level 18 on admission  CIWA protocol, ativan PRN - ideally would not give benzos given likelihood it will skew EEG results      Chronic medical conditions:  HTN: BP as elevated as 161/110 on admission, not on any PTA meds, chart review revealed patient used to be on amlodipine La Ozuna is a 29 year old female with PMHx of HTN and seizure disorder who presented to the ED on 9/23/24 for complaints of witnessed seizure like activity and admitted for breakthrough seizures.    Breakthrough seizures  In pt with known hx of seizure disorder  Reports seizure at work last week where she bit her tongue  Fell off her bed today, landed on R. sided of body and hit head, no associated tongue biting, urinary or bowel incontinence today  CT head without acute intracranial findings, CXR unremarkable  Recent MRI brain (on 6/14/24) without abnormal enhancement  Recent EEG (on 8/28/24) without epileptiform abnormalities  S/p 1L NS bolus, levetiracetam 1 g IV, lorazepam 1 mg IV in the ED  PTA levetiracetam 1 g BID  Will defer need for potential EEG to neurology given recent EEG  F/u levetiracetam level, prolactin, Utox  Telemetry  Pending official neurology consult - please f/u in AM    Lactic acidosis secondary to above  HAGMA   Lactic acid 2.6 and AG 24 on admission  S/p 1L NS bolus in the ED, additional bolus ordered  F/u serial lactates and BMP    Alcohol use  Reports she quit drinking  Blood alcohol level 18 on admission  CIWA protocol, ativan PRN - ideally would not give benzos given likelihood it will skew EEG results      Chronic medical conditions:  HTN: BP as elevated as 161/110 on admission, not on any PTA meds, chart review revealed patient is supposed to be on amlodipine according to recent discharge summary in 8/2024

## 2024-09-23 NOTE — ED ADULT NURSE NOTE - CAS TRG GEN SKIN COLOR
Normal for race Adjacent Tissue Transfer Text: The defect edges were debeveled with a #15 scalpel blade.  Given the location of the defect and the proximity to free margins an adjacent tissue transfer was deemed most appropriate.  Using a sterile surgical marker, an appropriate flap was drawn incorporating the defect and placing the expected incisions within the relaxed skin tension lines where possible.    The area thus outlined was incised deep to adipose tissue with a #15 scalpel blade.  The skin margins were undermined to an appropriate distance in all directions utilizing iris scissors.

## 2024-09-23 NOTE — ED ADULT TRIAGE NOTE - CHIEF COMPLAINT QUOTE
BIBA- from home  Witnessed seizure by dad x1  HX denies BIBA- from home  Witnessed seizure by dad x1  HX denies, but stated to EMS seizure 2 years ago  EMS ,  pt still not AOX4

## 2024-09-23 NOTE — ED PROVIDER NOTE - NEUROLOGICAL LEVEL OF CONSCIOUSNESS
post ictal state - initially confused, now able to speak and responding to questions./follows commands

## 2024-09-24 LAB
ANION GAP SERPL CALC-SCNC: 11 MMOL/L — SIGNIFICANT CHANGE UP (ref 5–17)
BUN SERPL-MCNC: 10 MG/DL — SIGNIFICANT CHANGE UP (ref 7–23)
CALCIUM SERPL-MCNC: 8.3 MG/DL — LOW (ref 8.5–10.1)
CHLORIDE SERPL-SCNC: 100 MMOL/L — SIGNIFICANT CHANGE UP (ref 96–108)
CO2 SERPL-SCNC: 22 MMOL/L — SIGNIFICANT CHANGE UP (ref 22–31)
CREAT SERPL-MCNC: 0.59 MG/DL — SIGNIFICANT CHANGE UP (ref 0.5–1.3)
EGFR: 123 ML/MIN/1.73M2 — SIGNIFICANT CHANGE UP
GLUCOSE SERPL-MCNC: 80 MG/DL — SIGNIFICANT CHANGE UP (ref 70–99)
LACTATE SERPL-SCNC: 1.1 MMOL/L — SIGNIFICANT CHANGE UP (ref 0.7–2)
LACTATE SERPL-SCNC: 2.7 MMOL/L — HIGH (ref 0.7–2)
PCP SPEC-MCNC: SIGNIFICANT CHANGE UP
POTASSIUM SERPL-MCNC: 2.9 MMOL/L — CRITICAL LOW (ref 3.5–5.3)
POTASSIUM SERPL-SCNC: 2.9 MMOL/L — CRITICAL LOW (ref 3.5–5.3)
PROLACTIN SERPL-MCNC: 14.4 NG/ML — SIGNIFICANT CHANGE UP (ref 3.4–24.1)
SODIUM SERPL-SCNC: 133 MMOL/L — LOW (ref 135–145)

## 2024-09-24 PROCEDURE — 93010 ELECTROCARDIOGRAM REPORT: CPT

## 2024-09-24 PROCEDURE — 99232 SBSQ HOSP IP/OBS MODERATE 35: CPT

## 2024-09-24 RX ORDER — SODIUM CHLORIDE IRRIG SOLUTION 0.9 %
1000 SOLUTION, IRRIGATION IRRIGATION ONCE
Refills: 0 | Status: COMPLETED | OUTPATIENT
Start: 2024-09-24 | End: 2024-09-24

## 2024-09-24 RX ADMIN — Medication 40 MILLIEQUIVALENT(S): at 15:34

## 2024-09-24 RX ADMIN — Medication 40 MILLIEQUIVALENT(S): at 12:11

## 2024-09-24 RX ADMIN — Medication 1000 MILLILITER(S): at 01:43

## 2024-09-24 RX ADMIN — Medication 30 MILLILITER(S): at 10:47

## 2024-09-24 RX ADMIN — Medication 400 MILLIGRAM(S): at 03:39

## 2024-09-24 RX ADMIN — Medication 3 MILLIGRAM(S): at 21:29

## 2024-09-24 RX ADMIN — Medication 40 MILLIEQUIVALENT(S): at 09:09

## 2024-09-24 RX ADMIN — Medication 650 MILLIGRAM(S): at 10:47

## 2024-09-24 RX ADMIN — Medication 650 MILLIGRAM(S): at 17:48

## 2024-09-24 RX ADMIN — Medication 1000 MILLIGRAM(S): at 05:54

## 2024-09-24 RX ADMIN — Medication 650 MILLIGRAM(S): at 17:23

## 2024-09-24 RX ADMIN — Medication 650 MILLIGRAM(S): at 00:12

## 2024-09-24 RX ADMIN — Medication 650 MILLIGRAM(S): at 11:27

## 2024-09-24 RX ADMIN — Medication 400 MILLIGRAM(S): at 02:49

## 2024-09-24 RX ADMIN — Medication 650 MILLIGRAM(S): at 01:00

## 2024-09-24 RX ADMIN — Medication 1000 MILLIGRAM(S): at 17:24

## 2024-09-24 NOTE — CONSULT NOTE ADULT - ASSESSMENT
La Ozuna is a 29 year old female with PMHx of HTN and seizure disorder who presented to the ED on 9/23/24 for complaints of witnessed seizure like activity. 3 in  the past week  Neuro exam non-focal  CTH-  MRI brain in 6/24 wnl  Unclear med compliance - states she has not missed Keppra doses. Is on levitracetam at home,    Impression: seizure    Continue with Keppra 1000 mg BID  routine EEG in past negative,   keppra level pending  For now would add Vimpat 50 mg BID  Can follow up with her Neurologist or Neurology, Dr. Juan R Rojas at [phone] for further AED management and seizure w/u La Ozuna is a 29 year old female with PMHx of HTN and seizure disorder who presented to the ED on 9/23/24 for complaints of witnessed seizure like activity. 3 in  the past week  Neuro exam non-focal  CTH-  MRI brain in 6/24 wnl  Unclear med compliance - states she has not missed Keppra doses. Is on levitracetam at home,    Impression: seizure    Continue with Keppra 1000 mg BID  routine EEG in past negative,   keppra level pending  For now would add Vimpat 50 mg BID  Can follow up with her Neurologist

## 2024-09-24 NOTE — PROGRESS NOTE ADULT - TIME BILLING
coordination of care with ER physician and ER RN, reviewing notes from recent hospitalization here at [facility], obtaining history, performing a physical examination, reviewing and interpreting labs and imaging, ordering further studies and tests, explaining the diagnosis and treatment plan to patient, completing medication reconciliation, and documentation as above.

## 2024-09-24 NOTE — CONSULT NOTE ADULT - SUBJECTIVE AND OBJECTIVE BOX
Neurology consult    [id number]     Patient is a 29y old  Female who presents with a chief complaint of Breakthrough seizures (23 Sep 2024 22:11)      HPI:  La Ozuna is a 29 year old female with PMHx of HTN and seizure disorder who presented to the ED on 9/23/24 for complaints of witnessed seizure like activity.    Patient reports she had a seizure while at work last week. Associated tongue biting at that time. Today, patient fell off her bed while seizing. Landed on right side of body and hit her head. No associated tongue biting, urinary or bowel incontinence today. Developed right sided chest pain and right sided flank pain after hitting the ground which has since resolved. Reports compliance with keppra at home. States her seizures are typically due to alcohol withdrawal but she quit drinking. However, BAL 18.     Recent admission from 8/25/24 - 8/28/24 for seizure like activity. EEG unremarkable. Evaluated by neurology who recommends keppra 1 g BID. Found to have elevated blood pressure and started on amlodipine. Advised to follow up outpatient for blood pressure management.    In the ED, VSS except HR as elevated as 187 and BP as elevated as 161/110. No documented hypoxia but placed on 10L NRB due to anxiety? Hgb 9.6, sodium 134, potassium 3.3, AG 24. Lactic acid 2.6. ABG 7.72 / < 15 / 111. Blood alcohol level 18. CT head without acute intracranial findings. CXR unremarkable. Received 1L NS bolus, levetiracetam 1 g IV, lorazepam 1 mg IV, and ibuprofen 600 mg. ER physician discussed with neurologist who recommended keppra load. (23 Sep 2024 22:11)      no difficulty with language.  No vision loss or double vision.  No dizziness, vertigo or new hearing loss.  . No difficulty with swallowing.  No focal weakness.  No focal sensory changes.  No numbness or tingling in the bilateral lower extremities.  No difficulty with balance.  No difficulty with ambulation.    REVIEW OF SYSTEMS:    Constitutional: No fever, chills, fatigue, weakness  Eyes: no eye pain, visual disturbances, or discharge  ENT:  No difficulty hearing, tinnitus, vertigo; No sinus or throat pain  Neck: No pain or stiffness  Respiratory: No cough, dyspnea, wheezing   Cardiovascular: No chest pain, palpitations,   Gastrointestinal: No abdominal or epigastric pain. No nausea, vomiting  No diarrhea or constipation.   Genitourinary: No dysuria, frequency, hematuria or incontinence  Neurological: No headaches, lightheadedness, vertigo, numbness or tremors  Psychiatric: No depression, anxiety, mood swings or difficulty sleeping  Musculoskeletal: No joint pain or swelling; No muscle, back or extremity pain  Skin: No itching, burning, rashes or lesions   Lymph Nodes: No enlarged glands  Endocrine: No heat or cold intolerance; No hair loss, No h/o diabetes or thyroid dysfunction  Allergy and Immunologic: No hives or eczema    MEDICATIONS    acetaminophen     Tablet .. 650 milliGRAM(s) Oral every 6 hours PRN  aluminum hydroxide/magnesium hydroxide/simethicone Suspension 30 milliLiter(s) Oral every 4 hours PRN  levETIRAcetam 1000 milliGRAM(s) Oral two times a day  LORazepam     Tablet 2 milliGRAM(s) Oral every 1 hour PRN  melatonin 3 milliGRAM(s) Oral at bedtime PRN  ondansetron Injectable 4 milliGRAM(s) IV Push every 8 hours PRN  potassium chloride   Powder 40 milliEquivalent(s) Oral every 4 hours      PMH: Leg pain    No pertinent past medical history    Seizure disorder    H/O: alcohol abuse    HTN (hypertension)    History of alcohol use         PSH: No significant past surgical history    Status post skin graft        Family history: No history of dementia, strokes, or seizures   FAMILY HISTORY:  FH: type 2 diabetes  Grandmother        SOCIAL HISTORY:  No history of tobacco or alcohol use     Allergies    No Known Allergies    Intolerances        Height (cm): 157.5 (09-23 @ 15:13)  Weight (kg): 63.2 (09-24 @ 01:41)  BMI (kg/m2): 25.5 (09-24 @ 01:41)    Vital Signs Last 24 Hrs  T(C): 37 (24 Sep 2024 05:25), Max: 37.2 (23 Sep 2024 23:33)  T(F): 98.6 (24 Sep 2024 05:25), Max: 98.9 (23 Sep 2024 23:33)  HR: 84 (24 Sep 2024 05:25) (84 - 187)  BP: 127/87 (24 Sep 2024 05:25) (127/87 - 161/110)  BP(mean): 108 (23 Sep 2024 23:33) (108 - 108)  RR: 18 (24 Sep 2024 05:25) (18 - 20)  SpO2: 100% (24 Sep 2024 05:25) (97% - 100%)    Parameters below as of 24 Sep 2024 05:25  Patient On (Oxygen Delivery Method): room air          On Neurological Examination:    Mental Status - Patient is alert, awake, oriented X3. fluent, names, no dysarthria no aphasia Follows commands well and able to answer questions appropriately. Mood and affect  normal    Cranial Nerves - PERRL, EOMI, VFF, V1-V3 intact, no gross facial asymmetry, tongue/uvula midline    Motor Exam -   TURNER 5/5    Sensory    Intact to light touch and pinprick bilaterally    Coord: FTN intact bilaterally     Gait -  normal             LABS:  CBC Full  -  ( 23 Sep 2024 15:48 )  WBC Count : 6.13 K/uL  RBC Count : 4.02 M/uL  Hemoglobin : 9.6 g/dL  Hematocrit : 31.0 %  Platelet Count - Automated : 196 K/uL  Mean Cell Volume : 77.1 fl  Mean Cell Hemoglobin : 23.9 pg  Mean Cell Hemoglobin Concentration : 31.0 g/dL  Auto Neutrophil # : 3.69 K/uL  Auto Lymphocyte # : 1.82 K/uL  Auto Monocyte # : 0.53 K/uL  Auto Eosinophil # : 0.01 K/uL  Auto Basophil # : 0.04 K/uL  Auto Neutrophil % : 60.1 %  Auto Lymphocyte % : 29.7 %  Auto Monocyte % : 8.6 %  Auto Eosinophil % : 0.2 %  Auto Basophil % : 0.7 %    Urinalysis Basic - ( 24 Sep 2024 05:55 )    Color: x / Appearance: x / SG: x / pH: x  Gluc: 80 mg/dL / Ketone: x  / Bili: x / Urobili: x   Blood: x / Protein: x / Nitrite: x   Leuk Esterase: x / RBC: x / WBC x   Sq Epi: x / Non Sq Epi: x / Bacteria: x      09-24    133[L]  |  100  |  10  ----------------------------<  80  2.9[LL]   |  22  |  0.59    Ca    8.3[L]      24 Sep 2024 05:55    TPro  9.6[H]  /  Alb  3.9  /  TBili  0.8  /  DBili  x   /  AST  77[H]  /  ALT  30  /  AlkPhos  76  09-23    LIVER FUNCTIONS - ( 23 Sep 2024 15:48 )  Alb: 3.9 g/dL / Pro: 9.6 gm/dL / ALK PHOS: 76 U/L / ALT: 30 U/L / AST: 77 U/L / GGT: x           Hemoglobin A1C:             RADIOLOGY  CTH   < from: CT Head No Cont (09.23.24 @ 17:20) >  IMPRESSION:  No acute intracranial hemorrhage, mass effect, or midline shift.        --- End of Report ---          < end of copied text >  < from: MR Brain-Seizure, Epilepsy w/wo IV Cont (06.14.24 @ 19:46) >  IMPRESSION: No acute infarction. No abnormal enhancement.    --- End of Report ---      < end of copied text >

## 2024-09-24 NOTE — PROGRESS NOTE ADULT - ASSESSMENT
La Ozuna is a 29 year old female with PMHx of HTN and seizure disorder who presented to the ED on 9/23/24 for complaints of witnessed seizure like activity and admitted for breakthrough seizures.          now sarted on vimpat   potassium replaced   follow basic in am       Breakthrough seizures  In pt with known hx of seizure disorder  Reports seizure at work last week where she bit her tongue  Fell off her bed today, landed on R. sided of body and hit head, no associated tongue biting, urinary or bowel incontinence today  CT head without acute intracranial findings, CXR unremarkable  Recent MRI brain (on 6/14/24) without abnormal enhancement  Recent EEG (on 8/28/24) without epileptiform abnormalities  S/p 1L NS bolus, levetiracetam 1 g IV, lorazepam 1 mg IV in the ED  PTA levetiracetam 1 g BID  Will defer need for potential EEG to neurology given recent EEG  F/u levetiracetam level, prolactin, Utox  Telemetry  Pending official neurology consult - please f/u in AM    Lactic acidosis secondary to above  HAGMA   Lactic acid 2.6 and AG 24 on admission  S/p 1L NS bolus in the ED, additional bolus ordered  F/u serial lactates and BMP    Alcohol use  Reports she quit drinking  Blood alcohol level 18 on admission  CIWA protocol, ativan PRN - ideally would not give benzos given likelihood it will skew EEG results      Chronic medical conditions:  HTN: BP as elevated as 161/110 on admission, not on any PTA meds, chart review revealed patient is supposed to be on amlodipine according to recent discharge summary in 8/2024

## 2024-09-25 VITALS — DIASTOLIC BLOOD PRESSURE: 89 MMHG | SYSTOLIC BLOOD PRESSURE: 132 MMHG

## 2024-09-25 LAB
ALBUMIN SERPL ELPH-MCNC: 3 G/DL — LOW (ref 3.3–5)
ALP SERPL-CCNC: 63 U/L — SIGNIFICANT CHANGE UP (ref 40–120)
ALT FLD-CCNC: 30 U/L — SIGNIFICANT CHANGE UP (ref 12–78)
ANION GAP SERPL CALC-SCNC: 9 MMOL/L — SIGNIFICANT CHANGE UP (ref 5–17)
AST SERPL-CCNC: 69 U/L — HIGH (ref 15–37)
BILIRUB SERPL-MCNC: 0.9 MG/DL — SIGNIFICANT CHANGE UP (ref 0.2–1.2)
BUN SERPL-MCNC: 6 MG/DL — LOW (ref 7–23)
CALCIUM SERPL-MCNC: 8.7 MG/DL — SIGNIFICANT CHANGE UP (ref 8.5–10.1)
CHLORIDE SERPL-SCNC: 101 MMOL/L — SIGNIFICANT CHANGE UP (ref 96–108)
CO2 SERPL-SCNC: 23 MMOL/L — SIGNIFICANT CHANGE UP (ref 22–31)
CREAT SERPL-MCNC: 0.5 MG/DL — SIGNIFICANT CHANGE UP (ref 0.5–1.3)
EGFR: 129 ML/MIN/1.73M2 — SIGNIFICANT CHANGE UP
GLUCOSE SERPL-MCNC: 85 MG/DL — SIGNIFICANT CHANGE UP (ref 70–99)
HCT VFR BLD CALC: 27.9 % — LOW (ref 34.5–45)
HGB BLD-MCNC: 8.9 G/DL — LOW (ref 11.5–15.5)
MAGNESIUM SERPL-MCNC: 1.2 MG/DL — LOW (ref 1.6–2.6)
MCHC RBC-ENTMCNC: 24.3 PG — LOW (ref 27–34)
MCHC RBC-ENTMCNC: 31.9 G/DL — LOW (ref 32–36)
MCV RBC AUTO: 76.2 FL — LOW (ref 80–100)
NRBC # BLD: 0 /100 WBCS — SIGNIFICANT CHANGE UP (ref 0–0)
PHOSPHATE SERPL-MCNC: 2.1 MG/DL — LOW (ref 2.5–4.5)
PLATELET # BLD AUTO: 143 K/UL — LOW (ref 150–400)
POTASSIUM SERPL-MCNC: 3.5 MMOL/L — SIGNIFICANT CHANGE UP (ref 3.5–5.3)
POTASSIUM SERPL-SCNC: 3.5 MMOL/L — SIGNIFICANT CHANGE UP (ref 3.5–5.3)
PROT SERPL-MCNC: 8 GM/DL — SIGNIFICANT CHANGE UP (ref 6–8.3)
RBC # BLD: 3.66 M/UL — LOW (ref 3.8–5.2)
RBC # FLD: 22.6 % — HIGH (ref 10.3–14.5)
SODIUM SERPL-SCNC: 133 MMOL/L — LOW (ref 135–145)
WBC # BLD: 3.58 K/UL — LOW (ref 3.8–10.5)
WBC # FLD AUTO: 3.58 K/UL — LOW (ref 3.8–10.5)

## 2024-09-25 PROCEDURE — 99239 HOSP IP/OBS DSCHRG MGMT >30: CPT

## 2024-09-25 RX ORDER — MAGNESIUM SULFATE 500 MG/ML
2 VIAL (ML) INJECTION ONCE
Refills: 0 | Status: COMPLETED | OUTPATIENT
Start: 2024-09-25 | End: 2024-09-25

## 2024-09-25 RX ORDER — POTASSIUM PHOSPHATE, MONOBASIC POTASSIUM PHOSPHATE, DIBASIC 224; 236 MG/ML; MG/ML
15 INJECTION, SOLUTION, CONCENTRATE INTRAVENOUS ONCE
Refills: 0 | Status: COMPLETED | OUTPATIENT
Start: 2024-09-25 | End: 2024-09-25

## 2024-09-25 RX ORDER — LACOSAMIDE 10 MG/ML
50 SOLUTION ORAL ONCE
Refills: 0 | Status: DISCONTINUED | OUTPATIENT
Start: 2024-09-25 | End: 2024-09-25

## 2024-09-25 RX ORDER — LACOSAMIDE 10 MG/ML
50 SOLUTION ORAL
Refills: 0 | Status: DISCONTINUED | OUTPATIENT
Start: 2024-09-25 | End: 2024-09-25

## 2024-09-25 RX ORDER — LACOSAMIDE 10 MG/ML
1 SOLUTION ORAL
Qty: 60 | Refills: 0
Start: 2024-09-25 | End: 2024-10-24

## 2024-09-25 RX ADMIN — Medication 1000 MILLIGRAM(S): at 18:14

## 2024-09-25 RX ADMIN — Medication 25 GRAM(S): at 13:33

## 2024-09-25 RX ADMIN — LACOSAMIDE 50 MILLIGRAM(S): 10 SOLUTION ORAL at 18:14

## 2024-09-25 RX ADMIN — POTASSIUM PHOSPHATE, MONOBASIC POTASSIUM PHOSPHATE, DIBASIC 62.5 MILLIMOLE(S): 224; 236 INJECTION, SOLUTION, CONCENTRATE INTRAVENOUS at 13:27

## 2024-09-25 RX ADMIN — Medication 600 MILLIGRAM(S): at 13:26

## 2024-09-25 RX ADMIN — Medication 1000 MILLIGRAM(S): at 05:18

## 2024-09-25 NOTE — PATIENT PROFILE ADULT - FALL HARM RISK - HARM RISK INTERVENTIONS
Assistance with ambulation/Assistance OOB with selected safe patient handling equipment/Communicate Risk of Fall with Harm to all staff/Discuss with provider need for PT consult/Monitor for mental status changes/Monitor gait and stability/Reinforce activity limits and safety measures with patient and family/Tailored Fall Risk Interventions/Toileting schedule using arm’s reach rule for commode and bathroom/Use of alarms - bed, chair and/or voice tab/Visual Cue: Yellow wristband and red socks/Bed in lowest position, wheels locked, appropriate side rails in place/Call bell, personal items and telephone in reach/Instruct patient to call for assistance before getting out of bed or chair/Non-slip footwear when patient is out of bed/[place] to call system/Physically safe environment - no spills, clutter or unnecessary equipment/Purposeful Proactive Rounding/Room/bathroom lighting operational, light cord in reach

## 2024-09-25 NOTE — DISCHARGE NOTE PROVIDER - NSDCCPCAREPLAN_GEN_ALL_CORE_FT
PRINCIPAL DISCHARGE DIAGNOSIS  Diagnosis: Seizures  Assessment and Plan of Treatment:       SECONDARY DISCHARGE DIAGNOSES  Diagnosis: HTN (hypertension)  Assessment and Plan of Treatment:     Diagnosis: Alcohol use  Assessment and Plan of Treatment:     Diagnosis: Seizure disorder  Assessment and Plan of Treatment:     Diagnosis: Hypophosphatemia  Assessment and Plan of Treatment:     Diagnosis: Hypomagnesemia  Assessment and Plan of Treatment:     Diagnosis: Hypokalemia  Assessment and Plan of Treatment:

## 2024-09-25 NOTE — PROGRESS NOTE ADULT - SUBJECTIVE AND OBJECTIVE BOX
Patient seen and examined this am. No new events    MEDICATIONS:    acetaminophen     Tablet .. 650 milliGRAM(s) Oral every 6 hours PRN  aluminum hydroxide/magnesium hydroxide/simethicone Suspension 30 milliLiter(s) Oral every 4 hours PRN  lacosamide 50 milliGRAM(s) Oral two times a day  levETIRAcetam 1000 milliGRAM(s) Oral two times a day  LORazepam     Tablet 2 milliGRAM(s) Oral every 1 hour PRN  melatonin 3 milliGRAM(s) Oral at bedtime PRN  ondansetron Injectable 4 milliGRAM(s) IV Push every 8 hours PRN      LABS:                          8.9    3.58  )-----------( 143      ( 25 Sep 2024 08:48 )             27.9     09-25    133[L]  |  101  |  6[L]  ----------------------------<  85  3.5   |  23  |  0.50    Ca    8.7      25 Sep 2024 08:48  Phos  2.1     09-25  Mg     1.2     09-25    TPro  8.0  /  Alb  3.0[L]  /  TBili  0.9  /  DBili  x   /  AST  69[H]  /  ALT  30  /  AlkPhos  63  09-25    CAPILLARY BLOOD GLUCOSE          Urinalysis Basic - ( 25 Sep 2024 08:48 )    Color: x / Appearance: x / SG: x / pH: x  Gluc: 85 mg/dL / Ketone: x  / Bili: x / Urobili: x   Blood: x / Protein: x / Nitrite: x   Leuk Esterase: x / RBC: x / WBC x   Sq Epi: x / Non Sq Epi: x / Bacteria: x      I&O's Summary    Vital Signs Last 24 Hrs  T(C): 36.9 (25 Sep 2024 05:01), Max: 36.9 (24 Sep 2024 10:33)  T(F): 98.5 (25 Sep 2024 05:01), Max: 98.5 (24 Sep 2024 17:51)  HR: 65 (25 Sep 2024 05:01) (65 - 105)  BP: 121/84 (25 Sep 2024 05:01) (121/84 - 131/92)  BP(mean): --  RR: 18 (25 Sep 2024 05:01) (18 - 19)  SpO2: 99% (25 Sep 2024 05:01) (98% - 100%)    Parameters below as of 25 Sep 2024 05:01  Patient On (Oxygen Delivery Method): room air            On Neurological Examination:    Mental Status - Patient is alert, awake, oriented X3. fluent, names, no dysarthria no aphasia Follows commands well and able to answer questions appropriately. Mood and affect  normal    Cranial Nerves - PERRL, EOMI, VFF, V1-V3 intact, no gross facial asymmetry, tongue/uvula midline    Motor Exam -   TURNER 5/5    Sensory    Intact to light touch and pinprick bilaterally    Coord: FTN intact bilaterally     Gait -  normal             LABS:  CBC Full  -  ( 23 Sep 2024 15:48 )  WBC Count : 6.13 K/uL  RBC Count : 4.02 M/uL  Hemoglobin : 9.6 g/dL  Hematocrit : 31.0 %  Platelet Count - Automated : 196 K/uL  Mean Cell Volume : 77.1 fl  Mean Cell Hemoglobin : 23.9 pg  Mean Cell Hemoglobin Concentration : 31.0 g/dL  Auto Neutrophil # : 3.69 K/uL  Auto Lymphocyte # : 1.82 K/uL  Auto Monocyte # : 0.53 K/uL  Auto Eosinophil # : 0.01 K/uL  Auto Basophil # : 0.04 K/uL  Auto Neutrophil % : 60.1 %  Auto Lymphocyte % : 29.7 %  Auto Monocyte % : 8.6 %  Auto Eosinophil % : 0.2 %  Auto Basophil % : 0.7 %    Urinalysis Basic - ( 24 Sep 2024 05:55 )    Color: x / Appearance: x / SG: x / pH: x  Gluc: 80 mg/dL / Ketone: x  / Bili: x / Urobili: x   Blood: x / Protein: x / Nitrite: x   Leuk Esterase: x / RBC: x / WBC x   Sq Epi: x / Non Sq Epi: x / Bacteria: x      09-24    133[L]  |  100  |  10  ----------------------------<  80  2.9[LL]   |  22  |  0.59    Ca    8.3[L]      24 Sep 2024 05:55    TPro  9.6[H]  /  Alb  3.9  /  TBili  0.8  /  DBili  x   /  AST  77[H]  /  ALT  30  /  AlkPhos  76  09-23    LIVER FUNCTIONS - ( 23 Sep 2024 15:48 )  Alb: 3.9 g/dL / Pro: 9.6 gm/dL / ALK PHOS: 76 U/L / ALT: 30 U/L / AST: 77 U/L / GGT: x           Hemoglobin A1C:             RADIOLOGY  CTH   < from: CT Head No Cont (09.23.24 @ 17:20) >  IMPRESSION:  No acute intracranial hemorrhage, mass effect, or midline shift.        --- End of Report ---          < end of copied text >  < from: MR Brain-Seizure, Epilepsy w/wo IV Cont (06.14.24 @ 19:46) >  IMPRESSION: No acute infarction. No abnormal enhancement.    --- End of Report ---      < end of copied text >          
HPI:  La Ozuna is a 29 year old female with PMHx of HTN and seizure disorder who presented to the ED on 9/23/24 for complaints of witnessed seizure like activity.    Patient reports she had a seizure while at work last week. Associated tongue biting at that time. Today, patient fell off her bed while seizing. Landed on right side of body and hit her head. No associated tongue biting, urinary or bowel incontinence today. Developed right sided chest pain and right sided flank pain after hitting the ground which has since resolved. Reports compliance with keppra at home. States her seizures are typically due to alcohol withdrawal but she quit drinking. However, BAL 18.     Recent admission from 8/25/24 - 8/28/24 for seizure like activity. EEG unremarkable. Evaluated by neurology who recommends keppra 1 g BID. Found to have elevated blood pressure and started on amlodipine. Advised to follow up outpatient for blood pressure management.    In the ED, VSS except HR as elevated as 187 and BP as elevated as 161/110. No documented hypoxia but placed on 10L NRB due to anxiety? Hgb 9.6, sodium 134, potassium 3.3, AG 24. Lactic acid 2.6. ABG 7.72 / < 15 / 111. Blood alcohol level 18. CT head without acute intracranial findings. CXR unremarkable. Received 1L NS bolus, levetiracetam 1 g IV, lorazepam 1 mg IV, and ibuprofen 600 mg. ER physician discussed with neurologist who recommended keppra load. (23 Sep 2024 22:11)  Patient is a 29y old  Female who presents with a chief complaint of Breakthrough seizures (24 Sep 2024 09:44)      INTERVAL HPI/OVERNIGHT EVENTS:    MEDICATIONS  (STANDING):  lacosamide 50 milliGRAM(s) Oral two times a day  levETIRAcetam 1000 milliGRAM(s) Oral two times a day    MEDICATIONS  (PRN):  acetaminophen     Tablet .. 650 milliGRAM(s) Oral every 6 hours PRN Temp greater or equal to 38C (100.4F), Mild Pain (1 - 3)  aluminum hydroxide/magnesium hydroxide/simethicone Suspension 30 milliLiter(s) Oral every 4 hours PRN Dyspepsia  LORazepam     Tablet 2 milliGRAM(s) Oral every 1 hour PRN CIWA-Ar score 8 or greater  melatonin 3 milliGRAM(s) Oral at bedtime PRN Insomnia  ondansetron Injectable 4 milliGRAM(s) IV Push every 8 hours PRN Nausea and/or Vomiting      Allergies    No Known Allergies    Intolerances        REVIEW OF SYSTEMS:  CONSTITUTIONAL: No fever, weight loss, or fatigue  EYES: No eye pain, visual disturbances, or discharge  ENMT:  No difficulty hearing, tinnitus, vertigo; No sinus or throat pain  NECK: No pain or stiffness  BREASTS: No pain, masses, or nipple discharge  RESPIRATORY: No cough, wheezing, chills or hemoptysis; No shortness of breath  CARDIOVASCULAR: No chest pain, palpitations, dizziness, or leg swelling  GASTROINTESTINAL: No abdominal or epigastric pain. No nausea, vomiting, or hematemesis; No diarrhea or constipation. No melena or hematochezia.  GENITOURINARY: No dysuria, frequency, hematuria, or incontinence  NEUROLOGICAL: No headaches, memory loss, loss of strength, numbness, or tremors  SKIN: No itching, burning, rashes, or lesions   LYMPH NODES: No enlarged glands  ENDOCRINE: No heat or cold intolerance; No hair loss  MUSCULOSKELETAL: No joint pain or swelling; No muscle, back, or extremity pain  PSYCHIATRIC: No depression, anxiety, mood swings, or difficulty sleeping  HEME/LYMPH: No easy bruising, or bleeding gums  ALLERGY AND IMMUNOLOGIC: No hives or eczema    Vital Signs Last 24 Hrs  T(C): 36.9 (25 Sep 2024 05:01), Max: 36.9 (24 Sep 2024 10:33)  T(F): 98.5 (25 Sep 2024 05:01), Max: 98.5 (24 Sep 2024 17:51)  HR: 65 (25 Sep 2024 05:01) (65 - 105)  BP: 121/84 (25 Sep 2024 05:01) (121/84 - 131/92)  BP(mean): --  RR: 18 (25 Sep 2024 05:01) (18 - 19)  SpO2: 99% (25 Sep 2024 05:01) (98% - 100%)    Parameters below as of 25 Sep 2024 05:01  Patient On (Oxygen Delivery Method): room air        PHYSICAL EXAM:  GENERAL: NAD, well-groomed, well-developed  HEAD:  Atraumatic, Normocephalic  EYES: EOMI, PERRLA, conjunctiva and sclera clear  ENMT: No tonsillar erythema, exudates, or enlargement; Moist mucous membranes, Good dentition, No lesions  NECK: Supple, No JVD, Normal thyroid  NERVOUS SYSTEM:  Alert & Oriented X3, Good concentration; Motor Strength 5/5 B/L upper and lower extremities; DTRs 2+ intact and symmetric  CHEST/LUNG: Clear to ascultation  bilaterally; No rales, rhonchi, wheezing, or rubs  HEART: Regular rate and rhythm; No murmurs, rubs, or gallops  ABDOMEN: Soft, Nontender, Nondistended; Bowel sounds present  EXTREMITIES:  2+ Peripheral Pulses, No clubbing, cyanosis, or edema  LYMPH: No lymphadenopathy noted  SKIN: No rashes or lesions    LABS:                        9.6    6.13  )-----------( 196      ( 23 Sep 2024 15:48 )             31.0     09-24    133[L]  |  100  |  10  ----------------------------<  80  2.9[LL]   |  22  |  0.59    Ca    8.3[L]      24 Sep 2024 05:55    TPro  9.6[H]  /  Alb  3.9  /  TBili  0.8  /  DBili  x   /  AST  77[H]  /  ALT  30  /  AlkPhos  76  09-23      Urinalysis Basic - ( 24 Sep 2024 05:55 )    Color: x / Appearance: x / SG: x / pH: x  Gluc: 80 mg/dL / Ketone: x  / Bili: x / Urobili: x   Blood: x / Protein: x / Nitrite: x   Leuk Esterase: x / RBC: x / WBC x   Sq Epi: x / Non Sq Epi: x / Bacteria: x      CAPILLARY BLOOD GLUCOSE          RADIOLOGY & ADDITIONAL TESTS:    Imaging Personally Reviewed:  [ ] YES  [ ] NO    Consultant(s) Notes Reviewed:  [ ] YES  [ ] NO    Care Discussed with Consultants/Other Providers [ ] YES  [ ] NO

## 2024-09-25 NOTE — DISCHARGE NOTE NURSING/CASE MANAGEMENT/SOCIAL WORK - PATIENT PORTAL LINK FT
You can access the FollowMyHealth Patient Portal offered by [facility] by registering at the following website: [email or web address]. By joining [employer]’s FollowMyHealth portal, you will also be able to view your health information using other applications (apps) compatible with our system.

## 2024-09-25 NOTE — DISCHARGE NOTE PROVIDER - NSDCMRMEDTOKEN_GEN_ALL_CORE_FT
lacosamide 50 mg oral tablet: 1 tab(s) orally 2 times a day MDD: two tabs  levETIRAcetam 1000 mg oral tablet: 1 tab(s) orally 2 times a day

## 2024-09-25 NOTE — DISCHARGE NOTE PROVIDER - HOSPITAL COURSE
HPI:  La Ozuna is a 29 year old female with PMHx of HTN and seizure disorder who presented to the ED on 9/23/24 for complaints of witnessed seizure like activity.    Patient reports she had a seizure while at work last week. Associated tongue biting at that time. Today, patient fell off her bed while seizing. Landed on right side of body and hit her head. No associated tongue biting, urinary or bowel incontinence today. Developed right sided chest pain and right sided flank pain after hitting the ground which has since resolved. Reports compliance with keppra at home. States her seizures are typically due to alcohol withdrawal but she quit drinking. However, BAL 18.     Recent admission from 8/25/24 - 8/28/24 for seizure like activity. EEG unremarkable. Evaluated by neurology who recommends keppra 1 g BID. Found to have elevated blood pressure and started on amlodipine. Advised to follow up outpatient for blood pressure management.    In the ED, VSS except HR as elevated as 187 and BP as elevated as 161/110. No documented hypoxia but placed on 10L NRB due to anxiety? Hgb 9.6, sodium 134, potassium 3.3, AG 24. Lactic acid 2.6. ABG 7.72 / < 15 / 111. Blood alcohol level 18. CT head without acute intracranial findings. CXR unremarkable. Received 1L NS bolus, levetiracetam 1 g IV, lorazepam 1 mg IV, and ibuprofen 600 mg. ER physician discussed with neurologist who recommended keppra load. (23 Sep 2024 22:11)    La Ozuna is a 29 year old female with PMHx of HTN and seizure disorder who presented to the ED on 9/23/24 for complaints of witnessed seizure like activity. 3 in  the past week  Neuro exam non-focal  CTH-  MRI brain in 6/24 wnl  Unclear med compliance - states she has not missed Keppra doses. Is on levitracetam at home,    Impression: seizure    Continue with Keppra 1000 mg BID   Vimpat 50 mg BID  Can follow up with her Neurologist        Patient with HYPOkalemia, HYPOMagnesia, HYpophosphatemia replaced durintg this admission

## 2024-09-25 NOTE — PROGRESS NOTE ADULT - ASSESSMENT
La Ozuna is a 29 year old female with PMHx of HTN and seizure disorder who presented to the ED on 9/23/24 for complaints of witnessed seizure like activity. 3 in  the past week  Neuro exam non-focal  CTH-  MRI brain in 6/24 wnl  Unclear med compliance - states she has not missed Keppra doses. Is on levitracetam at home,    Impression: seizure    Continue with Keppra 1000 mg BID   Vimpat 50 mg BID  Can follow up with her Neurologist  DC planning  -No further inpatient Neurologic workup at this time

## 2024-09-25 NOTE — DISCHARGE NOTE NURSING/CASE MANAGEMENT/SOCIAL WORK - NSDCPEFALRISK_GEN_ALL_CORE
For information on Fall & Injury Prevention, visit: [email or web address] OR  [email or web address] OR  https://www.cdc.gov/steadi/patient.html

## 2024-09-26 LAB — LEVETIRACETAM SERPL-MCNC: <2 UG/ML — LOW (ref 10–40)

## 2024-10-03 DIAGNOSIS — G40.909 EPILEPSY, UNSPECIFIED, NOT INTRACTABLE, WITHOUT STATUS EPILEPTICUS: ICD-10-CM

## 2024-10-03 DIAGNOSIS — E87.6 HYPOKALEMIA: ICD-10-CM

## 2024-10-03 DIAGNOSIS — E87.20 ACIDOSIS, UNSPECIFIED: ICD-10-CM

## 2024-10-03 DIAGNOSIS — E83.39 OTHER DISORDERS OF PHOSPHORUS METABOLISM: ICD-10-CM

## 2024-10-03 DIAGNOSIS — Z79.899 OTHER LONG TERM (CURRENT) DRUG THERAPY: ICD-10-CM

## 2024-10-03 DIAGNOSIS — I10 ESSENTIAL (PRIMARY) HYPERTENSION: ICD-10-CM

## 2024-10-03 DIAGNOSIS — F10.90 ALCOHOL USE, UNSPECIFIED, UNCOMPLICATED: ICD-10-CM
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
## 2024-10-03 DIAGNOSIS — E83.42 HYPOMAGNESEMIA: ICD-10-CM

## 2024-11-09 ENCOUNTER — EMERGENCY (EMERGENCY)
Facility: HOSPITAL | Age: 29
LOS: 0 days | Discharge: HOME | End: 2024-11-09
Attending: STUDENT IN AN ORGANIZED HEALTH CARE EDUCATION/TRAINING PROGRAM
Payer: COMMERCIAL

## 2024-11-09 VITALS
WEIGHT: 138.01 LBS | SYSTOLIC BLOOD PRESSURE: 157 MMHG | OXYGEN SATURATION: 95 % | TEMPERATURE: 98 F | HEIGHT: 62 IN | DIASTOLIC BLOOD PRESSURE: 90 MMHG | HEART RATE: 58 BPM | RESPIRATION RATE: 16 BRPM

## 2024-11-09 VITALS
DIASTOLIC BLOOD PRESSURE: 98 MMHG | HEIGHT: 62 IN | RESPIRATION RATE: 18 BRPM | TEMPERATURE: 98 F | SYSTOLIC BLOOD PRESSURE: 136 MMHG | HEART RATE: 76 BPM | WEIGHT: 138.01 LBS | OXYGEN SATURATION: 98 %

## 2024-11-09 DIAGNOSIS — R56.9 UNSPECIFIED CONVULSIONS: ICD-10-CM

## 2024-11-09 DIAGNOSIS — Z88.1 ALLERGY STATUS TO OTHER ANTIBIOTIC AGENTS: ICD-10-CM

## 2024-11-09 DIAGNOSIS — Z94.5 SKIN TRANSPLANT STATUS: Chronic | ICD-10-CM

## 2024-11-09 DIAGNOSIS — M25.551 PAIN IN RIGHT HIP: ICD-10-CM

## 2024-11-09 DIAGNOSIS — F10.20 ALCOHOL DEPENDENCE, UNCOMPLICATED: ICD-10-CM

## 2024-11-09 DIAGNOSIS — G40.909 EPILEPSY, UNSPECIFIED, NOT INTRACTABLE, WITHOUT STATUS EPILEPTICUS: ICD-10-CM

## 2024-11-09 LAB — HCG UR QL: NEGATIVE — SIGNIFICANT CHANGE UP

## 2024-11-09 PROCEDURE — 99283 EMERGENCY DEPT VISIT LOW MDM: CPT

## 2024-11-09 PROCEDURE — 99284 EMERGENCY DEPT VISIT MOD MDM: CPT

## 2024-11-09 PROCEDURE — 73502 X-RAY EXAM HIP UNI 2-3 VIEWS: CPT | Mod: 26,RT

## 2024-11-09 RX ORDER — LEVETIRACETAM 10 MG/ML
1500 INJECTION, SOLUTION INTRAVENOUS ONCE
Refills: 0 | Status: COMPLETED | OUTPATIENT
Start: 2024-11-09 | End: 2024-11-09

## 2024-11-09 RX ORDER — IBUPROFEN 200 MG
600 TABLET ORAL ONCE
Refills: 0 | Status: COMPLETED | OUTPATIENT
Start: 2024-11-09 | End: 2024-11-09

## 2024-11-09 RX ORDER — IBUPROFEN 200 MG
400 TABLET ORAL ONCE
Refills: 0 | Status: COMPLETED | OUTPATIENT
Start: 2024-11-09 | End: 2024-11-09

## 2024-11-09 RX ORDER — LEVETIRACETAM 10 MG/ML
3 INJECTION, SOLUTION INTRAVENOUS
Qty: 84 | Refills: 0
Start: 2024-11-09 | End: 2024-11-22

## 2024-11-09 RX ORDER — IBUPROFEN 200 MG
1 TABLET ORAL
Qty: 21 | Refills: 0
Start: 2024-11-09 | End: 2024-11-15

## 2024-11-09 RX ADMIN — Medication 400 MILLIGRAM(S): at 09:48

## 2024-11-09 RX ADMIN — LEVETIRACETAM 1500 MILLIGRAM(S): 10 INJECTION, SOLUTION INTRAVENOUS at 20:55

## 2024-11-09 RX ADMIN — LEVETIRACETAM 1500 MILLIGRAM(S): 10 INJECTION, SOLUTION INTRAVENOUS at 09:45

## 2024-11-09 RX ADMIN — Medication 600 MILLIGRAM(S): at 20:56

## 2024-11-10 PROBLEM — I10 ESSENTIAL (PRIMARY) HYPERTENSION: Chronic | Status: ACTIVE | Noted: 2024-09-23

## 2024-11-10 PROBLEM — Z87.898 PERSONAL HISTORY OF OTHER SPECIFIED CONDITIONS: Chronic | Status: ACTIVE | Noted: 2024-09-23

## 2024-12-03 NOTE — ED ADULT NURSE NOTE - NSFALLRISKASMT_ED_ALL_ED_DT
Detail Level: Detailed Quality 431: Preventive Care And Screening: Unhealthy Alcohol Use - Screening: Patient identified as an unhealthy alcohol user when screened for unhealthy alcohol use using a systematic screening method and received brief counseling Name And Contact Information For Health Care Proxy: Patricia Pulido (wife), [phone] Quality 47: Advance Care Plan: Advance Care Planning discussed and documented; advance care plan or surrogate decision maker documented in the medical record. Quality 226: Preventive Care And Screening: Tobacco Use: Screening And Cessation Intervention: Patient screened for tobacco use and is an ex/non-smoker 23-Sep-2024 15:55

## 2025-02-26 NOTE — ED PROVIDER NOTE - IV ALTEPLASE ADMIN OUTSIDE HIDDEN
Apixaban/Eliquis is used to treat and prevent blood clots. If you are not able to swallow the tablets whole, they may be crushed and mixed in water, apple juice, or applesauce and promptly taken within four hours. Never skip a dose of Apixaban/Eliquis. If you forget to take your Apixaban/Eliquis, take a dose as soon as you remember. If it is almost time for your next Apixaban/Eliquis dose, wait until then and take a regular dose. DO NOT take an extra pill to ‘catch up’.  NEVER TAKE A DOUBLE DOSE. Notify your doctor that you missed a dose. Take Apixaban/Eliquis at the same time each morning and evening. Apixaban/Eliquis may be taken with other medication or food.
show

## 2025-06-17 NOTE — ED ADULT NURSE NOTE - CADM POA URETHRAL CATHETER
06/17/25 1:20 PM     Chart reviewed for Mammogram ; nothing is submitted to the patient's insurance at this time.     Ivett Rolle MA   [facility]   No

## 2025-07-02 NOTE — ED ADULT TRIAGE NOTE - PAIN RATING/NUMBER SCALE (0-10): ACTIVITY
[facility] Chemo Progress Note    Date: July 2, 2025        1100: Ms. Lindsay Arrived to [facility] for  Taxol + [id number] C2 D1 ambulatory in stable condition.  Assessment was completed and port accessed by Pooja SHAHID. Labs drawn 7/1/25. Went to provider appointment with Medical Oncology.    Returned from provider appointment. Labs reviewed from 7/1/25. Criteria for treatment was met.    Ms. Lindsay's vitals were reviewed.  Patient Vitals for the past 12 hrs:   Temp Pulse Resp BP SpO2   07/02/25 1630 -- 89 -- (!) 136/99 --   07/02/25 1100 97.6 °F (36.4 °C) 89 18 132/84 100 %         Pre-medications  were administered as ordered and chemotherapy was initiated.  Medications Administered         ([id number]) investigational 1,400 mg in sodium chloride 0.9 % 250 mL IVPB Admin Date  07/02/2025 Action  New Bag Dose  1,400 mg Rate  275 mL/hr Route  IntraVENous Documented By  Pooja Cutler RN        *[facility]* PACLitaxel (TAXOL) 186 mg in sodium chloride 0.9 % 250 mL chemo infusion Admin Date  07/02/2025 Action  New Bag Dose  186 mg Rate  306 mL/hr Route  IntraVENous Documented By  Pooja Cutler RN        0.9 % sodium chloride infusion Admin Date  07/02/2025 Action  New Bag Dose  25 mL/hr Rate  25 mL/hr Route  IntraVENous Documented By  Pooja Cutler RN        dexAMETHasone (DECADRON) injection 10 mg Admin Date  07/02/2025 Action  Given Dose  10 mg Rate   Route  IntraVENous Documented By  Pooja Cutler RN        diphenhydrAMINE (BENADRYL) injection 25 mg Admin Date  07/02/2025 Action  Given Dose  25 mg Rate   Route  IntraVENous Documented By  Pooja Cutler RN        famotidine (PEPCID) injection 20 mg Admin Date  07/02/2025 Action  Given Dose  20 mg Rate   Route  IntraVENous Documented By  Pooja Cutler RN        sodium chloride flush 0.9 % injection 5-40 mL Admin Date  07/02/2025 Action  Given Dose  20 mL Rate   Route  IntraVENous Documented By  Pooja Cutler RN              Two nurses verified  8 (severe pain)